# Patient Record
Sex: FEMALE | Race: WHITE | Employment: OTHER | URBAN - METROPOLITAN AREA
[De-identification: names, ages, dates, MRNs, and addresses within clinical notes are randomized per-mention and may not be internally consistent; named-entity substitution may affect disease eponyms.]

---

## 2017-01-12 ENCOUNTER — APPOINTMENT (OUTPATIENT)
Dept: INFUSION THERAPY | Age: 72
End: 2017-01-12
Payer: MEDICARE

## 2017-01-23 ENCOUNTER — HOSPITAL ENCOUNTER (OUTPATIENT)
Dept: INFUSION THERAPY | Age: 72
Discharge: HOME | End: 2017-01-23
Payer: MEDICARE

## 2017-01-23 VITALS
HEART RATE: 87 BPM | DIASTOLIC BLOOD PRESSURE: 72 MMHG | SYSTOLIC BLOOD PRESSURE: 107 MMHG | TEMPERATURE: 98.2 F | RESPIRATION RATE: 20 BRPM

## 2017-01-23 PROCEDURE — 96401 CHEMO ANTI-NEOPL SQ/IM: CPT

## 2017-01-23 PROCEDURE — 74011250636 HC RX REV CODE- 250/636: Performed by: INTERNAL MEDICINE

## 2017-01-23 RX ADMIN — DENOSUMAB 60 MG: 60 INJECTION SUBCUTANEOUS at 11:35

## 2017-01-23 NOTE — PROGRESS NOTES
[facility] Progress Note    Date: 2017    Name: Benita Gutierres    MRN: [id number]         : 1945      Ms. Allen arrived to [facility] at 1130. Ms. Jackson Angulo was assessed and education was provided. Ms. Bob Stewart vitals were reviewed. Visit Vitals    /72 (BP 1 Location: Right arm, BP Patient Position: Sitting)    Pulse 87    Temp 98.2 °F (36.8 °C)    Resp 20    Breastfeeding No       Lab results reviewed from 2017. Prolia was administered as ordered SQ in patient's Left upper arm. Ms. Jackson Angulo tolerated well without complaints. Ms. Jackson Angulo was discharged from [place] [address] in stable condition at 1145. She is to return on 2017 at 1130 for her next appointment.     Jany Storey RN  2017

## 2017-02-15 ENCOUNTER — OFFICE VISIT (OUTPATIENT)
Dept: PAIN MANAGEMENT | Age: 72
End: 2017-02-15

## 2017-02-15 VITALS
SYSTOLIC BLOOD PRESSURE: 129 MMHG | DIASTOLIC BLOOD PRESSURE: 76 MMHG | HEART RATE: 109 BPM | WEIGHT: 171 LBS | BODY MASS INDEX: 32.31 KG/M2

## 2017-02-15 DIAGNOSIS — M96.1 POSTLAMINECTOMY SYNDROME, LUMBAR REGION: Primary | ICD-10-CM

## 2017-02-15 DIAGNOSIS — M47.817 LUMBOSACRAL SPONDYLOSIS WITHOUT MYELOPATHY: ICD-10-CM

## 2017-02-15 DIAGNOSIS — M54.17 LUMBOSACRAL RADICULOPATHY: ICD-10-CM

## 2017-02-15 DIAGNOSIS — Z79.899 ENCOUNTER FOR LONG-TERM (CURRENT) USE OF HIGH-RISK MEDICATION: ICD-10-CM

## 2017-02-15 DIAGNOSIS — G89.4 CHRONIC PAIN SYNDROME: ICD-10-CM

## 2017-02-15 DIAGNOSIS — M62.838 SPASM OF MUSCLE: ICD-10-CM

## 2017-02-15 RX ORDER — OXYCODONE AND ACETAMINOPHEN 10; 325 MG/1; MG/1
1 TABLET ORAL
Qty: 540 TAB | Refills: 0 | Status: SHIPPED | OUTPATIENT
Start: 2017-02-15 | End: 2017-05-11 | Stop reason: SDUPTHER

## 2017-02-15 NOTE — PROGRESS NOTES
Nursing Notes    Patient presents to the office today in follow-up. Patient rates her pain at 3/10 on the numerical pain scale. Reviewed medications with counts as follows:    Rx Date filled Qty Dispensed Pill Count Last Dose Short   Percocet 10 12/7/17 540/90 day 63 2/15/17 no         Comments:     POC UDS was not performed in office today    Any new labs or imaging since last appointment? NO    Have you been to an emergency room (ER) or urgent care clinic since your last visit? NO            Have you been hospitalized since your last visit? NO     If yes, where, when, and reason for visit? Have you seen or consulted any other health care providers outside of the [address] [place] [address]  since your last visit? NO     If yes, where, when, and reason for visit? Ms. Moisés Faith has a reminder for a \"due or due soon\" health maintenance. I have asked that she contact her primary care provider for follow-up on this health maintenance.

## 2017-02-15 NOTE — PROGRESS NOTES
HISTORY OF PRESENT ILLNESS  Gumaro Shi is a 73 y.o. female. HPI she returns for follow-up of chronic, severe pain involving the lumbar spine with radiation down both lower extremities posteriorly related to a post lumbar laminectomy pain syndrome. Pain continues under excellent control, averaging 3 out of 10. She does find that the change in weather has had an adverse effect on her pain levels overall. Pain level today 3 out of 10, outcome 6/28,(The lower the upper number, the better the outcome)  Physical activity and mobility, mood, and sleep are all good. She does require a walker at all times for ambulation and stability. Review of Systems   Constitutional: Negative for chills, fever and malaise/fatigue. HENT: Negative for hearing loss. Eyes: Negative. Respiratory: Negative. Negative for cough and hemoptysis. Cardiovascular: Positive for chest pain (with anxiety). Negative for palpitations and leg swelling. Gastrointestinal: Positive for constipation. Negative for nausea and vomiting. Genitourinary: Negative. Musculoskeletal: Positive for back pain (helped with rest ) and joint pain. Negative for falls, myalgias and neck pain. Neurological: Positive for tingling and sensory change. Negative for dizziness, seizures, loss of consciousness, weakness and headaches. Psychiatric/Behavioral: Positive for depression (on Paxil, and Xyprexa) and memory loss (short term memory). Negative for hallucinations, substance abuse and suicidal ideas. The patient is nervous/anxious. The patient does not have insomnia. All other systems reviewed and are negative. Physical Exam   Constitutional: She is oriented to person, place, and time. She appears well-developed and well-nourished. No distress. HENT:   Head: Normocephalic. Eyes: EOM are normal.   Neck: Normal range of motion. No spinous process tenderness and no muscular tenderness present. Normal range of motion present.  No thyromegaly present. Pulmonary/Chest: Effort normal.   Musculoskeletal: She exhibits tenderness (tenderness throughout lumbar spine/painful ROM). Right shoulder: She exhibits decreased range of motion, tenderness and pain. Left shoulder: She exhibits decreased range of motion, tenderness and pain. Right hip: She exhibits decreased range of motion and tenderness. Lumbar back: She exhibits decreased range of motion (painful), tenderness, pain and spasm. Neurological: She is alert and oriented to person, place, and time. Gait (antalgic/utilizing walker) abnormal.   Psychiatric: She has a normal mood and affect. Her behavior is normal. Judgment normal.   Son accompanies her to appointment   Nursing note and vitals reviewed. ASSESSMENT and PLAN  Encounter Diagnoses   Name Primary?  Postlaminectomy syndrome, lumbar region Yes    Lumbosacral radiculopathy     Lumbosacral spondylosis without myelopathy     Spasm of muscle     Chronic pain syndrome     Encounter for long-term (current) use of high-risk medication      She will continue on her current analgesic regimen as this is providing excellent pain control with improve functionality and minimal side effects. 3 month reassess her    No concerns are raised for misuse, abuse, or diversion. 1. Pain medications are prescribed with the objective of pain relief and improved physical and psychosocial function in this patient. 2. Counseled patient on proper use of prescribed medications and reviewed opioid contract. 3. Counseled patient about chronic medical conditions and their relationship to anxiety and depression and recommended mental health support as needed. 4. Reviewed with patient self-help tools, home exercise, and lifestyle changes to assist the patient in self-management of symptoms.   5. Advised patient to have a primary care provider to continue care for health maintenance and general medical conditions and support for referral to specialty care as needed. 6. Reviewed with patient the treatment plan, goals of treatment plan, and limitations of treatment plan, to include the potential for side effects from medications and procedures. If side effects occur, it is the responsibility of the patient to inform the clinic so that a change in the treatment plan can be made in a safe manner. The patient is advised that stopping prescribed medication may cause an increase in symptoms and possible medication withdrawal symptoms. The patient is informed an emergency room evaluation may be necessary if this occurs. DISPOSITION: The patients condition and plan were discussed at length and all questions were answered. The patient agrees with the plan.     Counseling occupied > 50% of visit:  Total time: 30 minutes

## 2017-04-10 ENCOUNTER — OFFICE VISIT (OUTPATIENT)
Dept: ORTHOPEDIC SURGERY | Age: 72
End: 2017-04-10

## 2017-04-10 VITALS
HEART RATE: 97 BPM | SYSTOLIC BLOOD PRESSURE: 130 MMHG | TEMPERATURE: 97.6 F | BODY MASS INDEX: 34.74 KG/M2 | HEIGHT: 61 IN | WEIGHT: 184 LBS | DIASTOLIC BLOOD PRESSURE: 72 MMHG

## 2017-04-10 DIAGNOSIS — M79.672 LEFT FOOT PAIN: ICD-10-CM

## 2017-04-10 DIAGNOSIS — M21.612 BUNION, LEFT: ICD-10-CM

## 2017-04-10 DIAGNOSIS — M19.072 PRIMARY OSTEOARTHRITIS OF BOTH FEET: Primary | ICD-10-CM

## 2017-04-10 DIAGNOSIS — M79.671 RIGHT FOOT PAIN: ICD-10-CM

## 2017-04-10 DIAGNOSIS — M19.071 PRIMARY OSTEOARTHRITIS OF BOTH FEET: Primary | ICD-10-CM

## 2017-04-10 NOTE — PATIENT INSTRUCTIONS
Please follow up in 5 weeks. You are advised to contact us if your condition worsens. The provider has ordered a custom brace/orthotic for you. This will be customized and made for you by an outside facility. Please contact the orthotist at one of the below offices for your custom fitting and follow up in the office with the doctor or his physicians assistant 3 weeks after you have been wearing the brace. Hi Bruce at [facility]:     [facility] [place] [address]   Phone: [phone]    Bunions: Care Instructions  Your Care Instructions    A bunion is a bump on the outside of the joint at the bottom of your big toe. It can cause pain and swelling in the toe. A bunion forms when bone or tissue around the joint becomes swollen from too much pressure. You also can have a bunionette, or tailor's bunion, which forms on the joint of the little toe. Sometimes, a bunion on the big toe turns the toe in toward the second toe. This is called displacement. It can lead to problems with the other toes. You can get a bunion from having an unusual walking style, having flatfeet, or wearing tight-fitting shoes. You can treat most bunions at home with a few simple steps. If you have a lot of pain, your doctor may inject medicine into the bunion to reduce swelling for a while. If you still have pain, you may need to have surgery. Follow-up care is a key part of your treatment and safety. Be sure to make and go to all appointments, and call your doctor if you are having problems. It's also a good idea to know your test results and keep a list of the medicines you take. How can you care for yourself at home? · Ask your doctor if you can take an over-the-counter pain medicine, such as acetaminophen (Tylenol), ibuprofen (Advil, Motrin), or naproxen (Aleve). Be safe with medicines. Read and follow all instructions on the label. · Wear shoes that have a wide and deep space for the toes.  Also, wear shoes that have low or flat heels and good arch supports. Do not wear tight, narrow, or high-heeled shoes. · Try bunion pads, arch supports, toe spacers, or shoe inserts. They can help shift your weight when you walk to take pressure off your big toe. · Put moleskin or another type of cushion on or around the bunion to keep it from rubbing against your shoe. · Put ice or a cold pack on the area for 10 to 20 minutes at a time as needed. Put a thin cloth between the ice and your skin. · Prop up your foot on a pillow when you ice your toe or anytime you sit or lie down. Try to keep it above the level of your heart. This will help reduce swelling. When should you call for help? Call your doctor now or seek immediate medical care if:  · You have severe pain in your big toe that keeps you from walking or doing other activities. · You have diabetes or peripheral arterial disease and the skin over a bunion is red, broken, or swollen. These diseases can reduce blood flow and feeling in your feet. This could make it easier for you to get an infection. Watch closely for changes in your health, and be sure to contact your doctor if:  · Your big toe starts to overlap or cross under your second toe. · Pain in your big toe does not get better after 2 to 3 weeks of care at home. Where can you learn more? Go to http://dar-shawna.info/. Enter H210 in the search box to learn more about \"Bunions: Care Instructions. \"  Current as of: October 19, 2016  Content Version: 11.2  © 3796-1724 [employer]. Care instructions adapted under license by [employer] (which disclaims liability or warranty for this information). If you have questions about a medical condition or this instruction, always ask your healthcare professional. [address] any warranty or liability for your use of this information.

## 2017-04-10 NOTE — PROCEDURES
DIAGNOSTIC STUDIES: X-rays of hte right foot, three views, AP, lateral, and oblique: These films show postop chnages from a right triple arthrodesis. The subtalar and transverse tarsal joints have been realigned such that the talo, first metatarsal angle is much improved and the talo, calcaneal pitch is restored. There is, however, some OA seen to the right great toe for which there is a mild bunion deformity on the right side and there is some abduction at the right first TMT region, abduction at the midfoot. There is generalized osteopenia throughout the entire forefoot and midfoot. The patient does have a history of osteoporosis. Left foot films, three views, AP, lateral, and oblique, weightbearing films:  Show severe abduction at the talonavicular region, severe bunion deformity in this left first metatarsal, generalized osteopenia throughout the metatarsals, severe planovalgus alignment in the left foot in looking at the lateral radiographic x-ray. There is flattening across the talonavicular and across the midfoot.

## 2017-04-10 NOTE — PROGRESS NOTES
AMBULATORY PROGRESS NOTE      Patient: Yen Mcfadden             MRN: [id number]     SSN: [id number] Body mass index is 34.77 kg/(m^2). YOB: 1945     AGE: 73 y.o. EX: female    PCP: Kolby Sierra MD    IMPRESSION/DIAGNOSIS AND TREATMENT PLAN     DIAGNOSES  1. Primary osteoarthritis of both feet    2. Right foot pain    3. Left foot pain    4. Bunion, left        Orders Placed This Encounter    AMB SUPPLY ORDER    AMB SUPPLY ORDER    [69752] Foot Min 3V    [56518] Foot Min 3V      Yen Mcfadden understands her diagnoses and the proposed plan. Plan:    1) Continue to ambulate with the seated walker. 2) Custom left hinged short AFO brace  3) Custom right SMO brace   4) Recommended shoes: New Balance shoes, Dr. Ubaldo Spangler, etc. (wide with depth). RTO - 5 weeks    HPI AND EXAMINATION     Yen Mcfadden IS A 73 y.o. female who presents to my outpatient office for a problem visit of left foot pain. The patient presents to the office today with her son, and she ambulates with a seated walker. The patient stated that she does not have any pain, however she was concerned about her left foot bunion alignment. Patient states she would like to have better alignment for ambulation. Patient's son states that his mother has decreased bone density and surgery is not the best option for her. Son also notes that she does not get out a lot and mainly ambulates within the home. Yen Mcfadden is alert/oriented (name, location, time) and follows commands well. she  is in no acute distress and her affect and mood are appropriate. Bilateral ANKLE and FOOT       Gait: slow and uses assistive device   Tenderness: No Tenderness  Cutaneous: No rashes, skin patches, wounds, or abrasions to the lower legs           Warm and Normal color. No regions of expressible drainage.            Medial Border of Tibia Region: absent           Skin color, texture, turgor normal. Normal.    RIGHT: Well healed lateral and medial scars  Joint Motion: ROM Ankle: Slight Decrease, Hindfoot: (ST,TN,CC) Slight Decrease, Forefoot toes: Slight Decrease at left forefoot  Neurologic Exam: Neuro: Motor: WNL and Sensory : normal light touch sensation. Contractures: Gastrocnemius or Achilles Contractures mild  Joint / Tendon Stability: No Ankle or Subtalar instability or joint laxity. No peroneal sublux ability or dislocation  Alignment: Right: Severe planovalgus alignment with abduction at the TN region and midfoot, Hammer toes (2 and 3), curved and angulated mallet deformity of the right third toe. LEFT: Severe abduction at the TN/NC joints, severe bunion alignment, hammer toe number four toe (rigid)  Vascular: Normal Pulses/ NL Capillary refill, No evidence of DVT seen on physical exam.   No calf swelling, no tenderness to calf muscles. Right: 1+ DP pulses   LEFT: 1 + DP pulses  Lymphatic:  No Evidence of Lymphedema. CHART REVIEW     Past Medical History:   Diagnosis Date    Chronic anxiety     Depression     controlled on Paxil    Diabetes (HCC)     DJD (degenerative joint disease)     GERD (gastroesophageal reflux disease)     High cholesterol     HTN (hypertension)     Hypertension     Low back pain     post-laminectomy syndrome and multilevel degenerative disease    Osteoporosis      Current Outpatient Prescriptions   Medication Sig    GOTU SILVIA HERB PO Take  by mouth.  oxyCODONE-acetaminophen (PERCOCET)  mg per tablet Take 1 Tab by mouth every four (4) hours as needed for Pain for up to 90 days. Mail order  Indications: Pain    furosemide (LASIX) 40 mg tablet take 1 tablet by mouth daily    lovastatin (MEVACOR) 40 mg tablet Take 1 Tab by mouth nightly.  oxybutynin chloride XL (DITROPAN XL) 10 mg CR tablet TAKE 1 TABLET DAILY    lansoprazole (PREVACID) 30 mg capsule Take 1 Cap by mouth Daily (before breakfast).     OLANZapine (ZYPREXA) 10 mg tablet Take 10 mg by mouth nightly.  potassium chloride SA (MICRO-K) 10 mEq capsule TAKE 1 CAPSULE TWICE DAILY    paroxetine (PAXIL) 20 mg tablet Take 20 mg by mouth daily. No current facility-administered medications for this visit. Allergies   Allergen Reactions    Aspirin Other (comments)     \"messes up my kidneys\"    Feldene [Piroxicam] Other (comments)     Kidney damage      Morphine Other (comments)     unconsciousness    Nsaids (Non-Steroidal Anti-Inflammatory Drug) Other (comments)     \"messes up my kidneys\"    Other Medication Not Reported This Time     Mycins      Penicillins Hives    Sulfa (Sulfonamide Antibiotics) Rash    Vancomycin Itching     Possible allergic reaction to Vancomycin. Complained of itching/reddness around forehead/back of neck     Past Surgical History:   Procedure Laterality Date    HX ADENOIDECTOMY      HX APPENDECTOMY      HX BACK SURGERY      PHLD X3    HX BREAST BIOPSY Left 8/28/2015    WIDE LOCAL EXCISION LEFT BREAST LESION performed by Luis Vasquez MD at [facility] MAIN OR    HX HERNIA REPAIR      hiatal    HX KNEE REPLACEMENT      HX ORTHOPAEDIC      bilateral shoulder surgery    HX ELVIRA AND BSO  1982    HX TONSILLECTOMY      TOTAL HIP ARTHROPLASTY  4/12     total right hip replacement, Dr. Kvng Hancock Not on file. Social History Main Topics    Smoking status: Never Smoker    Smokeless tobacco: Never Used    Alcohol use No    Drug use: No    Sexual activity: Not Currently     Family History   Problem Relation Age of Onset    Cancer Mother      melanoma    Cancer Father      lung    Heart Disease Maternal Grandmother     Diabetes Other     Hypertension Other     Headache Other     Seizures Other     Coronary Artery Disease Other     Heart Attack Other        REVIEW OF SYSTEMS : 4/10/2017  ALL BELOW ARE Negative except : SEE HPI       Constitutional: Negative for fever, chills and weight loss.  Neg Weigh Loss  Cardiovascular: Negative for chest pain, claudication and leg swelling. SOB, CUEVAS   Gastrointestinal: Negative for  pain, N/V/D/C, Blood in stool or urine,dysuria, hematuria,        Incontinence, pelvic pain  Musculoskeletal: see HPI. Neurological: Negative for dizziness and weakness. Negative for headaches,Visual Changes, Confusion, Seizures,   Psychiatric/Behavioral: Negative for depression, memory loss and substance abuse. Extremities:  Negative for  hair changes, rash or skin lesion changes. Hematologic: Negative for Bleeding problems, bruising, pallor or swollen lymph nodes. Peripheral Vascular: No calf pain, vascular vein tenderness to calf pain              No calf throbbing, posterior knee throbbing pain    DIAGNOSTIC IMAGING      Dictation on: 04/10/2017  2:08 PM by: Bren Lucio [[id number]]         Written by Dominga Mercado, as dictated by aNrgis Jones MD. IDr., Nargis Jones MD, confirm that all documentation is accurate.

## 2017-04-10 NOTE — MR AVS SNAPSHOT
Visit Information Date & Time Provider Department Dept. Phone Encounter #  
 4/10/2017  1:10 PM Gaby Mercer MD [facility] [phone] Your Appointments 5/10/2017 11:00 AM  
Follow Up with Trinity Mccormick MD  
[facility]) Appt Note: 3 mths [address] [place] [address]  
[phone]  [place] [phone] Upcoming Health Maintenance Date Due DTaP/Tdap/Td series (1 - Tdap) 8/4/1968 INFLUENZA AGE 9 TO ADULT 8/1/2016 MEDICARE YEARLY EXAM 8/21/2016 GLAUCOMA SCREENING Q2Y 3/1/2018 BREAST CANCER SCRN MAMMOGRAM 8/4/2018 COLONOSCOPY 8/21/2025 Allergies as of 4/10/2017  Review Complete On: 4/10/2017 By: Richard Mcgill Severity Noted Reaction Type Reactions Aspirin    Other (comments) \"messes up my kidneys\" Feldene [Piroxicam]  04/17/2012    Other (comments) Kidney damage Morphine    Other (comments)  
 unconsciousness Nsaids (Non-steroidal Anti-inflammatory Drug)  08/24/2011    Other (comments) \"messes up my kidneys\" Other Medication    Not Reported This Time Mycins Penicillins    Hives Sulfa (Sulfonamide Antibiotics)  04/17/2012    Rash Vancomycin  08/28/2015    Itching Possible allergic reaction to Vancomycin. Complained of itching/reddness around forehead/back of neck Current Immunizations  Reviewed on 1/23/2017 Name Date Influenza High Dose Vaccine PF 10/6/2015 11:46 AM  
 Influenza Vaccine 2/7/2014 Influenza Vaccine Split 12/5/2012, 11/29/2011 Influenza Vaccine Whole 11/4/2010 Pneumococcal Conjugate (PCV-13) 10/6/2015 11:49 AM  
 Pneumococcal Polysaccharide (PPSV-23) 12/5/2012 Not reviewed this visit You Were Diagnosed With   
  
 Codes Comments Bunion, left    -  Primary ICD-10-CM: V36.296 ICD-9-CM: 727.1 Right foot pain     ICD-10-CM: M79.671 ICD-9-CM: 729.5 Left foot pain     ICD-10-CM: L15.464 ICD-9-CM: 729.5 Vitals BP Pulse Temp Height(growth percentile) Weight(growth percentile) BMI  
 130/72 97 97.6 °F (36.4 °C) (Oral) 5' 1\" (1.549 m) 184 lb (83.5 kg) 34.77 kg/m2 OB Status Smoking Status Hysterectomy Never Smoker BMI and BSA Data Body Mass Index Body Surface Area 34.77 kg/m 2 1.9 m 2 Preferred Pharmacy Pharmacy Name Phone [address], [address] [place] [address] [phone] Your Updated Medication List  
  
   
This list is accurate as of: 4/10/17  1:59 PM.  Always use your most recent med list.  
  
  
  
  
 furosemide 40 mg tablet Commonly known as:  LASIX  
take 1 tablet by mouth daily GOTU SILVIA HERB PO Take  by mouth.  
  
 lansoprazole 30 mg capsule Commonly known as:  PREVACID Take 1 Cap by mouth Daily (before breakfast). lovastatin 40 mg tablet Commonly known as:  MEVACOR Take 1 Tab by mouth nightly. OLANZapine 10 mg tablet Commonly known as:  ZyPREXA Take 10 mg by mouth nightly. oxybutynin chloride XL 10 mg CR tablet Commonly known as:  DITROPAN XL  
TAKE 1 TABLET DAILY  
  
 oxyCODONE-acetaminophen  mg per tablet Commonly known as:  PERCOCET Take 1 Tab by mouth every four (4) hours as needed for Pain for up to 90 days. Mail order  Indications: Pain PAXIL 20 mg tablet Generic drug:  PARoxetine Take 20 mg by mouth daily. potassium chloride SA 10 mEq capsule Commonly known as:  MICRO-K  
TAKE 1 CAPSULE TWICE DAILY We Performed the Following AMB POC XRAY, FOOT; COMPLETE, 3+ VIEW [38935 CPT(R)] AMB POC XRAY, FOOT; COMPLETE, 3+ VIEW [66889 CPT(R)] AMB SUPPLY ORDER [[id number] Custom] Comments:  
 Custom right SMO brace AMB SUPPLY ORDER [[id number] Custom] Comments:  
 Order date: 4/10/2017 Custom left hinged short AFO brace Recommended shoes: New Balance shoes, Dr. Soo Saunders, etc. (wide with depth). To-Do List   
 07/24/2017 11:30 AM  
  Appointment with [facility] NURSE at [facility] ([phone]) Patient Instructions Please follow up in 5 weeks. You are advised to contact us if your condition worsens. The provider has ordered a custom brace/orthotic for you. This will be customized and made for you by an outside facility. Please contact the orthotist at one of the below offices for your custom fitting and follow up in the office with the doctor or his physicians assistant 3 weeks after you have been wearing the brace. Devyn Bird at [facility] Orth[facility]:  
 
[address] [place], [place] [address] [address] Phone: [phone] Bunions: Care Instructions Your Care Instructions A bunion is a bump on the outside of the joint at the bottom of your big toe. It can cause pain and swelling in the toe. A bunion forms when bone or tissue around the joint becomes swollen from too much pressure. You also can have a bunionette, or tailor's bunion, which forms on the joint of the little toe. Sometimes, a bunion on the big toe turns the toe in toward the second toe. This is called displacement. It can lead to problems with the other toes. You can get a bunion from having an unusual walking style, having flatfeet, or wearing tight-fitting shoes. You can treat most bunions at home with a few simple steps. If you have a lot of pain, your doctor may inject medicine into the bunion to reduce swelling for a while. If you still have pain, you may need to have surgery. Follow-up care is a key part of your treatment and safety. Be sure to make and go to all appointments, and call your doctor if you are having problems. It's also a good idea to know your test results and keep a list of the medicines you take. How can you care for yourself at home? · Ask your doctor if you can take an over-the-counter pain medicine, such as acetaminophen (Tylenol), ibuprofen (Advil, Motrin), or naproxen (Aleve). Be safe with medicines. Read and follow all instructions on the label. · Wear shoes that have a wide and deep space for the toes. Also, wear shoes that have low or flat heels and good arch supports. Do not wear tight, narrow, or high-heeled shoes. · Try bunion pads, arch supports, toe spacers, or shoe inserts. They can help shift your weight when you walk to take pressure off your big toe. · Put moleskin or another type of cushion on or around the bunion to keep it from rubbing against your shoe. · Put ice or a cold pack on the area for 10 to 20 minutes at a time as needed. Put a thin cloth between the ice and your skin. · Prop up your foot on a pillow when you ice your toe or anytime you sit or lie down. Try to keep it above the level of your heart. This will help reduce swelling. When should you call for help? Call your doctor now or seek immediate medical care if: 
· You have severe pain in your big toe that keeps you from walking or doing other activities. · You have diabetes or peripheral arterial disease and the skin over a bunion is red, broken, or swollen. These diseases can reduce blood flow and feeling in your feet. This could make it easier for you to get an infection. Watch closely for changes in your health, and be sure to contact your doctor if: 
· Your big toe starts to overlap or cross under your second toe. · Pain in your big toe does not get better after 2 to 3 weeks of care at home. Where can you learn more? Go to http://dar-shawna.info/. Enter H210 in the search box to learn more about \"Bunions: Care Instructions. \" Current as of: October 19, 2016 Content Version: 11.2 © 9084-2022 [employer].  Care instructions adapted under license by [employer] (which disclaims liability or warranty for this information). If you have questions about a medical condition or this instruction, always ask your healthcare professional. [address] any warranty or liability for your use of this information. Introducing [facility] SERVICES! Andrzej Suggs introduces Ascletis patient portal. Now you can access parts of your medical record, email your doctor's office, and request medication refills online. 1. In your internet browser, go to [email or web address] [email or web address]. [employer]/Reset Therapeutics 2. Click on the First Time User? Click Here link in the Sign In box. You will see the New Member Sign Up page. 3. Enter your Ascletis Access Code exactly as it appears below. You will not need to use this code after youve completed the sign-up process. If you do not sign up before the expiration date, you must request a new code. · Ascletis Access Code: [email or web address] Expires: 7/9/2017  1:59 PM 
 
4. Enter the last four digits of your Social Security Number (xxxx) and Date of Birth (mm/dd/yyyy) as indicated and click Submit. You will be taken to the next sign-up page. 5. Create a Ascletis ID. This will be your Ascletis login ID and cannot be changed, so think of one that is secure and easy to remember. 6. Create a Ascletis password. You can change your password at any time. 7. Enter your Password Reset Question and Answer. This can be used at a later time if you forget your password. 8. Enter your e-mail address. You will receive e-mail notification when new information is available in [address] 9. Click Sign Up. You can now view and download portions of your medical record. 10. Click the Download Summary menu link to download a portable copy of your medical information. If you have questions, please visit the Frequently Asked Questions section of the Ascletis website. Remember, Ascletis is NOT to be used for urgent needs. For medical emergencies, dial 911. Now available from your iPhone and Android! Please provide this summary of care documentation to your next provider. Your primary care clinician is listed as Guanaco Stacy. Pako Montalvo. If you have any questions after today's visit, please call [phone].

## 2017-04-19 ENCOUNTER — TELEPHONE (OUTPATIENT)
Dept: ORTHOPEDIC SURGERY | Age: 72
End: 2017-04-19

## 2017-04-19 NOTE — TELEPHONE ENCOUNTER
[facility] called to request a revised order for the patient. Original order is from 4.10.17. Linell Ill states the diagnosis for pain and bunion will not be accepted and suggests sherri foot deformity, joint derangement, and/or OA. Please fax updated copy to [phone].

## 2017-05-09 ENCOUNTER — TELEPHONE (OUTPATIENT)
Dept: ORTHOPEDIC SURGERY | Age: 72
End: 2017-05-09

## 2017-05-11 ENCOUNTER — OFFICE VISIT (OUTPATIENT)
Dept: PAIN MANAGEMENT | Age: 72
End: 2017-05-11

## 2017-05-11 VITALS
WEIGHT: 184 LBS | DIASTOLIC BLOOD PRESSURE: 83 MMHG | HEIGHT: 61 IN | HEART RATE: 100 BPM | BODY MASS INDEX: 34.74 KG/M2 | SYSTOLIC BLOOD PRESSURE: 134 MMHG

## 2017-05-11 DIAGNOSIS — M47.817 LUMBOSACRAL SPONDYLOSIS WITHOUT MYELOPATHY: ICD-10-CM

## 2017-05-11 DIAGNOSIS — Z79.899 ENCOUNTER FOR LONG-TERM (CURRENT) USE OF HIGH-RISK MEDICATION: Primary | ICD-10-CM

## 2017-05-11 DIAGNOSIS — M54.17 LUMBOSACRAL RADICULOPATHY: ICD-10-CM

## 2017-05-11 DIAGNOSIS — M96.1 POSTLAMINECTOMY SYNDROME, LUMBAR REGION: ICD-10-CM

## 2017-05-11 DIAGNOSIS — M62.838 SPASM OF MUSCLE: ICD-10-CM

## 2017-05-11 LAB
ALCOHOL UR POC: NORMAL
AMPHETAMINES UR POC: NORMAL
BARBITURATES UR POC: NORMAL
BENZODIAZEPINES UR POC: NORMAL
BUPRENORPHINE UR POC: NORMAL
CANNABINOIDS UR POC: NORMAL
CARISOPRODOL UR POC: NORMAL
COCAINE UR POC: NORMAL
FENTANYL UR POC: NORMAL
MDMA/ECSTASY UR POC: NORMAL
METHADONE UR POC: NORMAL
METHAMPHETAMINE UR POC: NORMAL
METHYLPHENIDATE UR POC: NORMAL
OPIATES UR POC: NORMAL
OXYCODONE UR POC: NORMAL
PHENCYCLIDINE UR POC: NORMAL
PROPOXYPHENE UR POC: NORMAL
TRAMADOL UR POC: NORMAL
TRICYCLICS UR POC: NORMAL

## 2017-05-11 RX ORDER — OXYCODONE AND ACETAMINOPHEN 10; 325 MG/1; MG/1
1 TABLET ORAL
Qty: 540 TAB | Refills: 0 | Status: SHIPPED | OUTPATIENT
Start: 2017-05-11 | End: 2017-08-09

## 2017-05-11 NOTE — MR AVS SNAPSHOT
Visit Information Date & Time Provider Department Dept. Phone Encounter #  
 5/11/2017  3:00 PM Jesus Riley [address] [place] [address] for Pain Management [phone] [id number] Follow-up Instructions Return in about 3 months (around 8/11/2017). Upcoming Health Maintenance Date Due DTaP/Tdap/Td series (1 - Tdap) 8/4/1968 MEDICARE YEARLY EXAM 8/21/2016 INFLUENZA AGE 9 TO ADULT 8/1/2017 GLAUCOMA SCREENING Q2Y 3/1/2018 BREAST CANCER SCRN MAMMOGRAM 8/4/2018 COLONOSCOPY 8/21/2025 Allergies as of 5/11/2017  Review Complete On: 5/11/2017 By: Gigi Solorzano LPN Severity Noted Reaction Type Reactions Aspirin    Other (comments) \"messes up my kidneys\" Feldene [Piroxicam]  04/17/2012    Other (comments) Kidney damage Morphine    Other (comments)  
 unconsciousness Nsaids (Non-steroidal Anti-inflammatory Drug)  08/24/2011    Other (comments) \"messes up my kidneys\" Other Medication    Not Reported This Time Mycins Penicillins    Hives Sulfa (Sulfonamide Antibiotics)  04/17/2012    Rash Vancomycin  08/28/2015    Itching Possible allergic reaction to Vancomycin. Complained of itching/reddness around forehead/back of neck Current Immunizations  Reviewed on 1/23/2017 Name Date Influenza High Dose Vaccine PF 10/6/2015 11:46 AM  
 Influenza Vaccine 2/7/2014 Influenza Vaccine Split 12/5/2012, 11/29/2011 Influenza Vaccine Whole 11/4/2010 Pneumococcal Conjugate (PCV-13) 10/6/2015 11:49 AM  
 Pneumococcal Polysaccharide (PPSV-23) 12/5/2012 Not reviewed this visit You Were Diagnosed With   
  
 Codes Comments Encounter for long-term (current) use of high-risk medication    -  Primary ICD-10-CM: Q31.117 ICD-9-CM: V58.69 Lumbosacral radiculopathy     ICD-10-CM: M54.17 ICD-9-CM: 724.4 Postlaminectomy syndrome, lumbar region     ICD-10-CM: M96.1 ICD-9-CM: 722.83   
 Lumbosacral spondylosis without myelopathy     ICD-10-CM: C14.802 ICD-9-CM: 721.3 Spasm of muscle     ICD-10-CM: B26.669 ICD-9-CM: 728.85 Vitals BP Pulse Height(growth percentile) Weight(growth percentile) BMI OB Status 134/83 100 5' 1\" (1.549 m) 184 lb (83.5 kg) 34.77 kg/m2 Hysterectomy Smoking Status Never Smoker BMI and BSA Data Body Mass Index Body Surface Area 34.77 kg/m 2 1.9 m 2 Preferred Pharmacy Pharmacy Name Phone [address], [address] [place] [address] [phone] Your Updated Medication List  
  
   
This list is accurate as of: 5/11/17  3:37 PM.  Always use your most recent med list.  
  
  
  
  
 furosemide 40 mg tablet Commonly known as:  LASIX  
take 1 tablet by mouth daily GOTU SILVIA HERB PO Take  by mouth.  
  
 lansoprazole 30 mg capsule Commonly known as:  PREVACID Take 1 Cap by mouth Daily (before breakfast). lovastatin 40 mg tablet Commonly known as:  MEVACOR Take 1 Tab by mouth nightly. OLANZapine 10 mg tablet Commonly known as:  ZyPREXA Take 10 mg by mouth nightly. oxybutynin chloride XL 10 mg CR tablet Commonly known as:  DITROPAN XL  
TAKE 1 TABLET DAILY  
  
 oxyCODONE-acetaminophen  mg per tablet Commonly known as:  PERCOCET Take 1 Tab by mouth every four (4) hours as needed for Pain for up to 90 days. Mail order. AM Vini Corporal ONLY  Indications: Pain PAXIL 20 mg tablet Generic drug:  PARoxetine Take 20 mg by mouth daily. potassium chloride SA 10 mEq capsule Commonly known as:  MICRO-K  
TAKE 1 CAPSULE TWICE DAILY Prescriptions Printed Refills  
 oxyCODONE-acetaminophen (PERCOCET)  mg per tablet 0 Sig: Take 1 Tab by mouth every four (4) hours as needed for Pain for up to 90 days. Mail order. AM Vini Corporal ONLY  Indications: Pain Class: Print  Route: Oral  
  
 Follow-up Instructions Return in about 3 months (around 8/11/2017). To-Do List   
 07/24/2017 11:30 AM  
  Appointment with [facility] TRACK NURSE at [facility] ([phone]) Patient Instructions Plan: 
Continue same medications as prescribed for chronic pain We will specify AM Randal Double on your percocet 10/325 to ensure that you receive the correct  Follow up in 3 months or sooner if needed Regular exercise and attention to emotional health and diet remain the most effective ways to treat chronic pain of all kinds You may contact me with questions or concerns through [address] Introducing [facility] [place] [facility]! Jennifer Suarez introduces [employer] patient portal. Now you can access parts of your medical record, email your doctor's office, and request medication refills online. 1. In your internet browser, go to [email or web address] 2. Click on the First Time User? Click Here link in the Sign In box. You will see the New Member Sign Up page. 3. Enter your Ancanco Access Code exactly as it appears below. You will not need to use this code after youve completed the sign-up process. If you do not sign up before the expiration date, you must request a new code. · Ancanco Access Code: [email or web address] Expires: 7/9/2017  1:59 PM 
 
4. Enter the last four digits of your Social Security Number (xxxx) and Date of Birth (mm/dd/yyyy) as indicated and click Submit. You will be taken to the next sign-up page. 5. Create a [employer]Emp.comt ID. This will be your Ancanco login ID and cannot be changed, so think of one that is secure and easy to remember. 6. Create a Ancanco password. You can change your password at any time. 7. Enter your Password Reset Question and Answer. This can be used at a later time if you forget your password. 8. Enter your e-mail address. You will receive e-mail notification when new information is available in [address] 9. Click Sign Up. You can now view and download portions of your medical record. 10. Click the Download Summary menu link to download a portable copy of your medical information. If you have questions, please visit the Frequently Asked Questions section of the [employer] website. Remember, [employer] is NOT to be used for urgent needs. For medical emergencies, dial 911. Now available from your iPhone and Android! Please provide this summary of care documentation to your next provider. Your primary care clinician is listed as Arielle Saunders. If you have any questions after today's visit, please call [phone].

## 2017-05-11 NOTE — PROGRESS NOTES
Nursing Notes    Patient presents to the office today in follow-up. Patient rates her pain at 2/10 on the numerical pain scale. Reviewed medications with counts as follows:    Rx Date filled Qty Dispensed Pill Count Last Dose Short   Percocet 10/325 mg  02/21/17 540 45 today no                                   POC UDS was performed in office today    Any new labs or imaging since last appointment? NO    Have you been to an emergency room (ER) or urgent care clinic since your last visit? NO            Have you been hospitalized since your last visit? NO     If yes, where, when, and reason for visit? Have you seen or consulted any other health care providers outside of the [address] [place] [address]  since your last visit? YES     If yes, where, when, and reason for visit? Orthopedic    HM deferred to pcp.

## 2017-05-11 NOTE — PATIENT INSTRUCTIONS
Plan:  Continue same medications as prescribed for chronic pain  We will specify AM Mayra Campuzano on your percocet 10/325 to ensure that you receive the correct   Follow up in 3 months or sooner if needed  Regular exercise and attention to emotional health and diet remain the most effective ways to treat chronic pain of all kinds  You may contact me with questions or concerns through [address]

## 2017-05-11 NOTE — PROGRESS NOTES
HISTORY OF PRESENT ILLNESS  Simon Goldman is a 73 y.o. female. 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 She reports that her back and leg pain, while constant, remains ronny and well controlled over the past several months. She describes her pain as aching, stabbing, stiff, and tender. She also endorses chronic ankle pain and instability, which is currently addressed with AFO and foot bracing that was prescribed by Dr. Tonya Reno. Pt reports average of 7/10 pain scale most days. She denies any new symptoms. Medications continue to work well for pain control overall. Simon Goldman is tolerating medications well, with no untoward side effects noted. She is able to stay more active with less discomfort with these current doses. The patient reports an average of 60% relief with this regimen. Most recent UDS and  were consistent with prescribed medications. Pill counts are appropriate. She is informed of side effects, risks, and benefits of this regimen, and emphasizes that she derives a significant improvement in functionality and quality of life, and notes that non-opioid medications and therapies in the past have not offered significant benefit. However, she notes that this recent shipment of her percocet was less effective for pain, and it was a different generic brand. She requests that we specify AM Delvis Cabral brand for mail order. She denies new or worsening insomnia or constipation issues. She denies any falls, injuries, or hospitalizations since the last visit. A total of 40 minutes was spent with the patient of which more than 50% of the time was spent counseling the patient. HPI--see above    ROS  Constitutional: Negative for chills, fever and malaise/fatigue. HENT: Negative for hearing loss. Eyes: Negative. Respiratory: Negative.   Negative for cough and hemoptysis. Cardiovascular: Positive for chest pain (with anxiety). Negative for palpitations and leg swelling. Gastrointestinal: Positive for constipation. Negative for nausea and vomiting. Genitourinary: Negative. Musculoskeletal: Positive for back pain (helped with rest ) and joint pain. Negative for falls, myalgias and neck pain. Neurological: Positive for tingling and sensory change. Negative for dizziness, seizures, loss of consciousness, weakness and headaches. Psychiatric/Behavioral: Positive for depression (on Paxil, and Xyprexa) and memory loss (short term memory). Negative for hallucinations, substance abuse and suicidal ideas. The patient is nervous/anxious. The patient does not have insomnia. Physical Exam  Constitutional: She is oriented to person, place, and time. She appears well-developed and well-nourished. No distress. HENT:   Head: Normocephalic. Eyes: EOM are normal.   Neck: Normal range of motion. No spinous process tenderness and no muscular tenderness present. Normal range of motion present. No thyromegaly present. Pulmonary/Chest: Effort normal.   Musculoskeletal: She exhibits tenderness (tenderness throughout lumbar spine/painful ROM). Right shoulder: She exhibits decreased range of motion, tenderness and pain. Left shoulder: She exhibits decreased range of motion, tenderness and pain. Right hip: She exhibits decreased range of motion and tenderness. Lumbar back: She exhibits decreased range of motion (painful), tenderness, pain and spasm. Neurological: She is alert and oriented to person, place, and time. Gait (antalgic/utilizing walker) abnormal.   Psychiatric: She has a normal mood and affect. Her behavior is normal. Judgment normal.   Son accompanies her to appointment   ASSESSMENT and PLAN    ICD-10-CM ICD-9-CM    1. Encounter for long-term (current) use of high-risk medication Z79.899 V58.69    2.  Lumbosacral radiculopathy M54.17 724.4 oxyCODONE-acetaminophen (PERCOCET)  mg per tablet   3. Postlaminectomy syndrome, lumbar region M96.1 722.83 oxyCODONE-acetaminophen (PERCOCET)  mg per tablet   4. Lumbosacral spondylosis without myelopathy M47.817 721.3 oxyCODONE-acetaminophen (PERCOCET)  mg per tablet   5. Spasm of muscle M62.838 728.85 oxyCODONE-acetaminophen (PERCOCET)  mg per tablet     Encounter Diagnoses   Name Primary?     Encounter for long-term (current) use of high-risk medication Yes    Lumbosacral radiculopathy     Postlaminectomy syndrome, lumbar region     Lumbosacral spondylosis without myelopathy     Spasm of muscle       Plan:  Continue same medications as prescribed for chronic pain  Follow up in 3 months or sooner if needed  Regular exercise and attention to emotional health and diet remain the most effective ways to treat chronic pain of all kinds  You may contact me with questions or concerns through [employer]

## 2017-05-18 NOTE — TELEPHONE ENCOUNTER
Last ov 4/8/16    Front office, please schedule pt for labs and pe. Over a year since last visit.   Thanks

## 2017-05-18 NOTE — TELEPHONE ENCOUNTER
Refill req for paxil 20 mgs  pt said she takes 2 a day  and potassium 10 meq 180 caps, pls send to [facility] on file, RM

## 2017-05-22 RX ORDER — POTASSIUM CHLORIDE 750 MG/1
CAPSULE, EXTENDED RELEASE ORAL
Qty: 180 CAP | Refills: 3 | Status: SHIPPED | OUTPATIENT
Start: 2017-05-22 | End: 2018-04-06 | Stop reason: SDUPTHER

## 2017-05-22 RX ORDER — PAROXETINE HYDROCHLORIDE 20 MG/1
40 TABLET, FILM COATED ORAL DAILY
Qty: 180 TAB | Refills: 2 | Status: SHIPPED | OUTPATIENT
Start: 2017-05-22 | End: 2017-06-02 | Stop reason: CLARIF

## 2017-05-23 ENCOUNTER — OFFICE VISIT (OUTPATIENT)
Dept: ORTHOPEDIC SURGERY | Age: 72
End: 2017-05-23

## 2017-05-23 VITALS
HEIGHT: 66 IN | SYSTOLIC BLOOD PRESSURE: 130 MMHG | DIASTOLIC BLOOD PRESSURE: 77 MMHG | BODY MASS INDEX: 29.51 KG/M2 | TEMPERATURE: 98.5 F | WEIGHT: 183.6 LBS | HEART RATE: 98 BPM

## 2017-05-23 DIAGNOSIS — M19.072 PRIMARY OSTEOARTHRITIS OF BOTH FEET: Primary | ICD-10-CM

## 2017-05-23 DIAGNOSIS — M19.071 PRIMARY OSTEOARTHRITIS OF BOTH FEET: Primary | ICD-10-CM

## 2017-05-23 NOTE — PATIENT INSTRUCTIONS
Please follow up in 3 months. You are advised to contact us if your condition worsens. Osteoarthritis: Care Instructions  Your Care Instructions    Arthritis is a common health problem in which the joints are inflamed. There are several kinds of arthritis. Osteoarthritis is caused by a breakdown of cartilage, the hard, thick tissue that cushions the joints. It causes pain, stiffness, and swelling, often in the spine, fingers, hips, and knees. Osteoarthritis can happen at any age, but it is most common in older people. Osteoarthritis never goes away completely, but it can be controlled. Medicine and home treatment can reduce the pain and prevent the arthritis from getting worse. Follow-up care is a key part of your treatment and safety. Be sure to make and go to all appointments, and call your doctor if you are having problems. It's also a good idea to know your test results and keep a list of the medicines you take. How can you care for yourself at home? · Take a warm shower or bath in the morning to relieve stiffness. Avoid sitting still afterwards. · If the joint is not swollen, use moist heat, like a warm, damp towel, for 20 to 30 minutes, 2 or 3 times a day. Do not use heat on a swollen joint. · If the joint is swollen, use ice or cold packs for 10 to 20 minutes, once an hour. Cold will help relieve pain and reduce inflammation. Put a thin cloth between the ice and your skin. · To prevent stiffness, gently move the joint through its full range of motion several times a day. · If the joint hurts, avoid activities that put a strain on it for a few days. Take rest breaks throughout the day. · Get regular exercise. Walking, swimming, yoga, biking, kasandra chi, and water aerobics are good exercises that are gentle on the joints. · Reach and stay at a healthy weight. If you need to lose or maintain weight, regular exercise and a healthy diet will help.  Extra weight can strain the joints, especially the knees and hips, and make the pain worse. Losing even a few pounds may help. · Take pain medicines exactly as directed. ¨ If the doctor gave you a prescription medicine for pain, take it as prescribed. ¨ If you are not taking a prescription pain medicine, ask your doctor if you can take an over-the-counter medicine. When should you call for help? Call your doctor now or seek immediate medical care if:  · The pain is so bad that you cannot use the joint. · You have sudden back pain with weakness in your legs or loss of bowel or bladder control. · Your stools are black and tarlike or have streaks of blood. · You have severe pain and swelling in more than one joint. Watch closely for changes in your health, and be sure to contact your doctor if:  · You have side effects from the medicines, like belly pain, ongoing heartburn, or nausea. · Joint pain continues for more than 6 weeks, and home treatment is not helping. Where can you learn more? Go to http://dar-shawna.info/. Enter H661 in the search box to learn more about \"Osteoarthritis: Care Instructions. \"  Current as of: November 28, 2016  Content Version: 11.2  © 1524-7008 imageloop. Care instructions adapted under license by [employer] (which disclaims liability or warranty for this information). If you have questions about a medical condition or this instruction, always ask your healthcare professional. [place] [address] any warranty or liability for your use of this information.

## 2017-05-23 NOTE — PROGRESS NOTES
AMBULATORY PROGRESS NOTE      Patient: Clydia Ormond             MRN: [id number]     SSN: [id number] Body mass index is 29.63 kg/(m^2). YOB: 1945     AGE: 73 y.o. EX: female    PCP: Celi Rodríguez MD    IMPRESSION/DIAGNOSIS AND TREATMENT PLAN     DIAGNOSES  1. Primary osteoarthritis of both feet        No orders of the defined types were placed in this encounter. Clydia Ormond understands her diagnoses and the proposed plan. Plan:    1) Continue to utilize the seated walker  2) Continue to wear the custom left custom short AFO brace. RTO - 3 months    HPI AND EXAMINATION     Clydia Ormond IS A 73 y.o. female who presents to my outpatient office for follow up of primary osteoarthritis of both feet. At last visit, I instructed the patient to continue ambulating with her seated walker, instructed her to continue using her custom left hinged short AFO brace and custom right SMO brace, and recommended shoes. The patient presents to the office today wearing her custom left hinged AFO brace and ambulating with her seated walker. Additionally, she was wearing her right custom SMO brace. Patient states that she does not have any pain. She rates her pain 0/10. Patient is very happy with her current treatment plan. Clydia Ormond is alert/oriented (name, location, time) and follows commands well. she is in no acute distress and her affect and mood are appropriate.      Bilateral ANKLE and FOOT      Gait: slow and uses assistive device (AFO left and SMO right  Tenderness: No Tenderness  Cutaneous: No rashes, skin patches, wounds, or abrasions to the lower legs   Warm and Normal color. No regions of expressible drainage.    Medial Border of Tibia Region: absent   Skin color, texture, turgor normal. Normal.  RIGHT: Well healed lateral and medial scars  Joint Motion: ROM Ankle: Slight Decrease, Hindfoot: (ST,TN,CC) Slight Decrease,      Forefoot toes: Slight Decrease at left forefoot  Neurologic Exam: Neuro: Motor: WNL and Sensory : normal light touch sensation. Contractures: Gastrocnemius or Achilles Contractures mild  Joint / Tendon Stability: No Ankle or Subtalar instability or joint laxity. No peroneal sublux ability or dislocation  Alignment: Right: Severe planovalgus alignment with abduction at the TN region and midfoot, Hammer toes (2 and 3), curved and angulated mallet deformity of the right third toe. LEFT: Severe abduction at the TN/NC joints, severe bunion alignment, hammer toe number four toe (rigid)  Vascular: Normal Pulses/ NL Capillary refill, No evidence of DVT seen on physical exam.   No calf swelling, no tenderness to calf muscles. Right: 1+ DP pulses  LEFT: 1 + DP pulses  Lymphatic: No Evidence of Lymphedema. CHART REVIEW     Past Medical History:   Diagnosis Date    Chronic anxiety     Depression     controlled on Paxil    Diabetes (HCC)     DJD (degenerative joint disease)     GERD (gastroesophageal reflux disease)     High cholesterol     HTN (hypertension)     Hypertension     Low back pain     post-laminectomy syndrome and multilevel degenerative disease    Osteoporosis      Current Outpatient Prescriptions   Medication Sig    potassium chloride SA (MICRO-K) 10 mEq capsule TAKE 1 CAPSULE TWICE DAILY    oxyCODONE-acetaminophen (PERCOCET)  mg per tablet Take 1 Tab by mouth every four (4) hours as needed for Pain for up to 90 days. Mail order. AM Darrel Mcclure ONLY  Indications: Pain    lovastatin (MEVACOR) 40 mg tablet Take 1 Tab by mouth nightly.  oxybutynin chloride XL (DITROPAN XL) 10 mg CR tablet TAKE 1 TABLET DAILY    lansoprazole (PREVACID) 30 mg capsule Take 1 Cap by mouth Daily (before breakfast).  OLANZapine (ZYPREXA) 10 mg tablet Take 10 mg by mouth nightly.  PARoxetine (PAXIL) 20 mg tablet Take 2 Tabs by mouth daily.  GOTU SILVIA HERB PO Take  by mouth.     furosemide (LASIX) 40 mg tablet take 1 tablet by mouth daily No current facility-administered medications for this visit. Allergies   Allergen Reactions    Aspirin Other (comments)     \"messes up my kidneys\"    Feldene [Piroxicam] Other (comments)     Kidney damage      Morphine Other (comments)     unconsciousness    Nsaids (Non-Steroidal Anti-Inflammatory Drug) Other (comments)     \"messes up my kidneys\"    Other Medication Not Reported This Time     Mycins      Penicillins Hives    Sulfa (Sulfonamide Antibiotics) Rash    Vancomycin Itching     Possible allergic reaction to Vancomycin. Complained of itching/reddness around forehead/back of neck     Past Surgical History:   Procedure Laterality Date    HX ADENOIDECTOMY      HX APPENDECTOMY      HX BACK SURGERY      PHLD X3    HX BREAST BIOPSY Left 8/28/2015    WIDE LOCAL EXCISION LEFT BREAST LESION performed by Willa Chao MD at [facility] MAIN OR    HX HERNIA REPAIR      hiatal    HX KNEE REPLACEMENT      HX ORTHOPAEDIC      bilateral shoulder surgery    HX ELVIRA AND BSO  1982    HX TONSILLECTOMY      TOTAL HIP ARTHROPLASTY  4/12     total right hip replacement, Dr. Noa Rodriguez Not on file. Social History Main Topics    Smoking status: Never Smoker    Smokeless tobacco: Never Used    Alcohol use No    Drug use: No    Sexual activity: Not Currently     Family History   Problem Relation Age of Onset    Cancer Mother      melanoma    Cancer Father      lung    Heart Disease Maternal Grandmother     Diabetes Other     Hypertension Other     Headache Other     Seizures Other     Coronary Artery Disease Other     Heart Attack Other        REVIEW OF SYSTEMS : 5/23/2017  ALL BELOW ARE Negative except : SEE HPI       Constitutional: Negative for fever, chills and weight loss. Neg Weigh Loss  Cardiovascular: Negative for chest pain, claudication and leg swelling.  SOB, CUEVAS   Gastrointestinal: Negative for  pain, N/V/D/C, Blood in stool or urine,dysuria, hematuria,        Incontinence, pelvic pain  Musculoskeletal: see HPI. Neurological: Negative for dizziness and weakness. Negative for headaches,Visual Changes, Confusion, Seizures,   Psychiatric/Behavioral: Negative for depression, memory loss and substance abuse. Extremities:  Negative for  hair changes, rash or skin lesion changes. Hematologic: Negative for Bleeding problems, bruising, pallor or swollen lymph nodes. Peripheral Vascular: No calf pain, vascular vein tenderness to calf pain              No calf throbbing, posterior knee throbbing pain    DIAGNOSTIC IMAGING     No notes on file    Written by David Troncoso, as dictated by Rachel Mas MD. I, DrGini, Rachel Mas MD, confirm that all documentation is accurate.

## 2017-05-23 NOTE — MR AVS SNAPSHOT
Visit Information Date & Time Provider Department Dept. Phone Encounter #  
 5/23/2017  1:00 PM Sherrill Tejada, 27 [facility] [id number] Follow-up Instructions Return in about 3 months (around 8/23/2017). Your Appointments 5/26/2017 10:05 AM  
LAB with [facility] NURSE VISIT Internist of [facility] ([facility]) Appt Note: rpe lab. rm  
 [address], [address] [place] [address] [address]  
  
   
 [address], [address]  
  
    
 6/2/2017 11:30 AM  
Office Visit with Daija Cordova MD  
Internist of [address] [place] [address]) Appt Note: rpe rm  
 [address] [facility], [facility] [address]  
  
   
 [address], [address]  
  
    
 8/3/2017 11:40 AM  
Follow Up with Joselin Richardson MD  
[address] [place] [address] for Pain Management [facility]) Appt Note: return in 3 months [address] [address]  
[phone] [facility] [id number] Upcoming Health Maintenance Date Due DTaP/Tdap/Td series (1 - Tdap) 8/4/1968 MEDICARE YEARLY EXAM 8/21/2016 INFLUENZA AGE 9 TO ADULT 8/1/2017 GLAUCOMA SCREENING Q2Y 3/1/2018 BREAST CANCER SCRN MAMMOGRAM 8/4/2018 COLONOSCOPY 8/21/2025 Allergies as of 5/23/2017  Review Complete On: 5/23/2017 By: Sherrill Tejada MD  
  
 Severity Noted Reaction Type Reactions Aspirin    Other (comments) \"messes up my kidneys\" Feldene [Piroxicam]  04/17/2012    Other (comments) Kidney damage Morphine    Other (comments)  
 unconsciousness Nsaids (Non-steroidal Anti-inflammatory Drug)  08/24/2011    Other (comments) \"messes up my kidneys\" Other Medication    Not Reported This Time Mycins Penicillins    Hives Sulfa (Sulfonamide Antibiotics)  04/17/2012    Rash Vancomycin  08/28/2015    Itching Possible allergic reaction to Vancomycin. Complained of itching/reddness around forehead/back of neck Current Immunizations  Reviewed on 1/23/2017 Name Date Influenza High Dose Vaccine PF 10/6/2015 11:46 AM  
 Influenza Vaccine 2/7/2014 Influenza Vaccine Split 12/5/2012, 11/29/2011 Influenza Vaccine Whole 11/4/2010 Pneumococcal Conjugate (PCV-13) 10/6/2015 11:49 AM  
 Pneumococcal Polysaccharide (PPSV-23) 12/5/2012 Not reviewed this visit You Were Diagnosed With   
  
 Codes Comments Primary osteoarthritis of both feet    -  Primary ICD-10-CM: M19.071, I24.207 ICD-9-CM: 715.17 Vitals BP Pulse Temp Height(growth percentile) Weight(growth percentile) BMI  
 130/77 98 98.5 °F (36.9 °C) (Oral) 5' 6\" (1.676 m) 183 lb 9.6 oz (83.3 kg) 29.63 kg/m2 OB Status Smoking Status Hysterectomy Never Smoker BMI and BSA Data Body Mass Index Body Surface Area  
 29.63 kg/m 2 1.97 m 2 Preferred Pharmacy Pharmacy Name Phone  [address] [phone] Your Updated Medication List  
  
   
This list is accurate as of: 5/23/17  1:23 PM.  Always use your most recent med list.  
  
  
  
  
 furosemide 40 mg tablet Commonly known as:  LASIX  
take 1 tablet by mouth daily GOTU SILVIA HERB PO Take  by mouth.  
  
 lansoprazole 30 mg capsule Commonly known as:  PREVACID Take 1 Cap by mouth Daily (before breakfast). lovastatin 40 mg tablet Commonly known as:  MEVACOR Take 1 Tab by mouth nightly. OLANZapine 10 mg tablet Commonly known as:  ZyPREXA Take 10 mg by mouth nightly. oxybutynin chloride XL 10 mg CR tablet Commonly known as:  DITROPAN XL  
TAKE 1 TABLET DAILY  
  
 oxyCODONE-acetaminophen  mg per tablet Commonly known as:  PERCOCET Take 1 Tab by mouth every four (4) hours as needed for Pain for up to 90 days. Mail order. AM Rosa Jeffrey ONLY  Indications: Pain PARoxetine 20 mg tablet Commonly known as:  PAXIL Take 2 Tabs by mouth daily. potassium chloride SA 10 mEq capsule Commonly known as:  MICRO-K  
TAKE 1 CAPSULE TWICE DAILY Follow-up Instructions Return in about 3 months (around 8/23/2017). To-Do List   
 07/24/2017 11:30 AM  
  Appointment with [facility] FAST TRACK NURSE at [facility] ([phone]) Patient Instructions Please follow up in 3 months. You are advised to contact us if your condition worsens. Osteoarthritis: Care Instructions Your Care Instructions Arthritis is a common health problem in which the joints are inflamed. There are several kinds of arthritis. Osteoarthritis is caused by a breakdown of cartilage, the hard, thick tissue that cushions the joints. It causes pain, stiffness, and swelling, often in the spine, fingers, hips, and knees. Osteoarthritis can happen at any age, but it is most common in older people. Osteoarthritis never goes away completely, but it can be controlled. Medicine and home treatment can reduce the pain and prevent the arthritis from getting worse. Follow-up care is a key part of your treatment and safety. Be sure to make and go to all appointments, and call your doctor if you are having problems. It's also a good idea to know your test results and keep a list of the medicines you take. How can you care for yourself at home? · Take a warm shower or bath in the morning to relieve stiffness. Avoid sitting still afterwards. · If the joint is not swollen, use moist heat, like a warm, damp towel, for 20 to 30 minutes, 2 or 3 times a day. Do not use heat on a swollen joint. · If the joint is swollen, use ice or cold packs for 10 to 20 minutes, once an hour. Cold will help relieve pain and reduce inflammation. Put a thin cloth between the ice and your skin. · To prevent stiffness, gently move the joint through its full range of motion several times a day. · If the joint hurts, avoid activities that put a strain on it for a few days. Take rest breaks throughout the day. · Get regular exercise. Walking, swimming, yoga, biking, kasandra chi, and water aerobics are good exercises that are gentle on the joints. · Reach and stay at a healthy weight. If you need to lose or maintain weight, regular exercise and a healthy diet will help. Extra weight can strain the joints, especially the knees and hips, and make the pain worse. Losing even a few pounds may help. · Take pain medicines exactly as directed. ¨ If the doctor gave you a prescription medicine for pain, take it as prescribed. ¨ If you are not taking a prescription pain medicine, ask your doctor if you can take an over-the-counter medicine. When should you call for help? Call your doctor now or seek immediate medical care if: · The pain is so bad that you cannot use the joint. · You have sudden back pain with weakness in your legs or loss of bowel or bladder control. · Your stools are black and tarlike or have streaks of blood. · You have severe pain and swelling in more than one joint. Watch closely for changes in your health, and be sure to contact your doctor if: 
· You have side effects from the medicines, like belly pain, ongoing heartburn, or nausea. · Joint pain continues for more than 6 weeks, and home treatment is not helping. Where can you learn more? Go to http://dar-shawna.info/. Enter R582 in the search box to learn more about \"Osteoarthritis: Care Instructions. \" Current as of: November 28, 2016 Content Version: 11.2 © 5671-4457 [employer]. Care instructions adapted under license by [employer] (which disclaims liability or warranty for this information).  If you have questions about a medical condition or this instruction, always ask your healthcare professional. [facility] any warranty or liability for your use of this information. Introducing [facility]! Bart Hung introduces Publisha patient portal. Now you can access parts of your medical record, email your doctor's office, and request medication refills online. 1. In your internet browser, go to [email or web address] 2. Click on the First Time User? Click Here link in the Sign In box. You will see the New Member Sign Up page. 3. Enter your Publisha Access Code exactly as it appears below. You will not need to use this code after youve completed the sign-up process. If you do not sign up before the expiration date, you must request a new code. · Publisha Access Code: [email or web address] Expires: 7/9/2017  1:59 PM 
 
4. Enter the last four digits of your Social Security Number (xxxx) and Date of Birth (mm/dd/yyyy) as indicated and click Submit. You will be taken to the next sign-up page. 5. Create a Publisha ID. This will be your Publisha login ID and cannot be changed, so think of one that is secure and easy to remember. 6. Create a Publisha password. You can change your password at any time. 7. Enter your Password Reset Question and Answer. This can be used at a later time if you forget your password. 8. Enter your e-mail address. You will receive e-mail notification when new information is available in [address] 9. Click Sign Up. You can now view and download portions of your medical record. 10. Click the Download Summary menu link to download a portable copy of your medical information. If you have questions, please visit the Frequently Asked Questions section of the Publisha website. Remember, Publisha is NOT to be used for urgent needs. For medical emergencies, dial 911. Now available from your iPhone and Android! Please provide this summary of care documentation to your next provider. Your primary care clinician is listed as Georgina Rodríguez. Jewels Choe. If you have any questions after today's visit, please call [phone].

## 2017-05-26 ENCOUNTER — HOSPITAL ENCOUNTER (OUTPATIENT)
Dept: LAB | Age: 72
Discharge: HOME | End: 2017-05-26
Payer: MEDICARE

## 2017-05-26 LAB
ALBUMIN SERPL BCP-MCNC: 3.9 G/DL (ref 3.4–5)
ALBUMIN/GLOB SERPL: 1.2 {RATIO} (ref 0.8–1.7)
ALP SERPL-CCNC: 72 U/L (ref 45–117)
ALT SERPL-CCNC: 20 U/L (ref 13–56)
ANION GAP BLD CALC-SCNC: 7 MMOL/L (ref 3–18)
AST SERPL W P-5'-P-CCNC: 23 U/L (ref 15–37)
BASOPHILS # BLD AUTO: 0 K/UL (ref 0–0.06)
BASOPHILS # BLD: 1 % (ref 0–2)
BILIRUB SERPL-MCNC: 0.4 MG/DL (ref 0.2–1)
BUN SERPL-MCNC: 19 MG/DL (ref 7–18)
BUN/CREAT SERPL: 14 (ref 12–20)
CALCIUM SERPL-MCNC: 10.2 MG/DL (ref 8.5–10.1)
CHLORIDE SERPL-SCNC: 100 MMOL/L (ref 100–108)
CHOLEST SERPL-MCNC: 213 MG/DL
CO2 SERPL-SCNC: 33 MMOL/L (ref 21–32)
CREAT SERPL-MCNC: 1.36 MG/DL (ref 0.6–1.3)
DIFFERENTIAL METHOD BLD: NORMAL
EOSINOPHIL # BLD: 0.3 K/UL (ref 0–0.4)
EOSINOPHIL NFR BLD: 5 % (ref 0–5)
ERYTHROCYTE [DISTWIDTH] IN BLOOD BY AUTOMATED COUNT: 13.9 % (ref 11.6–14.5)
EST. AVERAGE GLUCOSE BLD GHB EST-MCNC: 108 MG/DL
GLOBULIN SER CALC-MCNC: 3.3 G/DL (ref 2–4)
GLUCOSE SERPL-MCNC: 97 MG/DL (ref 74–99)
HBA1C MFR BLD: 5.4 % (ref 4.2–5.6)
HCT VFR BLD AUTO: 43.9 % (ref 35–45)
HDLC SERPL-MCNC: 62 MG/DL (ref 40–60)
HDLC SERPL: 3.4 {RATIO} (ref 0–5)
HGB BLD-MCNC: 13.8 G/DL (ref 12–16)
LDLC SERPL CALC-MCNC: 125 MG/DL (ref 0–100)
LIPID PROFILE,FLP: ABNORMAL
LYMPHOCYTES # BLD AUTO: 30 % (ref 21–52)
LYMPHOCYTES # BLD: 2.1 K/UL (ref 0.9–3.6)
MCH RBC QN AUTO: 30 PG (ref 24–34)
MCHC RBC AUTO-ENTMCNC: 31.4 G/DL (ref 31–37)
MCV RBC AUTO: 95.4 FL (ref 74–97)
MONOCYTES # BLD: 0.6 K/UL (ref 0.05–1.2)
MONOCYTES NFR BLD AUTO: 9 % (ref 3–10)
NEUTS SEG # BLD: 3.8 K/UL (ref 1.8–8)
NEUTS SEG NFR BLD AUTO: 55 % (ref 40–73)
PLATELET # BLD AUTO: 247 K/UL (ref 135–420)
PMV BLD AUTO: 9.4 FL (ref 9.2–11.8)
POTASSIUM SERPL-SCNC: 4.3 MMOL/L (ref 3.5–5.5)
PROT SERPL-MCNC: 7.2 G/DL (ref 6.4–8.2)
RBC # BLD AUTO: 4.6 M/UL (ref 4.2–5.3)
SODIUM SERPL-SCNC: 140 MMOL/L (ref 136–145)
TRIGL SERPL-MCNC: 130 MG/DL (ref ?–150)
TSH SERPL DL<=0.05 MIU/L-ACNC: 2.56 UIU/ML (ref 0.36–3.74)
VLDLC SERPL CALC-MCNC: 26 MG/DL
WBC # BLD AUTO: 6.8 K/UL (ref 4.6–13.2)

## 2017-05-26 PROCEDURE — 84445 ASSAY OF TSI GLOBULIN: CPT | Performed by: INTERNAL MEDICINE

## 2017-05-26 PROCEDURE — 82043 UR ALBUMIN QUANTITATIVE: CPT | Performed by: INTERNAL MEDICINE

## 2017-05-26 PROCEDURE — 36415 COLL VENOUS BLD VENIPUNCTURE: CPT | Performed by: INTERNAL MEDICINE

## 2017-05-26 PROCEDURE — 80061 LIPID PANEL: CPT | Performed by: INTERNAL MEDICINE

## 2017-05-26 PROCEDURE — 83036 HEMOGLOBIN GLYCOSYLATED A1C: CPT | Performed by: INTERNAL MEDICINE

## 2017-05-26 PROCEDURE — 80053 COMPREHEN METABOLIC PANEL: CPT | Performed by: INTERNAL MEDICINE

## 2017-05-26 PROCEDURE — 85025 COMPLETE CBC W/AUTO DIFF WBC: CPT | Performed by: INTERNAL MEDICINE

## 2017-05-26 PROCEDURE — 84443 ASSAY THYROID STIM HORMONE: CPT | Performed by: INTERNAL MEDICINE

## 2017-05-27 LAB
CREAT UR-MCNC: 7.74 MG/DL (ref 30–125)
MICROALBUMIN UR-MCNC: <0.13 MG/DL (ref 0–3)
MICROALBUMIN/CREAT UR-RTO: ABNORMAL MG/G (ref 0–30)

## 2017-06-02 ENCOUNTER — OFFICE VISIT (OUTPATIENT)
Dept: INTERNAL MEDICINE CLINIC | Age: 72
End: 2017-06-02

## 2017-06-02 VITALS
HEART RATE: 112 BPM | DIASTOLIC BLOOD PRESSURE: 78 MMHG | SYSTOLIC BLOOD PRESSURE: 122 MMHG | TEMPERATURE: 98.5 F | WEIGHT: 186.6 LBS | BODY MASS INDEX: 29.99 KG/M2 | HEIGHT: 66 IN | OXYGEN SATURATION: 97 % | RESPIRATION RATE: 14 BRPM

## 2017-06-02 DIAGNOSIS — M96.1 POSTLAMINECTOMY SYNDROME, LUMBAR REGION: ICD-10-CM

## 2017-06-02 DIAGNOSIS — E78.5 HYPERLIPIDEMIA LDL GOAL <130: ICD-10-CM

## 2017-06-02 DIAGNOSIS — M06.9 RHEUMATOID ARTHRITIS INVOLVING MULTIPLE JOINTS (HCC): Primary | ICD-10-CM

## 2017-06-02 DIAGNOSIS — N18.30 CHRONIC RENAL FAILURE, STAGE 3 (MODERATE) (HCC): ICD-10-CM

## 2017-06-02 RX ORDER — PAROXETINE HYDROCHLORIDE 20 MG/1
20 TABLET, FILM COATED ORAL DAILY
Qty: 90 TAB | Refills: 3 | Status: SHIPPED | OUTPATIENT
Start: 2017-06-02 | End: 2019-03-13 | Stop reason: SDUPTHER

## 2017-06-02 NOTE — MR AVS SNAPSHOT
Visit Information Date & Time Provider Department Dept. Phone Encounter #  
 6/2/2017 11:30 AM Kavitha Art MD Internist of [address] [place] [address] Place [id number] Follow-up Instructions Return in about 6 months (around 12/2/2017). Your Appointments 8/3/2017 11:40 AM  
Follow Up with Malik Queen MD  
[facility] [address] [place] [address]) Appt Note: return in 3 months 30 [facility] [address]  
[phone]  Shawn [id number]  
  
    
 8/22/2017  1:00 PM  
Follow Up with Munir Leblanc MD  
914 [facility], [address] and [facility] - [facility] ([address]) Appt Note: 3 mo fu  
 [address], [facility] 100 200 [facility]  
[phone] 1212 [facility], [address]  
  
    
 12/5/2017 11:00 AM  
Office Visit with Kavitha Art MD  
Internist of [address] [place] [address]) Appt Note: 6 mo fu  
 [address], [address] [place] [address] [address] 455 [facility] [place]  
  
   
 [address], [address] Upcoming Health Maintenance Date Due DTaP/Tdap/Td series (1 - Tdap) 8/4/1966 MEDICARE YEARLY EXAM 8/21/2016 INFLUENZA AGE 9 TO ADULT 8/1/2017 GLAUCOMA SCREENING Q2Y 3/1/2018 BREAST CANCER SCRN MAMMOGRAM 8/4/2018 COLONOSCOPY 8/21/2025 Allergies as of 6/2/2017  Review Complete On: 6/2/2017 By: Kavitha Art MD  
  
 Severity Noted Reaction Type Reactions Aspirin    Other (comments) \"messes up my kidneys\" Feldene [Piroxicam]  04/17/2012    Other (comments) Kidney damage Morphine    Other (comments)  
 unconsciousness Nsaids (Non-steroidal Anti-inflammatory Drug)  08/24/2011    Other (comments) \"messes up my kidneys\" Other Medication    Not Reported This Time Mycins Penicillins    Hives Sulfa (Sulfonamide Antibiotics)  04/17/2012    Rash Vancomycin  08/28/2015    Itching Possible allergic reaction to Vancomycin. Complained of itching/reddness around forehead/back of neck Current Immunizations  Reviewed on 1/23/2017 Name Date Influenza High Dose Vaccine PF 10/6/2015 11:46 AM  
 Influenza Vaccine 2/7/2014 Influenza Vaccine Split 12/5/2012, 11/29/2011 Influenza Vaccine Whole 11/4/2010 Pneumococcal Conjugate (PCV-13) 10/6/2015 11:49 AM  
 Pneumococcal Polysaccharide (PPSV-23) 12/5/2012 Not reviewed this visit You Were Diagnosed With   
  
 Codes Comments Rheumatoid arthritis involving multiple joints (HCC)    -  Primary ICD-10-CM: M06.9 ICD-9-CM: 714.0 Postlaminectomy syndrome, lumbar region     ICD-10-CM: M96.1 ICD-9-CM: 722.83 Hyperlipidemia LDL goal <130     ICD-10-CM: E78.5 ICD-9-CM: 272.4 Chronic renal failure, stage 3 (moderate)     ICD-10-CM: N18.3 ICD-9-CM: 420. 3 Vitals BP Pulse Temp Resp Height(growth percentile) Weight(growth percentile) 122/78 (!) 112 98.5 °F (36.9 °C) (Oral) 14 5' 6\" (1.676 m) 186 lb 9.6 oz (84.6 kg) SpO2 BMI OB Status Smoking Status 97% 30.12 kg/m2 Hysterectomy Never Smoker Vitals History BMI and BSA Data Body Mass Index Body Surface Area  
 30.12 kg/m 2 1.98 m 2 Preferred Pharmacy Pharmacy Name Phone  [address] [phone] Your Updated Medication List  
  
   
This list is accurate as of: 6/2/17 12:18 PM.  Always use your most recent med list.  
  
  
  
  
 furosemide 40 mg tablet Commonly known as:  LASIX  
take 1 tablet by mouth daily GOTU SILVIA HERB PO Take  by mouth.  
  
 lansoprazole 30 mg capsule Commonly known as:  PREVACID Take 1 Cap by mouth Daily (before breakfast). lovastatin 40 mg tablet Commonly known as:  MEVACOR Take 1 Tab by mouth nightly. OLANZapine 10 mg tablet Commonly known as:  ZyPREXA Take 10 mg by mouth nightly. oxybutynin chloride XL 10 mg CR tablet Commonly known as:  DITROPAN XL  
TAKE 1 TABLET DAILY  
  
 oxyCODONE-acetaminophen  mg per tablet Commonly known as:  PERCOCET Take 1 Tab by mouth every four (4) hours as needed for Pain for up to 90 days. Mail order. AM Fawn STEWART  Indications: Pain PARoxetine 20 mg tablet Commonly known as:  PAXIL Take 1 Tab by mouth daily. potassium chloride SA 10 mEq capsule Commonly known as:  MICRO-K  
TAKE 1 CAPSULE TWICE DAILY Prescriptions Sent to Pharmacy Refills PARoxetine (PAXIL) 20 mg tablet 3 Sig: Take 1 Tab by mouth daily. Class: Normal  
 Pharmacy: [facility] [address] Ph #: [phone] Route: Oral  
  
Follow-up Instructions Return in about 6 months (around 12/2/2017). To-Do List   
 07/24/2017 11:30 AM  
  Appointment with HBV FAST TRACK NURSE at [facility] ([phone]) Introducing [facility] SERVICES! Meghan Samuelverly introduces [employer] patient portal. Now you can access parts of your medical record, email your doctor's office, and request medication refills online. 1. In your internet browser, go to [email or web address] 2. Click on the First Time User? Click Here link in the Sign In box. You will see the New Member Sign Up page. 3. Enter your [employer] Access Code exactly as it appears below. You will not need to use this code after youve completed the sign-up process. If you do not sign up before the expiration date, you must request a new code. · [employer] Access Code: [email or web address] Expires: 7/9/2017  1:59 PM 
 
4. Enter the last four digits of your Social Security Number (xxxx) and Date of Birth (mm/dd/yyyy) as indicated and click Submit. You will be taken to the next sign-up page. 5. Create a [employer] ID. This will be your [employer] login ID and cannot be changed, so think of one that is secure and easy to remember. 6. Create a [employer] password. You can change your password at any time. 7. Enter your Password Reset Question and Answer. This can be used at a later time if you forget your password. 8. Enter your e-mail address. You will receive e-mail notification when new information is available in [address] 9. Click Sign Up. You can now view and download portions of your medical record. 10. Click the Download Summary menu link to download a portable copy of your medical information. If you have questions, please visit the Frequently Asked Questions section of the [employer] website. Remember, [employer] is NOT to be used for urgent needs. For medical emergencies, dial 911. Now available from your iPhone and Android! Please provide this summary of care documentation to your next provider. Your primary care clinician is listed as Jessica Buitrago. Eloina Vasquez. If you have any questions after today's visit, please call [phone].

## 2017-06-02 NOTE — PROGRESS NOTES
Shahrzad Kelly 1945, is a 73 y.o. female, who is seen today for reevaluation of hypertension chronic renal insufficiency obesity depression GERD. She is feeling quite well currently. Chronic back pain is doing quite well. She uses a brace on her ankles and walks with a walker and is getting around reasonably well. She has a history of rheumatoid arthritis. She is taking one half of a 40 mg Paxil but with rather take a 20 mg tablet. She is following a no added salt diet for her hypertension. She is mildly obese and tries to keep her weight down with healthy diet. She is having no reflux as she continues PPI. Past Medical History:   Diagnosis Date    Chronic anxiety     Depression     controlled on Paxil    Diabetes (HCC)     DJD (degenerative joint disease)     GERD (gastroesophageal reflux disease)     High cholesterol     HTN (hypertension)     Hypertension     Low back pain     post-laminectomy syndrome and multilevel degenerative disease    Osteoporosis      Current Outpatient Prescriptions   Medication Sig Dispense Refill    PARoxetine (PAXIL) 20 mg tablet Take 1 Tab by mouth daily. 90 Tab 3    potassium chloride SA (MICRO-K) 10 mEq capsule TAKE 1 CAPSULE TWICE DAILY 180 Cap 3    oxyCODONE-acetaminophen (PERCOCET)  mg per tablet Take 1 Tab by mouth every four (4) hours as needed for Pain for up to 90 days. Mail order. AM Sahil STEWART  Indications: Pain 540 Tab 0    GOTU SILVIA HERB PO Take  by mouth.  furosemide (LASIX) 40 mg tablet take 1 tablet by mouth daily 90 Tab 3    lovastatin (MEVACOR) 40 mg tablet Take 1 Tab by mouth nightly. 90 Tab 3    oxybutynin chloride XL (DITROPAN XL) 10 mg CR tablet TAKE 1 TABLET DAILY 90 Tab 3    lansoprazole (PREVACID) 30 mg capsule Take 1 Cap by mouth Daily (before breakfast). 90 Cap 3    OLANZapine (ZYPREXA) 10 mg tablet Take 10 mg by mouth nightly.        Visit Vitals    /78    Pulse (!) 112    Temp 98.5 °F (36.9 °C) (Oral)    Resp 14    Ht 5' 6\" (1.676 m)    Wt 186 lb 9.6 oz (84.6 kg)    SpO2 97%    BMI 30.12 kg/m2     Carotids are 2+ without bruits. Lungs are clear to percussion. Good breath sounds with no wheezing or crackles. Heart reveals a regular rhythm with normal S1 and S2 no murmur gallop click or rub. Apical impulse is not palpable. Abdomen is soft and nontender with no hepatosplenomegaly or masses and no bruits. Extremities reveal no clubbing cyanosis or edema. She has some deformities of her hands consistent with history of rheumatoid arthritis. No active joints. Pulses are 2+ throughout. There is a scar from L1-S1. Assessment: #1. History of hypertension doing very well now with just no added salt diet. She will continue with salt diet. #2.  Chronic renal failure stage III unchanged, that will be monitored periodically. #3.  Depression doing quite well. She will continue 20 mg of paroxetine per day, we will send in a prescription for the 20 mg size so she does not have to break the 40 mg size in half. She will continue follow-up with her psychiatrist as well. #4.  Mild obesity, encouraged her to work on weight loss. #5.  GERD asymptomatic, she will continue PPI. Follow-up in 6 months or sooner if needed    Milton Bennett MD FACP    Please note: This document has been produced using voice recognition software. Unrecognized errors in transcription may be present.

## 2017-06-09 LAB — TSI ACT/NOR SER: 43 % (ref 0–139)

## 2017-06-22 RX ORDER — LANSOPRAZOLE 30 MG/1
30 CAPSULE, DELAYED RELEASE ORAL
Qty: 90 CAP | Refills: 3 | Status: SHIPPED | OUTPATIENT
Start: 2017-06-22 | End: 2017-12-26 | Stop reason: SDUPTHER

## 2017-06-22 RX ORDER — OXYBUTYNIN CHLORIDE 10 MG/1
TABLET, EXTENDED RELEASE ORAL
Qty: 90 TAB | Refills: 3 | Status: SHIPPED | OUTPATIENT
Start: 2017-06-22 | End: 2018-08-27 | Stop reason: SDUPTHER

## 2017-07-12 ENCOUNTER — TELEPHONE (OUTPATIENT)
Dept: INTERNAL MEDICINE CLINIC | Age: 72
End: 2017-07-12

## 2017-07-12 NOTE — TELEPHONE ENCOUNTER
Spoke with patient's son, given message below from Dr. Arianna Bui. Verbalized understanding and will give Ibuprofen and monitor mental status. Erika Saucedo MD   [facility] 16 minutes ago (2:18 PM)                 Ice and ibuprofen to treat the goose egg is perfect, if she develops worsening headache or change in mental status she should be evaluated.  (Routing comment)

## 2017-07-12 NOTE — TELEPHONE ENCOUNTER
Patient's son, Etelvina Land, is calling. His mother fell out of the bed and hit her head. She has a knot the size of a golf ball. He wants to know if she should just do ice and ibuprofen. Please advise.

## 2017-07-24 ENCOUNTER — HOSPITAL ENCOUNTER (OUTPATIENT)
Dept: INFUSION THERAPY | Age: 72
Discharge: HOME | End: 2017-07-24
Payer: MEDICARE

## 2017-07-24 VITALS
OXYGEN SATURATION: 94 % | DIASTOLIC BLOOD PRESSURE: 75 MMHG | TEMPERATURE: 98.6 F | RESPIRATION RATE: 20 BRPM | SYSTOLIC BLOOD PRESSURE: 121 MMHG | HEART RATE: 86 BPM

## 2017-07-24 LAB
ALBUMIN SERPL BCP-MCNC: 3.6 G/DL (ref 3.4–5)
ALBUMIN/GLOB SERPL: 1.1 {RATIO} (ref 0.8–1.7)
ALP SERPL-CCNC: 72 U/L (ref 45–117)
ALT SERPL-CCNC: 16 U/L (ref 13–56)
ANION GAP BLD CALC-SCNC: 8 MMOL/L (ref 3–18)
AST SERPL W P-5'-P-CCNC: 23 U/L (ref 15–37)
BILIRUB SERPL-MCNC: 0.4 MG/DL (ref 0.2–1)
BUN SERPL-MCNC: 19 MG/DL (ref 7–18)
BUN/CREAT SERPL: 14 (ref 12–20)
CALCIUM SERPL-MCNC: 9.9 MG/DL (ref 8.5–10.1)
CHLORIDE SERPL-SCNC: 102 MMOL/L (ref 100–108)
CO2 SERPL-SCNC: 31 MMOL/L (ref 21–32)
CREAT SERPL-MCNC: 1.37 MG/DL (ref 0.6–1.3)
GLOBULIN SER CALC-MCNC: 3.3 G/DL (ref 2–4)
GLUCOSE SERPL-MCNC: 98 MG/DL (ref 74–99)
MAGNESIUM SERPL-MCNC: 2.2 MG/DL (ref 1.6–2.6)
PHOSPHATE SERPL-MCNC: 3.9 MG/DL (ref 2.5–4.9)
POTASSIUM SERPL-SCNC: 3.8 MMOL/L (ref 3.5–5.5)
PROT SERPL-MCNC: 6.9 G/DL (ref 6.4–8.2)
SODIUM SERPL-SCNC: 141 MMOL/L (ref 136–145)

## 2017-07-24 PROCEDURE — 80053 COMPREHEN METABOLIC PANEL: CPT | Performed by: INTERNAL MEDICINE

## 2017-07-24 PROCEDURE — 83735 ASSAY OF MAGNESIUM: CPT | Performed by: INTERNAL MEDICINE

## 2017-07-24 PROCEDURE — 84100 ASSAY OF PHOSPHORUS: CPT | Performed by: INTERNAL MEDICINE

## 2017-07-24 PROCEDURE — 36415 COLL VENOUS BLD VENIPUNCTURE: CPT

## 2017-07-24 NOTE — PROGRESS NOTES
[facility] Progress Note    Date: 2017    Name: Hugh Segundo    MRN: [id number]         : 1945    Peripheral Lab Draw      Ms. Allen to [facility], ambulatory at 26 accompanied by her son. Pt was assessed and education was provided. Ms. Mariposa Camarillo vitals were reviewed and patient was observed for 5 minutes prior to treatment. Visit Vitals    /75 (BP 1 Location: Left arm, BP Patient Position: At rest)    Pulse 86    Temp 98.6 °F (37 °C)    Resp 20    SpO2 94%    Breastfeeding No       Blood obtained peripherally from Right AC x 1 attempt with butterfly needle and sent to lab for CMP, Mag, & Phos per written orders. No bleeding or hematoma noted at site. Guaze and coban applied. Patient son stated they were unable to wait for lab results to come back from [facility] lab because he needed to return to work. 2 attempts made to get in contact with ordering physician but unable to get a hold of anyone. Patient son stated he could bring the patient back tomorrow, 17, for the injection. Ms. Rebecca Montalvo tolerated the phlebotomy, and had no complaints. Patient armband removed and shredded. Ms. Rebecca Motnalvo was discharged from [place] [address] in stable condition at 1235. She is to return on 17 at 1100 for her next Prolia appointment.     Collin Ramachandran RN  2017

## 2017-07-25 ENCOUNTER — HOSPITAL ENCOUNTER (OUTPATIENT)
Dept: INFUSION THERAPY | Age: 72
Discharge: HOME | End: 2017-07-25
Payer: MEDICARE

## 2017-07-25 VITALS
SYSTOLIC BLOOD PRESSURE: 125 MMHG | HEART RATE: 99 BPM | RESPIRATION RATE: 20 BRPM | OXYGEN SATURATION: 94 % | DIASTOLIC BLOOD PRESSURE: 78 MMHG | TEMPERATURE: 98.5 F

## 2017-07-25 PROCEDURE — 96372 THER/PROPH/DIAG INJ SC/IM: CPT

## 2017-07-25 PROCEDURE — 74011250636 HC RX REV CODE- 250/636: Performed by: INTERNAL MEDICINE

## 2017-07-25 RX ADMIN — DENOSUMAB 60 MG: 60 INJECTION SUBCUTANEOUS at 11:09

## 2017-07-25 NOTE — PROGRESS NOTES
[facility] Progress Note    Date: 2017    Name: Orlin Wood    MRN: [id number]         : 1945    Prolia Injection    Ms. Allen arrived to [facility] at 1100. Ms. Shane eFrnandez was assessed and education was provided. Ms. Fran Weir vitals were reviewed. Visit Vitals    /78 (BP 1 Location: Left arm, BP Patient Position: Sitting)    Pulse 99    Temp 98.5 °F (36.9 °C)    Resp 20    SpO2 94%       Lab results obtained and reviewed from 2017. Recent Results (from the past 24 hour(s))   METABOLIC PANEL, COMPREHENSIVE    Collection Time: 17 12:25 PM   Result Value Ref Range    Sodium 141 136 - 145 mmol/L    Potassium 3.8 3.5 - 5.5 mmol/L    Chloride 102 100 - 108 mmol/L    CO2 31 21 - 32 mmol/L    Anion gap 8 3.0 - 18 mmol/L    Glucose 98 74 - 99 mg/dL    BUN 19 (H) 7.0 - 18 MG/DL    Creatinine 1.37 (H) 0.6 - 1.3 MG/DL    BUN/Creatinine ratio 14 12 - 20      GFR est AA 46 (L) >60 ml/min/1.73m2    GFR est non-AA 38 (L) >60 ml/min/1.73m2    Calcium 9.9 8.5 - 10.1 MG/DL    Bilirubin, total 0.4 0.2 - 1.0 MG/DL    ALT (SGPT) 16 13 - 56 U/L    AST (SGOT) 23 15 - 37 U/L    Alk. phosphatase 72 45 - 117 U/L    Protein, total 6.9 6.4 - 8.2 g/dL    Albumin 3.6 3.4 - 5.0 g/dL    Globulin 3.3 2.0 - 4.0 g/dL    A-G Ratio 1.1 0.8 - 1.7     MAGNESIUM    Collection Time: 17 12:25 PM   Result Value Ref Range    Magnesium 2.2 1.6 - 2.6 mg/dL   PHOSPHORUS    Collection Time: 17 12:25 PM   Result Value Ref Range    Phosphorus 3.9 2.5 - 4.9 MG/DL        Calcium level noted to be 9.9. Verified with pharmacy, [facility] to proceed with injection today. Prolia was administered as ordered SQ in the back of the patient's Left upper arm. Band-aid applied at the site. Ms. Shane Fernandez tolerated well without complaints. Ms. Shane Fernandez was discharged from [place] [address] in stable condition at 1115. She is to return on 2017 at 1130 for her next appointment.     Lonnie Mandujano RN  , 2017

## 2017-08-03 ENCOUNTER — OFFICE VISIT (OUTPATIENT)
Dept: PAIN MANAGEMENT | Age: 72
End: 2017-08-03

## 2017-08-03 VITALS
RESPIRATION RATE: 17 BRPM | HEART RATE: 95 BPM | DIASTOLIC BLOOD PRESSURE: 73 MMHG | WEIGHT: 186 LBS | TEMPERATURE: 97.1 F | SYSTOLIC BLOOD PRESSURE: 139 MMHG | BODY MASS INDEX: 30.02 KG/M2

## 2017-08-03 DIAGNOSIS — M62.838 SPASM OF MUSCLE: ICD-10-CM

## 2017-08-03 DIAGNOSIS — G89.4 CHRONIC PAIN SYNDROME: ICD-10-CM

## 2017-08-03 DIAGNOSIS — M54.17 LUMBOSACRAL RADICULOPATHY: ICD-10-CM

## 2017-08-03 DIAGNOSIS — M06.9 RHEUMATOID ARTHRITIS INVOLVING MULTIPLE JOINTS (HCC): ICD-10-CM

## 2017-08-03 DIAGNOSIS — M47.817 LUMBOSACRAL SPONDYLOSIS WITHOUT MYELOPATHY: ICD-10-CM

## 2017-08-03 DIAGNOSIS — M96.1 POSTLAMINECTOMY SYNDROME, LUMBAR REGION: Primary | ICD-10-CM

## 2017-08-03 DIAGNOSIS — Z79.899 ENCOUNTER FOR LONG-TERM (CURRENT) USE OF HIGH-RISK MEDICATION: ICD-10-CM

## 2017-08-03 RX ORDER — OXYCODONE AND ACETAMINOPHEN 10; 325 MG/1; MG/1
1 TABLET ORAL
Qty: 540 TAB | Refills: 0 | Status: SHIPPED | OUTPATIENT
Start: 2017-08-03 | End: 2017-10-11 | Stop reason: SDUPTHER

## 2017-08-03 NOTE — PROGRESS NOTES
HISTORY OF PRESENT ILLNESS  Glory Grijalva is a 73 y.o. female. HPI she returns for follow-up of chronic, severe pain involving the lumbar spine with radiation down both lower extremities posteriorly related to a post lumbar laminectomy pain syndrome.     Pain continues under excellent control, averaging 3 out of 10. She does find that the change in weather has had an adverse effect on her pain levels overall. Pain level today 3 out of 10, outcome 6/28,(The lower the upper number, the better the outcome)  Physical activity and mobility, mood, and sleep are all good. She does require a walker at all times for ambulation and stability.     A current review of the  does not identify any inconsistency. UDS obtained and reviewed; formal confirmation from laboratory is pending. Review of Systems   Constitutional: Negative for chills, fever and malaise/fatigue. HENT: Negative for hearing loss. Eyes: Negative. Respiratory: Negative. Negative for cough and hemoptysis. Cardiovascular: Positive for chest pain (with anxiety). Negative for palpitations and leg swelling. Gastrointestinal: Positive for constipation. Negative for nausea and vomiting. Genitourinary: Negative. Musculoskeletal: Positive for back pain (helped with rest ) and joint pain. Negative for falls, myalgias and neck pain. Neurological: Positive for tingling and sensory change. Negative for dizziness, seizures, loss of consciousness, weakness and headaches. Psychiatric/Behavioral: Positive for depression (on Paxil, and Xyprexa) and memory loss (short term memory). Negative for hallucinations, substance abuse and suicidal ideas. The patient is nervous/anxious. The patient does not have insomnia. All other systems reviewed and are negative. Physical Exam   Constitutional: She is oriented to person, place, and time. She appears well-developed and well-nourished. No distress. HENT:   Head: Normocephalic.    Eyes: EOM are normal.   Neck: Normal range of motion. No spinous process tenderness and no muscular tenderness present. Normal range of motion present. No thyromegaly present. Pulmonary/Chest: Effort normal.   Musculoskeletal: She exhibits tenderness (tenderness throughout lumbar spine/painful ROM). Right shoulder: She exhibits decreased range of motion, tenderness and pain. Left shoulder: She exhibits decreased range of motion, tenderness and pain. Right hip: She exhibits decreased range of motion and tenderness. Lumbar back: She exhibits decreased range of motion (painful), tenderness, pain and spasm. Neurological: She is alert and oriented to person, place, and time. Gait (antalgic/utilizing walker) abnormal.   Psychiatric: She has a normal mood and affect. Her behavior is normal. Judgment normal.   Son accompanies her to appointment   Nursing note and vitals reviewed. ASSESSMENT and PLAN  Encounter Diagnoses   Name Primary?  Postlaminectomy syndrome, lumbar region Yes    Lumbosacral radiculopathy     Lumbosacral spondylosis without myelopathy     Spasm of muscle     Encounter for long-term (current) use of high-risk medication     Chronic pain syndrome     Rheumatoid arthritis involving multiple joints (HCC)      She will continue on her current analgesic regimen as this is providing excellent pain control with improve functionality and minimal side effects. 3 month reassess her    No concerns are raised for misuse, abuse, or diversion. 1. Pain medications are prescribed with the objective of pain relief and improved physical and psychosocial function in this patient. 2. Counseled patient on proper use of prescribed medications and reviewed opioid contract. 3. Counseled patient about chronic medical conditions and their relationship to anxiety and depression and recommended mental health support as needed.   4. Reviewed with patient self-help tools, home exercise, and lifestyle changes to assist the patient in self-management of symptoms. 5. Advised patient to have a primary care provider to continue care for health maintenance and general medical conditions and support for referral to specialty care as needed. 6. Reviewed with patient the treatment plan, goals of treatment plan, and limitations of treatment plan, to include the potential for side effects from medications and procedures. If side effects occur, it is the responsibility of the patient to inform the clinic so that a change in the treatment plan can be made in a safe manner. The patient is advised that stopping prescribed medication may cause an increase in symptoms and possible medication withdrawal symptoms. The patient is informed an emergency room evaluation may be necessary if this occurs. DISPOSITION: The patients condition and plan were discussed at length and all questions were answered. The patient agrees with the plan.     Counseling occupied > 50% of visit:  Total time: 30 minutes

## 2017-08-03 NOTE — PROGRESS NOTES
Nursing Notes    Patient presents to the office today in follow-up. Patient rates her pain at 3/10 on the numerical pain scale. Reviewed medications with counts as follows:    Rx Date filled Qty Dispensed Pill Count Last Dose Short   Percocet 10 5/19/17 540/90 day 67 8/3/17 no         Comments:     POC UDS was not performed in office today    Any new labs or imaging since last appointment? Yes, blood work for prolia injection    Have you been to an emergency room (ER) or urgent care clinic since your last visit? NO            Have you been hospitalized since your last visit? NO     If yes, where, when, and reason for visit? Have you seen or consulted any other health care providers outside of the [address] [place] [address]  since your last visit? NO     If yes, where, when, and reason for visit? Ms. Konrad Dye has a reminder for a \"due or due soon\" health maintenance. I have asked that she contact her primary care provider for follow-up on this health maintenance.

## 2017-08-03 NOTE — MR AVS SNAPSHOT
Visit Information Date & Time Provider Department Dept. Phone Encounter #  
 8/3/2017 11:40 AM Juan Becerra MD [address] for Pain Management [id number] [phone] Follow-up Instructions Return in about 3 months (around 11/3/2017). Your Appointments 8/22/2017  1:00 PM  
Follow Up with Karin Sevilla MD  
[address] [facility], [address] and [facility] - \[facility]\"" ([facility]) Appt Note: 3 mo fu  
 [address], [address] 200 [facility]  
[phone] 2300 [facility]  
  
    
 12/5/2017 11:00 AM  
Office Visit with Erika Saucedo MD  
Internist of [address] [place] [address] Appt Note: 6 mo fu  
 [address] [facility] [address] [place]  
  
   
 [address] [facility] Upcoming Health Maintenance Date Due DTaP/Tdap/Td series (1 - Tdap) 8/4/1968 MEDICARE YEARLY EXAM 8/21/2016 INFLUENZA AGE 9 TO ADULT 8/1/2017 GLAUCOMA SCREENING Q2Y 3/1/2018 BREAST CANCER SCRN MAMMOGRAM 8/4/2018 COLONOSCOPY 8/21/2025 Allergies as of 8/3/2017  Review Complete On: 8/3/2017 By: Sridevi Lau RN Severity Noted Reaction Type Reactions Aspirin    Other (comments) \"messes up my kidneys\" Feldene [Piroxicam]  04/17/2012    Other (comments) Kidney damage Morphine    Other (comments)  
 unconsciousness Nsaids (Non-steroidal Anti-inflammatory Drug)  08/24/2011    Other (comments) \"messes up my kidneys\" Other Medication    Not Reported This Time Mycins Penicillins    Hives Sulfa (Sulfonamide Antibiotics)  04/17/2012    Rash Vancomycin  08/28/2015    Itching Possible allergic reaction to Vancomycin. Complained of itching/reddness around forehead/back of neck Current Immunizations  Reviewed on 7/25/2017 Name Date  Influenza High Dose Vaccine PF 10/6/2015 11:46 AM  
 Influenza Vaccine 2/7/2014 Influenza Vaccine Split 12/5/2012, 11/29/2011 Influenza Vaccine Whole 11/4/2010 Pneumococcal Conjugate (PCV-13) 10/6/2015 11:49 AM  
 Pneumococcal Polysaccharide (PPSV-23) 12/5/2012 Not reviewed this visit You Were Diagnosed With   
  
 Codes Comments Lumbosacral radiculopathy     ICD-10-CM: M54.17 ICD-9-CM: 724.4 Postlaminectomy syndrome, lumbar region     ICD-10-CM: M96.1 ICD-9-CM: 722.83 Lumbosacral spondylosis without myelopathy     ICD-10-CM: N58.013 ICD-9-CM: 721.3 Spasm of muscle     ICD-10-CM: P50.533 ICD-9-CM: 728.85 Vitals BP Pulse Temp Resp Weight(growth percentile) BMI  
 139/73 95 97.1 °F (36.2 °C) 17 186 lb (84.4 kg) 30.02 kg/m2 OB Status Smoking Status Hysterectomy Never Smoker BMI and BSA Data Body Mass Index Body Surface Area 30.02 kg/m 2 1.98 m 2 Preferred Pharmacy Pharmacy Name Phone [address] [phone] Your Updated Medication List  
  
   
This list is accurate as of: 8/3/17 12:09 PM.  Always use your most recent med list.  
  
  
  
  
 furosemide 40 mg tablet Commonly known as:  LASIX  
take 1 tablet by mouth daily GOTU SILVIA HERB PO Take  by mouth.  
  
 lansoprazole 30 mg capsule Commonly known as:  PREVACID Take 1 Cap by mouth Daily (before breakfast). lovastatin 40 mg tablet Commonly known as:  MEVACOR Take 1 Tab by mouth nightly. OLANZapine 10 mg tablet Commonly known as:  ZyPREXA Take 10 mg by mouth nightly. oxybutynin chloride XL 10 mg CR tablet Commonly known as:  DITROPAN XL  
TAKE 1 TABLET DAILY  
  
 * oxyCODONE-acetaminophen  mg per tablet Commonly known as:  PERCOCET Take 1 Tab by mouth every four (4) hours as needed for Pain for up to 90 days. Mail order. AM Edgar STEWART  Indications: Pain * oxyCODONE-acetaminophen  mg per tablet Commonly known as:  PERCOCET Take 1 Tab by mouth every four (4) hours as needed for Pain for up to 90 days. Mail order Amneal Brand Preferred  Indications: Pain PARoxetine 20 mg tablet Commonly known as:  PAXIL Take 1 Tab by mouth daily. potassium chloride SA 10 mEq capsule Commonly known as:  MICRO-K  
TAKE 1 CAPSULE TWICE DAILY * Notice: This list has 2 medication(s) that are the same as other medications prescribed for you. Read the directions carefully, and ask your doctor or other care provider to review them with you. Prescriptions Printed Refills  
 oxyCODONE-acetaminophen (PERCOCET)  mg per tablet 0 Sig: Take 1 Tab by mouth every four (4) hours as needed for Pain for up to 90 days. Mail order Amneal Brand Preferred  Indications: Pain Class: Print Route: Oral  
  
Follow-up Instructions Return in about 3 months (around 11/3/2017). To-Do List   
 01/24/2018 11:30 AM  
  Appointment with [facility] FAST TRACK NURSE at [facility] OP INFUSION ([phone])  
  
 07/25/2018 11:30 AM  
  Appointment with HBV FAST TRACK NURSE at [facility] OP [facility]FUS[facility] ([phone]) Patient Instructions Current health maintenance issues were reviewed and the patient was advised to followup with his/her PCP for completion of these items. Introducing [facility] [place] [facility]! [facility] introduces [employer] patient portal. Now you can access parts of your medical record, email your doctor's office, and request medication refills online. 1. In your internet browser, go to [email or web address] 2. Click on the First Time User? Click Here link in the Sign In box. You will see the New Member Sign Up page. 3. Enter your [employer] Access Code exactly as it appears below. You will not need to use this code after youve completed the sign-up process. If you do not sign up before the expiration date, you must request a new code. · [employer] Access Code: [email or web address] Expires: 10/11/2017 11:49 AM 
 
4. Enter the last four digits of your Social Security Number (xxxx) and Date of Birth (mm/dd/yyyy) as indicated and click Submit. You will be taken to the next sign-up page. 5. Create a [employer] ID. This will be your [employer] login ID and cannot be changed, so think of one that is secure and easy to remember. 6. Create a [employer] password. You can change your password at any time. 7. Enter your Password Reset Question and Answer. This can be used at a later time if you forget your password. 8. Enter your e-mail address. You will receive e-mail notification when new information is available in [address] 9. Click Sign Up. You can now view and download portions of your medical record. 10. Click the Download Summary menu link to download a portable copy of your medical information. If you have questions, please visit the Frequently Asked Questions section of the [employer] website. Remember, [employer] is NOT to be used for urgent needs. For medical emergencies, dial 911. Now available from your iPhone and Android! Please provide this summary of care documentation to your next provider. Your primary care clinician is listed as Citlalli Alston. If you have any questions after today's visit, please call [phone].

## 2017-09-11 ENCOUNTER — OFFICE VISIT (OUTPATIENT)
Dept: ORTHOPEDIC SURGERY | Age: 72
End: 2017-09-11

## 2017-09-11 VITALS
TEMPERATURE: 98.8 F | SYSTOLIC BLOOD PRESSURE: 127 MMHG | RESPIRATION RATE: 15 BRPM | WEIGHT: 180.4 LBS | HEIGHT: 66 IN | HEART RATE: 109 BPM | BODY MASS INDEX: 28.99 KG/M2 | DIASTOLIC BLOOD PRESSURE: 75 MMHG

## 2017-09-11 DIAGNOSIS — M19.072 PRIMARY OSTEOARTHRITIS OF BOTH FEET: Primary | ICD-10-CM

## 2017-09-11 DIAGNOSIS — M19.071 PRIMARY OSTEOARTHRITIS OF BOTH FEET: Primary | ICD-10-CM

## 2017-09-11 NOTE — PATIENT INSTRUCTIONS
Please follow up as needed. You are advised to contact us if your condition worsens. Purchase Toe Spacer from local pharmacy     Ankle Fusion: What to Expect at [address] [place] [address] had ankle fusion surgery to treat a painful, unstable ankle. When you leave the hospital, you will wear a cast or walking boot. And you will use crutches or a walker to keep your weight off your ankle. After surgery, you can expect your ankle to feel stiff and sore around the area where your doctor made the cut (incision). Your doctor will give you medicine for the pain. Your doctor may give you specific instructions on when you can do your normal activities again, such as driving and going back to work. This care sheet gives you a general idea about how long it will take for you to recover. But each person recovers at a different pace. Follow the steps below to get better as quickly as possible. How can you care for yourself at home? Activity  · Rest when you feel tired. · Avoid putting weight on your ankle until your doctor says it is okay. · You may shower 24 to 48 hours after surgery, if your doctor okays it. When you shower, keep your dressing and incisions dry. If you have a cast, tape a sheet of plastic to cover it so that it does not get wet. It may help to sit on a shower stool. · If you have a removable splint, ask your doctor if it is okay to take it off to bathe. Your doctor may want you to keep it on as much as possible. Be careful not to put the splint on too tight. · You will probably need to take at least 2 to 4 weeks off from work. It depends on the type of work you do and how you feel. Diet  · You can eat your normal diet. If your stomach is upset, try bland, low-fat foods like plain rice, broiled chicken, toast, and yogurt. Medicines  · Your doctor will tell you if and when you can restart your medicines. He or she will also give you instructions about taking any new medicines.   · If you take blood thinners, such as warfarin (Coumadin), clopidogrel (Plavix), or aspirin, be sure to talk to your doctor. He or she will tell you if and when to start taking those medicines again. Make sure that you understand exactly what your doctor wants you to do. · Be safe with medicines. Read and follow all instructions on the label. ¨ If the doctor gave you a prescription medicine for pain, take it as prescribed. ¨ If you are not taking a prescription pain medicine, ask your doctor if you can take an over-the-counter medicine. · If your doctor prescribed antibiotics, take them as directed. Do not stop taking them just because you feel better. You need to take the full course of antibiotics. Incision care  · If you do not have a cast, wash the incision daily with warm, soapy water, and pat it dry. Don't use hydrogen peroxide or alcohol. They can slow healing. · If you have strips of tape on the incision, leave the tape on for a week or until it falls off. · If you had stitches, your doctor will tell you when to come back to have them removed. Exercise  · Ankle rehabilitation is a series of exercises you do after your surgery. This helps you improve your ankle's range of motion and strength. You will work with your doctor and physical therapist to plan this exercise program. To get the best results, you need to do the exercises correctly and as often and as long as your doctor tells you. Ice and elevation  · Prop up the sore leg on a pillow when you ice it or anytime you sit or lie down during the next 3 days. Try to keep it above the level of your heart. This will help reduce swelling. Other instructions  · You will need to use crutches or a walker after surgery for about 6 to 8 weeks. Your doctor will tell you when you can put weight on the ankle. It may help to use a backpack or wear clothes with a lot of pockets to carry items. Follow-up care is a key part of your treatment and safety.  Be sure to make and go to all appointments, and call your doctor if you are having problems. It's also a good idea to know your test results and keep a list of the medicines you take. When should you call for help? Call 911 anytime you think you may need emergency care. For example, call if:  · You passed out (lost consciousness). · You have severe trouble breathing. · You have sudden chest pain and shortness of breath, or you cough up blood. Call your doctor now or seek immediate medical care if:  · You have pain that does not get better after you take pain medicine. · You have loose stitches, or your incision comes open. · You have signs of infection, such as:  ¨ Increased pain, swelling, warmth, or redness. ¨ Red streaks leading from the incision. ¨ Pus draining from the incision. ¨ A fever. Watch closely for changes in your health, and be sure to contact your doctor if you have any problems. Where can you learn more? Go to http://dar-shawna.info/. Enter P005 in the search box to learn more about \"Ankle Fusion: What to Expect at Home. \"  Current as of: March 21, 2017  Content Version: 11.3  © 6435-5281 [employer]. Care instructions adapted under license by [employer] (which disclaims liability or warranty for this information). If you have questions about a medical condition or this instruction, always ask your healthcare professional. [address] any warranty or liability for your use of this information.

## 2017-09-11 NOTE — MR AVS SNAPSHOT
Visit Information Date & Time Provider Department Dept. Phone Encounter #  
 9/11/2017  1:00 PM Yuly Farris, [address] [facility] [phone] [id number] Follow-up Instructions Return in about 6 months (around 3/11/2018). Your Appointments 11/1/2017  1:00 PM  
Follow Up with Oren Fan MD  
[facility] [address] [place] [address]) Appt Note: Return in about 3 months (around 11/3/2017). [address] [facility] [address]  
[phone] Nika Mckeon [phone]  
  
    
 12/5/2017 11:00 AM  
Office Visit with Radha Grimaldo MD  
Internist of [address] [place] [address]) Appt Note: 6 mo fu  
 [address] [facility] [facility] [address] [place]  
  
   
 [address] [facility], [address] Upcoming Health Maintenance Date Due DTaP/Tdap/Td series (1 - Tdap) 8/4/1968 MEDICARE YEARLY EXAM 8/21/2016 INFLUENZA AGE 9 TO ADULT 8/1/2017 GLAUCOMA SCREENING Q2Y 3/1/2018 BREAST CANCER SCRN MAMMOGRAM 8/4/2018 COLONOSCOPY 8/21/2025 Allergies as of 9/11/2017  Review Complete On: 9/11/2017 By: Yuly Farris MD  
  
 Severity Noted Reaction Type Reactions Aspirin    Other (comments) \"messes up my kidneys\" Feldene [Piroxicam]  04/17/2012    Other (comments) Kidney damage Morphine    Other (comments)  
 unconsciousness Nsaids (Non-steroidal Anti-inflammatory Drug)  08/24/2011    Other (comments) \"messes up my kidneys\" Other Medication    Not Reported This Time Mycins Penicillins    Hives Sulfa (Sulfonamide Antibiotics)  04/17/2012    Rash Vancomycin  08/28/2015    Itching Possible allergic reaction to Vancomycin. Complained of itching/reddness around forehead/back of neck Current Immunizations  Reviewed on 7/25/2017 Name Date Influenza High Dose Vaccine PF 10/6/2015 11:46 AM  
 Influenza Vaccine 2/7/2014 Influenza Vaccine Split 12/5/2012, 11/29/2011 Influenza Vaccine Whole 11/4/2010 Pneumococcal Conjugate (PCV-13) 10/6/2015 11:49 AM  
 Pneumococcal Polysaccharide (PPSV-23) 12/5/2012 Not reviewed this visit You Were Diagnosed With   
  
 Codes Comments Primary osteoarthritis of both feet    -  Primary ICD-10-CM: M19.071, V82.923 ICD-9-CM: 715.17 Vitals BP Pulse Temp Resp Height(growth percentile) Weight(growth percentile) 127/75 (!) 109 98.8 °F (37.1 °C) 15 5' 6\" (1.676 m) 180 lb 6.4 oz (81.8 kg) BMI OB Status Smoking Status 29.12 kg/m2 Hysterectomy Never Smoker Vitals History BMI and BSA Data Body Mass Index Body Surface Area  
 29.12 kg/m 2 1.95 m 2 Preferred Pharmacy Pharmacy Name Phone  [address] [phone] Your Updated Medication List  
  
   
This list is accurate as of: 9/11/17  1:11 PM.  Always use your most recent med list.  
  
  
  
  
 furosemide 40 mg tablet Commonly known as:  LASIX  
take 1 tablet by mouth daily GOTU SILVIA HERB PO Take  by mouth.  
  
 lansoprazole 30 mg capsule Commonly known as:  PREVACID Take 1 Cap by mouth Daily (before breakfast). lovastatin 40 mg tablet Commonly known as:  MEVACOR Take 1 Tab by mouth nightly. OLANZapine 10 mg tablet Commonly known as:  ZyPREXA Take 10 mg by mouth nightly. oxybutynin chloride XL 10 mg CR tablet Commonly known as:  DITROPAN XL  
TAKE 1 TABLET DAILY  
  
 oxyCODONE-acetaminophen  mg per tablet Commonly known as:  PERCOCET Take 1 Tab by mouth every four (4) hours as needed for Pain for up to 90 days. Mail order Amneal Brand Preferred  Indications: Pain PARoxetine 20 mg tablet Commonly known as:  PAXIL Take 1 Tab by mouth daily. potassium chloride SA 10 mEq capsule Commonly known as:  MICRO-K  
TAKE 1 CAPSULE TWICE DAILY Follow-up Instructions Return in about 6 months (around 3/11/2018). To-Do List   
 01/24/2018 11:30 AM  
  Appointment with [facility] FA[facility] TRACK NURSE at [facility] ([phone])  
  
 07/25/2018 11:30 AM  
  Appointment with [facility] FAST TRACK NURSE at [facility] ([phone]) Patient Instructions Please follow up as needed. You are advised to contact us if your condition worsens. Purchase Toe Spacer from local pharmacy Ankle Fusion: What to Expect at [facility] Your Recovery You had ankle fusion surgery to treat a painful, unstable ankle. When you leave the hospital, you will wear a cast or walking boot. And you will use crutches or a walker to keep your weight off your ankle. After surgery, you can expect your ankle to feel stiff and sore around the area where your doctor made the cut (incision). Your doctor will give you medicine for the pain. Your doctor may give you specific instructions on when you can do your normal activities again, such as driving and going back to work. This care sheet gives you a general idea about how long it will take for you to recover. But each person recovers at a different pace. Follow the steps below to get better as quickly as possible. How can you care for yourself at home? Activity · Rest when you feel tired. · Avoid putting weight on your ankle until your doctor says it is okay. · You may shower 24 to 48 hours after surgery, if your doctor okays it. When you shower, keep your dressing and incisions dry. If you have a cast, tape a sheet of plastic to cover it so that it does not get wet. It may help to sit on a shower stool. · If you have a removable splint, ask your doctor if it is okay to take it off to bathe. Your doctor may want you to keep it on as much as possible. Be careful not to put the splint on too tight. · You will probably need to take at least 2 to 4 weeks off from work. It depends on the type of work you do and how you feel. Diet · You can eat your normal diet. If your stomach is upset, try bland, low-fat foods like plain rice, broiled chicken, toast, and yogurt. Medicines · Your doctor will tell you if and when you can restart your medicines. He or she will also give you instructions about taking any new medicines. · If you take blood thinners, such as warfarin (Coumadin), clopidogrel (Plavix), or aspirin, be sure to talk to your doctor. He or she will tell you if and when to start taking those medicines again. Make sure that you understand exactly what your doctor wants you to do. · Be safe with medicines. Read and follow all instructions on the label. ¨ If the doctor gave you a prescription medicine for pain, take it as prescribed. ¨ If you are not taking a prescription pain medicine, ask your doctor if you can take an over-the-counter medicine. · If your doctor prescribed antibiotics, take them as directed. Do not stop taking them just because you feel better. You need to take the full course of antibiotics. Incision care · If you do not have a cast, wash the incision daily with warm, soapy water, and pat it dry. Don't use hydrogen peroxide or alcohol. They can slow healing. · If you have strips of tape on the incision, leave the tape on for a week or until it falls off. · If you had stitches, your doctor will tell you when to come back to have them removed. Exercise · Ankle rehabilitation is a series of exercises you do after your surgery. This helps you improve your ankle's range of motion and strength. You will work with your doctor and physical therapist to plan this exercise program. To get the best results, you need to do the exercises correctly and as often and as long as your doctor tells you. Ice and elevation · Prop up the sore leg on a pillow when you ice it or anytime you sit or lie down during the next 3 days. Try to keep it above the level of your heart. This will help reduce swelling. Other instructions · You will need to use crutches or a walker after surgery for about 6 to 8 weeks. Your doctor will tell you when you can put weight on the ankle. It may help to use a backpack or wear clothes with a lot of pockets to carry items. Follow-up care is a key part of your treatment and safety. Be sure to make and go to all appointments, and call your doctor if you are having problems. It's also a good idea to know your test results and keep a list of the medicines you take. When should you call for help? Call 911 anytime you think you may need emergency care. For example, call if: 
· You passed out (lost consciousness). · You have severe trouble breathing. · You have sudden chest pain and shortness of breath, or you cough up blood. Call your doctor now or seek immediate medical care if: 
· You have pain that does not get better after you take pain medicine. · You have loose stitches, or your incision comes open. · You have signs of infection, such as: 
¨ Increased pain, swelling, warmth, or redness. ¨ Red streaks leading from the incision. ¨ Pus draining from the incision. ¨ A fever. Watch closely for changes in your health, and be sure to contact your doctor if you have any problems. Where can you learn more? Go to http://dar-shawna.info/. Enter I451 in the search box to learn more about \"Ankle Fusion: What to Expect at Home. \" Current as of: March 21, 2017 Content Version: 11.3 © 2489-2871 [employer]wise, Incorporated. Care instructions adapted under license by [employer] (which disclaims liability or warranty for this information). If you have questions about a medical condition or this instruction, always ask your healthcare professional. [address] any warranty or liability for your use of this information. Introducing [facility] & HEALTH SERVICES! Rashaunliliana Tania introduces [employer] patient portal. Now you can access parts of your medical record, email your doctor's office, and request medication refills online. 1. In your internet browser, go to [email or web address] 2. Click on the First Time User? Click Here link in the Sign In box. You will see the New Member Sign Up page. 3. Enter your [employer] Access Code exactly as it appears below. You will not need to use this code after youve completed the sign-up process. If you do not sign up before the expiration date, you must request a new code. · [employer] Access Code: [email or web address] Expires: 10/11/2017 11:49 AM 
 
4. Enter the last four digits of your Social Security Number (xxxx) and Date of Birth (mm/dd/yyyy) as indicated and click Submit. You will be taken to the next sign-up page. 5. Create a [employer] ID. This will be your [employer] login ID and cannot be changed, so think of one that is secure and easy to remember. 6. Create a [employer] password. You can change your password at any time. 7. Enter your Password Reset Question and Answer. This can be used at a later time if you forget your password. 8. Enter your e-mail address. You will receive e-mail notification when new information is available in [address] 9. Click Sign Up. You can now view and download portions of your medical record. 10. Click the Download Summary menu link to download a portable copy of your medical information. If you have questions, please visit the Frequently Asked Questions section of the [employer] website. Remember, [employer] is NOT to be used for urgent needs. For medical emergencies, dial 911. Now available from your iPhone and Android! Please provide this summary of care documentation to your next provider. Your primary care clinician is listed as José Miguel Campos. Edel Chang. If you have any questions after today's visit, please call [phone].

## 2017-09-11 NOTE — PROGRESS NOTES
AMBULATORY PROGRESS NOTE      Patient: Cherelle Waterman             MRN: [id number]     SSN: [id number] Body mass index is 29.12 kg/(m^2). YOB: 1945     AGE: 81 y.o. EX: female    PCP: Kranthi Bhat MD    IMPRESSION/DIAGNOSIS AND TREATMENT PLAN     DIAGNOSES  1. Primary osteoarthritis of both feet        No orders of the defined types were placed in this encounter. Cherelle Waterman understands her diagnoses and the proposed plan. Plan:    1) Continue wearing AFO brace on left foot and SMO brace on right foot. 2) Continue activity as tolerated  3) Purchase Toe Spacers from local pharmacy    RTO - PRN  As she is doing well    HPI AND EXAMINATION     Cherelle Waterman IS A 81 y.o. female who presents to my outpatient office for follow up of primary osteoarthritis of both feet. At last visit, I instructed to continue wearing the custom left short AFO brace, and to continue using the seated walker. The patient presents to the office rating her back pain at 3/10. The patient is wearing her AFO brace on the left foot along with the SMO brace on the right foot. The patient notes that she is doing well. The understands the information provided and is content with the course given. Cherelle Waterman is alert/oriented (name, location, time) and follows commands well. she is in no acute distress and her affect and mood are appropriate.       Bilateral ANKLE and FOOT       Gait: slow and uses assistive device (AFO left and SMO right)  Tenderness: No Tenderness  Cutaneous: No rashes, skin patches, wounds, or abrasions to the lower legs                       Warm and Normal color. No regions of expressible drainage.                        Medial Border of [place]ia Region: absent                       Skin color, texture, turgor normal. Normal.  RIGHT: Well healed lateral and medial scars  Joint Motion: ROM Ankle: Slight Decrease, Hindfoot: (ST,TN,CC) Slight Decrease, Forefoot toes: Slight Decrease at left forefoot  Neurologic Exam: Neuro: Motor: WNL and Sensory : normal light touch sensation. Contractures: Gastrocnemius or Achilles Contractures mild  Joint / Tendon Stability: No Ankle or Subtalar instability or joint laxity. No peroneal sublux ability or dislocation  Alignment: Right:           Severe planovalgus alignment with abduction at the TN region and midfoot, Hammer toes (2 and 3), curved and angulated  mallet deformity of the right third toe. LEFT: Severe abduction at the TN/NC joints, severe bunion alignment, hammer toe number four toe (rigid)  Vascular: Normal Pulses/ NL Capillary refill, No evidence of DVT seen on physical exam.                   No calf swelling, no tenderness to calf muscles. Right: 1+ DP pulses  LEFT: 1 + DP pulses  Lymphatic: No Evidence of Lymphedema. CHART REVIEW     Past Medical History:   Diagnosis Date    Chronic anxiety     Depression     controlled on Paxil    Diabetes (HCC)     DJD (degenerative joint disease)     GERD (gastroesophageal reflux disease)     High cholesterol     HTN (hypertension)     Hypertension     Low back pain     post-laminectomy syndrome and multilevel degenerative disease    Osteoporosis      Current Outpatient Prescriptions   Medication Sig    oxyCODONE-acetaminophen (PERCOCET)  mg per tablet Take 1 Tab by mouth every four (4) hours as needed for Pain for up to 90 days. Mail order  [facility] Preferred  Indications: Pain    oxybutynin chloride XL (DITROPAN XL) 10 mg CR tablet TAKE 1 TABLET DAILY    lansoprazole (PREVACID) 30 mg capsule Take 1 Cap by mouth Daily (before breakfast).  PARoxetine (PAXIL) 20 mg tablet Take 1 Tab by mouth daily.  potassium chloride SA (MICRO-K) 10 mEq capsule TAKE 1 CAPSULE TWICE DAILY    GOTU SILVIA HERB PO Take  by mouth.     furosemide (LASIX) 40 mg tablet take 1 tablet by mouth daily    lovastatin (MEVACOR) 40 mg tablet Take 1 Tab by mouth nightly.  OLANZapine (ZYPREXA) 10 mg tablet Take 10 mg by mouth nightly. No current facility-administered medications for this visit. Allergies   Allergen Reactions    Aspirin Other (comments)     \"messes up my kidneys\"    Feldene [Piroxicam] Other (comments)     Kidney damage      Morphine Other (comments)     unconsciousness    Nsaids (Non-Steroidal Anti-Inflammatory Drug) Other (comments)     \"messes up my kidneys\"    Other Medication Not Reported This Time     Mycins      Penicillins Hives    Sulfa (Sulfonamide Antibiotics) Rash    Vancomycin Itching     Possible allergic reaction to Vancomycin. Complained of itching/reddness around forehead/back of neck     Past Surgical History:   Procedure Laterality Date    HX ADENOIDECTOMY      HX APPENDECTOMY      HX BACK SURGERY      PHLD X3    HX BREAST BIOPSY Left 8/28/2015    WIDE LOCAL EXCISION LEFT BREAST LESION performed by Lázaro Humphrey MD at [facility] MAIN OR    HX HERNIA REPAIR      hiatal    HX KNEE REPLACEMENT      HX ORTHOPAEDIC      bilateral shoulder surgery    HX ELVIRA AND BSO  1982    HX TONSILLECTOMY      TOTAL HIP ARTHROPLASTY  4/12     total right hip replacement, Dr. Olivera Likes Not on file. Social History Main Topics    Smoking status: Never Smoker    Smokeless tobacco: Never Used    Alcohol use No    Drug use: No    Sexual activity: Not Currently     Family History   Problem Relation Age of Onset    Cancer Mother      melanoma    Cancer Father      lung    Heart Disease Maternal Grandmother     Diabetes Other     Hypertension Other     Headache Other     Seizures Other     Coronary Artery Disease Other     Heart Attack Other        REVIEW OF SYSTEMS : 9/11/2017  ALL BELOW ARE Negative except : SEE HPI       Constitutional: Negative for fever, chills and weight loss.  Neg Weigh Loss  Cardiovascular: Negative for chest pain, claudication and leg swelling. SOB, CUEVAS   Gastrointestinal: Negative for  pain, N/V/D/C, Blood in stool or urine,dysuria, hematuria,        Incontinence, pelvic pain  Musculoskeletal: see HPI. Neurological: Negative for dizziness and weakness. Negative for headaches,Visual Changes, Confusion, Seizures,   Psychiatric/Behavioral: Negative for depression, memory loss and substance abuse. Extremities:  Negative for  hair changes, rash or skin lesion changes. Hematologic: Negative for Bleeding problems, bruising, pallor or swollen lymph nodes. Peripheral Vascular: No calf pain, vascular vein tenderness to calf pain              No calf throbbing, posterior knee throbbing pain    DIAGNOSTIC IMAGING     No notes on file    Written by Bhavya Stevens, as dictated by Aubrey Escalera MD. I, DrGini, Aubrey Escalera MD, confirm that all documentation is accurate.

## 2017-10-11 ENCOUNTER — TELEPHONE (OUTPATIENT)
Dept: INTERNAL MEDICINE CLINIC | Age: 72
End: 2017-10-11

## 2017-10-11 DIAGNOSIS — M54.17 LUMBOSACRAL RADICULOPATHY: ICD-10-CM

## 2017-10-11 DIAGNOSIS — M96.1 POSTLAMINECTOMY SYNDROME, LUMBAR REGION: ICD-10-CM

## 2017-10-11 DIAGNOSIS — M62.838 SPASM OF MUSCLE: ICD-10-CM

## 2017-10-11 DIAGNOSIS — M47.817 LUMBOSACRAL SPONDYLOSIS WITHOUT MYELOPATHY: ICD-10-CM

## 2017-10-11 DIAGNOSIS — M96.1 POSTLAMINECTOMY SYNDROME, LUMBAR REGION: Primary | ICD-10-CM

## 2017-10-11 DIAGNOSIS — Z79.899 ENCOUNTER FOR LONG-TERM (CURRENT) USE OF HIGH-RISK MEDICATION: ICD-10-CM

## 2017-10-11 NOTE — TELEPHONE ENCOUNTER
The pt was called and notified that Dr. Curly Severs is no longer with the practice and that her upcoming appt was cancelled. The pt is requesting a prescription for her pain medication. A  was obtained and there were no aberrancies noted. She last filled a 90 day supply on 08/07/17. Due to appt availabilities, the reviewing provider states that the pt can have a month of her medication and no 90 days. The practice administrator will call the pt to make her aware of the only 30 days of medication.

## 2017-10-11 NOTE — TELEPHONE ENCOUNTER
Pt's son called and stated tht Dr. Ernesto Valdez office which is  pain management is moving to another location and he is trying to get her in with Dr. Debbie Finch at [facility] in [place].  instead , she needs a dr to  referral, they can get her in at the end of Oct if they recv the referral, their GH#[phone], LWG#[phone], RM

## 2017-10-12 RX ORDER — OXYCODONE AND ACETAMINOPHEN 10; 325 MG/1; MG/1
1 TABLET ORAL
Qty: 180 TAB | Refills: 0 | Status: SHIPPED | OUTPATIENT
Start: 2017-10-12 | End: 2017-11-11

## 2017-10-13 NOTE — TELEPHONE ENCOUNTER
Son calling to see if referral has been done yet. Can you please call him and advise? Says Mom is in a bad way and they won't even schedule her til they hear from us.

## 2017-12-05 ENCOUNTER — OFFICE VISIT (OUTPATIENT)
Dept: INTERNAL MEDICINE CLINIC | Age: 72
End: 2017-12-05

## 2017-12-05 VITALS
HEIGHT: 66 IN | BODY MASS INDEX: 27.9 KG/M2 | OXYGEN SATURATION: 94 % | SYSTOLIC BLOOD PRESSURE: 136 MMHG | TEMPERATURE: 98.4 F | DIASTOLIC BLOOD PRESSURE: 80 MMHG | HEART RATE: 97 BPM | RESPIRATION RATE: 12 BRPM | WEIGHT: 173.6 LBS

## 2017-12-05 DIAGNOSIS — I10 ESSENTIAL HYPERTENSION: Primary | ICD-10-CM

## 2017-12-05 DIAGNOSIS — Z23 ENCOUNTER FOR IMMUNIZATION: ICD-10-CM

## 2017-12-05 DIAGNOSIS — M06.9 RHEUMATOID ARTHRITIS INVOLVING MULTIPLE JOINTS (HCC): ICD-10-CM

## 2017-12-05 DIAGNOSIS — Z13.39 SCREENING FOR ALCOHOLISM: ICD-10-CM

## 2017-12-05 DIAGNOSIS — E78.5 HYPERLIPIDEMIA LDL GOAL <100: ICD-10-CM

## 2017-12-05 DIAGNOSIS — Z13.31 SCREENING FOR DEPRESSION: ICD-10-CM

## 2017-12-05 DIAGNOSIS — Z00.00 MEDICARE ANNUAL WELLNESS VISIT, SUBSEQUENT: ICD-10-CM

## 2017-12-05 DIAGNOSIS — R41.3 MEMORY LOSS: ICD-10-CM

## 2017-12-05 RX ORDER — LOVASTATIN 40 MG/1
40 TABLET ORAL
Qty: 90 TAB | Refills: 3 | Status: SHIPPED | OUTPATIENT
Start: 2017-12-05 | End: 2019-02-02 | Stop reason: SDUPTHER

## 2017-12-05 NOTE — PROGRESS NOTES
Carlos Emanuel 1945, is a 81 y.o. female, who is seen today for reevaluation of hypertension DJD anxiety and now memory loss. She is now getting her pain management treatment at [facility] and they have cut her pain management narcotic from 6 times a day to 4 times a day hoping to still control pain and allow her mental function to improve. Her  is quite concerned that that has worsened. Past Medical History:   Diagnosis Date    Chronic anxiety     Depression     controlled on Paxil    Diabetes (HCC)     DJD (degenerative joint disease)     GERD (gastroesophageal reflux disease)     High cholesterol     HTN (hypertension)     Hypertension     Low back pain     post-laminectomy syndrome and multilevel degenerative disease    Osteoporosis      Current Outpatient Prescriptions   Medication Sig Dispense Refill    lovastatin (MEVACOR) 40 mg tablet Take 1 Tab by mouth nightly. 90 Tab 3    oxybutynin chloride XL (DITROPAN XL) 10 mg CR tablet TAKE 1 TABLET DAILY 90 Tab 3    lansoprazole (PREVACID) 30 mg capsule Take 1 Cap by mouth Daily (before breakfast). 90 Cap 3    PARoxetine (PAXIL) 20 mg tablet Take 1 Tab by mouth daily. 90 Tab 3    potassium chloride SA (MICRO-K) 10 mEq capsule TAKE 1 CAPSULE TWICE DAILY 180 Cap 3    GOTU SILVIA HERB PO Take  by mouth.  furosemide (LASIX) 40 mg tablet take 1 tablet by mouth daily 90 Tab 3    OLANZapine (ZYPREXA) 10 mg tablet Take 10 mg by mouth nightly. Visit Vitals    /80    Pulse 97    Temp 98.4 °F (36.9 °C) (Oral)    Resp 12    Ht 5' 6\" (1.676 m)    Wt 173 lb 9.6 oz (78.7 kg)    SpO2 94%    BMI 28.02 kg/m2     Carotids are 2+ without bruits. Lungs are clear to percussion. Good breath sounds with no wheezing or crackles. Heart reveals a regular rhythm with normal S1 and S2 no murmur gallop click or rub. Apical impulse is not palpable. Abdomen is soft and nontender with no hepatosplenomegaly or masses and no bruits. Extremities reveal no clubbing cyanosis or edema. Pulses are intact. Assessment: #1. Chronic pain seems to be doing fairly well with lower dose narcotics. #2.  Memory loss probably multifactorial but analgesics may play some role in that. He would like her referred to neurology so I will take care of that. #3. Hyperlipidemia has been doing well. She will continue lovastatin. I have informed the family that that is not contributing to her memory loss. #5.  GERD asymptomatic. She will continue Prevacid which is working very well, she is having no symptoms. Follow-up will be in 6 months with [facility] LEONARDO Izquierdo MD FACP    Please note: This document has been produced using voice recognition software. Unrecognized errors in transcription may be present.

## 2017-12-05 NOTE — PATIENT INSTRUCTIONS
Medicare Part B Preventive Services Limitations Recommendation Follow-up Action Needed    Bone Mass Measurement  (age 72 & older, biennial) Requires diagnosis related to osteoporosis or estrogen deficiency. Biennial benefit unless patient has history of long-term glucocorticoid tx or baseline is needed because initial test was by other method Last: 11/12/15    A DEXA scan is recommended after you turn 72years of age to determine your risk for osteoporosis Next: Follow up as recommended by primary care provider and/or specialist     Colorectal Cancer Screening  -Fecal occult blood test (annual)  -Flexible sigmoidoscopy (5y)  -Screening colonoscopy (10y)  -Barium Enema Normally recommended for patients age 48 - 78  Last: N/A    Every 5-10 years depending on your colonoscopy result starting at age 48 years Next: Follow up as recommended by primary care provider and/or specialist    Glaucoma Screening (no USPSTF recommendation) Diabetes mellitus, family history, , age 48 or over,  American, age 72 or over Last: 3/2016    Every year, or as directed by provider Next: Follow up as recommended by primary care provider and/or specialist or at least every 2 years    Screening Mammography (biennial age 54-69) Annually (age 36 or over) Last: 8/2016 Next: Follow up as recommended by primary care provider and/or specialist    Screening Pap Tests and Pelvic Examination (up to age 72 and after 72 if unknown history or abnormal study last 10 years) Every 24 months except high risk Last: N/A Next: Discuss with your doctor if this screening is appropriate for you.    Cardiovascular Screening Blood Tests (every 5 years)  Total cholesterol, HDL, Triglycerides Order as a panel if possible Last: 5/2017    Every Year Next: Follow up as recommended by primary care provider and/or specialist   Diabetes Screening Tests (at least every 3 years, Medicare covers annually or at 6-month intervals for prediabetic patients)    Fasting blood sugar (FBS) or glucose tolerance test (GTT) Patient must be diagnosed with one of the following:  -Hypertension, Dyslipidemia, obesity, previous impaired FBS or GTT  Or any two of the following: overweight, FH of diabetes, age ? 72, history of gestational diabetes, birth of baby weighing more than 9 pounds Last: 5/2017    Every 3-6 months depending on your result    Every 3 years Next: Follow up as recommended by primary care provider and/or specialist    Diabetes Self-Management Training (DSMT) (no USPSTF recommendation) Requires referral by treating physician for patient with diabetes or renal disease. 10 hours of initial DSMT session of no less than 30 minutes each in a continuous 12-month period. 2 hours of follow-up DSMT in subsequent years. Last: N/A Talk to your doctor if you are interested in a refresher course. Medical Nutrition Therapy (MNT) (for diabetes or renal disease not recommended schedule) Requires referral by treating physician for patient with diabetes or renal disease. Can be provided in same year as diabetes self-management training (DSMT), and CMS recommends medical nutrition therapy take place after DSMT. Up to 3 hours for initial year and 2 hours in subsequent years. Last: N/A Talk to your doctor if you are interested in a refresher course. Seasonal Influenza Vaccination (annually)  Last: Done today  Every Fall Please consider getting one every fall    Pneumococcal Vaccination (once after 65)  Last:  Pneumovax: 12/5/12    Prevnar-13: 10/6/15   Next:Vaccination series complete   Shingles Vaccination  Last: N/A    A shingles vaccine is recommended once in a lifetime after age 61 Please consider getting one at your pharmacy.    Tetanus, Diphtheria and Pertussis (Tdap) Vaccination Booster One Booster as an adult and then tetanus every 10 years or as indicated Last: N/A Please consider, especially if you will have frequent contact with young children who have not completed their vaccine series. Can get at your pharmacy. Hepatitis B Vaccinations (if medium/high risk) Medium/high risk factors:  End-stage renal disease,  Hemophiliacs who received Factor VIII or IX concentrates, Clients of institutions for the mentally retarded, Persons who live in the same house as a HepB virus carrier, Homosexual men, Illicit injectable drug abusers. Last: N/A Next: N/A   Counseling to Prevent Tobacco Use (up to 8 sessions per year)  - Counseling greater than 3 and up to 10 minutes  - Counseling greater than 10 minutes Patients must be asymptomatic of tobacco-related conditions to receive as preventive service  As recommended by your PCP or specialist    Human Immunodeficiency Virus (HIV) Screening (annually for increased risk patients)  HIV-1 and HIV-2 by EIA, AGGIE, rapid antibody test, or oral mucosa transudate Patient must be at increased risk for HIV infection per USPSTF guidelines or pregnant. Tests covered annually for patients at increased risk. Pregnant patients may receive up to 3 test during pregnancy. As recommended by your PCP or Specialist   Ultrasound Screening for Abdominal Aortic Aneurysm (AAA) once Patient must be referred through IPPE and not have had a screening for abdominal aortic aneurysm before under medicare. Limited to patients who meet onf of the following criteria:  -Men who are 73-68 years old and have smoked more than 100 cigarettes in their lifetime  -Anyone with a FH of AAA  -Anyone recommended for screening by the USPSFTF    As recommended by your PCP or Specialist     NA = Not Applicable; NI= Not Indicated     Advanced care planning: Please bring in a copy of your advanced directives at your convenience to our office so we may scan a copy for our records. Advance Directives: Care Instructions  Your Care Instructions  An advance directive is a legal way to state your wishes at the end of your life.  It tells your family and your doctor what to do if you can no longer say what you want. There are two main types of advance directives. You can change them any time that your wishes change. · A living will tells your family and your doctor your wishes about life support and other treatment. · A durable power of  for health care lets you name a person to make treatment decisions for you when you can't speak for yourself. This person is called a health care agent. If you do not have an advance directive, decisions about your medical care may be made by a doctor or a  who doesn't know you. It may help to think of an advance directive as a gift to the people who care for you. If you have one, they won't have to make tough decisions by themselves. Follow-up care is a key part of your treatment and safety. Be sure to make and go to all appointments, and call your doctor if you are having problems. It's also a good idea to know your test results and keep a list of the medicines you take. How can you care for yourself at home? · Discuss your wishes with your loved ones and your doctor. This way, there are no surprises. · Many states have a unique form. Or you might use a universal form that has been approved by many states. This kind of form can sometimes be completed and stored online. Your electronic copy will then be available wherever you have a connection to the Internet. In most cases, doctors will respect your wishes even if you have a form from a different state. · You don't need a  to do an advance directive. But you may want to get legal advice. · Think about these questions when you prepare an advance directive:  ¨ Who do you want to make decisions about your medical care if you are not able to? Many people choose a family member or close friend. ¨ Do you know enough about life support methods that might be used? If not, talk to your doctor so you understand. ¨ What are you most afraid of that might happen?  You might be afraid of having pain, losing your independence, or being kept alive by machines. ¨ Where would you prefer to die? Choices include your home, a hospital, or a nursing home. ¨ Would you like to have information about hospice care to support you and your family? ¨ Do you want to donate organs when you die? ¨ Do you want certain Nondenominational practices performed before you die? If so, put your wishes in the advance directive. · Read your advance directive every year, and make changes as needed. When should you call for help? Be sure to contact your doctor if you have any questions. Where can you learn more? Go to http://dar-shawna.info/. Enter R264 in the search box to learn more about \"Advance Directives: Care Instructions. \"  Current as of: September 24, 2016  Content Version: 11.4  © 9904-4231 [employer]. Care instructions adapted under license by [employer] (which disclaims liability or warranty for this information). If you have questions about a medical condition or this instruction, always ask your healthcare professional. [address] any warranty or liability for your use of this information.

## 2017-12-05 NOTE — PROGRESS NOTES
Dr. Shayy Cline referred Derryl Goldberg ([birth year]) a 81 y.o. female for a Medicare Annual Wellness Visit ([phone])    This is a Subsequent Medicare Annual Wellness Visit providing Personalized Prevention Plan Services (PPPS) (Performed 12 months after initial AWV and PPPS )    I have reviewed the patient's medical history in detail and updated the computerized patient record. History     Past Medical History:   Diagnosis Date    Chronic anxiety     Depression     controlled on Paxil    Diabetes (HCC)     DJD (degenerative joint disease)     GERD (gastroesophageal reflux disease)     High cholesterol     HTN (hypertension)     Hypertension     Low back pain     post-laminectomy syndrome and multilevel degenerative disease    Osteoporosis       Past Surgical History:   Procedure Laterality Date    HX ADENOIDECTOMY      HX APPENDECTOMY      HX BACK SURGERY      PHLD X3    HX BREAST BIOPSY Left 8/28/2015    WIDE LOCAL EXCISION LEFT BREAST LESION performed by Renetta Schroeder MD at [address] [place] [address] HX HERNIA REPAIR      hiatal    HX KNEE REPLACEMENT      HX ORTHOPAEDIC      bilateral shoulder surgery    HX ELVIRA AND BSO  1982    HX TONSILLECTOMY      TOTAL HIP ARTHROPLASTY  4/12     total right hip replacement, Dr. Renetta Song     Current Outpatient Prescriptions   Medication Sig Dispense Refill    lovastatin (MEVACOR) 40 mg tablet Take 1 Tab by mouth nightly. 90 Tab 3    oxybutynin chloride XL (DITROPAN XL) 10 mg CR tablet TAKE 1 TABLET DAILY 90 Tab 3    lansoprazole (PREVACID) 30 mg capsule Take 1 Cap by mouth Daily (before breakfast). 90 Cap 3    PARoxetine (PAXIL) 20 mg tablet Take 1 Tab by mouth daily. 90 Tab 3    potassium chloride SA (MICRO-K) 10 mEq capsule TAKE 1 CAPSULE TWICE DAILY 180 Cap 3    GOTU SILVIA HERB PO Take  by mouth.  furosemide (LASIX) 40 mg tablet take 1 tablet by mouth daily 90 Tab 3    OLANZapine (ZYPREXA) 10 mg tablet Take 10 mg by mouth nightly. Allergies   Allergen Reactions    Aspirin Other (comments)     \"messes up my kidneys\"    Feldene [Piroxicam] Other (comments)     Kidney damage      Morphine Other (comments)     unconsciousness    Nsaids (Non-Steroidal Anti-Inflammatory Drug) Other (comments)     \"messes up my kidneys\"    Other Medication Not Reported This Time     Mycins      Penicillins Hives    Sulfa (Sulfonamide Antibiotics) Rash    Vancomycin Itching     Possible allergic reaction to Vancomycin.  Complained of itching/reddness around forehead/back of neck     Family History   Problem Relation Age of Onset    Cancer Mother      melanoma    Cancer Father      lung    Heart Disease Maternal Grandmother     Diabetes Other     Hypertension Other     Headache Other     Seizures Other     Coronary Artery Disease Other     Heart Attack Other      Social History   Substance Use Topics    Smoking status: Never Smoker    Smokeless tobacco: Never Used    Alcohol use No     Patient Active Problem List   Diagnosis Code    Postlaminectomy syndrome, lumbar region M96.1    Lumbosacral spondylosis without myelopathy M47.817    Lumbosacral radiculopathy M54.17    Spasm of muscle M62.838    Encounter for long-term (current) use of high-risk medication Z79.899    DJD (degenerative joint disease) M19.90    HTN (hypertension) I10    Urgency incontinence N39.41    Major depression F32.9    Nausea R11.0    Hyperlipidemia E78.5    Chronic pain syndrome G89.4    Prediabetes R73.03    Insomnia G47.00    Essential hypertension I10    Rheumatoid arthritis involving multiple joints (HCC) M06.9    Back pain M54.9    Chest pain R07.9    Overactive bladder N32.81    Hyperlipidemia LDL goal <130 E78.5    Chronic renal failure, stage 3 (moderate) N18.3    Hyperlipidemia LDL goal <100 E78.5       Depression Risk Factor Screening:     PHQ over the last two weeks 9/2/2014   PHQ Not Done -   Little interest or pleasure in doing things Not at all   Feeling down, depressed or hopeless Not at all   Total Score PHQ 2 0     Alcohol Risk Factor Screening: You do not drink alcohol or very rarely. Functional Ability and Level of Safety:     Hearing Loss   Hearing is good. Activities of Daily Living   The home contains: no safety equipment    Partial assistance. Requires assistance with:   ADL Assessment 12/5/2017   Feeding yourself No Help Needed   Getting from bed to chair No Help Needed   Getting dressed No Help Needed   Bathing or showering No Help Needed   Walk across the room (includes cane/walker) Help Needed   Using the telphone No Help Needed   Taking your medications Help Needed   Preparing meals Help Needed   Managing money (expenses/bills) Help Needed   Moderately strenuous housework (laundry) Help Needed   Shopping for personal items (toiletries/medicines) Help Needed   Shopping for groceries Help Needed   Driving Help Needed   Climbing a flight of stairs Help Needed   Getting to places beyond walking distances No Help Needed     Fall Risk     Fall Risk Assessment, last 12 mths 12/5/2017   Able to walk? Yes   Fall in past 12 months? No   Fall with injury? -   Number of falls in past 12 months -   Fall Risk Score -     Abuse Screen   Patient is not abused    Abuse Screening Questionnaire 8/3/2017   Do you ever feel afraid of your partner? N   Are you in a relationship with someone who physically or mentally threatens you? N   Is it safe for you to go home? Y       Cognitive Screening     Evaluation of Cognitive Function:  Has your family/caregiver stated any concerns about your memory: yes  Patient/family requested referral to specialist for further evaluation     Mood/affect:  neutral  Appearance: age appropriate and within normal Limits  Family member/caregiver input: son expressed some concerns about memory impairment     No exam performed by pharmacist today, not required for Medicare Annual Wellness Visit.  Patient to be seen by  Jacy Marinelli separately. Patient Care Team:  Jacy Marinelli MD as PCP - General (Internal Medicine)  Shari Vasquez, RN as [address] (Internal Medicine)  Reynaldo Vega MD (Cardiology)  Diamond Robledo MD (Orthopedic Surgery)  Manjeet Ortiz MD (Gastroenterology)  Beverly Florentino MD (Rheumatology)  Ted Christie MD (Surgical Oncology)  Reed Cannon RN as Ambulatory Care Navigator (Internal Medicine)  Lizeth Hart MD (Endocrinology)  Kristine Hector MD (Physical Medicine and Rehab)  Paty Floyd MD (Orthopedic Surgery)    Advice/Referrals/Counseling   Education and counseling provided:  End-of-Life planning (with patient's consent)  Pneumococcal Vaccine  Influenza Vaccine  Screening Mammography  Colorectal cancer screening tests  Cardiovascular screening blood test  Bone mass measurement (DEXA)  Screening for glaucoma  Diabetes screening test  Tdap / Zoster vaccination recommendations     Assessment/Plan       ICD-10-CM ICD-9-CM    1. Essential hypertension I10 401.9 CBC WITH AUTOMATED DIFF      LIPID PANEL      METABOLIC PANEL, COMPREHENSIVE   2. Encounter for immunization Z23 V03.89 INFLUENZA VIRUS VACCINE, HIGH DOSE SEASONAL, PRESERVATIVE FREE      ZOSTER (SHINGLES) VACCINE, LIVE, SC INJECTION   3. Rheumatoid arthritis involving multiple joints (HCC) M06.9 714.0    4. Hyperlipidemia LDL goal <100 E78.5 272.4 LIPID PANEL      METABOLIC PANEL, COMPREHENSIVE      TSH 3RD GENERATION      T4, FREE   5. Memory loss R41.3 780.93    6. Medicare annual wellness visit, subsequent Z00.00 V70.0    7. Screening for alcoholism Z13.89 V79.1    8. Screening for depression Z13.89 V79.0    .    9. Medication Reconciliation: Performed today and patient did not bring her medications to the visit. and the following changes were made.    Discontinued: none   Additions: none   Changes / other discrepancies: none    Medications Discontinued During This Encounter Medication Reason    lovastatin (MEVACOR) 40 mg tablet Reorder       10. Immunizations: Patient confirmed the following records of vaccinations are correct and current.   -Pneumococcal: done    -Influenza: done today     -Zoster:  Done today   -Tdap:  N/A    Immunization History   Administered Date(s) Administered    Influenza High Dose Vaccine PF 10/06/2015    Influenza Vaccine 02/07/2014    Influenza Vaccine Split 11/29/2011, 12/05/2012    Influenza Vaccine Whole 11/04/2010    Pneumococcal Conjugate (PCV-13) 10/06/2015    Pneumococcal Polysaccharide (PPSV-23) 12/05/2012       11. Screenings: (see patient instructions for chart/information):   -DEXA scan: done    -Colonoscopy: declines previously    -Glaucoma screening/Eye exam: done 2016, will see soon    -Mammogram: done 3/2016    -Cervical Screening: N/A   -Lipid panel: done 5/2017   -Diabetes: done 5/2017     12. Advanced Care Planning: Patient educated on the importance of an advanced care plan and paperwork was given to the patient today. Asked patient to read over the information and bring it back to her next appointment with her physician. Patient verbalized understanding of information presented. Answered all of the patient's questions. AVS information was reviewed with patient and will be printed on checkout.     Rajesh Avilez, PharmD, BCPS, BCACP

## 2017-12-05 NOTE — MR AVS SNAPSHOT
Visit Information Date & Time Provider Department Dept. Phone Encounter #  
 12/5/2017 11:00 AM Marii Dougherty MD [facility] [address] [place] [address] [id number] Your Appointments 5/30/2018 11:05 AM  
LAB with [facility] NURSE VISIT [facility] [address] [place] [address] ([facility] Appt Note: labs [address], [address] [place] [address] [address] [place]  
  
   
 [address], [address]  
  
    
 6/6/2018 10:30 AM  
PHYSICAL with Marii Dougherty MD  
[facility] [address] [place] [address] [facility] Appt Note: physical  
 [address] [facility], [facility] [address] [address] [place]  
  
   
 [address], [address] Upcoming Health Maintenance Date Due  
 MEDICARE YEARLY EXAM 8/21/2016 Influenza Age 5 to Adult 8/1/2017 GLAUCOMA SCREENING Q2Y 3/1/2018 BREAST CANCER SCRN MAMMOGRAM 8/4/2018 COLONOSCOPY 8/21/2025 DTaP/Tdap/Td series (2 - Td) 12/5/2027 Allergies as of 12/5/2017  Review Complete On: 12/5/2017 By: Marii Dougherty MD  
  
 Severity Noted Reaction Type Reactions Aspirin    Other (comments) \"messes up my kidneys\" Feldene [Piroxicam]  04/17/2012    Other (comments) Kidney damage Morphine    Other (comments)  
 unconsciousness Nsaids (Non-steroidal Anti-inflammatory Drug)  08/24/2011    Other (comments) \"messes up my kidneys\" Other Medication    Not Reported This Time Mycins Penicillins    Hives Sulfa (Sulfonamide Antibiotics)  04/17/2012    Rash Vancomycin  08/28/2015    Itching Possible allergic reaction to Vancomycin. Complained of itching/reddness around forehead/back of neck Current Immunizations  Reviewed on 7/25/2017 Name Date Influenza High Dose Vaccine PF  Incomplete, 10/6/2015 11:46 AM  
 Influenza Vaccine 2/7/2014 Influenza Vaccine Split 12/5/2012, 11/29/2011 Influenza Vaccine Whole 11/4/2010 Pneumococcal Conjugate (PCV-13) 10/6/2015 11:49 AM  
 Pneumococcal Polysaccharide (PPSV-23) 12/5/2012 Zoster Vaccine, Live  Incomplete Not reviewed this visit You Were Diagnosed With   
  
 Codes Comments Essential hypertension    -  Primary ICD-10-CM: I10 
ICD-9-CM: 401.9 Encounter for immunization     ICD-10-CM: V43 ICD-9-CM: V03.89 Rheumatoid arthritis involving multiple joints (HCC)     ICD-10-CM: M06.9 ICD-9-CM: 714.0 Hyperlipidemia LDL goal <100     ICD-10-CM: E78.5 ICD-9-CM: 272.4 Memory loss     ICD-10-CM: R41.3 ICD-9-CM: 780.93 Medicare annual wellness visit, subsequent     ICD-10-CM: Z00.00 ICD-9-CM: V70.0 Screening for alcoholism     ICD-10-CM: Z13.89 ICD-9-CM: V79.1 Screening for depression     ICD-10-CM: Z13.89 ICD-9-CM: V79.0 Vitals BP Pulse Temp Resp Height(growth percentile) Weight(growth percentile) 136/80 97 98.4 °F (36.9 °C) (Oral) 12 5' 6\" (1.676 m) 173 lb 9.6 oz (78.7 kg) SpO2 BMI OB Status Smoking Status 94% 28.02 kg/m2 Hysterectomy Never Smoker Vitals History BMI and BSA Data Body Mass Index Body Surface Area 28.02 kg/m 2 1.91 m 2 Preferred Pharmacy Pharmacy Name Phone  [facility] [phone] Your Updated Medication List  
  
   
This list is accurate as of: 12/5/17 12:10 PM.  Always use your most recent med list.  
  
  
  
  
 furosemide 40 mg tablet Commonly known as:  LASIX  
take 1 tablet by mouth daily GOTU SILVIA HERB PO Take  by mouth.  
  
 lansoprazole 30 mg capsule Commonly known as:  PREVACID Take 1 Cap by mouth Daily (before breakfast). lovastatin 40 mg tablet Commonly known as:  MEVACOR Take 1 Tab by mouth nightly. OLANZapine 10 mg tablet Commonly known as:  ZyPREXA Take 10 mg by mouth nightly. oxybutynin chloride XL 10 mg CR tablet Commonly known as:  DITROPAN XL  
TAKE 1 TABLET DAILY PARoxetine 20 mg tablet Commonly known as:  PAXIL Take 1 Tab by mouth daily. potassium chloride SA 10 mEq capsule Commonly known as:  MICRO-K  
TAKE 1 CAPSULE TWICE DAILY Prescriptions Sent to Pharmacy Refills  
 lovastatin (MEVACOR) 40 mg tablet 3 Sig: Take 1 Tab by mouth nightly. Class: Normal  
 Pharmacy: [facility] [address] Ph #: [phone] Route: Oral  
  
We Performed the Following INFLUENZA VIRUS VACCINE, HIGH DOSE SEASONAL, PRESERVATIVE FREE [70191 CPT(R)] ZOSTER (SHINGLES) VACCINE, LIVE, SC INJECTION [id number] CPT(R)] To-Do List   
 01/24/2018 11:30 AM  
  Appointment with [facility] NURSE at [facility] ([phone]) Around 05/29/2018 Lab:  CBC WITH AUTOMATED DIFF Around 05/29/2018 Lab:  LIPID PANEL Around 05/29/2018 Lab:  METABOLIC PANEL, COMPREHENSIVE Around 05/29/2018 Lab:  T4, FREE Around 05/29/2018 Lab:  TSH 3RD GENERATION   
  
 07/25/2018 11:30 AM  
  Appointment with [facility] NURSE at [facility] ([phone]) Patient Instructions Medicare Part B Preventive Services Limitations Recommendation Follow-up Action Needed Bone Mass Measurement 
(age 72 & older, biennial) Requires diagnosis related to osteoporosis or estrogen deficiency. Biennial benefit unless patient has history of long-term glucocorticoid tx or baseline is needed because initial test was by other method Last: 11/12/15 A DEXA scan is recommended after you turn 72years of age to determine your risk for osteoporosis Next: Follow up as recommended by primary care provider and/or specialist    
Colorectal Cancer Screening 
-Fecal occult blood test (annual) -Flexible sigmoidoscopy (5y) 
-Screening colonoscopy (10y) -Barium Enema Normally recommended for patients age 48 - 78  Last: N/A Every 5-10 years depending on your colonoscopy result starting at age 48 years Next: Follow up as recommended by primary care provider and/or specialist   
Glaucoma Screening (no USPSTF recommendation) Diabetes mellitus, family history, , age 48 or over,  American, age 72 or over Last: 3/2016 Every year, or as directed by provider Next: Follow up as recommended by primary care provider and/or specialist or at least every 2 years Screening Mammography (biennial age 54-69) Annually (age 36 or over) Last: 8/2016 Next: Follow up as recommended by primary care provider and/or specialist   
Screening Pap Tests and Pelvic Examination (up to age 72 and after 72 if unknown history or abnormal study last 10 years) Every 24 months except high risk Last: N/A Next: Discuss with your doctor if this screening is appropriate for you. Cardiovascular Screening Blood Tests (every 5 years) Total cholesterol, HDL, Triglycerides Order as a panel if possible Last: 5/2017 Every Year Next: Follow up as recommended by primary care provider and/or specialist  
Diabetes Screening Tests (at least every 3 years, Medicare covers annually or at 6-month intervals for prediabetic patients) Fasting blood sugar (FBS) or glucose tolerance test (GTT) Patient must be diagnosed with one of the following: 
-Hypertension, Dyslipidemia, obesity, previous impaired FBS or GTT 
Or any two of the following: overweight, FH of diabetes, age ? 72, history of gestational diabetes, birth of baby weighing more than 9 pounds Last: 5/2017 Every 3-6 months depending on your result Every 3 years Next: Follow up as recommended by primary care provider and/or specialist   
Diabetes Self-Management Training (DSMT) (no USPSTF recommendation) Requires referral by treating physician for patient with diabetes or renal disease. 10 hours of initial DSMT session of no less than 30 minutes each in a continuous 12-month period.   2 hours of follow-up DSMT in subsequent years. Last: N/A Talk to your doctor if you are interested in a refresher course. Medical Nutrition Therapy (MNT) (for diabetes or renal disease not recommended schedule) Requires referral by treating physician for patient with diabetes or renal disease. Can be provided in same year as diabetes self-management training (DSMT), and CMS recommends medical nutrition therapy take place after DSMT. Up to 3 hours for initial year and 2 hours in subsequent years. Last: N/A Talk to your doctor if you are interested in a refresher course. Seasonal Influenza Vaccination (annually)  Last: Done today Every Fall Please consider getting one every fall Pneumococcal Vaccination (once after 65)  Last: 
Pneumovax: 12/5/12 Prevnar-13: 10/6/15 Next:Vaccination series complete Shingles Vaccination  Last: N/A A shingles vaccine is recommended once in a lifetime after age 61 Please consider getting one at your pharmacy. Tetanus, Diphtheria and Pertussis (Tdap) Vaccination Booster One Booster as an adult and then tetanus every 10 years or as indicated Last: N/A Please consider, especially if you will have frequent contact with young children who have not completed their vaccine series. Can get at your pharmacy. Hepatitis B Vaccinations (if medium/high risk) Medium/high risk factors:  End-stage renal disease, Hemophiliacs who received Factor VIII or IX concentrates, Clients of institutions for the mentally retarded, Persons who live in the same house as a HepB virus carrier, Homosexual men, Illicit injectable drug abusers. Last: N/A Next: N/A Counseling to Prevent Tobacco Use (up to 8 sessions per year) - Counseling greater than 3 and up to 10 minutes - Counseling greater than 10 minutes Patients must be asymptomatic of tobacco-related conditions to receive as preventive service  As recommended by your PCP or specialist   
Human Immunodeficiency Virus (HIV) Screening (annually for increased risk patients) HIV-1 and HIV-2 by EIA, AGGIE, rapid antibody test, or oral mucosa transudate Patient must be at increased risk for HIV infection per USPSTF guidelines or pregnant. Tests covered annually for patients at increased risk. Pregnant patients may receive up to 3 test during pregnancy. As recommended by your PCP or Specialist  
Ultrasound Screening for Abdominal Aortic Aneurysm (AAA) once Patient must be referred through IPPE and not have had a screening for abdominal aortic aneurysm before under medicare. Limited to patients who meet onf of the following criteria: 
-Men who are 73-68 years old and have smoked more than 100 cigarettes in their lifetime 
-Anyone with a FH of AAA 
-Anyone recommended for screening by the USPSFTF As recommended by your PCP or Specialist 
  
NA = Not Applicable; NI= Not Indicated Advanced care planning: Please bring in a copy of your advanced directives at your convenience to our office so we may scan a copy for our records. Advance Directives: Care Instructions Your Care Instructions An advance directive is a legal way to state your wishes at the end of your life. It tells your family and your doctor what to do if you can no longer say what you want. There are two main types of advance directives. You can change them any time that your wishes change. · A living will tells your family and your doctor your wishes about life support and other treatment. · A durable power of  for health care lets you name a person to make treatment decisions for you when you can't speak for yourself. This person is called a health care agent. If you do not have an advance directive, decisions about your medical care may be made by a doctor or a  who doesn't know you. It may help to think of an advance directive as a gift to the people who care for you. If you have one, they won't have to make tough decisions by themselves. Follow-up care is a key part of your treatment and safety. Be sure to make and go to all appointments, and call your doctor if you are having problems. It's also a good idea to know your test results and keep a list of the medicines you take. How can you care for yourself at home? · Discuss your wishes with your loved ones and your doctor. This way, there are no surprises. · Many states have a unique form. Or you might use a universal form that has been approved by many states. This kind of form can sometimes be completed and stored online. Your electronic copy will then be available wherever you have a connection to the Internet. In most cases, doctors will respect your wishes even if you have a form from a different state. · You don't need a  to do an advance directive. But you may want to get legal advice. · Think about these questions when you prepare an advance directive: ¨ Who do you want to make decisions about your medical care if you are not able to? Many people choose a family member or close friend. ¨ Do you know enough about life support methods that might be used? If not, talk to your doctor so you understand. ¨ What are you most afraid of that might happen? You might be afraid of having pain, losing your independence, or being kept alive by machines. ¨ Where would you prefer to die? Choices include your home, a hospital, or a nursing home. ¨ Would you like to have information about hospice care to support you and your family? ¨ Do you want to donate organs when you die? ¨ Do you want certain Roman Catholic practices performed before you die? If so, put your wishes in the advance directive. · Read your advance directive every year, and make changes as needed. When should you call for help? Be sure to contact your doctor if you have any questions. Where can you learn more? Go to http://dar-shawna.info/. Enter R264 in the search box to learn more about \"Advance Directives: Care Instructions. \" Current as of: September 24, 2016 Content Version: 11.4 © 1163-2820 Healthwise, [employer]. Care instructions adapted under license by [employer] (which disclaims liability or warranty for this information). If you have questions about a medical condition or this instruction, always ask your healthcare professional. [address] any warranty or liability for your use of this information. Introducing [address]! [facility] introduces IntelligentM patient portal. Now you can access parts of your medical record, email your doctor's office, and request medication refills online. 1. In your internet browser, go to [email or web address]://[email or web address]. [employer] 2. Click on the First Time User? Click Here link in the Sign In box. You will see the New Member Sign Up page. 3. Enter your IntelligentM Access Code exactly as it appears below. You will not need to use this code after youve completed the sign-up process. If you do not sign up before the expiration date, you must request a new code. · IntelligentM Access Code: [email or web address] Expires: 3/5/2018  9:47 AM 
 
4. Enter the last four digits of your Social Security Number (xxxx) and Date of Birth (mm/dd/yyyy) as indicated and click Submit. You will be taken to the next sign-up page. 5. Create a IntelligentM ID. This will be your IntelligentM login ID and cannot be changed, so think of one that is secure and easy to remember. 6. Create a IntelligentM password. You can change your password at any time. 7. Enter your Password Reset Question and Answer. This can be used at a later time if you forget your password. 8. Enter your e-mail address. You will receive e-mail notification when new information is available in [address] 9. Click Sign Up. You can now view and download portions of your medical record. 10. Click the Download Summary menu link to download a portable copy of your medical information. If you have questions, please visit the Frequently Asked Questions section of the [email or web address] website. Remember, [email or web address] is NOT to be used for urgent needs. For medical emergencies, dial 911. Now available from your iPhone and Android! Please provide this summary of care documentation to your next provider. Your primary care clinician is listed as Kasia Hawk. Ortega Otero. If you have any questions after today's visit, please call [phone].

## 2017-12-26 RX ORDER — LANSOPRAZOLE 30 MG/1
30 CAPSULE, DELAYED RELEASE ORAL
Qty: 90 CAP | Refills: 3 | Status: SHIPPED | OUTPATIENT
Start: 2017-12-26 | End: 2018-12-24 | Stop reason: SDUPTHER

## 2017-12-26 RX ORDER — FUROSEMIDE 40 MG/1
TABLET ORAL
Qty: 90 TAB | Refills: 3 | Status: SHIPPED | OUTPATIENT
Start: 2017-12-26 | End: 2018-12-18 | Stop reason: SDUPTHER

## 2017-12-29 ENCOUNTER — DOCUMENTATION ONLY (OUTPATIENT)
Dept: INTERNAL MEDICINE CLINIC | Age: 72
End: 2017-12-29

## 2018-01-09 ENCOUNTER — TELEPHONE (OUTPATIENT)
Dept: INTERNAL MEDICINE CLINIC | Age: 73
End: 2018-01-09

## 2018-01-24 ENCOUNTER — HOSPITAL ENCOUNTER (OUTPATIENT)
Dept: INFUSION THERAPY | Age: 73
Discharge: HOME | End: 2018-01-24
Payer: MEDICARE

## 2018-01-24 VITALS
TEMPERATURE: 98.7 F | RESPIRATION RATE: 18 BRPM | DIASTOLIC BLOOD PRESSURE: 75 MMHG | OXYGEN SATURATION: 94 % | HEART RATE: 92 BPM | SYSTOLIC BLOOD PRESSURE: 122 MMHG

## 2018-01-24 LAB
ANION GAP BLD CALC-SCNC: 16 MMOL/L (ref 10–20)
BUN BLD-MCNC: 18 MG/DL (ref 7–18)
CA-I BLD-MCNC: 1.18 MMOL/L (ref 1.12–1.32)
CHLORIDE BLD-SCNC: 101 MMOL/L (ref 100–108)
CO2 BLD-SCNC: 28 MMOL/L (ref 19–24)
CREAT UR-MCNC: 1.4 MG/DL (ref 0.6–1.3)
GLUCOSE BLD STRIP.AUTO-MCNC: 110 MG/DL (ref 74–106)
HCT VFR BLD CALC: 40 % (ref 36–49)
HGB BLD-MCNC: 13.6 G/DL (ref 12–16)
MAGNESIUM SERPL-MCNC: 2 MG/DL (ref 1.6–2.6)
PHOSPHATE SERPL-MCNC: 4 MG/DL (ref 2.5–4.9)
POTASSIUM BLD-SCNC: 3.8 MMOL/L (ref 3.5–5.5)
SODIUM BLD-SCNC: 141 MMOL/L (ref 136–145)

## 2018-01-24 PROCEDURE — 84100 ASSAY OF PHOSPHORUS: CPT | Performed by: INTERNAL MEDICINE

## 2018-01-24 PROCEDURE — 36415 COLL VENOUS BLD VENIPUNCTURE: CPT | Performed by: INTERNAL MEDICINE

## 2018-01-24 PROCEDURE — 83735 ASSAY OF MAGNESIUM: CPT | Performed by: INTERNAL MEDICINE

## 2018-01-24 PROCEDURE — 96372 THER/PROPH/DIAG INJ SC/IM: CPT

## 2018-01-24 PROCEDURE — 74011250636 HC RX REV CODE- 250/636: Performed by: INTERNAL MEDICINE

## 2018-01-24 PROCEDURE — 80047 BASIC METABLC PNL IONIZED CA: CPT

## 2018-01-24 RX ADMIN — DENOSUMAB 60 MG: 60 INJECTION SUBCUTANEOUS at 11:54

## 2018-01-24 NOTE — PROGRESS NOTES
[facility] Progress Note    Date: 2018    Name: Rodrigo Voss    MRN: [id number]         : 1945     Prolia injection      Ms. Allen to [facility], ambulatory with rolling walker at 1125 accompanied by her son. Pt was assessed and education was provided. Explained use of Prolia medicine as well as need to check calcium level prior. Patient exhibits some memory loss/confusion. Son confirms she is taking calcium supplements daily. Ms. Bob Esteves vitals were reviewed and patient was observed for 5 minutes prior to treatment. Visit Vitals    /75 (BP 1 Location: Right arm, BP Patient Position: Sitting)    Pulse 92    Temp 98.7 °F (37.1 °C)    Resp 18    SpO2 94%    Breastfeeding No       Blood obtained peripherally from right AC without difficulty at 1141 and sent to lab for Mg and Phos per written orders. BMP run on iSTAT. No bleeding or hematoma noted at site. Guaze and coban applied. Lab results were obtained and reviewed. Recent Results (from the past 12 hour(s))   MAGNESIUM    Collection Time: 18 11:41 AM   Result Value Ref Range    Magnesium 2.0 1.6 - 2.6 mg/dL   PHOSPHORUS    Collection Time: 18 11:41 AM   Result Value Ref Range    Phosphorus 4.0 2.5 - 4.9 MG/DL   POC CHEM8    Collection Time: 18 11:49 AM   Result Value Ref Range    CO2, POC 28 (H) 19 - 24 MMOL/L    Glucose,  (H) 74 - 106 MG/DL    BUN, POC 18 7 - 18 MG/DL    Creatinine, POC 1.4 (H) 0.6 - 1.3 MG/DL    GFRAA, POC 45 (L) >60 ml/min/1.73m2    GFRNA, POC 37 (L) >60 ml/min/1.73m2    Sodium,  136 - 145 MMOL/L    Potassium, POC 3.8 3.5 - 5.5 MMOL/L    Calcium, ionized (POC) 1.18 1.12 - 1.32 MMOL/L    Chloride,  100 - 108 MMOL/L    Anion gap, POC 16 10 - 20      Hematocrit, POC 40 36 - 49 %    Hemoglobin, POC 13.6 12 - 16 G/DL     Results within ordered parameters to give Prolia today. Ionized calcium = 1.18 today which is WNL.     Prolia 60 mg was administered subcutaneous in  Back of left upper arm. No irritation or bleeding noted at site. Bandaid applied. Ms. Niko Greenberg tolerated well, and had no complaints. Patient armband removed and shredded. Ms. Niko Greenberg was discharged from [place] [address] in stable condition at 1155. Her son says they will see Dr. Santo Vegas before making next appointment in six months.     Jordan Sparks RN  January 24, 2018

## 2018-01-29 ENCOUNTER — OFFICE VISIT (OUTPATIENT)
Dept: NEUROLOGY | Age: 73
End: 2018-01-29

## 2018-01-29 VITALS
TEMPERATURE: 99.5 F | OXYGEN SATURATION: 95 % | HEART RATE: 118 BPM | SYSTOLIC BLOOD PRESSURE: 120 MMHG | HEIGHT: 66 IN | WEIGHT: 168 LBS | BODY MASS INDEX: 27 KG/M2 | DIASTOLIC BLOOD PRESSURE: 80 MMHG | RESPIRATION RATE: 16 BRPM

## 2018-01-29 DIAGNOSIS — R41.3 MEMORY IMPAIRMENT: ICD-10-CM

## 2018-01-29 DIAGNOSIS — R41.3 MEMORY IMPAIRMENT: Primary | ICD-10-CM

## 2018-01-29 RX ORDER — THERA TABS 400 MCG
1 TAB ORAL DAILY
COMMUNITY

## 2018-01-29 NOTE — PROGRESS NOTES
[facility]  [address], [address], [place], [address]  [address], [address]  Office:  [phone]  Fax: [phone]    Referring: Rg Lopez MD    Chief Complaint   Patient presents with   [facility] Establish Care     NP: Memory Concerns. Son states that patient has been having memory problems for many years but is gradually getting worse. This is a 81year old female who presents for new patient evaluation with complaint of memory loss. She is accompanied by her son in office today. Symptoms started 5 year ago, but states this recently has been getting worse. She lives at home with her son. Endorses short term memory loss. She has been paying her own bills. She denies forgetting to pay the bills. She takes her mediations on her own. Her son set up an alarm on the iPod to remind her when to take her medications in the pill box. She is no longer cooking. She is able to complete activities of daily living including dressing, bathing, and using the restroom. Denies leaving the stove on and forgetting this. Denies wandering. Denies driving. During the day she likes to watch television. She reads magazines. She does not keep physically active because of her chronic pain. She likes to talk on the telephone but does not go out much. She is seen by pain management for chronic pain related to previous lumbar laminectomy. States her narcotics dose was recently decreased. Her son is hoping that with the decreased dose of narcotics that this could improve her memory. He endorses the use of herbal supplements to help improve his mom's memory. She is maintained on Zyprexa and has been on this for many years. She is unsure of her psychiatric diagnoses. Her son says that she has not been seen by the psychiatrist and a while. She was going to [employer].   Her son thinks that this medication may also be affecting her memory, and he is going to call and schedule a follow-up appointment. Patient denies family history of memory loss. Denies history of stroke or heart attack. Memory Questions:  Repeating or asking the same thing over and over? Yes  Forgetting appointments, family occasions, holidays? No  Needs help managing finances? No  Needs help shopping? Yes  Needs help taking medications correctly? Yes  Getting lost in familiar places? No  Need help with ADLs? No    Unsafe behaviors: None as listed below. Aggression  Wandering  Kitchen  Driving (obeying signs, etc)    Past Medical History:   Diagnosis Date    Chronic anxiety     Depression     controlled on Paxil    Diabetes ([facility] [address]     DJD (degenerative joint disease)     GERD (gastroesophageal reflux disease)     High cholesterol     HTN (hypertension)     Hypertension     Low back pain     post-laminectomy syndrome and multilevel degenerative disease    Osteoporosis        Past Surgical History:   Procedure Laterality Date    HX ADENOIDECTOMY      HX APPENDECTOMY      HX BACK SURGERY      PHLD X3    HX BREAST BIOPSY Left 8/28/2015    WIDE LOCAL EXCISION LEFT BREAST LESION performed by Sheree Liao MD at [facility] MAIN OR    HX HERNIA REPAIR      hiatal    HX KNEE REPLACEMENT      HX ORTHOPAEDIC      bilateral shoulder surgery    HX ELVIRA AND BSO  1982    HX TONSILLECTOMY      TOTAL HIP ARTHROPLASTY  4/12     total right hip replacement, Dr. Teodora Antony       Current Outpatient Prescriptions   Medication Sig Dispense Refill    therapeutic multivitamin (THERAGRAN) tablet Take 1 Tab by mouth daily.  OXYCODONE HCL/ACETAMINOPHEN (PERCOCET PO) Take  by mouth.  furosemide (LASIX) 40 mg tablet take 1 tablet by mouth once daily 90 Tab 3    lansoprazole (PREVACID) 30 mg capsule Take 1 Cap by mouth Daily (before breakfast). 90 Cap 3    lovastatin (MEVACOR) 40 mg tablet Take 1 Tab by mouth nightly.  90 Tab 3    oxybutynin chloride XL (DITROPAN XL) 10 mg CR tablet TAKE 1 TABLET DAILY 90 Tab 3    PARoxetine (PAXIL) 20 mg tablet Take 1 Tab by mouth daily. 90 Tab 3    potassium chloride SA (MICRO-K) 10 mEq capsule TAKE 1 CAPSULE TWICE DAILY 180 Cap 3    GOTU SILVIA HERB PO Take  by mouth.  OLANZapine (ZYPREXA) 10 mg tablet Take 10 mg by mouth nightly. Allergies   Allergen Reactions    Aspirin Other (comments)     \"messes up my kidneys\"    Feldene [Piroxicam] Other (comments)     Kidney damage      Morphine Other (comments)     unconsciousness    Nsaids (Non-Steroidal Anti-Inflammatory Drug) Other (comments)     \"messes up my kidneys\"    Other Medication Not Reported This Time     Mycins      Penicillins Hives    Sulfa (Sulfonamide Antibiotics) Rash    Vancomycin Itching     Possible allergic reaction to Vancomycin. Complained of itching/reddness around forehead/back of neck       Social History   Substance Use Topics    Smoking status: Never Smoker    Smokeless tobacco: Never Used    Alcohol use No       Family History   Problem Relation Age of Onset    Cancer Mother      melanoma    Cancer Father      lung    Heart Disease Maternal Grandmother     Diabetes Other     Hypertension Other     Headache Other     Seizures Other     Coronary Artery Disease Other     Heart Attack Other        Review of Systems:  Pertinent positives and negatives as noted otherwise comprehensive review is negative. Physical Examination:    Visit Vitals    /80    Pulse (!) 118    Temp 99.5 °F (37.5 °C) (Oral)    Resp 16    Ht 5' 6\" (1.676 m)    Wt 76.2 kg (168 lb)    SpO2 95%    BMI 27.12 kg/m2     General:  Well defined, nourished, and groomed individual in no acute distress. Neck: Supple, nontender, no bruits. Heart: Regular rate and rhythm, no murmurs, rub, or gallop. Normal S1S2. Lungs:  Clear to auscultation bilaterally with equal chest expansion, no cough, no wheeze  Musculoskeletal:  Extremities revealed no edema.   Psych:  Good mood and bright affect    Neurological Examination:     Today's date: 2018 January unsure date, Monday,   [facility] [place] view location, \"doctor's office,\" first floor  Current president: Nabil  Previous president: unsure  Recognition: 3/3  Repeat back: 1/3  3 step command: intact  How many quarters in $1.50: 6  Mental Status:   Alert and oriented to person, place, and time with recent and remote memory intact. Attention span and concentration are normal. Speech is fluent with a full fund of knowledge. Cranial Nerves:    II, III, IV, VI:  Visual acuity grossly intact. Visual fields are normal.    Pupils are equal, round, and reactive to light and accommodation. Extra-ocular movements are full and fluid. Fundoscopic exam was benign, no ptosis or nystagmus. V-XII: Hearing is grossly intact. Facial features are symmetric. The palate rises symmetrically and the tongue protrudes midline. Sternocleidomastoids 5/5. Motor Examination: Normal tone, bulk, and strength, 5/5 muscle strength throughout. No cogwheel rigidity or clonus present. Sensory exam:  Deferred. Coordination:  Finger to nose was normal.   No resting or intention tremor    Gait and Station:  Stooped posture and reduced arm swing. No pronator drift. No muscle wasting or fasiculations noted. Reflexes:  DTRs symmetric. Impression/Plan  Ramu Marcelo is a 81 y.o. female whose history and physical are consistent with memory impairment. Patient and patient's son endorse several year history of memory impairment with worsening course over the past few months. Specifically endorses short-term memory loss. Denies dangerous behaviors. Her examination today is reassuringly nonfocal.  I will move forward with customary workup for memory impairment. This includes head CT looking for any structural abnormality. We will move forward with EEG looking for slowing or epileptiform. Order serum lab tests as indicated below. Looking for any metabolic reason for memory impairment. Rectify this if appropriate. I have also place referral for neuropsychology to perform formal memory evaluation. We discussed that once all these tests are complete that she will follow-up for results. We discussed some treatment options if this is true dementia. We discussed safety and the avoidance of falls. We discussed keeping mentally. We discussed keeping physically active as tolerated. Also maintain social activity including going to the senior center or talking on the telephone. All questions addressed patient and patient's son are agreeable with plan of care. They will follow-up after test results. Diagnoses and all orders for this visit:    1. Memory impairment  -     CT HEAD WO CONT; Future  -     TSH AND FREE T4; Future  -     VITAMIN B12; Future  -     REFERRAL TO NEUROPSYCHOLOGY  -     EEG; Future    Signed By: Meena Mtz NP    This note will not be viewable in [address] This note was created using voice recognition software. Despite editing, there may be syntax errors.

## 2018-01-29 NOTE — MR AVS SNAPSHOT
[address] [facility] [address] 
 
 
 [address] [facility] 
[phone] Patient: Kevin Choi MRN: [id number] CKT:[birth year] Visit Information Date & Time Provider Department Dept. Phone Encounter #  
 1/29/2018 11:15 AM Ryan Willard NP [facility] at [address] [place] [address] [id number] Follow-up Instructions Return for after tests. Your Appointments 5/30/2018 11:05 AM  
LAB with C NURSE VISIT [facility] [address] [place] [address] ([address] [place] [address]) Appt Note: labs [address], [facility] [address] [address]  
  
   
 [address], [address]  
  
    
 6/6/2018 10:30 AM  
PHYSICAL with Srini Flores MD  
[facility] [address] [place] [address]) Appt Note: physical  
 [address] [facility], [facility] [address] [address]  
  
   
 [address], [address] Upcoming Health Maintenance Date Due  
 GLAUCOMA SCREENING Q2Y 3/1/2018 BREAST CANCER SCRN MAMMOGRAM 8/4/2018 MEDICARE YEARLY EXAM 12/6/2018 COLONOSCOPY 8/21/2025 DTaP/Tdap/Td series (2 - Td) 12/5/2027 Allergies as of 1/29/2018  Review Complete On: 1/29/2018 By: Tess Alvarado LPN Severity Noted Reaction Type Reactions Aspirin    Other (comments) \"messes up my kidneys\" Feldene [Piroxicam]  04/17/2012    Other (comments) Kidney damage Morphine    Other (comments)  
 unconsciousness Nsaids (Non-steroidal Anti-inflammatory Drug)  08/24/2011    Other (comments) \"messes up my kidneys\" Other Medication    Not Reported This Time Mycins Penicillins    Hives Sulfa (Sulfonamide Antibiotics)  04/17/2012    Rash Vancomycin  08/28/2015    Itching Possible allergic reaction to Vancomycin. Complained of itching/reddness around forehead/back of neck Current Immunizations  Reviewed on 7/25/2017 Name Date Influenza High Dose Vaccine PF 12/5/2017 12:26 PM, 10/6/2015 11:46 AM  
 Influenza Vaccine 2/7/2014 Influenza Vaccine Split 12/5/2012, 11/29/2011 Influenza Vaccine Whole 11/4/2010 Pneumococcal Conjugate (PCV-13) 10/6/2015 11:49 AM  
 Pneumococcal Polysaccharide (PPSV-23) 12/5/2012 Zoster Vaccine, Live 12/5/2017 12:27 PM  
  
 Not reviewed this visit You Were Diagnosed With   
  
 Codes Comments Memory impairment    -  Primary ICD-10-CM: R41.3 ICD-9-CM: 780.93 Vitals BP Pulse Temp Resp Height(growth percentile) Weight(growth percentile) 120/80 (!) 118 99.5 °F (37.5 °C) (Oral) 16 5' 6\" (1.676 m) 168 lb (76.2 kg) SpO2 BMI OB Status Smoking Status 95% 27.12 kg/m2 Hysterectomy Never Smoker BMI and BSA Data Body Mass Index Body Surface Area  
 27.12 kg/m 2 1.88 m 2 Preferred Pharmacy Pharmacy Name Phone  [facility] [phone] Your Updated Medication List  
  
   
This list is accurate as of: 1/29/18 11:43 AM.  Always use your most recent med list.  
  
  
  
  
 furosemide 40 mg tablet Commonly known as:  LASIX  
take 1 tablet by mouth once daily GOTU SILVIA HERB PO Take  by mouth.  
  
 lansoprazole 30 mg capsule Commonly known as:  PREVACID Take 1 Cap by mouth Daily (before breakfast). lovastatin 40 mg tablet Commonly known as:  MEVACOR Take 1 Tab by mouth nightly. OLANZapine 10 mg tablet Commonly known as:  ZyPREXA Take 10 mg by mouth nightly. oxybutynin chloride XL 10 mg CR tablet Commonly known as:  DITROPAN XL  
TAKE 1 TABLET DAILY PARoxetine 20 mg tablet Commonly known as:  PAXIL Take 1 Tab by mouth daily. PERCOCET PO Take  by mouth.  
  
 potassium chloride SA 10 mEq capsule Commonly known as:  MICRO-K  
TAKE 1 CAPSULE TWICE DAILY therapeutic multivitamin tablet Commonly known as:  [facility]  
 Take 1 Tab by mouth daily. We Performed the Following REFERRAL TO NEUROPSYCHOLOGY [BOT50 Custom] Comments:  
 Please evaluate patient for memory impairment. Follow-up Instructions Return for after tests. To-Do List   
 01/29/2018 Imaging:  CT HEAD WO CONT   
  
 01/29/2018 Neurology:  EEG   
  
 01/29/2018 Lab:  TSH AND FREE T4   
  
 01/29/2018 Lab:  VITAMIN B12   
  
 07/25/2018 11:30 AM  
  Appointment with [facility] FA[facility] TRACK NURSE at [facility] ([phone]) Referral Information Referral ID Referred By Referred To  
  
 [id number] Rajwinder Angeles, PHD   
   [address] [place] [facility] [address]   
   [facility] Phone: [phone] Fax: [phone] Visits Status Start Date End Date 1 New Request 1/29/18 1/29/19 If your referral has a status of pending review or denied, additional information will be sent to support the outcome of this decision. Patient Instructions Please have all tests complete and follow up afterwards. Introducing [facility] & HEALTH SERVICES! Koko Salgado introduces [employer] patient portal. Now you can access parts of your medical record, email your doctor's office, and request medication refills online. 1. In your internet browser, go to [email or web address] 2. Click on the First Time User? Click Here link in the Sign In box. You will see the New Member Sign Up page. 3. Enter your [employer] Access Code exactly as it appears below. You will not need to use this code after youve completed the sign-up process. If you do not sign up before the expiration date, you must request a new code. · [employer] Access Code: [email or web address] Expires: 3/5/2018  9:47 AM 
 
4. Enter the last four digits of your Social Security Number (xxxx) and Date of Birth (mm/dd/yyyy) as indicated and click Submit.  You will be taken to the next sign-up page. 5. Create a [email or web address] ID. This will be your [email or web address] login ID and cannot be changed, so think of one that is secure and easy to remember. 6. Create a [email or web address] password. You can change your password at any time. 7. Enter your Password Reset Question and Answer. This can be used at a later time if you forget your password. 8. Enter your e-mail address. You will receive e-mail notification when new information is available in [address] 9. Click Sign Up. You can now view and download portions of your medical record. 10. Click the Download Summary menu link to download a portable copy of your medical information. If you have questions, please visit the Frequently Asked Questions section of the [email or web address] website. Remember, [email or web address] is NOT to be used for urgent needs. For medical emergencies, dial 911. Now available from your iPhone and Android! Please provide this summary of care documentation to your next provider. Your primary care clinician is listed as Moises Dias. If you have any questions after today's visit, please call [phone].

## 2018-01-29 NOTE — PROGRESS NOTES
Chief Complaint   Patient presents with   [facility] Establish Care     NP: Memory Concerns. Son states that patient has been having memory problems for many years but is gradually getting worse. Patient placed in room 2. Chart and medications reviewed with patient and son.

## 2018-02-01 ENCOUNTER — HOSPITAL ENCOUNTER (OUTPATIENT)
Dept: CT IMAGING | Age: 73
Discharge: HOME | End: 2018-02-01
Attending: NURSE PRACTITIONER
Payer: MEDICARE

## 2018-02-01 ENCOUNTER — HOSPITAL ENCOUNTER (OUTPATIENT)
Dept: LAB | Age: 73
Discharge: HOME | End: 2018-02-01
Attending: NURSE PRACTITIONER
Payer: MEDICARE

## 2018-02-01 ENCOUNTER — HOSPITAL ENCOUNTER (OUTPATIENT)
Dept: NEUROLOGY | Age: 73
Discharge: HOME | End: 2018-02-01
Attending: NURSE PRACTITIONER
Payer: MEDICARE

## 2018-02-01 DIAGNOSIS — R41.3 MEMORY IMPAIRMENT: ICD-10-CM

## 2018-02-01 LAB
T4 FREE SERPL-MCNC: 1 NG/DL (ref 0.7–1.5)
TSH SERPL DL<=0.05 MIU/L-ACNC: 2.2 UIU/ML (ref 0.36–3.74)
VIT B12 SERPL-MCNC: 1358 PG/ML (ref 211–911)

## 2018-02-01 PROCEDURE — 82607 VITAMIN B-12: CPT | Performed by: NURSE PRACTITIONER

## 2018-02-01 PROCEDURE — 36415 COLL VENOUS BLD VENIPUNCTURE: CPT | Performed by: NURSE PRACTITIONER

## 2018-02-01 PROCEDURE — 95816 EEG AWAKE AND DROWSY: CPT

## 2018-02-01 PROCEDURE — 84439 ASSAY OF FREE THYROXINE: CPT | Performed by: NURSE PRACTITIONER

## 2018-02-01 PROCEDURE — 70450 CT HEAD/BRAIN W/O DYE: CPT

## 2018-02-09 NOTE — PROCEDURES
[address] [place] [address] Dr SAELEM Churchill Garth  MR#: [id number]  : 1945  ACCOUNT #: [de-identified]   DATE OF SERVICE: 2018    INDICATIONS:  This is a 68-year-old right-handed female who presents for elective cortical evaluation for memory loss. The patient does have her EEG performed as an outpatient utilizing both referential and differential montages, as well as the International 10-20 system electrode placement system for an 18-channel EEG. CURRENT MEDICATIONS:  She was noted to be on oxycodone, Lasix, Prevacid, Mevacor, Ditropan, Paxil, Micro-K, and Zyprexa. EEG is to evaluate progressive memory loss. She was initially awake. She demonstrates up to 8-9 Hz posteriorly dominant and reactive alpha rhythm, as she enters into sleep. At times, there is some bitemporal slowing noted. Hyperventilation was not performed. Intermittent photic stimulation and mental alerting failed to elicit abnormal activity. There were no other focal lateralized or abnormal paroxysmal discharges noted on single channel EKG monitoring. There was no ectopy appreciated. EEG INTERPRETATION:  Normal awake and sleep recording for age.       MD NAPOLEON Urena / TN  D: 2018 16:55     T: 2018 05:48  JOB #: [id number]

## 2018-04-06 RX ORDER — POTASSIUM CHLORIDE 750 MG/1
10 CAPSULE, EXTENDED RELEASE ORAL 2 TIMES DAILY
Qty: 180 CAP | Refills: 3 | Status: SHIPPED | OUTPATIENT
Start: 2018-04-06 | End: 2019-06-23 | Stop reason: SDUPTHER

## 2018-04-06 NOTE — TELEPHONE ENCOUNTER
Last Visit: 12/05/2017 with MD Edel Chang    Next Appointment: 06/27/2018 with MD Edel Chang   Previous Refill Encounters: 05/22/2017 per MD Edel Chang #[id number] with 3 refills     Requested Prescriptions     Pending Prescriptions Disp Refills    potassium chloride SA (MICRO-K) 10 mEq capsule 180 Cap 3     Sig: Take 1 Cap by mouth two (2) times a day.

## 2018-05-29 DIAGNOSIS — I10 ESSENTIAL HYPERTENSION: ICD-10-CM

## 2018-05-29 DIAGNOSIS — E78.5 HYPERLIPIDEMIA LDL GOAL <100: ICD-10-CM

## 2018-05-30 ENCOUNTER — HOSPITAL ENCOUNTER (OUTPATIENT)
Dept: LAB | Age: 73
Discharge: HOME | End: 2018-05-30
Payer: MEDICARE

## 2018-05-30 ENCOUNTER — LAB ONLY (OUTPATIENT)
Dept: INTERNAL MEDICINE CLINIC | Age: 73
End: 2018-05-30

## 2018-05-30 DIAGNOSIS — E78.5 HYPERLIPIDEMIA LDL GOAL <100: ICD-10-CM

## 2018-05-30 DIAGNOSIS — I10 ESSENTIAL HYPERTENSION: Primary | ICD-10-CM

## 2018-05-30 DIAGNOSIS — I10 ESSENTIAL HYPERTENSION: ICD-10-CM

## 2018-05-30 LAB
ALBUMIN SERPL-MCNC: 4 G/DL (ref 3.4–5)
ALBUMIN/GLOB SERPL: 1.3 {RATIO} (ref 0.8–1.7)
ALP SERPL-CCNC: 60 U/L (ref 45–117)
ALT SERPL-CCNC: 19 U/L (ref 13–56)
ANION GAP SERPL CALC-SCNC: 7 MMOL/L (ref 3–18)
AST SERPL-CCNC: 24 U/L (ref 15–37)
BASOPHILS # BLD: 0 K/UL (ref 0–0.06)
BASOPHILS NFR BLD: 0 % (ref 0–2)
BILIRUB SERPL-MCNC: 0.4 MG/DL (ref 0.2–1)
BUN SERPL-MCNC: 19 MG/DL (ref 7–18)
BUN/CREAT SERPL: 14 (ref 12–20)
CALCIUM SERPL-MCNC: 9.1 MG/DL (ref 8.5–10.1)
CHLORIDE SERPL-SCNC: 103 MMOL/L (ref 100–108)
CHOLEST SERPL-MCNC: 175 MG/DL
CO2 SERPL-SCNC: 31 MMOL/L (ref 21–32)
CREAT SERPL-MCNC: 1.35 MG/DL (ref 0.6–1.3)
DIFFERENTIAL METHOD BLD: ABNORMAL
EOSINOPHIL # BLD: 0.1 K/UL (ref 0–0.4)
EOSINOPHIL NFR BLD: 3 % (ref 0–5)
ERYTHROCYTE [DISTWIDTH] IN BLOOD BY AUTOMATED COUNT: 14.3 % (ref 11.6–14.5)
GLOBULIN SER CALC-MCNC: 3 G/DL (ref 2–4)
GLUCOSE SERPL-MCNC: 115 MG/DL (ref 74–99)
HCT VFR BLD AUTO: 45 % (ref 35–45)
HDLC SERPL-MCNC: 54 MG/DL (ref 40–60)
HDLC SERPL: 3.2 {RATIO} (ref 0–5)
HGB BLD-MCNC: 14.1 G/DL (ref 12–16)
LDLC SERPL CALC-MCNC: 92.6 MG/DL (ref 0–100)
LIPID PROFILE,FLP: NORMAL
LYMPHOCYTES # BLD: 1.6 K/UL (ref 0.9–3.6)
LYMPHOCYTES NFR BLD: 35 % (ref 21–52)
MCH RBC QN AUTO: 29.9 PG (ref 24–34)
MCHC RBC AUTO-ENTMCNC: 31.3 G/DL (ref 31–37)
MCV RBC AUTO: 95.3 FL (ref 74–97)
MONOCYTES # BLD: 0.8 K/UL (ref 0.05–1.2)
MONOCYTES NFR BLD: 18 % (ref 3–10)
NEUTS SEG # BLD: 2 K/UL (ref 1.8–8)
NEUTS SEG NFR BLD: 44 % (ref 40–73)
PLATELET # BLD AUTO: 201 K/UL (ref 135–420)
PMV BLD AUTO: 9.6 FL (ref 9.2–11.8)
POTASSIUM SERPL-SCNC: 3.9 MMOL/L (ref 3.5–5.5)
PROT SERPL-MCNC: 7 G/DL (ref 6.4–8.2)
RBC # BLD AUTO: 4.72 M/UL (ref 4.2–5.3)
SODIUM SERPL-SCNC: 141 MMOL/L (ref 136–145)
T4 FREE SERPL-MCNC: 1 NG/DL (ref 0.7–1.5)
TRIGL SERPL-MCNC: 142 MG/DL (ref ?–150)
TSH SERPL DL<=0.05 MIU/L-ACNC: 1.47 UIU/ML (ref 0.36–3.74)
VLDLC SERPL CALC-MCNC: 28.4 MG/DL
WBC # BLD AUTO: 4.6 K/UL (ref 4.6–13.2)

## 2018-05-30 PROCEDURE — 80053 COMPREHEN METABOLIC PANEL: CPT | Performed by: INTERNAL MEDICINE

## 2018-05-30 PROCEDURE — 36415 COLL VENOUS BLD VENIPUNCTURE: CPT | Performed by: INTERNAL MEDICINE

## 2018-05-30 PROCEDURE — 84439 ASSAY OF FREE THYROXINE: CPT | Performed by: INTERNAL MEDICINE

## 2018-05-30 PROCEDURE — 85025 COMPLETE CBC W/AUTO DIFF WBC: CPT | Performed by: INTERNAL MEDICINE

## 2018-05-30 PROCEDURE — 84443 ASSAY THYROID STIM HORMONE: CPT | Performed by: INTERNAL MEDICINE

## 2018-05-30 PROCEDURE — 80061 LIPID PANEL: CPT | Performed by: INTERNAL MEDICINE

## 2018-06-07 ENCOUNTER — TELEPHONE (OUTPATIENT)
Dept: NEUROLOGY | Age: 73
End: 2018-06-07

## 2018-06-27 ENCOUNTER — OFFICE VISIT (OUTPATIENT)
Dept: INTERNAL MEDICINE CLINIC | Age: 73
End: 2018-06-27

## 2018-06-27 VITALS
TEMPERATURE: 98.9 F | OXYGEN SATURATION: 96 % | HEART RATE: 100 BPM | BODY MASS INDEX: 25.23 KG/M2 | SYSTOLIC BLOOD PRESSURE: 110 MMHG | RESPIRATION RATE: 14 BRPM | DIASTOLIC BLOOD PRESSURE: 72 MMHG | WEIGHT: 157 LBS | HEIGHT: 66 IN

## 2018-06-27 DIAGNOSIS — Z12.31 SCREENING MAMMOGRAM, ENCOUNTER FOR: ICD-10-CM

## 2018-06-27 DIAGNOSIS — M06.9 RHEUMATOID ARTHRITIS INVOLVING MULTIPLE JOINTS (HCC): Primary | ICD-10-CM

## 2018-06-27 DIAGNOSIS — E78.5 HYPERLIPIDEMIA LDL GOAL <100: ICD-10-CM

## 2018-06-27 DIAGNOSIS — I10 ESSENTIAL HYPERTENSION: ICD-10-CM

## 2018-06-27 NOTE — MR AVS SNAPSHOT
[facility] 
 
 
 [address] 
[phone] Patient: Lois Cabral MRN: [id number] ZMR:[birth year] Visit Information Date & Time Provider Department Dept. Phone Encounter #  
 6/27/2018  3:00 PM Deb Echevarria MD [facility] [phone] Follow-up Instructions Return in about 6 months (around 12/27/2018). Upcoming Health Maintenance Date Due  
 GLAUCOMA SCREENING Q2Y 3/1/2018 BREAST CANCER SCRN MAMMOGRAM 8/4/2018 Influenza Age 5 to Adult 8/1/2018 MEDICARE YEARLY EXAM 12/6/2018 COLONOSCOPY 8/21/2025 DTaP/Tdap/Td series (2 - Td) 12/5/2027 Allergies as of 6/27/2018  Review Complete On: 6/27/2018 By: Deb Echevarria MD  
  
 Severity Noted Reaction Type Reactions Aspirin    Other (comments) \"messes up my kidneys\" Feldene [Piroxicam]  04/17/2012    Other (comments) Kidney damage Morphine    Other (comments)  
 unconsciousness Nsaids (Non-steroidal Anti-inflammatory Drug)  08/24/2011    Other (comments) \"messes up my kidneys\" Other Medication    Not Reported This Time Mycins Penicillins    Hives Sulfa (Sulfonamide Antibiotics)  04/17/2012    Rash Vancomycin  08/28/2015    Itching Possible allergic reaction to Vancomycin. Complained of itching/reddness around forehead/back of neck Current Immunizations  Reviewed on 7/25/2017 Name Date Influenza High Dose Vaccine PF 12/5/2017 12:26 PM, 10/6/2015 11:46 AM  
 Influenza Vaccine 2/7/2014 Influenza Vaccine Split 12/5/2012, 11/29/2011 Influenza Vaccine Whole 11/4/2010 Pneumococcal Conjugate (PCV-13) 10/6/2015 11:49 AM  
 Pneumococcal Polysaccharide (PPSV-23) 12/5/2012 Zoster Vaccine, Live 12/5/2017 12:27 PM  
  
 Not reviewed this visit You Were Diagnosed With   
  
 Codes Comments  Rheumatoid arthritis involving multiple joints (HCC)    -  Primary ICD-10-CM: M06.9 ICD-9-CM: 714.0 Hyperlipidemia LDL goal <100     ICD-10-CM: E78.5 ICD-9-CM: 272.4 Essential hypertension     ICD-10-CM: I10 
ICD-9-CM: 401.9 Screening mammogram, encounter for     ICD-10-CM: Z12.31 
ICD-9-CM: V76.12 Vitals BP Pulse Temp Resp Height(growth percentile) Weight(growth percentile) 110/72 100 98.9 °F (37.2 °C) (Oral) 14 5' 6\" (1.676 m) 157 lb (71.2 kg) SpO2 BMI OB Status Smoking Status 96% 25.34 kg/m2 Hysterectomy Never Smoker Vitals History BMI and BSA Data Body Mass Index Body Surface Area  
 25.34 kg/m 2 1.82 m 2 Preferred Pharmacy Pharmacy Name Phone  [facility] [phone] Your Updated Medication List  
  
   
This list is accurate as of 6/27/18  3:13 PM.  Always use your most recent med list.  
  
  
  
  
 furosemide 40 mg tablet Commonly known as:  LASIX  
take 1 tablet by mouth once daily GOTU SILVIA HERB PO Take  by mouth.  
  
 lansoprazole 30 mg capsule Commonly known as:  PREVACID Take 1 Cap by mouth Daily (before breakfast). lovastatin 40 mg tablet Commonly known as:  MEVACOR Take 1 Tab by mouth nightly. OLANZapine 10 mg tablet Commonly known as:  ZyPREXA Take 10 mg by mouth nightly. oxybutynin chloride XL 10 mg CR tablet Commonly known as:  DITROPAN XL  
TAKE 1 TABLET DAILY PARoxetine 20 mg tablet Commonly known as:  PAXIL Take 1 Tab by mouth daily. PERCOCET PO Take  by mouth.  
  
 potassium chloride SA 10 mEq capsule Commonly known as:  Hermelinda Grannis Take 1 Cap by mouth two (2) times a day. therapeutic multivitamin tablet Commonly known as:  [facility] Take 1 Tab by mouth daily. Follow-up Instructions Return in about 6 months (around 12/27/2018). To-Do List   
 Around 06/27/2018   Imaging:  LAMIN MAMMO BI SCREENING INCL CAD   
  
 07/25/2018 11:30 AM  
 Appointment with [facility] FAST TRACK NURSE at [facility] ([phone]) Introducing [facility] & HEALTH SERVICES! [employer] introduces [employer] patient portal. Now you can access parts of your medical record, email your doctor's office, and request medication refills online. 1. In your internet browser, go to [email or web address]t 2. Click on the First Time User? Click Here link in the Sign In box. You will see the New Member Sign Up page. 3. Enter your [employer] Access Code exactly as it appears below. You will not need to use this code after youve completed the sign-up process. If you do not sign up before the expiration date, you must request a new code. · [employer] Access Code: [email or web address] Expires: 9/25/2018  3:13 PM 
 
4. Enter the last four digits of your Social Security Number (xxxx) and Date of Birth (mm/dd/yyyy) as indicated and click Submit. You will be taken to the next sign-up page. 5. Create a [employer] ID. This will be your [employer] login ID and cannot be changed, so think of one that is secure and easy to remember. 6. Create a [employer] password. You can change your password at any time. 7. Enter your Password Reset Question and Answer. This can be used at a later time if you forget your password. 8. Enter your e-mail address. You will receive e-mail notification when new information is available in [address] 9. Click Sign Up. You can now view and download portions of your medical record. 10. Click the Download Summary menu link to download a portable copy of your medical information. If you have questions, please visit the Frequently Asked Questions section of the [employer] website. Remember, [employer] is NOT to be used for urgent needs. For medical emergencies, dial 911. Now available from your iPhone and Android! Please provide this summary of care documentation to your next provider. Your primary care clinician is listed as Ciro Alexander. Ashley Brambila. If you have any questions after today's visit, please call [phone].

## 2018-06-27 NOTE — PROGRESS NOTES
Skyler Epp 1945, is a 81 y.o. female, who is seen today for reevaluation of hyperlipidemia history of depression memory loss hypertension. Her son brought her in today and says that he has switched her over to lots of herbal supplements and she no longer requires blood pressure medicine. She has been off antihypertensives for a while now. Her memory continues to deteriorate and she has been seen a neurology practice locally and has been sent to [place] for more extensive testing and that has not yet been completed. The patient herself has no complaints and denies reflux issues or feeling depressed. Her son make sure she gets all of her medicine taking correctly. Past Medical History:   Diagnosis Date    Chronic anxiety     Depression     controlled on Paxil    Diabetes (HCC)     DJD (degenerative joint disease)     GERD (gastroesophageal reflux disease)     High cholesterol     HTN (hypertension)     Hypertension     Low back pain     post-laminectomy syndrome and multilevel degenerative disease    Osteoporosis      Past Surgical History:   Procedure Laterality Date    HX ADENOIDECTOMY      HX APPENDECTOMY      HX BACK SURGERY      PHLD X3    HX BREAST BIOPSY Left 8/28/2015    WIDE LOCAL EXCISION LEFT BREAST LESION performed by Panfilo Ryan MD at [address] HX HERNIA REPAIR      hiatal    HX KNEE REPLACEMENT      HX ORTHOPAEDIC      bilateral shoulder surgery    HX ELVIRA AND BSO  1982    HX TONSILLECTOMY      TOTAL HIP ARTHROPLASTY  4/12     total right hip replacement, Dr. Valeriano Vargas     Current Outpatient Prescriptions   Medication Sig Dispense Refill    potassium chloride SA (MICRO-K) 10 mEq capsule Take 1 Cap by mouth two (2) times a day. 180 Cap 3    therapeutic multivitamin (THERAGRAN) tablet Take 1 Tab by mouth daily.  OXYCODONE HCL/ACETAMINOPHEN (PERCOCET PO) Take  by mouth.       furosemide (LASIX) 40 mg tablet take 1 tablet by mouth once daily 90 Tab 3    lansoprazole (PREVACID) 30 mg capsule Take 1 Cap by mouth Daily (before breakfast). 90 Cap 3    lovastatin (MEVACOR) 40 mg tablet Take 1 Tab by mouth nightly. 90 Tab 3    oxybutynin chloride XL (DITROPAN XL) 10 mg CR tablet TAKE 1 TABLET DAILY 90 Tab 3    PARoxetine (PAXIL) 20 mg tablet Take 1 Tab by mouth daily. 90 Tab 3    GOTU SILVIA HERB PO Take  by mouth.  OLANZapine (ZYPREXA) 10 mg tablet Take 10 mg by mouth nightly. Allergies   Allergen Reactions    Aspirin Other (comments)     \"messes up my kidneys\"    Feldene [Piroxicam] Other (comments)     Kidney damage      Morphine Other (comments)     unconsciousness    Nsaids (Non-Steroidal Anti-Inflammatory Drug) Other (comments)     \"messes up my kidneys\"    Other Medication Not Reported This Time     Mycins      Penicillins Hives    Sulfa (Sulfonamide Antibiotics) Rash    Vancomycin Itching     Possible allergic reaction to Vancomycin. Complained of itching/reddness around forehead/back of neck     Social History     Social History    Marital status:      Spouse name: N/A    Number of children: N/A    Years of education: N/A     Social History Main Topics    Smoking status: Never Smoker    Smokeless tobacco: Never Used    Alcohol use No    Drug use: No    Sexual activity: Not Currently     Other Topics Concern    None     Social History Narrative     Visit Vitals    /72    Pulse 100    Temp 98.9 °F (37.2 °C) (Oral)    Resp 14    Ht 5' 6\" (1.676 m)    Wt 157 lb (71.2 kg)    SpO2 96%    BMI 25.34 kg/m2     Ear canals reveal moderate wax on the right and very little on the right and both tympanic membranes appear normal.  Oral cavity reveals no lesions. Neck reveals no adenopathy or thyromegaly. Carotids are 2+ without bruits. Lungs are clear to percussion. Good breath sounds with no wheezing or crackles. Heart reveals a regular rhythm with normal S1-S2 no murmur gallop click or rub.   Apical impulse is not palpable. Abdomen soft and nontender with no hepatosplenomegaly or masses and no bruits. Extremities reveal no clubbing cyanosis or edema. She does have a brace on her lower left leg. Breasts reveal no masses no skin or nipple abnormalities and no axillary adenopathy. Results for orders placed or performed during the hospital encounter of 05/30/18   CBC WITH AUTOMATED DIFF   Result Value Ref Range    WBC 4.6 4.6 - 13.2 K/uL    RBC 4.72 4.20 - 5.30 M/uL    HGB 14.1 12.0 - 16.0 g/dL    HCT 45.0 35.0 - 45.0 %    MCV 95.3 74.0 - 97.0 FL    MCH 29.9 24.0 - 34.0 PG    MCHC 31.3 31.0 - 37.0 g/dL    RDW 14.3 11.6 - 14.5 %    PLATELET 536 208 - 555 K/uL    MPV 9.6 9.2 - 11.8 FL    NEUTROPHILS 44 40 - 73 %    LYMPHOCYTES 35 21 - 52 %    MONOCYTES 18 (H) 3 - 10 %    EOSINOPHILS 3 0 - 5 %    BASOPHILS 0 0 - 2 %    ABS. NEUTROPHILS 2.0 1.8 - 8.0 K/UL    ABS. LYMPHOCYTES 1.6 0.9 - 3.6 K/UL    ABS. MONOCYTES 0.8 0.05 - 1.2 K/UL    ABS. EOSINOPHILS 0.1 0.0 - 0.4 K/UL    ABS. BASOPHILS 0.0 0.0 - 0.06 K/UL    DF AUTOMATED     LIPID PANEL   Result Value Ref Range    LIPID PROFILE          Cholesterol, total 175 <200 MG/DL    Triglyceride 142 <150 MG/DL    HDL Cholesterol 54 40 - 60 MG/DL    LDL, calculated 92.6 0 - 100 MG/DL    VLDL, calculated 28.4 MG/DL    CHOL/HDL Ratio 3.2 0 - 5.0     METABOLIC PANEL, COMPREHENSIVE   Result Value Ref Range    Sodium 141 136 - 145 mmol/L    Potassium 3.9 3.5 - 5.5 mmol/L    Chloride 103 100 - 108 mmol/L    CO2 31 21 - 32 mmol/L    Anion gap 7 3.0 - 18 mmol/L    Glucose 115 (H) 74 - 99 mg/dL    BUN 19 (H) 7.0 - 18 MG/DL    Creatinine 1.35 (H) 0.6 - 1.3 MG/DL    BUN/Creatinine ratio 14 12 - 20      GFR est AA 47 (L) >60 ml/min/1.73m2    GFR est non-AA 39 (L) >60 ml/min/1.73m2    Calcium 9.1 8.5 - 10.1 MG/DL    Bilirubin, total 0.4 0.2 - 1.0 MG/DL    ALT (SGPT) 19 13 - 56 U/L    AST (SGOT) 24 15 - 37 U/L    Alk.  phosphatase 60 45 - 117 U/L    Protein, total 7.0 6.4 - 8.2 g/dL    Albumin 4.0 3.4 - 5.0 g/dL    Globulin 3.0 2.0 - 4.0 g/dL    A-G Ratio 1.3 0.8 - 1.7     T4, FREE   Result Value Ref Range    T4, Free 1.0 0.7 - 1.5 NG/DL   TSH 3RD GENERATION   Result Value Ref Range    TSH 1.47 0.36 - 3.74 uIU/mL     Assessment: #1. Memory loss being evaluated with scanning and extensive memory testing in [place]. Results should be finalized fairly soon. We will leave that to the neurology practice. #2.  History of hypertension no longer an issue. She will continue with no added salt diet. #3.  Elevated glucose which is new, she is going to continue a very healthy diet and avoid any weight gain. Weight is currently normal.  #4.  History of hyperlipidemia doing very well. She will continue lovastatin 40 mg each evening. #5.  Chronic renal failure, stage III, unchanged from year ago. We will schedule mammography for her and follow-up in 6 months for a brief checkup with next evaluation with lab not for a year. Milton Bingham MD FACP    Please note: This document has been produced using voice recognition software. Unrecognized errors in transcription may be present.

## 2018-07-25 ENCOUNTER — HOSPITAL ENCOUNTER (OUTPATIENT)
Dept: INFUSION THERAPY | Age: 73
End: 2018-07-25

## 2018-08-07 ENCOUNTER — HOSPITAL ENCOUNTER (OUTPATIENT)
Dept: MAMMOGRAPHY | Age: 73
Discharge: HOME | End: 2018-08-07
Attending: INTERNAL MEDICINE
Payer: MEDICARE

## 2018-08-07 DIAGNOSIS — Z12.31 SCREENING MAMMOGRAM, ENCOUNTER FOR: ICD-10-CM

## 2018-08-07 PROCEDURE — 77067 SCR MAMMO BI INCL CAD: CPT

## 2018-08-27 ENCOUNTER — OFFICE VISIT (OUTPATIENT)
Dept: NEUROLOGY | Age: 73
End: 2018-08-27

## 2018-08-27 VITALS
HEIGHT: 66 IN | TEMPERATURE: 98.7 F | DIASTOLIC BLOOD PRESSURE: 60 MMHG | WEIGHT: 152 LBS | OXYGEN SATURATION: 98 % | HEART RATE: 104 BPM | RESPIRATION RATE: 18 BRPM | BODY MASS INDEX: 24.43 KG/M2 | SYSTOLIC BLOOD PRESSURE: 110 MMHG

## 2018-08-27 DIAGNOSIS — G31.84 MILD COGNITIVE IMPAIRMENT: Primary | ICD-10-CM

## 2018-08-27 RX ORDER — OXYBUTYNIN CHLORIDE 10 MG/1
TABLET, EXTENDED RELEASE ORAL
Qty: 90 TAB | Refills: 3 | Status: SHIPPED | OUTPATIENT
Start: 2018-08-27 | End: 2019-08-14 | Stop reason: SDUPTHER

## 2018-08-27 NOTE — PROGRESS NOTES
[address], [address], [place], [address] Kilo Jaimes, [address]  Office:  [phone]  Fax: [phone]  Chief Complaint   Patient presents with    Memory Loss     follow up     This is a 53-year-old female who presents for follow-up memory loss. She is accompanied in office today by her son. Here for results. In the interim endorses memory is about the same. Denies dangerous behaviors. No other complaints at this time. Past Medical History:   Diagnosis Date    Chronic anxiety     Depression     controlled on Paxil    Diabetes (HCC)     DJD (degenerative joint disease)     GERD (gastroesophageal reflux disease)     High cholesterol     HTN (hypertension)     Hypertension     Low back pain     post-laminectomy syndrome and multilevel degenerative disease    Osteoporosis        Past Surgical History:   Procedure Laterality Date    HX ADENOIDECTOMY      HX APPENDECTOMY      HX BACK SURGERY      PHLD X3    HX BREAST BIOPSY Left 8/28/2015    WIDE LOCAL EXCISION LEFT BREAST LESION performed by Sulma Birmingham MD at [address] [place] [address] HX HERNIA REPAIR      hiatal    HX KNEE REPLACEMENT      HX ORTHOPAEDIC      bilateral shoulder surgery    HX ELVIRA AND BSO  1982    HX TONSILLECTOMY      TOTAL HIP ARTHROPLASTY  4/12     total right hip replacement, Dr. Tatiana Brar       Current Outpatient Prescriptions   Medication Sig Dispense Refill    oxybutynin chloride XL (DITROPAN XL) 10 mg CR tablet TAKE 1 TABLET DAILY 90 Tab 3    potassium chloride SA (MICRO-K) 10 mEq capsule Take 1 Cap by mouth two (2) times a day. 180 Cap 3    therapeutic multivitamin (THERAGRAN) tablet Take 1 Tab by mouth daily.  OXYCODONE HCL/ACETAMINOPHEN (PERCOCET PO) Take  by mouth.  furosemide (LASIX) 40 mg tablet take 1 tablet by mouth once daily 90 Tab 3    lansoprazole (PREVACID) 30 mg capsule Take 1 Cap by mouth Daily (before breakfast).  90 Cap 3    lovastatin (MEVACOR) 40 mg tablet Take 1 Tab by mouth nightly. 90 Tab 3    PARoxetine (PAXIL) 20 mg tablet Take 1 Tab by mouth daily. 90 Tab 3    GOTU SILVIA HERB PO Take  by mouth.  OLANZapine (ZYPREXA) 10 mg tablet Take 10 mg by mouth nightly. Allergies   Allergen Reactions    Aspirin Other (comments)     \"messes up my kidneys\"    Feldene [Piroxicam] Other (comments)     Kidney damage      Morphine Other (comments)     unconsciousness    Nsaids (Non-Steroidal Anti-Inflammatory Drug) Other (comments)     \"messes up my kidneys\"    Other Medication Not Reported This Time     Mycins      Penicillins Hives    Sulfa (Sulfonamide Antibiotics) Rash    Vancomycin Itching     Possible allergic reaction to Vancomycin. Complained of itching/reddness around forehead/back of neck       Social History   Substance Use Topics    Smoking status: Never Smoker    Smokeless tobacco: Never Used    Alcohol use No       Family History   Problem Relation Age of Onset    Cancer Mother      melanoma    Cancer Father      lung    Heart Disease Maternal Grandmother     Diabetes Other     Hypertension Other     Headache Other     Seizures Other     Coronary Artery Disease Other     Heart Attack Other        Review of Systems  GENERAL: Denies fever or fatigue  CARDIAC: No CP or SOB  PULMONARY: No cough of SOB  MUSCULOSKELETAL: No new joint pain  NEURO: SEE HPI    Examination  Visit Vitals    /60 (BP 1 Location: Right arm, BP Patient Position: Sitting)    Pulse (!) 104    Temp 98.7 °F (37.1 °C) (Oral)    Resp 18    Ht 5' 6\" (1.676 m)    Wt 68.9 kg (152 lb)    SpO2 98%    BMI 24.53 kg/m2       This is a very pleasant 79-year-old female. She is alert and conversant. Her speech is clear. No abnormal movements. No signs of incoordination or ataxia. She ambulates steady with use of rolling walker.      No results found for this or any previous visit (from the past 24 hour(s)). Impression/Plan  Chip Pascual is a 72 y.o. female whose history and physical are consistent with mild cognitive impairment. Patient presents for follow-up memory loss here to discuss results. In the interim denies any significant changes or dangerous behaviors. We discussed diagnostic test results including unremarkable findings on head CT. Serum thyroid and B12 studies within normal limits. She had an EEG complete unremarkable for any epileptiform. We discussed results of neuropsychology evaluation consistent with a mild cognitive impairment. We discussed that approximately 50% of those with MCI can go on to dementing process. We discussed that there is then the consideration for the initiation of such medication such as Aricept or Namenda at this time. Patient and patient's son would like to defer this. Her son today brings up alternative treatments that he would like to initiate. I encouraged him to discuss these alternative therapies with her primary care provider especially if using an alternative therapy over a prescriptive therapy such as her statin. He mentions they will be scheduling an evaluation by dermatologist as recommended by the neuropsychologist.  No medication adjustments. No additional testing recommended at this time. Discussed the importance of doing something daily to keep her mind active. Discussed importance of keeping physically and socially active. Follow-up on an as-needed basis. All questions addressed and patient patient's son are agreeable plan of care. Diagnoses and all orders for this visit:    1. Mild cognitive impairment    Total time: 30 min   Counseling / coordination time: 25 min   > 50% counseling / coordination?: Yes re: symptoms, management, safety, diagnostic test results, answering questions, and plan of care. Signed By: Reyes Barboza NP    This note will not be viewable in [address]     PLEASE NOTE:   Portions of this document may have been produced using voice recognition software. Unrecognized errors in transcription may be present.

## 2018-08-27 NOTE — PATIENT INSTRUCTIONS
[facility] you for [facility]     [facility]. You may receive a survey about your visit. We appreciate you taking time     to complete this survey as we use your feedback to improve our services. We     realize we are not perfect, but we strive to provide excellent care.

## 2018-08-27 NOTE — MR AVS SNAPSHOT
[address] [facility] [address] [facility] 
[phone] Patient: Eyad Terrazas MRN: [id number] RMT:[birth year] Visit Information Date & Time Provider Department Dept. Phone Encounter #  
 8/27/2018  2:15 PM Shyann Trujillo NP [address] at [address] [place] [address] [id number] Follow-up Instructions Return if symptoms worsen or fail to improve. Your Appointments 11/16/2018 11:30 AM  
Office Visit with Marika Tsai MD  
[facility] [address] [place] [address]) Appt Note: 6 month f/u; pt r/s from 12/21/18  
 [address] [facility], [address] [address] [address]  
  
   
 [address] [facility], [facility] Upcoming Health Maintenance Date Due  
 GLAUCOMA SCREENING Q2Y 3/1/2018 Influenza Age 5 to Adult 8/1/2018 MEDICARE YEARLY EXAM 12/6/2018 BREAST CANCER SCRN MAMMOGRAM 8/7/2020 COLONOSCOPY 8/21/2025 DTaP/Tdap/Td series (2 - Td) 12/5/2027 Allergies as of 8/27/2018  Review Complete On: 8/27/2018 By: Kory Mojica LPN Severity Noted Reaction Type Reactions Aspirin    Other (comments) \"messes up my kidneys\" Feldene [Piroxicam]  04/17/2012    Other (comments) Kidney damage Morphine    Other (comments)  
 unconsciousness Nsaids (Non-steroidal Anti-inflammatory Drug)  08/24/2011    Other (comments) \"messes up my kidneys\" Other Medication    Not Reported This Time Mycins Penicillins    Hives Sulfa (Sulfonamide Antibiotics)  04/17/2012    Rash Vancomycin  08/28/2015    Itching Possible allergic reaction to Vancomycin. Complained of itching/reddness around forehead/back of neck Current Immunizations  Reviewed on 7/25/2017 Name Date Influenza High Dose Vaccine PF 12/5/2017 12:26 PM, 10/6/2015 11:46 AM  
 Influenza Vaccine 2/7/2014 Influenza Vaccine Split 12/5/2012, 11/29/2011 Influenza Vaccine Whole 11/4/2010 Pneumococcal Conjugate (PCV-13) 10/6/2015 11:49 AM  
 Pneumococcal Polysaccharide (PPSV-23) 12/5/2012 Zoster Vaccine, Live 12/5/2017 12:27 PM  
  
 Not reviewed this visit You Were Diagnosed With   
  
 Codes Comments Mild cognitive impairment    -  Primary ICD-10-CM: G31.84 ICD-9-CM: 331.83 Vitals BP Pulse Temp Resp Height(growth percentile) Weight(growth percentile) 110/60 (BP 1 Location: Right arm, BP Patient Position: Sitting) (!) 104 98.7 °F (37.1 °C) (Oral) 18 5' 6\" (1.676 m) 152 lb (68.9 kg) SpO2 BMI OB Status Smoking Status 98% 24.53 kg/m2 Hysterectomy Never Smoker Vitals History BMI and BSA Data Body Mass Index Body Surface Area 24.53 kg/m 2 1.79 m 2 Preferred Pharmacy Pharmacy Name Phone  [address] [phone] Your Updated Medication List  
  
   
This list is accurate as of 8/27/18  2:31 PM.  Always use your most recent med list.  
  
  
  
  
 furosemide 40 mg tablet Commonly known as:  LASIX  
take 1 tablet by mouth once daily GOTU SILVIA HERB PO Take  by mouth.  
  
 lansoprazole 30 mg capsule Commonly known as:  PREVACID Take 1 Cap by mouth Daily (before breakfast). lovastatin 40 mg tablet Commonly known as:  MEVACOR Take 1 Tab by mouth nightly. OLANZapine 10 mg tablet Commonly known as:  ZyPREXA Take 10 mg by mouth nightly. oxybutynin chloride XL 10 mg CR tablet Commonly known as:  DITROPAN XL  
TAKE 1 TABLET DAILY PARoxetine 20 mg tablet Commonly known as:  PAXIL Take 1 Tab by mouth daily. PERCOCET PO Take  by mouth.  
  
 potassium chloride SA 10 mEq capsule Commonly known as:  Renner Husky Take 1 Cap by mouth two (2) times a day. therapeutic multivitamin tablet Commonly known as:  [facility] Take 1 Tab by mouth daily. Follow-up Instructions Return if symptoms worsen or fail to improve. Patient Instructions Thank you for choosing [facility] and [facility] for your  
 
care. You may receive a survey about your visit. We appreciate you taking time  
 
to complete this survey as we use your feedback to improve our services. We  
 
realize we are not perfect, but we strive to provide excellent care. Introducing \[facility]\"" & [facility] SERVICES! [facility] introduces [facility] patient portal. Now you can access parts of your medical record, email your doctor's office, and request medication refills online. 1. In your internet browser, go to [email or web address] 2. Click on the First Time User? Click Here link in the Sign In box. You will see the New Member Sign Up page. 3. Enter your [facility] Access Code exactly as it appears below. You will not need to use this code after youve completed the sign-up process. If you do not sign up before the expiration date, you must request a new code. · [facility] Access Code: [email or web address] Expires: 9/25/2018  3:13 PM 
 
4. Enter the last four digits of your Social Security Number (xxxx) and Date of Birth (mm/dd/yyyy) as indicated and click Submit. You will be taken to the next sign-up page. 5. Create a [facility] ID. This will be your [facility] login ID and cannot be changed, so think of one that is secure and easy to remember. 6. Create a [facility] password. You can change your password at any time. 7. Enter your Password Reset Question and Answer. This can be used at a later time if you forget your password. 8. Enter your e-mail address. You will receive e-mail notification when new information is available in [address] 9. Click Sign Up. You can now view and download portions of your medical record. 10. Click the Download Summary menu link to download a portable copy of your medical information. If you have questions, please visit the Frequently Asked Questions section of the [employer] website. Remember, [employer] is NOT to be used for urgent needs. For medical emergencies, dial 911. Now available from your iPhone and Android! Please provide this summary of care documentation to your next provider. Your primary care clinician is listed as Radha Martinez. Chip Cummings. If you have any questions after today's visit, please call [phone].

## 2018-10-25 ENCOUNTER — CLINICAL SUPPORT (OUTPATIENT)
Dept: INTERNAL MEDICINE CLINIC | Age: 73
End: 2018-10-25

## 2018-10-25 DIAGNOSIS — Z23 ENCOUNTER FOR IMMUNIZATION: ICD-10-CM

## 2018-10-25 NOTE — PROGRESS NOTES
Chief Complaint   Patient presents with    Immunization/Injection     Influenza Vaccine     Patient given influenza vaccine, Adjuvanted FLUAD, in right deltoid. Instructed patient to sit and wait 10-20 minutes before leaving the premises so that we can watch for any complications or adverse reactions. Patient given vaccine information statement handout before vaccine was given. Patient tolerated well without adverse reactions or complications.

## 2018-11-16 ENCOUNTER — OFFICE VISIT (OUTPATIENT)
Dept: INTERNAL MEDICINE CLINIC | Age: 73
End: 2018-11-16

## 2018-11-16 VITALS
RESPIRATION RATE: 14 BRPM | BODY MASS INDEX: 23.53 KG/M2 | TEMPERATURE: 98.9 F | DIASTOLIC BLOOD PRESSURE: 70 MMHG | SYSTOLIC BLOOD PRESSURE: 112 MMHG | HEART RATE: 86 BPM | WEIGHT: 146.4 LBS | OXYGEN SATURATION: 94 % | HEIGHT: 66 IN

## 2018-11-16 DIAGNOSIS — E78.5 HYPERLIPIDEMIA LDL GOAL <130: ICD-10-CM

## 2018-11-16 DIAGNOSIS — M06.9 RHEUMATOID ARTHRITIS INVOLVING MULTIPLE JOINTS (HCC): ICD-10-CM

## 2018-11-16 DIAGNOSIS — N18.30 CHRONIC RENAL FAILURE, STAGE 3 (MODERATE) (HCC): ICD-10-CM

## 2018-11-16 DIAGNOSIS — I10 ESSENTIAL HYPERTENSION: Primary | ICD-10-CM

## 2018-11-16 NOTE — PROGRESS NOTES
1. Have you been to the ER, urgent care clinic or hospitalized since your last visit? NO 
      
 
2. Have you seen or consulted any other health care providers outside of the [address] [place] [address] since your last visit (Include any pap smears or colon screening)? NO Do you have an Advanced Directive? NO Would you like information on Advanced Directives?  YES

## 2018-11-16 NOTE — PROGRESS NOTES
Alysa Swanson 1945, is a 72 y.o. female, who is seen today for reevaluation of rheumatoid arthritis, hypertension, GERD, mild cognitive impairment. Her son brought her in today. She says she is eating well and having no chest pain, she uses her walker and has had no falls. Her son says she has mild cognitive impairment and for the last 3 weeks he has been supplementing her diet with coconut oil and feels this has made a difference where she is taking her medicine on her own now without prompting. Past Medical History:  
Diagnosis Date  Chronic anxiety  Depression   
 controlled on Paxil  Diabetes ([facility] [address]  DJD (degenerative joint disease)  GERD (gastroesophageal reflux disease)  High cholesterol  HTN (hypertension)  Hypertension  Low back pain   
 post-laminectomy syndrome and multilevel degenerative disease  Osteoporosis Current Outpatient Medications Medication Sig Dispense Refill  oxybutynin chloride XL (DITROPAN XL) 10 mg CR tablet TAKE 1 TABLET DAILY 90 Tab 3  potassium chloride SA (MICRO-K) 10 mEq capsule Take 1 Cap by mouth two (2) times a day. 180 Cap 3  therapeutic multivitamin (THERAGRAN) tablet Take 1 Tab by mouth daily.  OXYCODONE HCL/ACETAMINOPHEN (PERCOCET PO) Take  by mouth.  furosemide (LASIX) 40 mg tablet take 1 tablet by mouth once daily 90 Tab 3  
 lansoprazole (PREVACID) 30 mg capsule Take 1 Cap by mouth Daily (before breakfast). 90 Cap 3  
 lovastatin (MEVACOR) 40 mg tablet Take 1 Tab by mouth nightly. 90 Tab 3  
 PARoxetine (PAXIL) 20 mg tablet Take 1 Tab by mouth daily. 90 Tab 3  
 GOTU SILVIA HERB PO Take  by mouth.  OLANZapine (ZYPREXA) 10 mg tablet Take 10 mg by mouth nightly. Visit Vitals /70 (BP 1 Location: Left arm, BP Patient Position: Sitting) Pulse 86 Temp 98.9 °F (37.2 °C) (Oral) Resp 14 Ht 5' 6\" (1.676 m) Wt 146 lb 6.4 oz (66.4 kg) SpO2 94% BMI 23.63 kg/m²  
' Carotids are 2+ without bruits. Lungs are clear to percussion. Good breath sounds with no wheezing or crackles. Heart reveals a regular rhythm with normal S1 and S2 no murmur gallop click or rub. Apical impulse is not palpable. Abdomen is soft and nontender with no hepatosplenomegaly or masses and no bruits. Extremities reveal no clubbing cyanosis or edema. She has braces on both ankles. Pulses are intact. Assessment: 1. Hypertension well controlled. She will continue no added salt diet. #2.  Cognitive impairment, she will continue Zyprexa and Paxil as ordered elsewhere. #3.  GERD asymptomatic, she will continue Prevacid 30 mg each morning. #4. Rheumatoid arthritis stable, continue current medication and ankle braces. Follow-up in about 6 months for complete evaluation. Milton Saleh, [address] [facility] [facility] [address] [facility] [address] Please note: This document has been produced using voice recognition software. Unrecognized errors in transcription may be present.

## 2018-12-18 RX ORDER — FUROSEMIDE 40 MG/1
TABLET ORAL
Qty: 90 TAB | Refills: 3 | Status: SHIPPED | OUTPATIENT
Start: 2018-12-18 | End: 2019-11-20 | Stop reason: SDUPTHER

## 2018-12-24 RX ORDER — LANSOPRAZOLE 30 MG/1
CAPSULE, DELAYED RELEASE ORAL
Qty: 90 CAP | Refills: 3 | Status: SHIPPED | OUTPATIENT
Start: 2018-12-24 | End: 2020-12-17 | Stop reason: SDUPTHER

## 2019-02-04 RX ORDER — LOVASTATIN 40 MG/1
TABLET ORAL
Qty: 90 TAB | Refills: 3 | Status: SHIPPED | OUTPATIENT
Start: 2019-02-04 | End: 2020-01-29

## 2019-03-13 RX ORDER — PAROXETINE HYDROCHLORIDE 20 MG/1
20 TABLET, FILM COATED ORAL DAILY
Qty: 90 TAB | Refills: 3 | Status: SHIPPED | OUTPATIENT
Start: 2019-03-13 | End: 2020-03-01

## 2019-03-13 NOTE — TELEPHONE ENCOUNTER
Last Visit: 11/16/2018 with MD Radha Wilson  Next Appointment: 05/20/2019 with MD Radha Wilson  Previous Refill Encounter(s): 06/02/2017 per MD Radha Wilson #90 with 3 refills     Requested Prescriptions     Pending Prescriptions Disp Refills    PARoxetine (PAXIL) 20 mg tablet 90 Tab 3     Sig: Take 1 Tab by mouth daily.

## 2019-03-26 ENCOUNTER — DOCUMENTATION ONLY (OUTPATIENT)
Dept: INTERNAL MEDICINE CLINIC | Age: 74
End: 2019-03-26

## 2019-03-26 NOTE — PROGRESS NOTES
The Prior Authorization request has been approved for Prevacid 30MG OR CPDR. The  authorization is valid from 02/24/2019 through 03/25/2020.

## 2019-04-13 NOTE — TELEPHONE ENCOUNTER
Patient can follow up in the office with Dr. Baldev Goddard after she receives the custom orthotic from [facility] at [facility].     Judith Peters PA-C  5/9/2017   10:01 AM
Patient states Dr Riana Mcfarland referred patient to Dr. Magnolia Simental (sp?) and she just had an appt with him last week and again on the 18th. Patient is inquiring if she still needs to keep the 05-16-17 appt with Dr. Jad Mcfarland. Please advise patient can be reached at [phone].
Patient states she has received her orthotic.   She follow up as scheduled
Tired to call patient, the phone line was busy
Tried to call patient, no answer no machine
street drug/inhalant/medication abuse

## 2019-05-10 ENCOUNTER — HOSPITAL ENCOUNTER (OUTPATIENT)
Dept: LAB | Age: 74
Discharge: HOME | End: 2019-05-10
Payer: MEDICARE

## 2019-05-10 ENCOUNTER — APPOINTMENT (OUTPATIENT)
Dept: INTERNAL MEDICINE CLINIC | Age: 74
End: 2019-05-10

## 2019-05-10 DIAGNOSIS — I10 ESSENTIAL HYPERTENSION: ICD-10-CM

## 2019-05-10 DIAGNOSIS — E78.5 HYPERLIPIDEMIA LDL GOAL <130: ICD-10-CM

## 2019-05-10 LAB
ALBUMIN SERPL-MCNC: 4.3 G/DL (ref 3.4–5)
ALBUMIN/GLOB SERPL: 1.5 {RATIO} (ref 0.8–1.7)
ALP SERPL-CCNC: 94 U/L (ref 45–117)
ALT SERPL-CCNC: 19 U/L (ref 13–56)
ANION GAP SERPL CALC-SCNC: 9 MMOL/L (ref 3–18)
AST SERPL-CCNC: 25 U/L (ref 15–37)
BASOPHILS # BLD: 0 K/UL (ref 0–0.1)
BASOPHILS NFR BLD: 0 % (ref 0–2)
BILIRUB SERPL-MCNC: 0.8 MG/DL (ref 0.2–1)
BUN SERPL-MCNC: 20 MG/DL (ref 7–18)
BUN/CREAT SERPL: 15 (ref 12–20)
CALCIUM SERPL-MCNC: 9.9 MG/DL (ref 8.5–10.1)
CHLORIDE SERPL-SCNC: 102 MMOL/L (ref 100–108)
CHOLEST SERPL-MCNC: 194 MG/DL
CO2 SERPL-SCNC: 32 MMOL/L (ref 21–32)
CREAT SERPL-MCNC: 1.31 MG/DL (ref 0.6–1.3)
DIFFERENTIAL METHOD BLD: ABNORMAL
EOSINOPHIL # BLD: 0.2 K/UL (ref 0–0.4)
EOSINOPHIL NFR BLD: 4 % (ref 0–5)
ERYTHROCYTE [DISTWIDTH] IN BLOOD BY AUTOMATED COUNT: 14.3 % (ref 11.6–14.5)
GLOBULIN SER CALC-MCNC: 2.9 G/DL (ref 2–4)
GLUCOSE SERPL-MCNC: 115 MG/DL (ref 74–99)
HCT VFR BLD AUTO: 44.5 % (ref 35–45)
HDLC SERPL-MCNC: 88 MG/DL (ref 40–60)
HDLC SERPL: 2.2 {RATIO} (ref 0–5)
HGB BLD-MCNC: 13.7 G/DL (ref 12–16)
LDLC SERPL CALC-MCNC: 81 MG/DL (ref 0–100)
LIPID PROFILE,FLP: ABNORMAL
LYMPHOCYTES # BLD: 2 K/UL (ref 0.9–3.6)
LYMPHOCYTES NFR BLD: 39 % (ref 21–52)
MCH RBC QN AUTO: 29.9 PG (ref 24–34)
MCHC RBC AUTO-ENTMCNC: 30.8 G/DL (ref 31–37)
MCV RBC AUTO: 97.2 FL (ref 74–97)
MONOCYTES # BLD: 0.5 K/UL (ref 0.05–1.2)
MONOCYTES NFR BLD: 10 % (ref 3–10)
NEUTS SEG # BLD: 2.4 K/UL (ref 1.8–8)
NEUTS SEG NFR BLD: 47 % (ref 40–73)
PLATELET # BLD AUTO: 245 K/UL (ref 135–420)
PMV BLD AUTO: 9.6 FL (ref 9.2–11.8)
POTASSIUM SERPL-SCNC: 4.6 MMOL/L (ref 3.5–5.5)
PROT SERPL-MCNC: 7.2 G/DL (ref 6.4–8.2)
RBC # BLD AUTO: 4.58 M/UL (ref 4.2–5.3)
SODIUM SERPL-SCNC: 143 MMOL/L (ref 136–145)
T4 FREE SERPL-MCNC: 1 NG/DL (ref 0.7–1.5)
TRIGL SERPL-MCNC: 125 MG/DL (ref ?–150)
TSH SERPL DL<=0.05 MIU/L-ACNC: 2.98 UIU/ML (ref 0.36–3.74)
VLDLC SERPL CALC-MCNC: 25 MG/DL
WBC # BLD AUTO: 5.1 K/UL (ref 4.6–13.2)

## 2019-05-10 PROCEDURE — 84439 ASSAY OF FREE THYROXINE: CPT

## 2019-05-10 PROCEDURE — 84443 ASSAY THYROID STIM HORMONE: CPT

## 2019-05-10 PROCEDURE — 80061 LIPID PANEL: CPT

## 2019-05-10 PROCEDURE — 80053 COMPREHEN METABOLIC PANEL: CPT

## 2019-05-10 PROCEDURE — 85025 COMPLETE CBC W/AUTO DIFF WBC: CPT

## 2019-05-20 ENCOUNTER — OFFICE VISIT (OUTPATIENT)
Dept: INTERNAL MEDICINE CLINIC | Age: 74
End: 2019-05-20

## 2019-05-20 VITALS
TEMPERATURE: 98.7 F | WEIGHT: 139.8 LBS | HEIGHT: 66 IN | RESPIRATION RATE: 12 BRPM | DIASTOLIC BLOOD PRESSURE: 64 MMHG | OXYGEN SATURATION: 97 % | BODY MASS INDEX: 22.47 KG/M2 | HEART RATE: 92 BPM | SYSTOLIC BLOOD PRESSURE: 122 MMHG

## 2019-05-20 DIAGNOSIS — M06.9 RHEUMATOID ARTHRITIS INVOLVING MULTIPLE JOINTS (HCC): ICD-10-CM

## 2019-05-20 DIAGNOSIS — R63.4 WEIGHT LOSS: ICD-10-CM

## 2019-05-20 DIAGNOSIS — N18.30 CHRONIC RENAL FAILURE, STAGE 3 (MODERATE) (HCC): Primary | ICD-10-CM

## 2019-05-20 DIAGNOSIS — I10 PRIMARY HYPERTENSION: ICD-10-CM

## 2019-05-20 DIAGNOSIS — E78.5 HYPERLIPIDEMIA LDL GOAL <130: ICD-10-CM

## 2019-05-20 NOTE — PROGRESS NOTES
Dayo Collins 1945, is a 72 y.o. female, who is seen today for reevaluation of hyperlipidemia impaired fasting glucose chronic renal failure depression overactive bladder GERD rheumatoid arthritis and hypertension. She feels generally well and her son says he is giving her coconut oil to help her brain function. He thinks that is why she has lost weight, she has lost 18 pounds from 11 months ago. She says her appetite is good and she is glad she is lost weight. She is having no reflux as she continues Prevacid. She takes all of her medicine regularly according to her son was with her in the room today. Past Medical History:  
Diagnosis Date  Chronic anxiety  Depression   
 controlled on Paxil  Diabetes ([facility] [address]  DJD (degenerative joint disease)  GERD (gastroesophageal reflux disease)  High cholesterol  HTN (hypertension)  Hypertension  Low back pain   
 post-laminectomy syndrome and multilevel degenerative disease  Osteoporosis Past Surgical History:  
Procedure Laterality Date  HX ADENOIDECTOMY  HX APPENDECTOMY  HX BACK SURGERY    
 PHLD X3  
 HX BREAST BIOPSY Left 8/28/2015 WIDE LOCAL EXCISION LEFT BREAST LESION performed by Anjum Roe MD at [facility] MAIN OR  
 HX HERNIA REPAIR    
 hiatal  
 HX KNEE REPLACEMENT    
 HX ORTHOPAEDIC    
 bilateral shoulder surgery [id number] [facility] [place]  HX TONSILLECTOMY  TOTAL HIP ARTHROPLASTY  4/12  
  total right hip replacement, Dr. Hyacinth Delgado Current Outpatient Medications Medication Sig Dispense Refill  PARoxetine (PAXIL) 20 mg tablet Take 1 Tab by mouth daily. 90 Tab 3  
 lovastatin (MEVACOR) 40 mg tablet TAKE 1 TABLET NIGHTLY 90 Tab 3  
 lansoprazole (PREVACID) 30 mg capsule TAKE 1 CAPSULE DAILY BEFOREBREAKFAST 90 Cap 3  furosemide (LASIX) 40 mg tablet take 1 tablet by mouth once daily 90 Tab 3  
 oxybutynin chloride XL (DITROPAN XL) 10 mg CR tablet TAKE 1 TABLET DAILY 90 Tab 3  potassium chloride SA (MICRO-K) 10 mEq capsule Take 1 Cap by mouth two (2) times a day. 180 Cap 3  therapeutic multivitamin (THERAGRAN) tablet Take 1 Tab by mouth daily.  OXYCODONE HCL/ACETAMINOPHEN (PERCOCET PO) Take  by mouth.  GOTU SILVIA HERB PO Take  by mouth.  OLANZapine (ZYPREXA) 10 mg tablet Take 10 mg by mouth nightly. Allergies Allergen Reactions  Aspirin Other (comments) \"messes up my kidneys\"  Feldene [Piroxicam] Other (comments) Kidney damage  Morphine Other (comments)  
  unconsciousness  Nsaids (Non-Steroidal Anti-Inflammatory Drug) Other (comments) \"messes up my kidneys\"  Other Medication Not Reported This Time Mycins  Penicillins Hives  Sulfa (Sulfonamide Antibiotics) Rash  Vancomycin Itching Possible allergic reaction to Vancomycin. Complained of itching/reddness around forehead/back of neck Social History Socioeconomic History  Marital status:  Spouse name: Not on file  Number of children: Not on file  Years of education: Not on file  Highest education level: Not on file Tobacco Use  Smoking status: Never Smoker  Smokeless tobacco: Never Used Substance and Sexual Activity  Alcohol use: No  
 Drug use: No  
 Sexual activity: Not Currently Visit Vitals /64 (BP 1 Location: Right arm, BP Patient Position: Sitting) Pulse 92 Temp 98.7 °F (37.1 °C) Resp 12 Ht 5' 6\" (1.676 m) Wt 139 lb 12.8 oz (63.4 kg) SpO2 97% BMI 22.56 kg/m² Carotids are 2+ without bruits. No adenopathy or thyromegaly. Lungs are clear to percussion. Good breath sounds with no wheezing or crackles. Heart reveals a regular rhythm with normal S1 and S2 no murmur gallop click or rub. Apical impulse is not palpable. Abdomen is soft and nontender with no hepatosplenomegaly or masses and no bruits.   Extremities reveal no clubbing cyanosis or edema. Both ankles are in braces because of her rheumatoid arthritis. Pulses are 2+. Results for orders placed or performed during the hospital encounter of 05/10/19 CBC WITH AUTOMATED DIFF Result Value Ref Range WBC 5.1 4.6 - 13.2 K/uL  
 RBC 4.58 4.20 - 5.30 M/uL  
 HGB 13.7 12.0 - 16.0 g/dL HCT 44.5 35.0 - 45.0 % MCV 97.2 (H) 74.0 - 97.0 FL  
 MCH 29.9 24.0 - 34.0 PG  
 MCHC 30.8 (L) 31.0 - 37.0 g/dL  
 RDW 14.3 11.6 - 14.5 % PLATELET 312 125 - 745 K/uL MPV 9.6 9.2 - 11.8 FL  
 NEUTROPHILS 47 40 - 73 % LYMPHOCYTES 39 21 - 52 % MONOCYTES 10 3 - 10 % EOSINOPHILS 4 0 - 5 % BASOPHILS 0 0 - 2 %  
 ABS. NEUTROPHILS 2.4 1.8 - 8.0 K/UL  
 ABS. LYMPHOCYTES 2.0 0.9 - 3.6 K/UL  
 ABS. MONOCYTES 0.5 0.05 - 1.2 K/UL  
 ABS. EOSINOPHILS 0.2 0.0 - 0.4 K/UL  
 ABS. BASOPHILS 0.0 0.0 - 0.1 K/UL  
 DF AUTOMATED    
LIPID PANEL Result Value Ref Range LIPID PROFILE Cholesterol, total 194 <200 MG/DL Triglyceride 125 <150 MG/DL  
 HDL Cholesterol 88 (H) 40 - 60 MG/DL  
 LDL, calculated 81 0 - 100 MG/DL VLDL, calculated 25 MG/DL  
 CHOL/HDL Ratio 2.2 0 - 5.0 METABOLIC PANEL, COMPREHENSIVE Result Value Ref Range Sodium 143 136 - 145 mmol/L Potassium 4.6 3.5 - 5.5 mmol/L Chloride 102 100 - 108 mmol/L  
 CO2 32 21 - 32 mmol/L Anion gap 9 3.0 - 18 mmol/L Glucose 115 (H) 74 - 99 mg/dL BUN 20 (H) 7.0 - 18 MG/DL Creatinine 1.31 (H) 0.6 - 1.3 MG/DL  
 BUN/Creatinine ratio 15 12 - 20 GFR est AA 49 (L) >60 ml/min/1.73m2 GFR est non-AA 40 (L) >60 ml/min/1.73m2 Calcium 9.9 8.5 - 10.1 MG/DL Bilirubin, total 0.8 0.2 - 1.0 MG/DL  
 ALT (SGPT) 19 13 - 56 U/L  
 AST (SGOT) 25 15 - 37 U/L Alk. phosphatase 94 45 - 117 U/L Protein, total 7.2 6.4 - 8.2 g/dL Albumin 4.3 3.4 - 5.0 g/dL Globulin 2.9 2.0 - 4.0 g/dL A-G Ratio 1.5 0.8 - 1.7 T4, FREE Result Value Ref Range  T4, Free 1.0 0.7 - 1.5 NG/DL  
 TSH 3RD GENERATION Result Value Ref Range TSH 2.98 0.36 - 3.74 uIU/mL Assessment: #1. History of hypertension now doing very well with weight loss. I suggested she work on maintaining current weight but not lose more weight. #2. Hyperlipidemia doing well, she will continue lovastatin 40 mg each evening. #3. Impaired fasting glucose, she will continue to avoid sweets in her diet and try to maintain current weight. #4.  GERD asymptomatic, she will continue Prevacid 30 mg daily. #5.  History of depression seems to be doing well, she will follow-up with her psychiatrist and continue medication as recommended there. #6.  Chronic renal failure with no change from a year ago, we will recheck kidney function in 1 year or sooner if symptoms develop. Follow-up in 6 months with no lab, mainly to see how she is doing in general and make sure she is not losing excessive weight. Milton PATTERSON [facility], [address] Please note: This document has been produced using voice recognition software. Unrecognized errors in transcription may be present.

## 2019-06-24 RX ORDER — POTASSIUM CHLORIDE 750 MG/1
CAPSULE, EXTENDED RELEASE ORAL
Qty: 180 CAP | Refills: 3 | Status: SHIPPED | OUTPATIENT
Start: 2019-06-24 | End: 2020-06-11

## 2019-08-14 RX ORDER — OXYBUTYNIN CHLORIDE 10 MG/1
TABLET, EXTENDED RELEASE ORAL
Qty: 90 TAB | Refills: 3 | Status: SHIPPED | OUTPATIENT
Start: 2019-08-14 | End: 2020-08-10

## 2019-11-12 ENCOUNTER — HOSPITAL ENCOUNTER (OUTPATIENT)
Dept: MAMMOGRAPHY | Age: 74
Discharge: HOME | End: 2019-11-12
Attending: INTERNAL MEDICINE
Payer: MEDICARE

## 2019-11-12 DIAGNOSIS — Z12.31 VISIT FOR SCREENING MAMMOGRAM: ICD-10-CM

## 2019-11-12 PROCEDURE — 77063 BREAST TOMOSYNTHESIS BI: CPT

## 2019-11-20 RX ORDER — FUROSEMIDE 40 MG/1
TABLET ORAL
Qty: 90 TAB | Refills: 3 | Status: SHIPPED | OUTPATIENT
Start: 2019-11-20 | End: 2020-11-13 | Stop reason: SDUPTHER

## 2019-11-22 ENCOUNTER — OFFICE VISIT (OUTPATIENT)
Dept: INTERNAL MEDICINE CLINIC | Age: 74
End: 2019-11-22

## 2019-11-22 VITALS
DIASTOLIC BLOOD PRESSURE: 72 MMHG | WEIGHT: 141.2 LBS | SYSTOLIC BLOOD PRESSURE: 110 MMHG | OXYGEN SATURATION: 95 % | HEART RATE: 92 BPM | TEMPERATURE: 98.7 F | BODY MASS INDEX: 22.69 KG/M2 | RESPIRATION RATE: 12 BRPM | HEIGHT: 66 IN

## 2019-11-22 DIAGNOSIS — R73.01 IMPAIRED FASTING GLUCOSE: ICD-10-CM

## 2019-11-22 DIAGNOSIS — M06.9 RHEUMATOID ARTHRITIS INVOLVING MULTIPLE JOINTS (HCC): Primary | ICD-10-CM

## 2019-11-22 DIAGNOSIS — Z23 ENCOUNTER FOR IMMUNIZATION: ICD-10-CM

## 2019-11-22 DIAGNOSIS — R63.4 WEIGHT LOSS: ICD-10-CM

## 2019-11-22 DIAGNOSIS — R41.3 MEMORY LOSS: ICD-10-CM

## 2019-11-22 DIAGNOSIS — N18.30 CHRONIC RENAL FAILURE, STAGE 3 (MODERATE) (HCC): ICD-10-CM

## 2019-11-22 DIAGNOSIS — I10 PRIMARY HYPERTENSION: ICD-10-CM

## 2019-11-22 NOTE — PROGRESS NOTES
Corey Gallegos 1945, is a 69 y.o. female, who is seen today for reevaluation of rheumatoid arthritis, GERD, anxiety, impaired fasting glucose and other issues. Her memory is gradually getting worse, she is here with her son as usual.  She denies chest pain or dyspnea. Weight is stable down just a couple of pounds over the last few months. He does not recall any recent reflux symptoms as he continues Prevacid. No apparent urinary problems that she continues Ditropan XL. Past Medical History:   Diagnosis Date    Chronic anxiety     Depression     controlled on Paxil    Diabetes (HCC)     DJD (degenerative joint disease)     GERD (gastroesophageal reflux disease)     High cholesterol     HTN (hypertension)     Hypertension     Low back pain     post-laminectomy syndrome and multilevel degenerative disease    Osteoporosis      Current Outpatient Medications   Medication Sig Dispense Refill    furosemide (LASIX) 40 mg tablet take 1 tablet by mouth once daily 90 Tab 3    oxybutynin chloride XL (DITROPAN XL) 10 mg CR tablet TAKE 1 TABLET DAILY 90 Tab 3    potassium chloride SA (MICRO-K) 10 mEq capsule TAKE 1 CAPSULE TWICE DAILY 180 Cap 3    PARoxetine (PAXIL) 20 mg tablet Take 1 Tab by mouth daily. 90 Tab 3    lovastatin (MEVACOR) 40 mg tablet TAKE 1 TABLET NIGHTLY 90 Tab 3    lansoprazole (PREVACID) 30 mg capsule TAKE 1 CAPSULE DAILY BEFOREBREAKFAST 90 Cap 3    therapeutic multivitamin (THERAGRAN) tablet Take 1 Tab by mouth daily.  OXYCODONE HCL/ACETAMINOPHEN (PERCOCET PO) Take  by mouth.  GOTU SILVIA HERB PO Take  by mouth.  OLANZapine (ZYPREXA) 10 mg tablet Take 10 mg by mouth nightly. Visit Vitals  /72 (BP 1 Location: Left arm, BP Patient Position: Sitting)   Pulse 92   Temp 98.7 °F (37.1 °C) (Oral)   Resp 12   Ht 5' 6\" (1.676 m)   Wt 141 lb 3.2 oz (64 kg)   SpO2 95%   BMI 22.79 kg/m²     Carotids are 2+ without bruits. Lungs are clear to percussion.   Good breath sounds with no wheezing or crackles. Heart reveals a regular rhythm with normal S1 and S2 no murmur gallop click or rub. Apical impulse is not palpable. Abdomen is soft and nontender with no hepatosplenomegaly or masses and no bruits. Extremities reveal no clubbing cyanosis or edema dorsalis pedis pulses. Ankles are somewhat deformed and she has braces on both ankles, with her walker. Assessment: #1. Rheumatoid arthritis getting along quite well with braces on her ankles. She will continue follow-up with her rheumatologist.  #2.  Impaired fasting glucose, we will check glucose in 2 months. She will continue very healthy diet. #3.  GERD asymptomatic, she will continue Prevacid 30 mg daily. #4.  Memory loss but also psychiatric issues, she will continue Zyprexa 10 mg daily under the direction of her psychiatrist.  #5.  Overactive bladder, apparently doing well, she will continue pan XL 10 mg daily. #6.  Chronic renal failure stage III, we will check BUN and creatinine in 6 months. Follow-up 6 months for a complete evaluation. Milton Acosta MD FACP    Please note: This document has been produced using voice recognition software. Unrecognized errors in transcription may be present.

## 2019-11-22 NOTE — PATIENT INSTRUCTIONS
Vaccine Information Statement Influenza (Flu) Vaccine (Inactivated or Recombinant): What You Need to Know Many Vaccine Information Statements are available in [place]ian and other languages. See www.immunize.org/vis Hojas de información sobre vacunas están disponibles en español y en muchos otros idiomas. Visite www.immunize.org/vis 1. Why get vaccinated? Influenza vaccine can prevent influenza (flu). Flu is a contagious disease that spreads around the [facility] every year, usually between October and May. Anyone can get the flu, but it is more dangerous for some people. Infants and young children, people 72years of age and older, pregnant women, and people with certain health conditions or a weakened immune system are at greatest risk of flu complications. Pneumonia, bronchitis, sinus infections and ear infections are examples of flu-related complications. If you have a medical condition, such as heart disease, cancer or diabetes, flu can make it worse. Flu can cause fever and chills, sore throat, muscle aches, fatigue, cough, headache, and runny or stuffy nose. Some people may have vomiting and diarrhea, though this is more common in children than adults. Each year thousands of people in the [facility] die from flu, and many more are hospitalized. Flu vaccine prevents millions of illnesses and flu-related visits to the doctor each year. 2. Influenza vaccines CDC recommends everyone 10months of age and older get vaccinated every flu season. Children 6 months through 6years of age may need 2 doses during a single flu season. Everyone else needs only 1 dose each flu season. It takes about 2 weeks for protection to develop after vaccination. There are many flu viruses, and they are always changing. Each year a new flu vaccine is made to protect against three or four viruses that are likely to cause disease in the upcoming flu season.  Even when the vaccine doesnt exactly match these viruses, it may still provide some protection. Influenza vaccine does not cause flu. Influenza vaccine may be given at the same time as other vaccines. 3. Talk with your health care provider Tell your vaccine provider if the person getting the vaccine: 
 Has had an allergic reaction after a previous dose of influenza vaccine, or has any severe, life-threatening allergies.  Has ever had Guillain-Barré Syndrome (also called GBS). In some cases, your health care provider may decide to postpone influenza vaccination to a future visit. People with minor illnesses, such as a cold, may be vaccinated. People who are moderately or severely ill should usually wait until they recover before getting influenza vaccine. Your health care provider can give you more information. 4. Risks of a reaction  Soreness, redness, and swelling where shot is given, fever, muscle aches, and headache can happen after influenza vaccine.  There may be a very small increased risk of Guillain-Barré Syndrome (GBS) after inactivated influenza vaccine (the flu shot). Ulysses Ricker children who get the flu shot along with pneumococcal vaccine (PCV13), and/or DTaP vaccine at the same time might be slightly more likely to have a seizure caused by fever. Tell your health care provider if a child who is getting flu vaccine has ever had a seizure. People sometimes faint after medical procedures, including vaccination. Tell your provider if you feel dizzy or have vision changes or ringing in the ears. As with any medicine, there is a very remote chance of a vaccine causing a severe allergic reaction, other serious injury, or death. 5. What if there is a serious problem? An allergic reaction could occur after the vaccinated person leaves the clinic.  If you see signs of a severe allergic reaction (hives, swelling of the face and throat, difficulty breathing, a fast heartbeat, dizziness, or weakness), call 9-1-1 and get the person to the nearest hospital. 
 
For other signs that concern you, call your health care provider. Adverse reactions should be reported to the Vaccine Adverse Event Reporting System (VAERS). Your health care provider will usually file this report, or you can do it yourself. Visit the VAERS website at www.vaers. hhs.gov or call 5-745.989.6535. VAERS is only for reporting reactions, and VAERS staff do not give medical advice. 6. The National Vaccine Injury Compensation Program 
 
The [facility] Vaccine Injury Compensation Program (VICP) is a federal program that was created to compensate people who may have been injured by certain vaccines. Visit the VICP website at www.hrsa.gov/vaccinecompensation or call 8-935.158.8211 to learn about the program and about filing a claim. There is a time limit to file a claim for compensation. 7. How can I learn more?  Ask your health care provider.  Call your local or state health department.  Contact the Centers for Disease Control and Prevention (CDC): 
- Call 2-545.185.6130 (1-800-CDC-INFO) or 
- Visit CDCs influenza website at www.cdc.gov/flu Vaccine Information Statement (Interim) Inactivated Influenza Vaccine 8/15/2019 
42 DEJAN Page 978FC-96 Department of Health and [place] Centers for Disease Control and Prevention Office Use Only

## 2020-01-29 RX ORDER — LOVASTATIN 40 MG/1
TABLET ORAL
Qty: 90 TAB | Refills: 3 | Status: SHIPPED | OUTPATIENT
Start: 2020-01-29 | End: 2021-01-27 | Stop reason: SDUPTHER

## 2020-03-01 RX ORDER — PAROXETINE HYDROCHLORIDE 20 MG/1
TABLET, FILM COATED ORAL
Qty: 90 TAB | Refills: 3 | Status: SHIPPED | OUTPATIENT
Start: 2020-03-01 | End: 2021-03-01 | Stop reason: SDUPTHER

## 2020-06-11 RX ORDER — POTASSIUM CHLORIDE 750 MG/1
CAPSULE, EXTENDED RELEASE ORAL
Qty: 180 CAP | Refills: 3 | Status: SHIPPED | OUTPATIENT
Start: 2020-06-11 | End: 2021-06-15 | Stop reason: SDUPTHER

## 2020-08-10 RX ORDER — OXYBUTYNIN CHLORIDE 10 MG/1
TABLET, EXTENDED RELEASE ORAL
Qty: 90 TAB | Refills: 3 | Status: SHIPPED | OUTPATIENT
Start: 2020-08-10 | End: 2021-07-30

## 2020-09-03 ENCOUNTER — TELEPHONE (OUTPATIENT)
Dept: INTERNAL MEDICINE CLINIC | Age: 75
End: 2020-09-03

## 2020-09-24 ENCOUNTER — OFFICE VISIT (OUTPATIENT)
Dept: FAMILY MEDICINE CLINIC | Age: 75
End: 2020-09-24
Payer: MEDICARE

## 2020-09-24 ENCOUNTER — HOSPITAL ENCOUNTER (OUTPATIENT)
Dept: LAB | Age: 75
Discharge: HOME | End: 2020-09-24
Payer: MEDICARE

## 2020-09-24 VITALS
HEART RATE: 102 BPM | RESPIRATION RATE: 18 BRPM | HEIGHT: 66 IN | OXYGEN SATURATION: 94 % | BODY MASS INDEX: 21.38 KG/M2 | TEMPERATURE: 96 F | SYSTOLIC BLOOD PRESSURE: 129 MMHG | DIASTOLIC BLOOD PRESSURE: 74 MMHG | WEIGHT: 133 LBS

## 2020-09-24 DIAGNOSIS — R73.03 PREDIABETES: ICD-10-CM

## 2020-09-24 DIAGNOSIS — E78.5 HYPERLIPIDEMIA LDL GOAL <100: ICD-10-CM

## 2020-09-24 DIAGNOSIS — N39.41 URGENCY INCONTINENCE: ICD-10-CM

## 2020-09-24 DIAGNOSIS — I10 PRIMARY HYPERTENSION: Primary | ICD-10-CM

## 2020-09-24 DIAGNOSIS — E78.00 PURE HYPERCHOLESTEROLEMIA: ICD-10-CM

## 2020-09-24 DIAGNOSIS — M06.9 RHEUMATOID ARTHRITIS INVOLVING MULTIPLE JOINTS (HCC): ICD-10-CM

## 2020-09-24 DIAGNOSIS — M47.817 LUMBOSACRAL SPONDYLOSIS WITHOUT MYELOPATHY: ICD-10-CM

## 2020-09-24 LAB
EST. AVERAGE GLUCOSE BLD GHB EST-MCNC: 108 MG/DL
HBA1C MFR BLD: 5.4 % (ref 4.2–5.6)

## 2020-09-24 PROCEDURE — 1090F PRES/ABSN URINE INCON ASSESS: CPT | Performed by: NURSE PRACTITIONER

## 2020-09-24 PROCEDURE — 36415 COLL VENOUS BLD VENIPUNCTURE: CPT

## 2020-09-24 PROCEDURE — 3017F COLORECTAL CA SCREEN DOC REV: CPT | Performed by: NURSE PRACTITIONER

## 2020-09-24 PROCEDURE — G0463 HOSPITAL OUTPT CLINIC VISIT: HCPCS | Performed by: NURSE PRACTITIONER

## 2020-09-24 PROCEDURE — G8399 PT W/DXA RESULTS DOCUMENT: HCPCS | Performed by: NURSE PRACTITIONER

## 2020-09-24 PROCEDURE — G8420 CALC BMI NORM PARAMETERS: HCPCS | Performed by: NURSE PRACTITIONER

## 2020-09-24 PROCEDURE — G9717 DOC PT DX DEP/BP F/U NT REQ: HCPCS | Performed by: NURSE PRACTITIONER

## 2020-09-24 PROCEDURE — G8427 DOCREV CUR MEDS BY ELIG CLIN: HCPCS | Performed by: NURSE PRACTITIONER

## 2020-09-24 PROCEDURE — G8752 SYS BP LESS 140: HCPCS | Performed by: NURSE PRACTITIONER

## 2020-09-24 PROCEDURE — 83036 HEMOGLOBIN GLYCOSYLATED A1C: CPT

## 2020-09-24 PROCEDURE — G8754 DIAS BP LESS 90: HCPCS | Performed by: NURSE PRACTITIONER

## 2020-09-24 PROCEDURE — 99214 OFFICE O/P EST MOD 30 MIN: CPT | Performed by: NURSE PRACTITIONER

## 2020-09-24 PROCEDURE — G8536 NO DOC ELDER MAL SCRN: HCPCS | Performed by: NURSE PRACTITIONER

## 2020-09-24 PROCEDURE — 1101F PT FALLS ASSESS-DOCD LE1/YR: CPT | Performed by: NURSE PRACTITIONER

## 2020-09-24 PROCEDURE — G9899 SCRN MAM PERF RSLTS DOC: HCPCS | Performed by: NURSE PRACTITIONER

## 2020-09-24 NOTE — PROGRESS NOTES
General Office Visit Note      Patient:  Marii Neri    Subjective:     Nestor Mena is a 70 y.o. y.o. female who complains of   Chief Complaint   Patient presents with    New Patient     Pt is here with her son Ravindra Whiting so that she can establish care with a new PCP due to her previous PCP retiring. She has multiple chronic conditions including HTN, RA (managed by rheumatology), Chronic renal failure stg.3 (managed by nephrology), Chronic pain (managed by [facility] pain management), psychiatric conditions (managed by psychiatry), prediabetes, and hyperlipidemia. Pt has no complaint at this time and would like to have her medications refilled when necessary.      Past Medical History:   Diagnosis Date    Chronic anxiety     Depression     controlled on Paxil    Diabetes (HCC)     DJD (degenerative joint disease)     GERD (gastroesophageal reflux disease)     High cholesterol     HTN (hypertension)     Hypertension     Low back pain     post-laminectomy syndrome and multilevel degenerative disease    Osteoporosis      Past Surgical History:   Procedure Laterality Date    HX ADENOIDECTOMY      HX APPENDECTOMY      HX BACK SURGERY      PHLD X3    HX BREAST BIOPSY Left 8/28/2015    WIDE LOCAL EXCISION LEFT BREAST LESION performed by Fredy High MD at [facility] MAIN OR    HX HERNIA REPAIR      hiatal    HX KNEE REPLACEMENT      HX ORTHOPAEDIC      bilateral shoulder surgery    HX ELVIRA AND BSO  1982    HX TONSILLECTOMY      TOTAL HIP ARTHROPLASTY  4/12     total right hip replacement, Dr. Escobar Gaona History     Socioeconomic History    Marital status:      Spouse name: Not on file    Number of children: Not on file    Years of education: Not on file    Highest education level: Not on file   Tobacco Use    Smoking status: Never Smoker    Smokeless tobacco: Never Used   Substance and Sexual Activity    Alcohol use: No    Drug use: No    Sexual activity: Not Currently     Current Outpatient Medications   Medication Sig Dispense Refill    oxybutynin chloride XL (DITROPAN XL) 10 mg CR tablet TAKE 1 TABLET DAILY 90 Tab 3    potassium chloride SA (MICRO-K) 10 mEq capsule TAKE 1 CAPSULE TWICE DAILY 180 Cap 3    PARoxetine (PAXIL) 20 mg tablet TAKE 1 TABLET DAILY 90 Tab 3    lovastatin (MEVACOR) 40 mg tablet TAKE 1 TABLET NIGHTLY 90 Tab 3    furosemide (LASIX) 40 mg tablet take 1 tablet by mouth once daily 90 Tab 3    lansoprazole (PREVACID) 30 mg capsule TAKE 1 CAPSULE DAILY BEFOREBREAKFAST 90 Cap 3    therapeutic multivitamin (THERAGRAN) tablet Take 1 Tab by mouth daily.  OXYCODONE HCL/ACETAMINOPHEN (PERCOCET PO) Take  by mouth.  GOTU SILVIA HERB PO Take  by mouth.  OLANZapine (ZYPREXA) 10 mg tablet Take 5 mg by mouth nightly. Allergies   Allergen Reactions    Aspirin Other (comments)     \"messes up my kidneys\"    Feldene [Piroxicam] Other (comments)     Kidney damage      Morphine Other (comments)     unconsciousness    Nsaids (Non-Steroidal Anti-Inflammatory Drug) Other (comments)     \"messes up my kidneys\"    Other Medication Not Reported This Time     Mycins      Penicillins Hives    Sulfa (Sulfonamide Antibiotics) Rash    Vancomycin Itching     Possible allergic reaction to Vancomycin. Complained of itching/reddness around forehead/back of neck     The patient has a family history of    REVIEW OF SYSTEMS  Review of Systems   Constitutional: Negative for chills, fever and weight loss. Respiratory: Negative for cough and shortness of breath. Cardiovascular: Negative for chest pain and palpitations. Gastrointestinal: Negative for nausea. Musculoskeletal: Positive for back pain and joint pain. Neurological: Positive for weakness.        Objective:     Visit Vitals  /74   Pulse (!) 102   Temp (!) 96 °F (35.6 °C) (Temporal)   Resp 18   Ht 5' 6\" (1.676 m)   Wt 133 lb (60.3 kg)   SpO2 94%   BMI 21.47 kg/m²       Current Outpatient Medications   Medication Instructions    furosemide (LASIX) 40 mg tablet take 1 tablet by mouth once daily    NURIA VEGA HERB PO Oral    lansoprazole (PREVACID) 30 mg capsule TAKE 1 CAPSULE DAILY BEFOREBREAKFAST    lovastatin (MEVACOR) 40 mg tablet TAKE 1 TABLET NIGHTLY    OLANZapine (ZYPREXA) 5 mg, Oral, EVERY BEDTIME    oxybutynin chloride XL (DITROPAN XL) 10 mg CR tablet TAKE 1 TABLET DAILY    OXYCODONE HCL/ACETAMINOPHEN (PERCOCET PO) Oral    PARoxetine (PAXIL) 20 mg tablet TAKE 1 TABLET DAILY    potassium chloride SA (MICRO-K) 10 mEq capsule TAKE 1 CAPSULE TWICE DAILY    therapeutic multivitamin (THERAGRAN) tablet 1 Tab, Oral, DAILY        PHYSICAL EXAM  Physical Exam  Vitals signs and nursing note reviewed. Constitutional:       Appearance: Normal appearance. Neck:      Musculoskeletal: Normal range of motion and neck supple. Cardiovascular:      Rate and Rhythm: Regular rhythm. Tachycardia present. Pulses: Normal pulses. Heart sounds: Normal heart sounds. Pulmonary:      Effort: Pulmonary effort is normal.      Breath sounds: Normal breath sounds. Abdominal:      General: Abdomen is flat. Bowel sounds are normal.      Palpations: Abdomen is soft. Musculoskeletal:         General: Deformity present. Cervical back: She exhibits decreased range of motion and deformity. Thoracic back: She exhibits decreased range of motion and deformity. Skin:     General: Skin is warm and dry. Neurological:      General: No focal deficit present. Mental Status: She is alert and oriented to person, place, and time. Psychiatric:         Attention and Perception: Attention normal.         Mood and Affect: Mood normal.         Speech: Speech normal.         Behavior: Behavior normal.         Cognition and Memory: Memory is impaired. Assessment/Plan:     Diagnoses and all orders for this visit:    1. Primary hypertension  -     CBC WITH AUTOMATED DIFF;  Future  -     METABOLIC PANEL, COMPREHENSIVE; Future    2. Hyperlipidemia LDL goal <100    3. Pure hypercholesterolemia  -     LIPID PANEL; Future    4. Prediabetes  -     HEMOGLOBIN A1C WITH EAG; Future    5. Rheumatoid arthritis involving multiple joints (HCC)    6. Urgency incontinence    7. Lumbosacral spondylosis without myelopathy        Follow-up and Dispositions    · Return Keep follow-up appointment on 11/18/20. Disclaimer:    I have discussed the diagnosis with the patient and the intended plan as seen above. The patient understands our medical plan. The risks, benefits and significant side effects of all medications have been reviewed. Anticipated time course and progression of condition reviewed. All questions have been addressed. She received an after visit summary, with information reviewed, and questions answered. Where appropriate, she is instructed to call the clinic if she has not been notified either by phone or through [address] with the results of her tests or with an appointment plan for any referrals within 1 week(s). The patient  is to call if her condition worsens or fails to improve or if significant side effects are experienced.        Clarita Marley, ELSI

## 2020-09-24 NOTE — PROGRESS NOTES
Sabra Basilio presents today for No chief complaint on file. Is someone accompanying this pt? yes    Is the patient using any DME equipment during OV? Son, Weston Fairchild Patient's son states no change in medications. Depression Screening:  3 most recent PHQ Screens 9/2/2014   PHQ Not Done -   Little interest or pleasure in doing things Not at all   Feeling down, depressed, irritable, or hopeless Not at all   Total Score PHQ 2 0       Learning Assessment:  Learning Assessment 7/6/2016   PRIMARY LEARNER Patient   HIGHEST LEVEL OF EDUCATION - PRIMARY LEARNER  -   BARRIERS PRIMARY LEARNER -   CO-LEARNER CAREGIVER -   PRIMARY LANGUAGE ENGLISH   LEARNER PREFERENCE PRIMARY LISTENING     DEMONSTRATION   ANSWERED BY patient   RELATIONSHIP SELF       Abuse Screening:  Abuse Screening Questionnaire 8/3/2017   Do you ever feel afraid of your partner? N   Are you in a relationship with someone who physically or mentally threatens you? N   Is it safe for you to go home? Y       Fall Risk  Fall Risk Assessment, last 12 mths 11/22/2019   Able to walk? Yes   Fall in past 12 months? No   Fall with injury? -   Number of falls in past 12 months -   Fall Risk Score -       ADL  ADL Assessment 12/5/2017   Feeding yourself No Help Needed   Getting from bed to chair No Help Needed   Getting dressed No Help Needed   Bathing or showering No Help Needed   Walk across the room (includes cane/walker) Help Needed   Using the telphone No Help Needed   Taking your medications Help Needed   Preparing meals Help Needed   Managing money (expenses/bills) Help Needed   Moderately strenuous housework (laundry) Help Needed   Shopping for personal items (toiletries/medicines) Help Needed   Shopping for groceries Help Needed   Driving Help Needed   Climbing a flight of stairs Help Needed   Getting to places beyond walking distances No Help Needed       Health Maintenance reviewed and discussed and ordered per Provider.     Health Maintenance Due   Topic Date Due    Shingrix Vaccine Age 49> (1 of 2) 08/04/1997    A1C test (Diabetic or Prediabetic)  05/26/2018    Medicare Yearly Exam  12/06/2018    Lipid Screen  05/10/2020    Flu Vaccine (1) 09/01/2020   . Coordination of Care:  1. Have you been to the ER, urgent care clinic since your last visit? Hospitalized since your last visit? no    2. Have you seen or consulted any other health care providers outside of the [address] [place] [address] since your last visit? Include any pap smears or colon screening.  no

## 2020-09-24 NOTE — PATIENT INSTRUCTIONS
Back Pain: Care Instructions Your Care Instructions Back pain has many possible causes. It is often related to problems with muscles and ligaments of the back. It may also be related to problems with the nerves, discs, or bones of the back. Moving, lifting, standing, sitting, or sleeping in an awkward way can strain the back. Sometimes you don't notice the injury until later. Arthritis is another common cause of back pain. Although it may hurt a lot, back pain usually improves on its own within several weeks. Most people recover in 12 weeks or less. Using good home treatment and being careful not to stress your back can help you feel better sooner. Follow-up care is a key part of your treatment and safety. Be sure to make and go to all appointments, and call your doctor if you are having problems. It's also a good idea to know your test results and keep a list of the medicines you take. How can you care for yourself at home? · Sit or lie in positions that are most comfortable and reduce your pain. Try one of these positions when you lie down: ? Lie on your back with your knees bent and supported by large pillows. ? Lie on the floor with your legs on the seat of a sofa or chair. ? Lie on your side with your knees and hips bent and a pillow between your legs. ? Lie on your stomach if it does not make pain worse. · Do not sit up in bed, and avoid soft couches and twisted positions. Bed rest can help relieve pain at first, but it delays healing. Avoid bed rest after the first day of back pain. · Change positions every 30 minutes. If you must sit for long periods of time, take breaks from sitting. Get up and walk around, or lie in a comfortable position. · Try using a heating pad on a low or medium setting for 15 to 20 minutes every 2 or 3 hours. Try a warm shower in place of one session with the heating pad. · You can also try an ice pack for 10 to 15 minutes every 2 to 3 hours. Put a thin cloth between the ice pack and your skin. · Take pain medicines exactly as directed. ? If the doctor gave you a prescription medicine for pain, take it as prescribed. ? If you are not taking a prescription pain medicine, ask your doctor if you can take an over-the-counter medicine. · Take short walks several times a day. You can start with 5 to 10 minutes, 3 or 4 times a day, and work up to longer walks. Walk on level surfaces and avoid hills and stairs until your back is better. · Return to work and other activities as soon as you can. Continued rest without activity is usually not good for your back. · To prevent future back pain, do exercises to stretch and strengthen your back and stomach. Learn how to use good posture, safe lifting techniques, and proper body mechanics. When should you call for help? Call your doctor now or seek immediate medical care if: 
  · You have new or worsening numbness in your legs.  
  · You have new or worsening weakness in your legs. (This could make it hard to stand up.)  
  · You lose control of your bladder or bowels. Watch closely for changes in your health, and be sure to contact your doctor if: 
  · You have a fever, lose weight, or don't feel well.  
  · You do not get better as expected. Where can you learn more? Go to http://dar-shawna.info/ Enter B321 in the search box to learn more about \"Back Pain: Care Instructions. \" Current as of: March 2, 2020               Content Version: 12.6 © 1177-8030 [employer]. Care instructions adapted under license by [employer] (which disclaims liability or warranty for this information). If you have questions about a medical condition or this instruction, always ask your healthcare professional. [place] [address] any warranty or liability for your use of this information. Prediabetes: Care Instructions Overview Prediabetes is a warning sign that you're at risk for getting type 2 diabetes. It means that your blood sugar is higher than it should be. But it's not high enough to be diabetes. The food you eat naturally turns into sugar. Your body uses the sugar for energy. Normally, an organ called the pancreas makes insulin. And insulin allows the sugar in your blood to get into your body's cells. But sometimes the body can't use insulin the right way. So the sugar stays in your blood instead. This is called insulin resistance. The buildup of sugar in your blood means you have prediabetes. The good news is that you may be able to prevent or delay diabetes. Making small lifestyle changes, like getting active and changing your eating habits, may help you get your blood sugar back to normal. You can work with your doctor to make a treatment plan. Follow-up care is a key part of your treatment and safety. Be sure to make and go to all appointments, and call your doctor if you are having problems. It's also a good idea to know your test results and keep a list of the medicines you take. How can you care for yourself at home? · Watch your weight. A healthy weight helps your body use insulin properly. · Limit the amount of calories, sweets, and unhealthy fat you eat. Ask your doctor if you should see a dietitian. A registered dietitian can help you create meal plans that fit your lifestyle. · Get at least 30 minutes of exercise on most days of the week. Exercise helps control your blood sugar. It also helps you maintain a healthy weight. Walking is a good choice. You also may want to do other activities, such as running, swimming, cycling, or playing tennis or team sports. · Do not smoke. Smoking can make prediabetes worse. If you need help quitting, talk to your doctor about stop-smoking programs and medicines. These can increase your chances of quitting for good. · If your doctor prescribed medicines, take them exactly as prescribed. Call your doctor if you think you are having a problem with your medicine. You will get more details on the specific medicines your doctor prescribes. When should you call for help? Watch closely for changes in your health, and be sure to contact your doctor if: 
  · You have any symptoms of diabetes. These may include: 
? Being thirsty more often. ? Urinating more. ? Being hungrier. ? Losing weight. ? Being very tired. ? Having blurry vision.  
  · You have a wound that will not heal.  
  · You have an infection that will not go away.  
  · You have problems with your blood pressure.  
  · You want more information about diabetes and how you can keep from getting it. Where can you learn more? Go to http://www.gray.com/ Enter I222 in the search box to learn more about \"Prediabetes: Care Instructions. \" Current as of: December 20, 2019               Content Version: 12.6 © 3482-7275 [employer]. Care instructions adapted under license by [employer] (which disclaims liability or warranty for this information). If you have questions about a medical condition or this instruction, always ask your healthcare professional. [address] any warranty or liability for your use of this information.

## 2020-09-29 ENCOUNTER — APPOINTMENT (OUTPATIENT)
Dept: FAMILY MEDICINE CLINIC | Age: 75
End: 2020-09-29

## 2020-09-29 ENCOUNTER — HOSPITAL ENCOUNTER (OUTPATIENT)
Dept: LAB | Age: 75
Discharge: HOME | End: 2020-09-29
Payer: MEDICARE

## 2020-09-29 DIAGNOSIS — E78.00 PURE HYPERCHOLESTEROLEMIA: ICD-10-CM

## 2020-09-29 DIAGNOSIS — I10 PRIMARY HYPERTENSION: ICD-10-CM

## 2020-09-29 LAB
ALBUMIN SERPL-MCNC: 3.8 G/DL (ref 3.4–5)
ALBUMIN/GLOB SERPL: 1.1 {RATIO} (ref 0.8–1.7)
ALP SERPL-CCNC: 105 U/L (ref 45–117)
ALT SERPL-CCNC: 27 U/L (ref 13–56)
ANION GAP SERPL CALC-SCNC: 5 MMOL/L (ref 3–18)
AST SERPL-CCNC: 31 U/L (ref 10–38)
BASOPHILS # BLD: 0 K/UL (ref 0–0.1)
BASOPHILS NFR BLD: 0 % (ref 0–2)
BILIRUB SERPL-MCNC: 0.4 MG/DL (ref 0.2–1)
BUN SERPL-MCNC: 18 MG/DL (ref 7–18)
BUN/CREAT SERPL: 15 (ref 12–20)
CALCIUM SERPL-MCNC: 9.6 MG/DL (ref 8.5–10.1)
CHLORIDE SERPL-SCNC: 100 MMOL/L (ref 100–111)
CHOLEST SERPL-MCNC: 199 MG/DL
CO2 SERPL-SCNC: 32 MMOL/L (ref 21–32)
CREAT SERPL-MCNC: 1.2 MG/DL (ref 0.6–1.3)
DIFFERENTIAL METHOD BLD: ABNORMAL
EOSINOPHIL # BLD: 0.1 K/UL (ref 0–0.4)
EOSINOPHIL NFR BLD: 2 % (ref 0–5)
ERYTHROCYTE [DISTWIDTH] IN BLOOD BY AUTOMATED COUNT: 14.1 % (ref 11.6–14.5)
GLOBULIN SER CALC-MCNC: 3.5 G/DL (ref 2–4)
GLUCOSE SERPL-MCNC: 152 MG/DL (ref 74–99)
HCT VFR BLD AUTO: 41.6 % (ref 35–45)
HDLC SERPL-MCNC: 91 MG/DL (ref 40–60)
HDLC SERPL: 2.2 {RATIO} (ref 0–5)
HGB BLD-MCNC: 13.6 G/DL (ref 12–16)
LDLC SERPL CALC-MCNC: 90.4 MG/DL (ref 0–100)
LIPID PROFILE,FLP: ABNORMAL
LYMPHOCYTES # BLD: 1.9 K/UL (ref 0.9–3.6)
LYMPHOCYTES NFR BLD: 30 % (ref 21–52)
MCH RBC QN AUTO: 29.5 PG (ref 24–34)
MCHC RBC AUTO-ENTMCNC: 32.7 G/DL (ref 31–37)
MCV RBC AUTO: 90.2 FL (ref 74–97)
MONOCYTES # BLD: 0.6 K/UL (ref 0.05–1.2)
MONOCYTES NFR BLD: 10 % (ref 3–10)
NEUTS SEG # BLD: 3.7 K/UL (ref 1.8–8)
NEUTS SEG NFR BLD: 58 % (ref 40–73)
PLATELET # BLD AUTO: 305 K/UL (ref 135–420)
PMV BLD AUTO: 9 FL (ref 9.2–11.8)
POTASSIUM SERPL-SCNC: 4.4 MMOL/L (ref 3.5–5.5)
PROT SERPL-MCNC: 7.3 G/DL (ref 6.4–8.2)
RBC # BLD AUTO: 4.61 M/UL (ref 4.2–5.3)
SODIUM SERPL-SCNC: 137 MMOL/L (ref 136–145)
TRIGL SERPL-MCNC: 88 MG/DL (ref ?–150)
VLDLC SERPL CALC-MCNC: 17.6 MG/DL
WBC # BLD AUTO: 6.4 K/UL (ref 4.6–13.2)

## 2020-09-29 PROCEDURE — 36415 COLL VENOUS BLD VENIPUNCTURE: CPT

## 2020-09-29 PROCEDURE — 80061 LIPID PANEL: CPT

## 2020-09-29 PROCEDURE — 80053 COMPREHEN METABOLIC PANEL: CPT

## 2020-09-29 PROCEDURE — 85025 COMPLETE CBC W/AUTO DIFF WBC: CPT

## 2020-11-13 NOTE — TELEPHONE ENCOUNTER
Last prescribed by former PCP    Last Visit: 9/24/20 with ELSI Nicolas  Next Appointment: 11/18/20 with MD Latrice Madison  Previous Refill Encounter(s): 11/20/19 #90 with 3 refills    Requested Prescriptions     Pending Prescriptions Disp Refills    furosemide (LASIX) 40 mg tablet 90 Tab 1     Sig: Take 1 Tab by mouth daily.

## 2020-11-18 ENCOUNTER — OFFICE VISIT (OUTPATIENT)
Dept: FAMILY MEDICINE CLINIC | Age: 75
End: 2020-11-18
Payer: MEDICARE

## 2020-11-18 VITALS
SYSTOLIC BLOOD PRESSURE: 131 MMHG | HEIGHT: 66 IN | WEIGHT: 134 LBS | OXYGEN SATURATION: 98 % | HEART RATE: 119 BPM | RESPIRATION RATE: 18 BRPM | BODY MASS INDEX: 21.53 KG/M2 | TEMPERATURE: 97.6 F | DIASTOLIC BLOOD PRESSURE: 73 MMHG

## 2020-11-18 DIAGNOSIS — Z23 NEEDS FLU SHOT: ICD-10-CM

## 2020-11-18 DIAGNOSIS — Z00.00 MEDICARE ANNUAL WELLNESS VISIT, SUBSEQUENT: Primary | ICD-10-CM

## 2020-11-18 PROCEDURE — G8754 DIAS BP LESS 90: HCPCS | Performed by: FAMILY MEDICINE

## 2020-11-18 PROCEDURE — G0439 PPPS, SUBSEQ VISIT: HCPCS | Performed by: FAMILY MEDICINE

## 2020-11-18 PROCEDURE — 90694 VACC AIIV4 NO PRSRV 0.5ML IM: CPT

## 2020-11-18 PROCEDURE — G8752 SYS BP LESS 140: HCPCS | Performed by: FAMILY MEDICINE

## 2020-11-18 PROCEDURE — G8427 DOCREV CUR MEDS BY ELIG CLIN: HCPCS | Performed by: FAMILY MEDICINE

## 2020-11-18 PROCEDURE — 90471 IMMUNIZATION ADMIN: CPT

## 2020-11-18 PROCEDURE — G8420 CALC BMI NORM PARAMETERS: HCPCS | Performed by: FAMILY MEDICINE

## 2020-11-18 PROCEDURE — G9717 DOC PT DX DEP/BP F/U NT REQ: HCPCS | Performed by: FAMILY MEDICINE

## 2020-11-18 PROCEDURE — G9899 SCRN MAM PERF RSLTS DOC: HCPCS | Performed by: FAMILY MEDICINE

## 2020-11-18 PROCEDURE — G8399 PT W/DXA RESULTS DOCUMENT: HCPCS | Performed by: FAMILY MEDICINE

## 2020-11-18 PROCEDURE — G8536 NO DOC ELDER MAL SCRN: HCPCS | Performed by: FAMILY MEDICINE

## 2020-11-18 PROCEDURE — 3017F COLORECTAL CA SCREEN DOC REV: CPT | Performed by: FAMILY MEDICINE

## 2020-11-18 PROCEDURE — 1101F PT FALLS ASSESS-DOCD LE1/YR: CPT | Performed by: FAMILY MEDICINE

## 2020-11-18 NOTE — PROGRESS NOTES
This is the Subsequent Medicare Annual Wellness Exam, performed 12 months or more after the Initial AWV or the last Subsequent AWV    I have reviewed the patient's medical history in detail and updated the computerized patient record. She is new to this provider  She is here with her son who assists with the history  She has no particular complaints  Health history has been reviewed. Depression Risk Factor Screening:     3 most recent PHQ Screens 9/2/2014   PHQ Not Done -   Little interest or pleasure in doing things Not at all   Feeling down, depressed, irritable, or hopeless Not at all   Total Score PHQ 2 0       Alcohol Risk Screen   Do you average more than 1 drink per night or more than 7 drinks a week:  No    On any one occasion in the past three months have you have had more than 3 drinks containing alcohol:  No        Functional Ability and Level of Safety:   Hearing: Hearing is good. Activities of Daily Living: The home contains: no safety equipment. Patient does total self care     Ambulation: with difficulty, uses a rollator     Fall Risk:  Fall Risk Assessment, last 12 mths 9/24/2020   Able to walk? Yes   Fall in past 12 months? No   Fall with injury? -   Number of falls in past 12 months -   Fall Risk Score -     Abuse Screen:  Patient is not abused       Cognitive Screening   Has your family/caregiver stated any concerns about your memory: mild memory problems.       Cognitive Screening: cognition intact     PE:   Visit Vitals  /73 (BP 1 Location: Left arm, BP Patient Position: Sitting)   Pulse (!) 119   Temp 97.6 °F (36.4 °C) (Temporal)   Resp 18   Ht 5' 6\" (1.676 m)   Wt 134 lb (60.8 kg)   SpO2 98%   BMI 21.63 kg/m²     General appearance: alert, cooperative, no distress, appears stated age    Lungs: clear to auscultation bilaterally  Heart: regular rate and rhythm, S1, S2 normal, no murmur, click, rub or gallop    Extremities: extremities normal, atraumatic, no cyanosis or edema      Assessment/Plan   Education and counseling provided:  Are appropriate based on today's review and evaluation  End-of-Life planning (with patient's consent)    Diagnoses and all orders for this visit:    1. Medicare annual wellness visit, subsequent    2. Needs flu shot  -     FLU (FLUAD QUAD INFLUENZA VACCINE,QUAD,ADJUVANTED)        As above, pt well and stable   treatment plan as listed below  Orders Placed This Encounter    Influenza Vaccine, QUAD, 65 Yrs +  IM  (Fluad 38734 )     Follow-up and Dispositions    · Return in about 4 months (around 3/18/2021). This has been fully explained to the patient, who indicates understanding. An After Visit Summary was printed and given to the patient.       Health Maintenance Due     Health Maintenance Due   Topic Date Due    Foot Exam Q1  08/04/1955    Eye Exam Retinal or Dilated  [birth year]04/1955    Shingrix Vaccine Age 50> (1 of 2) 08/04/1997    MICROALBUMIN Q1  05/26/2018       Patient Care Team   Patient Care Team:  Layne Messina MD as PCP - General (Family Medicine)  Layne Messina MD as PCP [facility] Provider  Ramón Quiroz RN as [facility] (Internal Medicine)  Piedad Park MD (Cardiology)  Zeyad Terrazas MD (Orthopedic Surgery)  Shahrzad Benavides MD (Gastroenterology)  Leia Andrew MD (Rheumatology)  Soto Moralez MD (Surgical Oncology)  Miriam Silveira MD (Endocrinology)  Nayana Boswell MD (Physical Medicine and Rehabilitation)  Ambreen Valadez MD (Orthopedic Surgery)    History     Patient Active Problem List   Diagnosis Code    Postlaminectomy syndrome, lumbar region M96.1    Lumbosacral spondylosis without myelopathy M47.817    Lumbosacral radiculopathy M54.17    Spasm of muscle M62.838    Encounter for long-term (current) use of high-risk medication Z79.899    DJD (degenerative joint disease) M19.90    HTN (hypertension) I10    Urgency incontinence N39.41    Major depression F32.9  Nausea R11.0    Hyperlipidemia E78.5    Chronic pain syndrome G89.4    Prediabetes R73.03    Insomnia G47.00    Primary hypertension I10    Rheumatoid arthritis involving multiple joints (HCC) M06.9    Back pain M54.9    Chest pain R07.9    Overactive bladder N32.81    Hyperlipidemia LDL goal <130 E78.5    Chronic renal failure, stage 3 (moderate) N18.30    Hyperlipidemia LDL goal <100 E78.5    Weight loss R63.4    Diabetes (HCC) E11.9    GERD (gastroesophageal reflux disease) K21.9    High cholesterol E78.00    Hypertension I10    Low back pain M54.5    Chronic anxiety F41.9    Depression F32.9     Past Medical History:   Diagnosis Date    Chronic anxiety     Depression     controlled on Paxil    Diabetes (HCC)     DJD (degenerative joint disease)     GERD (gastroesophageal reflux disease)     High cholesterol     HTN (hypertension)     Hypertension     Low back pain     post-laminectomy syndrome and multilevel degenerative disease    Osteoporosis       Past Surgical History:   Procedure Laterality Date    HX ADENOIDECTOMY      HX APPENDECTOMY      HX BACK SURGERY      PHLD X3    HX BREAST BIOPSY Left 8/28/2015    WIDE LOCAL EXCISION LEFT BREAST LESION performed by Len Naranjo MD at [facility] MAIN OR    HX HERNIA REPAIR      hiatal    HX KNEE REPLACEMENT      HX ORTHOPAEDIC      bilateral shoulder surgery    HX ELVIRA AND BSO  1982    HX TONSILLECTOMY      TOTAL HIP ARTHROPLASTY  4/12     total right hip replacement, Dr. Lakisha Alfredo     Current Outpatient Medications   Medication Sig Dispense Refill    oxybutynin chloride XL (DITROPAN XL) 10 mg CR tablet TAKE 1 TABLET DAILY 90 Tab 3    potassium chloride SA (MICRO-K) 10 mEq capsule TAKE 1 CAPSULE TWICE DAILY 180 Cap 3    PARoxetine (PAXIL) 20 mg tablet TAKE 1 TABLET DAILY 90 Tab 3    lovastatin (MEVACOR) 40 mg tablet TAKE 1 TABLET NIGHTLY 90 Tab 3    lansoprazole (PREVACID) 30 mg capsule TAKE 1 CAPSULE DAILY BEFOREBREAKFAST 90 Cap 3    therapeutic multivitamin (THERAGRAN) tablet Take 1 Tab by mouth daily.  OXYCODONE HCL/ACETAMINOPHEN (PERCOCET PO) Take  by mouth.  GOTU SILVIA HERB PO Take  by mouth.  OLANZapine (ZYPREXA) 10 mg tablet Take 5 mg by mouth nightly.  furosemide (LASIX) 40 mg tablet Take 1 Tab by mouth daily. 90 Tab 1     Allergies   Allergen Reactions    Aspirin Other (comments)     \"messes up my kidneys\"    Feldene [Piroxicam] Other (comments)     Kidney damage      Morphine Other (comments)     unconsciousness    Nsaids (Non-Steroidal Anti-Inflammatory Drug) Other (comments)     \"messes up my kidneys\"    Other Medication Not Reported This Time     Mycins      Penicillins Hives    Sulfa (Sulfonamide Antibiotics) Rash    Vancomycin Itching     Possible allergic reaction to Vancomycin. Complained of itching/reddness around forehead/back of neck       Family History   Problem Relation Age of Onset    Cancer Mother         melanoma    Cancer Father         lung    Heart Disease Maternal Grandmother     Diabetes Other     Hypertension Other     Headache Other     Seizures Other     Coronary Artery Disease Other     Heart Attack Other      Social History     Tobacco Use    Smoking status: Former Smoker    Smokeless tobacco: Never Used   Substance Use Topics    Alcohol use:  No

## 2020-11-18 NOTE — PATIENT INSTRUCTIONS
Medicare Wellness Visit, Female The best way to live healthy is to have a lifestyle where you eat a well-balanced diet, exercise regularly, limit alcohol use, and quit all forms of tobacco/nicotine, if applicable. Regular preventive services are another way to keep healthy. Preventive services (vaccines, screening tests, monitoring & exams) can help personalize your care plan, which helps you manage your own care. Screening tests can find health problems at the earliest stages, when they are easiest to treat. [facility] follows the current, evidence-based guidelines published by the [facility] Hannon ([facility]) when recommending preventive services for our patients. Because we follow these guidelines, sometimes recommendations change over time as research supports it. (For example, mammograms used to be recommended annually. Even though Medicare will still pay for an annual mammogram, the newer guidelines recommend a mammogram every two years for women of average risk). Of course, you and your doctor may decide to screen more often for some diseases, based on your risk and your co-morbidities (chronic disease you are already diagnosed with). Preventive services for you include: - Medicare offers their members a free annual wellness visit, which is time for you and your primary care provider to discuss and plan for your preventive service needs. Take advantage of this benefit every year! 
-All adults over the age of 72 should receive the recommended pneumonia vaccines. Current USPSTF guidelines recommend a series of two vaccines for the best pneumonia protection.  
-All adults should have a flu vaccine yearly and a tetanus vaccine every 10 years.  
-All adults age 48 and older should receive the shingles vaccines (series of two vaccines). -All adults age 38-68 who are overweight should have a diabetes screening test once every three years. -All adults born between [birth year] and 1963 should be screened once for Hepatitis C. 
-Other screening tests and preventive services for persons with diabetes include: an eye exam to screen for diabetic retinopathy, a kidney function test, a foot exam, and stricter control over your cholesterol.  
-Cardiovascular screening for adults with routine risk involves an electrocardiogram (ECG) at intervals determined by your doctor.  
-Colorectal cancer screenings should be done for adults age 54-65 with no increased risk factors for colorectal cancer. There are a number of acceptable methods of screening for this type of cancer. Each test has its own benefits and drawbacks. Discuss with your doctor what is most appropriate for you during your annual wellness visit. The different tests include: colonoscopy (considered the best screening method), a fecal occult blood test, a fecal DNA test, and sigmoidoscopy. 
 
-A bone mass density test is recommended when a woman turns 65 to screen for osteoporosis. This test is only recommended one time, as a screening. Some providers will use this same test as a disease monitoring tool if you already have osteoporosis. -Breast cancer screenings are recommended every other year for women of normal risk, age 54-69. 
-Cervical cancer screenings for women over age 72 are only recommended with certain risk factors. Here is a list of your current Health Maintenance items (your personalized list of preventive services) with a due date: 
Health Maintenance Due Topic Date Due  
 Diabetic Foot Care  08/04/1955 Bonita Vizcarra Eye Exam  08/04/1955  Shingles Vaccine (1 of 2) 08/04/1997  Albumin Urine Test  05/26/2018

## 2020-11-19 RX ORDER — FUROSEMIDE 40 MG/1
40 TABLET ORAL DAILY
Qty: 90 TAB | Refills: 1 | Status: SHIPPED | OUTPATIENT
Start: 2020-11-19 | End: 2021-05-19

## 2020-12-17 NOTE — TELEPHONE ENCOUNTER
This was last prescribed by former PCP    Last Visit: 11/18/20 with MD Patt Strong  Next Appointment: Joel Mauricio to follow-up in 4 months  Previous Refill Encounter(s): 12/24/18 #90 with 3 refills    Requested Prescriptions     Pending Prescriptions Disp Refills    lansoprazole (PREVACID) 30 mg capsule 90 Cap 3     Sig: Take 1 Cap by mouth Daily (before breakfast).

## 2020-12-24 RX ORDER — LANSOPRAZOLE 30 MG/1
30 CAPSULE, DELAYED RELEASE ORAL
Qty: 90 CAP | Refills: 3 | Status: SHIPPED | OUTPATIENT
Start: 2020-12-24 | End: 2021-12-23

## 2021-01-27 NOTE — TELEPHONE ENCOUNTER
Last Visit: 11/18/20 with MD Jai Roberts  Next Appointment: Advised to follow-up in 4 months  Previous Refill Encounter(s): 1/29/20 #90 with 3 refills    Requested Prescriptions     Pending Prescriptions Disp Refills    lovastatin (MEVACOR) 40 mg tablet 90 Tab 3     Sig: Take 1 Tab by mouth nightly.

## 2021-01-29 RX ORDER — LOVASTATIN 40 MG/1
40 TABLET ORAL
Qty: 90 TAB | Refills: 3 | Status: SHIPPED | OUTPATIENT
Start: 2021-01-29 | End: 2022-01-15

## 2021-03-01 NOTE — TELEPHONE ENCOUNTER
Last Visit: 11/18/20 with MD Jai Roberts  Next Appointment: Advised to follow-up in 4 months  Previous Refill Encounter(s): 3/1/20 #90 with 3 refills    Requested Prescriptions     Pending Prescriptions Disp Refills    PARoxetine (PAXIL) 20 mg tablet 90 Tab 3     Sig: Take 1 Tab by mouth daily.

## 2021-03-02 RX ORDER — PAROXETINE HYDROCHLORIDE 20 MG/1
20 TABLET, FILM COATED ORAL DAILY
Qty: 90 TAB | Refills: 3 | Status: SHIPPED | OUTPATIENT
Start: 2021-03-02 | End: 2022-01-30

## 2021-05-19 RX ORDER — FUROSEMIDE 40 MG/1
TABLET ORAL
Qty: 90 TABLET | Refills: 1 | Status: SHIPPED | OUTPATIENT
Start: 2021-05-19 | End: 2021-11-13

## 2021-06-02 ENCOUNTER — OFFICE VISIT (OUTPATIENT)
Dept: ORTHOPEDIC SURGERY | Age: 76
End: 2021-06-02
Payer: MEDICARE

## 2021-06-02 VITALS
WEIGHT: 134 LBS | OXYGEN SATURATION: 95 % | RESPIRATION RATE: 15 BRPM | HEART RATE: 88 BPM | TEMPERATURE: 96.4 F | HEIGHT: 66 IN | BODY MASS INDEX: 21.53 KG/M2

## 2021-06-02 DIAGNOSIS — M19.071 PRIMARY OSTEOARTHRITIS OF BOTH FEET: Primary | ICD-10-CM

## 2021-06-02 DIAGNOSIS — M19.072 PRIMARY OSTEOARTHRITIS OF BOTH FEET: Primary | ICD-10-CM

## 2021-06-02 DIAGNOSIS — M79.672 LEFT FOOT PAIN: ICD-10-CM

## 2021-06-02 DIAGNOSIS — M25.551 HIP PAIN, RIGHT: ICD-10-CM

## 2021-06-02 PROCEDURE — G8756 NO BP MEASURE DOC: HCPCS | Performed by: ORTHOPAEDIC SURGERY

## 2021-06-02 PROCEDURE — 99213 OFFICE O/P EST LOW 20 MIN: CPT | Performed by: ORTHOPAEDIC SURGERY

## 2021-06-02 PROCEDURE — 72170 X-RAY EXAM OF PELVIS: CPT | Performed by: ORTHOPAEDIC SURGERY

## 2021-06-02 PROCEDURE — G8536 NO DOC ELDER MAL SCRN: HCPCS | Performed by: ORTHOPAEDIC SURGERY

## 2021-06-02 PROCEDURE — G8427 DOCREV CUR MEDS BY ELIG CLIN: HCPCS | Performed by: ORTHOPAEDIC SURGERY

## 2021-06-02 PROCEDURE — 73630 X-RAY EXAM OF FOOT: CPT | Performed by: ORTHOPAEDIC SURGERY

## 2021-06-02 PROCEDURE — G8399 PT W/DXA RESULTS DOCUMENT: HCPCS | Performed by: ORTHOPAEDIC SURGERY

## 2021-06-02 PROCEDURE — 1090F PRES/ABSN URINE INCON ASSESS: CPT | Performed by: ORTHOPAEDIC SURGERY

## 2021-06-02 PROCEDURE — G9899 SCRN MAM PERF RSLTS DOC: HCPCS | Performed by: ORTHOPAEDIC SURGERY

## 2021-06-02 PROCEDURE — 3017F COLORECTAL CA SCREEN DOC REV: CPT | Performed by: ORTHOPAEDIC SURGERY

## 2021-06-02 PROCEDURE — 1101F PT FALLS ASSESS-DOCD LE1/YR: CPT | Performed by: ORTHOPAEDIC SURGERY

## 2021-06-02 PROCEDURE — G8420 CALC BMI NORM PARAMETERS: HCPCS | Performed by: ORTHOPAEDIC SURGERY

## 2021-06-02 PROCEDURE — G9717 DOC PT DX DEP/BP F/U NT REQ: HCPCS | Performed by: ORTHOPAEDIC SURGERY

## 2021-06-02 NOTE — PROGRESS NOTES
I did that after 1 to 2 hours to 3 hours ago or so sorry                   AMBULATORY PROGRESS NOTE      Patient: Mildred Bill             MRN: [id number]     SSN: [id number] Body mass index is 21.63 kg/m². YOB: 1945     AGE: 70 y.o. EX: female    PCP: Mando Blandon MD       IMPRESSION //  DIAGNOSIS AND TREATMENT PLAN        Mildred Bill has a diagnosis of:      She has severe bilateral planovalgus alignment. She can benefit from new custom braces. She has profound osteoporosis as well as her severely poor quality bone. So reconstruction be quite difficult. Son, mentions that he can hear some clicking, popping, of the right hip when she ambulates. She is a total hip replacement in, done many years ago, location unknown. Looks like she is S-ROM right total hip placement, some asymmetric wear of the poly cup/metal ball interface. She does not appear to have any pain when she ambulates limited, but the son says he can hear a squeaking, and popping. Have her see Dr. Maxine Felton, respectively, for an additional opinion and evaluation of this right hip. DIAGNOSES    1. Primary osteoarthritis of both feet    2. Hip pain, right    3. Left foot pain        Orders Placed This Encounter    Generic Supply Order     Custom Bilateral ankle braces    Patient had the following in the past: AFO brace on left foot and SMO brace on right foot. Please use judgement on deciding which bilateral ankle braces are needed.  AMB POC XRAY, FOOT; COMPLETE, 3+ VIEW     ASK ALL FEMALE PATIENTS IF PREGNANT     Order Specific Question:   Reason for Exam     Answer:   PAIN    [[id number]] Pelvis 1-2 views     Order Specific Question:   Weight bearing? Answer:   No          PLAN:  1.  orthotics and prosthetics for custom bracing/  I wrote a DME order: Custom Bilateral ankle braces Patient had the following in the past: AFO brace on left foot and SMO brace on right foot.  Please use judgement on deciding which bilateral ankle braces are needed. 2. I will obtain 3-view x-ray of left foot and 1/2-view of right pelvis in the office today. RTO-2-month RTO    Ten Martínez  expresses understanding of the diagnosis, treatment plan, and all of their proposed questions were answered to their satisfaction. Patient education has been provided re the diagnoses. HPI //  Kathy Pi IS A 70 y.o. female who is a/an  established patient, presenting to my outpatient office for evaluation of  the following chief complaint(s):     Chief Complaint   Patient presents with    Foot Pain     left     Patient presents today with her son. Her son recalls 1 month ago, the patient was complaining about the orthotic I prescribed was causing her pain. As a result, the patient stopped wearing the orthotics and the patient's son noticed loud popping sounds in the patient's right hip. However, when wearing her slippers, the patient's son notices the popping noises in her right hip was subtle. Pt's son notes the patient had a total right hip replacement on the right and is unsure of the specialist who did it. He notes the patient had back surgery done by Dr. Munir Matthew. Pt also has low density in her back. Patient's son reports a bone protruding and skin callous in the patient's left heel. Visit Vitals  Pulse 88   Temp (!) 96.4 °F (35.8 °C)   Resp 15   Ht 5' 6\" (1.676 m)   Wt 134 lb (60.8 kg)   SpO2 95%   BMI 21.63 kg/m²       Appearance: Alert, well appearing and pleasant patient who is in no distress, oriented to person, place/time, and who follows commands. This patient is accompanied in the examination room by her  son. There is signs of: no dementia  Psychiatric: Affect/mood are appropriate. Speech normal in context and clarity, memory intact grossly, no involuntary movements - tremors.   Patient arrives to office via: with assistive device: Wheelchair  H EENT (2): Head normocephalic & atraumatic. Eye: pupils are round// EOM are intact // Neck: ROM WNL  // Hearings Intact   Respiratory: Breathing non labored     ANKLE/FOOT left    Gait: uses assistive device   Tenderness: mild to the left medial talonavicular and navicular cuneiform region. Cutaneous: Skin is intact, no wounds or abrasions. WNL. Joint Motion: Does not have full hindfoot motion, on this left side, at the talonavicular region in particular. She is a planovalgus alignment left foot, with more limited motion, again of his left hindfoot. He can dorsiflex her left ankle to neutral, plantar flex her to 25 degrees, full eversion. Not full inversion. Joint / Tendon Stability: No Ankle or Subtalar instability or joint laxity. No peroneal sublux ability or dislocation  Alignment: severe planovalgus alighment, left achilles tendon contracture  Neuro Motor/Sensory: NL/NL  Vascular: NL foot/ankle pulses,   Lymphatics: No extremity lymphedema, No calf swelling, no tenderness to calf muscles. CHART REVIEW     Nadege Garvin has been experiencing pain and discomfort confirmed as outlined in the pain assessment outlined below.  was reviewed by Esteban Larson MD on 6/2/2021. Pain Assessment  6/2/2021   Location of Pain Hip   Pain Location Comment -   Location Modifiers Right   Severity of Pain 7   Quality of Pain Aching   Duration of Pain A few hours   Frequency of Pain Several times daily   Aggravating Factors Bending;Straightening;Stretching;Kneeling;Exercise;Squatting;Standing;Walking;Stairs   Aggravating Factors Comment -   Limiting Behavior Yes   Relieving Factors Rest   Relieving Factors Comment -   Result of Injury No        Nadege Garvin  has a past medical history of Chronic anxiety, Depression, Diabetes ([facility] [address] DJD (degenerative joint disease), GERD (gastroesophageal reflux disease), High cholesterol, HTN (hypertension), Hypertension, Low back pain, and Osteoporosis. Patients is employed at:         Past Medical History:   Diagnosis Date    Chronic anxiety     Depression     controlled on Paxil    Diabetes ([address]     DJD (degenerative joint disease)     GERD (gastroesophageal reflux disease)     High cholesterol     HTN (hypertension)     Hypertension     Low back pain     post-laminectomy syndrome and multilevel degenerative disease    Osteoporosis      Past Surgical History:   Procedure Laterality Date    HX ADENOIDECTOMY      HX APPENDECTOMY      HX BACK SURGERY      PHLD X3    HX BREAST BIOPSY Left 8/28/2015    WIDE LOCAL EXCISION LEFT BREAST LESION performed by Remberto Del Valle MD at [address] [place] [address] HX HERNIA REPAIR      hiatal    HX KNEE REPLACEMENT      HX ORTHOPAEDIC      bilateral shoulder surgery    HX ELVIRA AND BSO  1982    HX TONSILLECTOMY      UT TOTAL HIP ARTHROPLASTY  4/12     total right hip replacement, Dr. Arelis Gunderson     Current Outpatient Medications   Medication Sig    furosemide (LASIX) 40 mg tablet take 1 tablet by mouth once daily    PARoxetine (PAXIL) 20 mg tablet Take 1 Tab by mouth daily.  lovastatin (MEVACOR) 40 mg tablet Take 1 Tab by mouth nightly.  lansoprazole (PREVACID) 30 mg capsule Take 1 Cap by mouth Daily (before breakfast).  oxybutynin chloride XL (DITROPAN XL) 10 mg CR tablet TAKE 1 TABLET DAILY    potassium chloride SA (MICRO-K) 10 mEq capsule TAKE 1 CAPSULE TWICE DAILY    therapeutic multivitamin (THERAGRAN) tablet Take 1 Tab by mouth daily.  OXYCODONE HCL/ACETAMINOPHEN (PERCOCET PO) Take  by mouth.  GOTU SILVIA HERB PO Take  by mouth.  OLANZapine (ZYPREXA) 10 mg tablet Take 5 mg by mouth nightly. No current facility-administered medications for this visit.      Allergies   Allergen Reactions    Aspirin Other (comments)     \"messes up my kidneys\"    Feldene [Piroxicam] Other (comments)     Kidney damage      Morphine Other (comments)     unconsciousness    Nsaids (Non-Steroidal Anti-Inflammatory Drug) Other (comments)     \"messes up my kidneys\"    Other Medication Not Reported This Time     Mycins      Penicillins Hives    Sulfa (Sulfonamide Antibiotics) Rash    Vancomycin Itching     Possible allergic reaction to Vancomycin. Complained of itching/reddness around forehead/back of neck     Social History     Occupational History    Occupation: office work     Comment: [employer]   Tobacco Use    Smoking status: Former Smoker    Smokeless tobacco: Never Used   Substance and Sexual Activity    Alcohol use: No    Drug use: No    Sexual activity: Not Currently     Family History   Problem Relation Age of Onset    Cancer Mother         melanoma    Cancer Father         lung    Heart Disease Maternal Grandmother     Diabetes Other     Hypertension Other     Headache Other     Seizures Other     Coronary Artery Disease Other     Heart Attack Other         DIAGNOSTIC LAB DATA      Lab Results   Component Value Date/Time    Hemoglobin A1c 5.4 09/24/2020 03:25 PM    Hemoglobin A1c 5.4 05/26/2017 09:59 AM    Hemoglobin A1c 5.6 03/04/2015 02:55 PM    //   Lab Results   Component Value Date/Time    Glucose 152 (H) 09/29/2020 01:16 PM    Glucose,  (H) 01/24/2018 11:49 AM        No results found for: [id number], [id number]      Lab Results   Component Value Date/Time    Vitamin D 25-Hydroxy 48.7 04/01/2016 09:53 AM         REVIEW OF SYSTEMS : 6/2/2021  ALL BELOW ARE Negative except : SEE HPI     All other systems reviewed and are negative. 12 point review of systems otherwise negative unless noted in HPI. DIAGNOSTIC IMAGING /ORDERS     3 views, of the left foot, shows generalized osteopenia, quite significant osteopenia presumed osteoporosis, abduction, that at the talonavicular region, some OA changes seen in the left calcaneocuboid joint, and a significant bunion deformity is present, as well as some calcium deposits at the noninsertional portion of the left Achilles tendon.     AP pelvis: Shows that looks like a right total hip replacement, looks like an S-ROM right total hip modular total hip replacement with screws transfixing her acetabulum. There is some hardware at the SI joints as well. There are some OA changes seen in the left hip, house and as well as a coxa valgus type alignment to the left hip on the singular AP view of the left hip. Going back to the right hip, again there is a total hip replacement, without fracture. It looks like there may be some asymmetry in the acetabular liner and the total hip ball, as there is more space seen at the inferior medial portion of the acetabular component/head space,.     I have reviewed the results of the above study. The interpretation of this study is my professional opinion. On this date 06/02/2021 I have spent 25 minutes reviewing previous notes, test results and face to face with the patient discussing the diagnosis and importance of compliance with the treatment plan as well as documenting on the day of the visit. An electronic signature was used to authenticate this note. Disclaimer: Sections of this note are dictated using utilizing voice recognition software, which may have resulted in some phonetic based errors in grammar and contents. Even though attempts were made to correct all the mistakes, some may have been missed, and remained in the body of the document. If questions arise, please contact our department. Russ Anand may have a reminder for a \"due or due soon\" health maintenance. I have asked that she contact her primary care provider for follow-up on this health maintenance. Washington Cruz, as dictated by Soledad Schwarz MD  6/2/2021  8:24 AM

## 2021-06-02 NOTE — PATIENT INSTRUCTIONS
Ankle: Exercises Introduction Here are some examples of exercises for you to try. The exercises may be suggested for a condition or for rehabilitation. Start each exercise slowly. Ease off the exercises if you start to have pain. You will be told when to start these exercises and which ones will work best for you. How to do the exercises 'Alphabet' exercise 1. Trace the alphabet with your toe. This helps your ankle move in all directions. Side-to-side knee swing exercise 1. Sit in a chair with your foot flat on the floor. 2. Slowly move your knee from side to side while keeping your foot pressed flat. 3. Continue this exercise for 2 to 3 minutes. Towel curl 1. While sitting, place your foot on a towel on the floor and scrunch the towel toward you with your toes. 2. Then use your toes to push the towel away from you. 3. Make this exercise more challenging by placing a weighted object, such as a soup can, on the other end of the towel. Towel stretch 1. Sit with your legs extended and knees straight. 2. Place a towel around your foot just under the toes. 3. Hold each end of the towel in each hand, with your hands above your knees. 4. Pull back with the towel so that your foot stretches toward you. 5. Hold the position for at least 15 to 30 seconds. 6. Repeat 2 to 4 times a session, up to 5 sessions a day. Ankle eversion exercise 1. Start by sitting with your foot flat on the floor and pushing it outward against an immovable object such as the wall or heavy furniture. Hold for about 6 seconds, then relax. Repeat 8 to 12 times. 2. After you feel comfortable with this, try using rubber tubing looped around the outside of your feet for resistance. Push your foot out to the side against the tubing, and then count to 10 as you slowly bring your foot back to the middle. Repeat 8 to 12 times. Isometric opposition exercises 1.  While sitting, put your feet together flat on the floor. 
2. Press your injured foot inward against your other foot. Hold for about 6 seconds, and relax. Repeat 8 to 12 times. 3. Then place the heel of your other foot on top of the injured one. Push down with the top heel while trying to push up with your injured foot. Hold for about 6 seconds, and relax. Repeat 8 to 12 times. Follow-up care is a key part of your treatment and safety. Be sure to make and go to all appointments, and call your doctor if you are having problems. It's also a good idea to know your test results and keep a list of the medicines you take. Where can you learn more? Go to http://www.gray.com/ Enter R008 in the search box to learn more about \"Ankle: Exercises. \" Current as of: November 16, 2020               Content Version: 12.8 © 4863-3477 [employer]. Care instructions adapted under license by [employer] (which disclaims liability or warranty for this information). If you have questions about a medical condition or this instruction, always ask your healthcare professional. [address] any warranty or liability for your use of this information.

## 2021-06-15 NOTE — TELEPHONE ENCOUNTER
Last Visit: 11/18/20 with MD Jammie Harada  Next Appointment: Advised to follow-up in 4 months  Previous Refill Encounter(s): 6/11/20 #180 with 3 refills    Requested Prescriptions     Pending Prescriptions Disp Refills    potassium chloride SA (MICRO-K) 10 mEq capsule 180 Capsule 1     Sig: Take 1 Capsule by mouth two (2) times a day.

## 2021-06-19 RX ORDER — POTASSIUM CHLORIDE 750 MG/1
10 CAPSULE, EXTENDED RELEASE ORAL 2 TIMES DAILY
Qty: 180 CAPSULE | Refills: 0 | Status: SHIPPED | OUTPATIENT
Start: 2021-06-19 | End: 2021-09-17

## 2021-07-30 RX ORDER — OXYBUTYNIN CHLORIDE 10 MG/1
TABLET, EXTENDED RELEASE ORAL
Qty: 90 TABLET | Refills: 3 | Status: SHIPPED | OUTPATIENT
Start: 2021-07-30 | End: 2022-06-23

## 2021-08-03 PROBLEM — I10 HTN (HYPERTENSION): Status: RESOLVED | Noted: 2021-08-03 | Resolved: 2021-08-03

## 2021-08-10 ENCOUNTER — OFFICE VISIT (OUTPATIENT)
Dept: FAMILY MEDICINE CLINIC | Age: 76
End: 2021-08-10
Payer: MEDICARE

## 2021-08-10 VITALS
SYSTOLIC BLOOD PRESSURE: 133 MMHG | WEIGHT: 133.2 LBS | HEIGHT: 66 IN | BODY MASS INDEX: 21.41 KG/M2 | RESPIRATION RATE: 12 BRPM | TEMPERATURE: 97.9 F | HEART RATE: 108 BPM | DIASTOLIC BLOOD PRESSURE: 68 MMHG | OXYGEN SATURATION: 93 %

## 2021-08-10 DIAGNOSIS — M06.9 RHEUMATOID ARTHRITIS INVOLVING MULTIPLE JOINTS (HCC): ICD-10-CM

## 2021-08-10 PROCEDURE — 1090F PRES/ABSN URINE INCON ASSESS: CPT | Performed by: FAMILY MEDICINE

## 2021-08-10 PROCEDURE — G8536 NO DOC ELDER MAL SCRN: HCPCS | Performed by: FAMILY MEDICINE

## 2021-08-10 PROCEDURE — G8399 PT W/DXA RESULTS DOCUMENT: HCPCS | Performed by: FAMILY MEDICINE

## 2021-08-10 PROCEDURE — 3017F COLORECTAL CA SCREEN DOC REV: CPT | Performed by: FAMILY MEDICINE

## 2021-08-10 PROCEDURE — 1101F PT FALLS ASSESS-DOCD LE1/YR: CPT | Performed by: FAMILY MEDICINE

## 2021-08-10 PROCEDURE — G8420 CALC BMI NORM PARAMETERS: HCPCS | Performed by: FAMILY MEDICINE

## 2021-08-10 PROCEDURE — G9717 DOC PT DX DEP/BP F/U NT REQ: HCPCS | Performed by: FAMILY MEDICINE

## 2021-08-10 PROCEDURE — G9899 SCRN MAM PERF RSLTS DOC: HCPCS | Performed by: FAMILY MEDICINE

## 2021-08-10 PROCEDURE — G8752 SYS BP LESS 140: HCPCS | Performed by: FAMILY MEDICINE

## 2021-08-10 PROCEDURE — G8754 DIAS BP LESS 90: HCPCS | Performed by: FAMILY MEDICINE

## 2021-08-10 PROCEDURE — 99214 OFFICE O/P EST MOD 30 MIN: CPT | Performed by: FAMILY MEDICINE

## 2021-08-10 PROCEDURE — G0463 HOSPITAL OUTPT CLINIC VISIT: HCPCS | Performed by: FAMILY MEDICINE

## 2021-08-10 PROCEDURE — G8427 DOCREV CUR MEDS BY ELIG CLIN: HCPCS | Performed by: FAMILY MEDICINE

## 2021-08-10 NOTE — PROGRESS NOTES
Chief Complaint   Patient presents with    Medication Evaluation     discuss pain management regimen        Pt preferred language for health care discussion is english. Is someone accompanying this pt? Yes son    Is the patient using any DME equipment during [address]? walker    Depression Screening:  3 most recent PHQ Screens 9/2/2014 6/4/2014   PHQ Not Done - Active Diagnosis of Depression or Bipolar Disorder   Little interest or pleasure in doing things Not at all -   Feeling down, depressed, irritable, or hopeless Not at all -   Total Score PHQ 2 0 -       Learning Assessment:  Learning Assessment 7/6/2016 8/25/2015 2/7/2014 12/5/2013   PRIMARY LEARNER Patient Patient Patient Patient   HIGHEST LEVEL OF EDUCATION - PRIMARY LEARNER  - - [address] LEARNER - - NONE -   [address] CAREGIVER - - No -   PRIMARY LANGUAGE ENGLISH ENGLISH ENGLISH ENGLISH   LEARNER PREFERENCE PRIMARY LISTENING LISTENING DEMONSTRATION DEMONSTRATION     DEMONSTRATION - - -   ANSWERED BY patient patient patient patient   RELATIONSHIP SELF SELF SELF SELF       Abuse Screening:  Abuse Screening Questionnaire 9/24/2020 8/3/2017 9/2/2014   Do you ever feel afraid of your partner? N N N   Are you in a relationship with someone who physically or mentally threatens you? N N N   Is it safe for you to go home? Y Y Y       Fall Risk  Fall Risk Assessment, last 12 mths 9/24/2020   Able to walk? Yes   Fall in past 12 months? No   Number of falls in past 12 months -   Fall with injury? -           Advance Directive:  1. Do you have an advance directive in place? Patient Reply:no    2. If not, would you like material regarding how to put one in place? Patient Reply: no      Coordination of Care:  1. Have you been to the ER, urgent care clinic since your last visit? Hospitalized since your last visit? no    2. Have you seen or consulted any other health care providers outside of the [address] [place] [address] since your last visit? Include any pap smears or colon screening.  Yes [facility]

## 2021-08-10 NOTE — PROGRESS NOTES
HPI:  Mariela Sanchez is a 78 y.o. female who presents today with   Chief Complaint   Patient presents with    Medication Evaluation     discuss pain management regimen         Pt is in pain management; Her son is concerned about the care of his mom at the current pain management center  Pt has had chronic back pain and is on pain medication as listed below. Pt is prescribed  5 percocet daily; but at a previous pain management visit she was cut down to 4 pills a day. Son states his mother had some increased confusion on the lower dose of percocet. Was advised by the pain management provider that the decreased dose of percocetwas likely not the cause of her increased confusion. He was advised to have pt see her PCP for possible change in percocet dosing. essentially son wants to know if this provider could prescribe pt her 5 percocet per day. . She does have follow up with pain management. Pt has RA. Eventually pt states that he thinks the cut down on the medication is better for her brain. Pt has hip pain as well; She has had hip surgery. She also wears orthotics on the lower ankles; Pt sees ortho for the ankle. PMH,  Meds, Allergies, Family History, Social history reviewed            3 most recent PHQ Screens 9/2/2014   PHQ Not Done -   Little interest or pleasure in doing things Not at all   Feeling down, depressed, irritable, or hopeless Not at all   Total Score PHQ 2 0               PMH,  Meds, Allergies, Family History, Social history reviewed      Current Outpatient Medications   Medication Sig Dispense Refill    oxybutynin chloride XL (DITROPAN XL) 10 mg CR tablet TAKE 1 TABLET DAILY 90 Tablet 3    potassium chloride SA (MICRO-K) 10 mEq capsule Take 1 Capsule by mouth two (2) times a day. 180 Capsule 0    furosemide (LASIX) 40 mg tablet take 1 tablet by mouth once daily 90 Tablet 1    PARoxetine (PAXIL) 20 mg tablet Take 1 Tab by mouth daily.  90 Tab 3    lovastatin (MEVACOR) 40 mg tablet Take 1 Tab by mouth nightly. 90 Tab 3    lansoprazole (PREVACID) 30 mg capsule Take 1 Cap by mouth Daily (before breakfast). 90 Cap 3    therapeutic multivitamin (THERAGRAN) tablet Take 1 Tab by mouth daily.  OXYCODONE HCL/ACETAMINOPHEN (PERCOCET PO) Take  by mouth.  GOTU SILVIA HERB PO Take  by mouth.  OLANZapine (ZYPREXA) 10 mg tablet Take 5 mg by mouth nightly. Allergies   Allergen Reactions    Aspirin Other (comments)     \"messes up my kidneys\"    Feldene [Piroxicam] Other (comments)     Kidney damage      Morphine Other (comments)     unconsciousness    Nsaids (Non-Steroidal Anti-Inflammatory Drug) Other (comments)     \"messes up my kidneys\"    Other Medication Not Reported This Time     Mycins      Penicillins Hives    Sulfa (Sulfonamide Antibiotics) Rash    Vancomycin Itching     Possible allergic reaction to Vancomycin. Complained of itching/reddness around forehead/back of neck                  ROS as per HPI      Visit Vitals  /68 (BP 1 Location: Right arm, BP Patient Position: Sitting, BP Cuff Size: Adult)   Pulse (!) 108   Temp 97.9 °F (36.6 °C) (Temporal)   Resp 12   Ht 5' 6\" (1.676 m)   Wt 133 lb 3.2 oz (60.4 kg)   SpO2 93%   BMI 21.50 kg/m²     Physical Exam   Pt mostly silent during this visit and does not appear in distress. General appearance: alert, cooperative, no distress, appears stated age  Neck: supple, symmetrical, trachea midline, no adenopathy, thyroid: not enlarged, symmetric, no tenderness/mass/nodules, no carotid bruit and no JVD  Lungs: clear to auscultation bilaterally  Heart: regular rate and rhythm, S1, S2 normal, no murmur, click, rub or gallop  Extremities: extremities normal, atraumatic, no cyanosis or edema      Assessment/Plan:    Diagnoses and all orders for this visit:    1. Rheumatoid arthritis involving multiple joints ([address]      As above  Patient advised to continue with her current pain management consultation.   I have advised the son the not able to prescribe the 5 Percocet a day for  per his request.  She is to continue current management care plans. Follow-up and Dispositions    · Return in about 4 months (around 12/10/2021). An After Visit Summary was printed and given to the patient. This has been fully explained to the patient, who indicates understanding.   Time 32 minutes              Johnny Rosario MD

## 2021-08-10 NOTE — PATIENT INSTRUCTIONS
Rheumatoid Arthritis (RA): Care Instructions  Your Care Instructions     Arthritis is a common health problem in which the joints are inflamed. There are many types of arthritis. In rheumatoid arthritis, the body's own immune system attacks the joints. This causes pain, stiffness, and swelling in the joints, especially in the hands and feet. It can become hard to open jars, write, and do other daily tasks. Sometimes rheumatoid arthritis can also cause bumps to form under the skin. Over time, rheumatoid arthritis can damage and deform joints. Early treatment with medicines may reduce your chances of having a lasting disability. Follow-up care is a key part of your treatment and safety. Be sure to make and go to all appointments, and call your doctor if you are having problems. It's also a good idea to know your test results and keep a list of the medicines you take. How can you care for yourself at home? · If your doctor recommends it, get more exercise. Walking is a good choice. If your knees or ankles hurt, try riding a stationary bike or swimming. · Move each joint gently through its full range of motion once or twice a day. · Rest joints when they are sore or overworked. Short rest breaks may help more than staying in bed. · Reach and stay at a healthy weight. Regular exercise and a healthy diet will help you do this. Extra weight can strain the joints, especially the knees and hips, and make the pain worse. Losing even a few pounds may help. · Get enough calcium and vitamin D to help prevent osteoporosis, which causes thin bones. Talk to your doctor about how much you should take. · Protect your joints from injury. Do not overuse them. Try to limit or avoid activities that cause joint pain or swelling. Use special kitchen tools and other self-help devices as well as walkers, splints, or canes if needed. · Use heat to ease pain. Take warm showers or baths. Use hot packs or a heating pad set on low. Sleep under a warm electric blanket. · Put ice or a cold pack on the area for 10 to 20 minutes at a time. Put a thin cloth between the ice and your skin. · Take pain medicines exactly as directed. ? If the doctor gave you a prescription medicine for pain, take it as prescribed. ? If you are not taking a prescription pain medicine, ask your doctor if you can take an over-the-counter medicine. · Take an active role in managing your condition. Set up a treatment plan with your doctor, and learn as much as you can about rheumatoid arthritis. This will help you control pain and stay active. When should you call for help? Call your doctor now or seek immediate medical care if:    · You have a fever or a rash along with joint pain.     · You have joint pain that is so severe that you cannot use the joint at all.     · You have sudden swelling, redness, or pain in one or more joints, and you do not know why.     · You have back or neck pain along with weakness in your arms or legs.     · You have a loss of bowel or bladder control. Watch closely for changes in your health, and be sure to contact your doctor if:    · You have joint pain that lasts for more than 6 weeks.     · You have side effects from your arthritis medicines, such as stomach pain, nausea, heartburn, or dark and tarlike stools. Where can you learn more? Go to http://www.gray.com/  Enter K205 in the search box to learn more about \"Rheumatoid Arthritis (RA): Care Instructions. \"  Current as of: August 5, 2020               Content Version: 12.8  © 2006-2021 [employer]. Care instructions adapted under license by [employer] (which disclaims liability or warranty for this information). If you have questions about a medical condition or this instruction, always ask your healthcare professional. [place] [address] any warranty or liability for your use of this information.

## 2021-09-17 RX ORDER — POTASSIUM CHLORIDE 750 MG/1
CAPSULE, EXTENDED RELEASE ORAL
Qty: 180 CAPSULE | Refills: 0 | Status: SHIPPED | OUTPATIENT
Start: 2021-09-17 | End: 2021-12-18

## 2021-09-23 ENCOUNTER — TELEPHONE (OUTPATIENT)
Dept: FAMILY MEDICINE CLINIC | Age: 76
End: 2021-09-23

## 2021-09-23 NOTE — TELEPHONE ENCOUNTER
Dr. Arie Kline from [facility] pain management called to speak with pt's pcp - wants to discuss decreasing pain medications and concerns regarding pt's caregiver. She ended the call while holding.      [phone]

## 2021-09-27 DIAGNOSIS — E78.00 PURE HYPERCHOLESTEROLEMIA: ICD-10-CM

## 2021-09-27 DIAGNOSIS — R73.03 PREDIABETES: Primary | ICD-10-CM

## 2021-09-27 DIAGNOSIS — E78.5 HYPERLIPIDEMIA LDL GOAL <100: ICD-10-CM

## 2021-11-13 RX ORDER — FUROSEMIDE 40 MG/1
TABLET ORAL
Qty: 90 TABLET | Refills: 1 | Status: SHIPPED | OUTPATIENT
Start: 2021-11-13 | End: 2022-04-29

## 2021-12-18 RX ORDER — POTASSIUM CHLORIDE 750 MG/1
CAPSULE, EXTENDED RELEASE ORAL
Qty: 180 CAPSULE | Refills: 0 | Status: SHIPPED | OUTPATIENT
Start: 2021-12-18 | End: 2022-06-23 | Stop reason: ALTCHOICE

## 2021-12-22 ENCOUNTER — OFFICE VISIT (OUTPATIENT)
Dept: NEUROLOGY | Age: 76
End: 2021-12-22
Payer: MEDICARE

## 2021-12-22 VITALS
DIASTOLIC BLOOD PRESSURE: 50 MMHG | RESPIRATION RATE: 18 BRPM | HEIGHT: 66 IN | SYSTOLIC BLOOD PRESSURE: 100 MMHG | BODY MASS INDEX: 21.86 KG/M2 | OXYGEN SATURATION: 97 % | HEART RATE: 98 BPM | WEIGHT: 136 LBS

## 2021-12-22 DIAGNOSIS — G31.84 MILD COGNITIVE IMPAIRMENT: ICD-10-CM

## 2021-12-22 DIAGNOSIS — R41.3 MEMORY LOSS: Primary | ICD-10-CM

## 2021-12-22 PROCEDURE — G9899 SCRN MAM PERF RSLTS DOC: HCPCS | Performed by: NURSE PRACTITIONER

## 2021-12-22 PROCEDURE — G8752 SYS BP LESS 140: HCPCS | Performed by: NURSE PRACTITIONER

## 2021-12-22 PROCEDURE — 1090F PRES/ABSN URINE INCON ASSESS: CPT | Performed by: NURSE PRACTITIONER

## 2021-12-22 PROCEDURE — G8420 CALC BMI NORM PARAMETERS: HCPCS | Performed by: NURSE PRACTITIONER

## 2021-12-22 PROCEDURE — G8754 DIAS BP LESS 90: HCPCS | Performed by: NURSE PRACTITIONER

## 2021-12-22 PROCEDURE — G8536 NO DOC ELDER MAL SCRN: HCPCS | Performed by: NURSE PRACTITIONER

## 2021-12-22 PROCEDURE — G8427 DOCREV CUR MEDS BY ELIG CLIN: HCPCS | Performed by: NURSE PRACTITIONER

## 2021-12-22 PROCEDURE — 3017F COLORECTAL CA SCREEN DOC REV: CPT | Performed by: NURSE PRACTITIONER

## 2021-12-22 PROCEDURE — G8399 PT W/DXA RESULTS DOCUMENT: HCPCS | Performed by: NURSE PRACTITIONER

## 2021-12-22 PROCEDURE — G0463 HOSPITAL OUTPT CLINIC VISIT: HCPCS | Performed by: NURSE PRACTITIONER

## 2021-12-22 PROCEDURE — G9717 DOC PT DX DEP/BP F/U NT REQ: HCPCS | Performed by: NURSE PRACTITIONER

## 2021-12-22 PROCEDURE — 1101F PT FALLS ASSESS-DOCD LE1/YR: CPT | Performed by: NURSE PRACTITIONER

## 2021-12-22 PROCEDURE — 99204 OFFICE O/P NEW MOD 45 MIN: CPT | Performed by: NURSE PRACTITIONER

## 2021-12-22 NOTE — PROGRESS NOTES
Gretta Newell presents today for   Chief Complaint   Patient presents with    Neurologic Problem     follow up       Is someone accompanying this pt? Yes, son    Is the patient using any DME equipment during [address]? no    Depression Screening:  3 most recent PHQ Screens 9/2/2014   PHQ Not Done -   Little interest or pleasure in doing things Not at all   Feeling down, depressed, irritable, or hopeless Not at all   Total Score PHQ 2 0       Learning Assessment:  Learning Assessment 7/6/2016   PRIMARY LEARNER Patient   HIGHEST LEVEL OF EDUCATION - PRIMARY LEARNER  -   BARRIERS PRIMARY LEARNER -   CO-LEARNER CAREGIVER -   PRIMARY LANGUAGE ENGLISH   LEARNER PREFERENCE PRIMARY LISTENING     DEMONSTRATION   ANSWERED BY patient   RELATIONSHIP SELF       Abuse Screening:  Abuse Screening Questionnaire 8/10/2021   Do you ever feel afraid of your partner? N   Are you in a relationship with someone who physically or mentally threatens you? N   Is it safe for you to go home? Y       Fall Risk  Fall Risk Assessment, last 12 mths 12/22/2021   Able to walk? Yes   Fall in past 12 months? 0   Do you feel unsteady? 0   Are you worried about falling 0   Number of falls in past 12 months -   Fall with injury? -         Coordination of Care:  1. Have you been to the ER, urgent care clinic since your last visit? Hospitalized since your last visit? no    2. Have you seen or consulted any other health care providers outside of the [address] [place] [address] since your last visit? Include any pap smears or colon screening.  no

## 2021-12-22 NOTE — PROGRESS NOTES
[facility]  [address], [address], [place], [address]  [address] [place], [address]  Office:  [phone]  Fax: [phone]    Referring: Micah Foster  PCP: Jarett Andrea MD      Chief Complaint   Patient presents with    Neurologic Problem     memory concern        HPI:  This is a 78year old female who presents for evaluation of memory concerns. Last seen in office in August of 2018. She is accompanied in office today by her son Ronda Adame. Patient's son entirety of the history today. She was referred by Micah Jade for neuropsychiatric evaluation for evaluation of mild cognitive impairment per referral placed in media section. Patient with history of chronic pain on opioids. Initially seen in January 2018 for concerns of memory loss. She completed extensive work-up including neuropsychological testing complete by Dr. Papo Alicia in April-May of 2018. Report consistent with mild cognitive impairment. She has head CT dated February of 2018 with no acute findings. She completed serological testing for metabolic etiology that was unremarkable. She also had unremarkable EEG in February of 2018 that was reported unremarkable. Her son brings her in today and explains a history starting 18 months ago of her pain management provider at [facility] working to decrease her opioids. He tells me they went from oxycodone-APAP  mg 5 pills a day to 4 pills a day. He reports it took 8 months for patient to return to her baseline due to increased memory concerns and confusion upon weaning off the medications. He tells me of a history involving understanding that mom needs to have her opioids decreased but has concerns regarding weaning schedule. She is now prescribed 3 tabs of oxycodone-APAP  mg daily per [place] . He had hoped they would wean her down more conservatively to 1/4 or a 1/2 tab instead of dropping down a whole pill at a time.     Son reports dissatisfaction with patient's pain management provider and has tried to secure a new pain management provider, but was not accepted from one and is now awaiting evaluation to continue treatment. He has concerns about seizures. Denies recent seizures or falls. No previously reported seizure per documentation. Since last seen in 2018, he has utilized over the counter aids for memory and said she had done pretty well until 18 months ago. Patient requires help with her medications. The son uses a pill organizer and she takes them. He is helping her pay bills and she will sign them. She is not driving. Does not report aggression or hallucinations. No wandering reported. Son tells me he does not have power of  at this time. History of psychiatric disorder and it is unclear who her current psychiatrist is at this time. On Zyprexa and Paxil. No other reported concerns at this time. Social History     Socioeconomic History    Marital status:      Spouse name: Not on file    Number of children: Not on file    Years of education: Not on file    Highest education level: Not on file   Occupational History    Occupation: office work     Comment: [employer]   Tobacco Use    Smoking status: Former Smoker    Smokeless tobacco: Never Used   Vaping Use    Vaping Use: Never used   Substance and Sexual Activity    Alcohol use: No    Drug use: No    Sexual activity: Not Currently   Other Topics Concern    Not on file   Social History Narrative    Not on file     Social Determinants of Health     Financial Resource Strain:     Difficulty of Paying Living Expenses: Not on file   Food Insecurity:     Worried About [address] in the Last Year: Not on file    [address] in the Last Year: Not on file   Transportation Needs:     Lack of Transportation (Medical): Not on file    Lack of Transportation (Non-Medical):  Not on file   Physical Activity:     Days of Exercise per Week: Not on file    Minutes of Exercise per Session: Not on file   Stress:     Feeling of Stress : Not on file   Social Connections:     Frequency of Communication with Friends and Family: Not on file    Frequency of Social Gatherings with Friends and Family: Not on file    Attends [employer] Services: Not on file    Active Member of Clubs or Organizations: Not on file    Attends Club or Organization Meetings: Not on file    Marital Status: Not on file   Intimate Partner Violence:     Fear of Current or Ex-Partner: Not on file    Emotionally Abused: Not on file    Physically Abused: Not on file    Sexually Abused: Not on file   Housing Stability:     Unable to Pay for Housing in the Last Year: Not on file    Number of [place] in the Last Year: Not on file    Unstable Housing in the Last Year: Not on file       Family History   Problem Relation Age of Onset    Cancer Mother         melanoma    Cancer Father         lung    Heart Disease Maternal Grandmother     Diabetes Other     Hypertension Other     Headache Other     Seizures Other     Coronary Art Dis Other     Heart Attack Other        Current Outpatient Medications   Medication Sig Dispense Refill    potassium chloride SA (MICRO-K) 10 mEq capsule TAKE 1 CAPSULE TWICE DAILY 180 Capsule 0    furosemide (LASIX) 40 mg tablet take 1 tablet by mouth once daily 90 Tablet 1    oxybutynin chloride XL (DITROPAN XL) 10 mg CR tablet TAKE 1 TABLET DAILY 90 Tablet 3    PARoxetine (PAXIL) 20 mg tablet Take 1 Tab by mouth daily. 90 Tab 3    lovastatin (MEVACOR) 40 mg tablet Take 1 Tab by mouth nightly. 90 Tab 3    therapeutic multivitamin (THERAGRAN) tablet Take 1 Tab by mouth daily.  OXYCODONE HCL/ACETAMINOPHEN (PERCOCET PO) Take  by mouth.  GOTU SILVIA HERB PO Take  by mouth.  OLANZapine (ZYPREXA) 10 mg tablet Take 5 mg by mouth nightly.          Past Medical History:   Diagnosis Date    Chronic anxiety     Depression     controlled on Paxil    Diabetes ([facility] [address]     RAYA (degenerative joint disease)     GERD (gastroesophageal reflux disease)     High cholesterol     HTN (hypertension)     Hypertension     Low back pain     post-laminectomy syndrome and multilevel degenerative disease    Osteoporosis        Past Surgical History:   Procedure Laterality Date    HX ADENOIDECTOMY      HX APPENDECTOMY      HX BACK SURGERY      PHLD X3    HX BREAST BIOPSY Left 8/28/2015    WIDE LOCAL EXCISION LEFT BREAST LESION performed by Ramez Brown MD at [facility] MAIN OR    HX HERNIA REPAIR      hiatal    HX KNEE REPLACEMENT      HX ORTHOPAEDIC      bilateral shoulder surgery    HX ELVIRA AND BSO  1982    HX TONSILLECTOMY      [place] TOTAL HIP ARTHROPLASTY  4/12     total right hip replacement, Dr. Ansari Poplin       Allergies   Allergen Reactions    Aspirin Other (comments)     \"messes up my kidneys\"    Feldene [Piroxicam] Other (comments)     Kidney damage      Morphine Other (comments)     unconsciousness    Nsaids (Non-Steroidal Anti-Inflammatory Drug) Other (comments)     \"messes up my kidneys\"    Other Medication Not Reported This Time     Mycins      Penicillins Hives    Sulfa (Sulfonamide Antibiotics) Rash    Vancomycin Itching     Possible allergic reaction to Vancomycin.  Complained of itching/reddness around forehead/back of neck       Patient Active Problem List   Diagnosis Code    Postlaminectomy syndrome, lumbar region M96.1    Lumbosacral spondylosis without myelopathy M47.817    Lumbosacral radiculopathy M54.17    Spasm of muscle M62.838    Encounter for long-term (current) use of high-risk medication Z79.899    DJD (degenerative joint disease) M19.90    Urgency incontinence N39.41    Major depression F32.9    Nausea R11.0    Chronic pain syndrome G89.4    Prediabetes R73.03    Insomnia G47.00    Rheumatoid arthritis involving multiple joints (HCC) M06.9    Back pain M54.9    Chest pain R07.9    Overactive bladder N32.81    Hyperlipidemia LDL goal <130 E78.5  Chronic renal failure, stage 3 (moderate) (HCC) N18.30    Hyperlipidemia LDL goal <100 E78.5    Weight loss R63.4    Diabetes (HCC) E11.9    GERD (gastroesophageal reflux disease) K21.9    High cholesterol E78.00    Hypertension I10    Low back pain M54.50    Chronic anxiety F41.9    Depression F32. A         Review of Systems:   Does not report additional concerns 12 pt ROS negative. PHYSICAL EXAMINATION:      VITAL SIGNS:    Visit Vitals  BP (!) 100/50   Pulse 98   Resp 18   Ht 5' 6\" (1.676 m)   Wt 61.7 kg (136 lb)   SpO2 97%   BMI 21.95 kg/m²       This is a very pleasant 72-year-old female. She is alert and in no apparent distress. Does appear drowsy at times and will intermittently close her eyes during examination. Speech is clear. She is disoriented to time. Originally is unable to tell me the month or the year. She is able to tell me the upcoming holiday is [place] and with prompting she is able to tell me [place] is in December. She does not know the day of the week or the date. She is oriented to [place]. She is unable to tell me the country we are in. She is unable to tell me the current president. No facial asymmetry. Tongue midline. Equal shoulder shrug. Finger-nose-finger intact. She ambulates with use of rolling walker. Impression/Plan  Lilli Shearer is a 78 y.o. female whose history and physical are consistent with history of mild cognitive impairment and concerns of worsening memory as related to weaning off opioids per patient's son. Also history of psychiatric disorder. This is a patient who I saw in office last in August 2018. She was initially seen for memory concern on January of 2018.   Previous evaluation includes an unremarkable head CT from 2018, unremarkable serum metabolic evaluation, and neuropsychological testing complete by [facility] provider Dr. Nel Guerra consistent with mild cognitive impairment with recommendation for continued more comprehensive evaluation for dementia complete by gerontologist Dr. Autumn Duong. To my knowledge she never established with gerontologist.  In regarding to her opioid use, at this time we discussed I am not able to clear patient for increased dose, or to provide patient with opioids. We dicussed this office does not manage pain or prescribe narcotics for such. We discussed what I can offer is a referral to our neuropsychologist, Dr. Claudeen Puff, for repeat neuropsychological testing in regards to previously diagnosed mild cognitive impairment and concerns of worsening memory after said decrease in opioids. Of note, no previous mention of history of seizures in patient recorded upon chart review. Patient was drowsy and disorient to place and time in office today. She rated her pain at a zero when vitals were obtained.  checked and patient prescribed 90 tabs of oxycodoneacetaminophen 10325 mg, receiving 90 tablets for a 30-day supply as prescribed by Dr. Izabel Weiss. Filled on 12/21/2021. They will follow up with Dr. Claudeen Puff moving forward and discuss her recommendations pending completion of testing. Maintain routine follow-ups with patient's primary care provider. Encourage her to see gerontologist for their professional recommendation. It appears she just received a new referral of recent provided by [facility]. No routine follow-ups warranted with neurology at this time. All questions addressed and patient's son is agreeable with plan of care. Diagnoses and all orders for this visit:    1. Memory loss  -     REFERRAL TO NEUROPSYCHOLOGY    2. Mild cognitive impairment  -     REFERRAL TO NEUROPSYCHOLOGY        I spent 45 minutes with the patient in face-to-face consultation, with 35 minutes spent in counseling and coordination of care as described above. Signed By: Lele Rivera NP          PLEASE NOTE:   Portions of this document may have been produced using voice recognition software.  Unrecognized errors in transcription may be present.

## 2021-12-27 ENCOUNTER — TELEPHONE (OUTPATIENT)
Dept: FAMILY MEDICINE CLINIC | Age: 76
End: 2021-12-27

## 2021-12-27 NOTE — TELEPHONE ENCOUNTER
Spoke with patient son today. States mother will not be able to get percocet refilled by pain management- [facility] until she cognitive assessment completed. States he was advised that this could take up to 5 weeks. Will need pain medication refilled around 1/21/2022. States pain management is decreasing amount of tablets patient is getting per month. States since they have decreased medication patient has been having issues with memory. States currently looking for another pain management provider due to this. States he has discussed this with Dr. Car Gallagher previously but was advised that there nothing she could do. Would like to know if the provider has any recommendations for the patient at this time.

## 2021-12-27 NOTE — TELEPHONE ENCOUNTER
----- Message from [place] sent at 12/23/2021  1:49 PM EST -----  Subject: Message to Provider    QUESTIONS  Information for Provider? patients son, Dakotah Rodriguez, called bc there is an issue   with her referral for pain management. he is worried about her running out   of her medication and asked for a call back to discuss the issue with the   office. we attempted to warm transfer but was not able to get through.   ---------------------------------------------------------------------------  --------------  [address]  What is the best way for the office to contact you? OK to leave message on   voicemail  Preferred Call Back Phone Number? [phone]  ---------------------------------------------------------------------------  --------------  SCRIPT ANSWERS  Relationship to Patient?  Third Party  Representative Name? Tashia Ching

## 2022-01-15 RX ORDER — LOVASTATIN 40 MG/1
TABLET ORAL
Qty: 90 TABLET | Refills: 3 | Status: SHIPPED | OUTPATIENT
Start: 2022-01-15 | End: 2022-02-14 | Stop reason: SDUPTHER

## 2022-01-30 RX ORDER — PAROXETINE HYDROCHLORIDE 20 MG/1
TABLET, FILM COATED ORAL
Qty: 90 TABLET | Refills: 3 | Status: SHIPPED | OUTPATIENT
Start: 2022-01-30

## 2022-02-14 ENCOUNTER — OFFICE VISIT (OUTPATIENT)
Dept: FAMILY MEDICINE CLINIC | Age: 77
End: 2022-02-14
Payer: MEDICARE

## 2022-02-14 VITALS
HEART RATE: 110 BPM | HEIGHT: 66 IN | DIASTOLIC BLOOD PRESSURE: 77 MMHG | SYSTOLIC BLOOD PRESSURE: 139 MMHG | RESPIRATION RATE: 12 BRPM | OXYGEN SATURATION: 95 % | TEMPERATURE: 96.8 F | BODY MASS INDEX: 22.85 KG/M2 | WEIGHT: 142.2 LBS

## 2022-02-14 DIAGNOSIS — M06.9 RHEUMATOID ARTHRITIS INVOLVING MULTIPLE JOINTS (HCC): ICD-10-CM

## 2022-02-14 DIAGNOSIS — Z23 NEEDS FLU SHOT: ICD-10-CM

## 2022-02-14 DIAGNOSIS — Z00.00 MEDICARE ANNUAL WELLNESS VISIT, SUBSEQUENT: Primary | ICD-10-CM

## 2022-02-14 DIAGNOSIS — E11.22 TYPE 2 DIABETES MELLITUS WITH STAGE 3B CHRONIC KIDNEY DISEASE, WITHOUT LONG-TERM CURRENT USE OF INSULIN (HCC): ICD-10-CM

## 2022-02-14 DIAGNOSIS — N18.32 TYPE 2 DIABETES MELLITUS WITH STAGE 3B CHRONIC KIDNEY DISEASE, WITHOUT LONG-TERM CURRENT USE OF INSULIN (HCC): ICD-10-CM

## 2022-02-14 DIAGNOSIS — Z12.31 OTHER SCREENING MAMMOGRAM: ICD-10-CM

## 2022-02-14 PROCEDURE — G9717 DOC PT DX DEP/BP F/U NT REQ: HCPCS | Performed by: FAMILY MEDICINE

## 2022-02-14 PROCEDURE — 90694 VACC AIIV4 NO PRSRV 0.5ML IM: CPT | Performed by: FAMILY MEDICINE

## 2022-02-14 PROCEDURE — G8536 NO DOC ELDER MAL SCRN: HCPCS | Performed by: FAMILY MEDICINE

## 2022-02-14 PROCEDURE — 3046F HEMOGLOBIN A1C LEVEL >9.0%: CPT | Performed by: FAMILY MEDICINE

## 2022-02-14 PROCEDURE — 3017F COLORECTAL CA SCREEN DOC REV: CPT | Performed by: FAMILY MEDICINE

## 2022-02-14 PROCEDURE — 2022F DILAT RTA XM EVC RTNOPTHY: CPT | Performed by: FAMILY MEDICINE

## 2022-02-14 PROCEDURE — G9899 SCRN MAM PERF RSLTS DOC: HCPCS | Performed by: FAMILY MEDICINE

## 2022-02-14 PROCEDURE — 1101F PT FALLS ASSESS-DOCD LE1/YR: CPT | Performed by: FAMILY MEDICINE

## 2022-02-14 PROCEDURE — G8752 SYS BP LESS 140: HCPCS | Performed by: FAMILY MEDICINE

## 2022-02-14 PROCEDURE — G8427 DOCREV CUR MEDS BY ELIG CLIN: HCPCS | Performed by: FAMILY MEDICINE

## 2022-02-14 PROCEDURE — G8399 PT W/DXA RESULTS DOCUMENT: HCPCS | Performed by: FAMILY MEDICINE

## 2022-02-14 PROCEDURE — G0439 PPPS, SUBSEQ VISIT: HCPCS | Performed by: FAMILY MEDICINE

## 2022-02-14 PROCEDURE — G8420 CALC BMI NORM PARAMETERS: HCPCS | Performed by: FAMILY MEDICINE

## 2022-02-14 PROCEDURE — G8754 DIAS BP LESS 90: HCPCS | Performed by: FAMILY MEDICINE

## 2022-02-14 RX ORDER — LOVASTATIN 40 MG/1
40 TABLET ORAL DAILY
Qty: 90 TABLET | Refills: 3 | Status: SHIPPED | OUTPATIENT
Start: 2022-02-14

## 2022-02-14 NOTE — PROGRESS NOTES
1. \"Have you been to the ER, urgent care clinic since your last visit? Hospitalized since your last visit? \" No    2. \"Have you seen or consulted any other health care providers outside of the [address] [place] [address] since your last visit? \" Yes pain management, psych      3. For patients aged 39-70: Has the patient had a colonoscopy / FIT/ Cologuard? No      If the patient is female:    4. For patients aged 41-77: Has the patient had a mammogram within the past 2 years? No      5. For patients aged 21-65: Has the patient had a pap smear? NA - based on age or sex      Patient received influenza vaccine 0.5ml in right deltoid. Tolerated well. No signs or symptoms of distress noted. Most current VIS given and consent signed.

## 2022-02-14 NOTE — PATIENT INSTRUCTIONS
Medicare Wellness Visit, Female     The best way to live healthy is to have a lifestyle where you eat a well-balanced diet, exercise regularly, limit alcohol use, and quit all forms of tobacco/nicotine, if applicable. Regular preventive services are another way to keep healthy. Preventive services (vaccines, screening tests, monitoring & exams) can help personalize your care plan, which helps you manage your own care. Screening tests can find health problems at the earliest stages, when they are easiest to treat. [facility] follows the current, evidence-based guidelines published by the [facility]annon ([facility]) when recommending preventive services for our patients. Because we follow these guidelines, sometimes recommendations change over time as research supports it. (For example, mammograms used to be recommended annually. Even though Medicare will still pay for an annual mammogram, the newer guidelines recommend a mammogram every two years for women of average risk). Of course, you and your doctor may decide to screen more often for some diseases, based on your risk and your co-morbidities (chronic disease you are already diagnosed with). Preventive services for you include:  - Medicare offers their members a free annual wellness visit, which is time for you and your primary care provider to discuss and plan for your preventive service needs. Take advantage of this benefit every year!  -All adults over the age of 72 should receive the recommended pneumonia vaccines. Current USPSTF guidelines recommend a series of two vaccines for the best pneumonia protection.   -All adults should have a flu vaccine yearly and a tetanus vaccine every 10 years.   -All adults age 48 and older should receive the shingles vaccines (series of two vaccines).       -All adults age 38-68 who are overweight should have a diabetes screening test once every three years.   -All adults born between [birth year] and 1965 should be screened once for Hepatitis C.  -Other screening tests and preventive services for persons with diabetes include: an eye exam to screen for diabetic retinopathy, a kidney function test, a foot exam, and stricter control over your cholesterol.   -Cardiovascular screening for adults with routine risk involves an electrocardiogram (ECG) at intervals determined by your doctor.   -Colorectal cancer screenings should be done for adults age 54-65 with no increased risk factors for colorectal cancer. There are a number of acceptable methods of screening for this type of cancer. Each test has its own benefits and drawbacks. Discuss with your doctor what is most appropriate for you during your annual wellness visit. The different tests include: colonoscopy (considered the best screening method), a fecal occult blood test, a fecal DNA test, and sigmoidoscopy.    -A bone mass density test is recommended when a woman turns 65 to screen for osteoporosis. This test is only recommended one time, as a screening. Some providers will use this same test as a disease monitoring tool if you already have osteoporosis. -Breast cancer screenings are recommended every other year for women of normal risk, age 54-69.  -Cervical cancer screenings for women over age 72 are only recommended with certain risk factors.      Here is a list of your current Health Maintenance items (your personalized list of preventive services) with a due date:  Health Maintenance Due   Topic Date Due    COVID-19 Vaccine (1) Never done    Diabetic Foot Care  Never done    Eye Exam  Never done    Depression Monitoring  Never done    Colorectal Screening  Never done    Shingles Vaccine (1 of 2) Never done    Albumin Urine Test  05/26/2018    Hemoglobin A1C    09/24/2021    Cholesterol Test   09/29/2021    Mammogram  11/12/2021

## 2022-02-14 NOTE — PROGRESS NOTES
This is the Subsequent Medicare Annual Wellness Exam, performed 12 months or more after the Initial AWV or the last Subsequent AWV    I have reviewed the patient's medical history in detail and updated the computerized patient record. Patient is here with her son, Scarlet Holland  He assists with the history. Patient, today, however is able to give some of her own history elements as well today. She has been undergoing evaluation for memory changes with neurology. Her son inquires about her Prevacid which we prior authorized and it was concluded that the medication is not covered by her insurance. Also additionally states that patient has not had any symptoms suggestive of reflux in over a year. It has been decided that the patient will just stay off the Prevacid. Patient' s son then inquires about patient's Mevacor. Patient son states that [facility] has not received the prescription. I reviewed with the patient that a prescription was sent in January 15, 2020 2240 supply with 3 refills. That her son says [facility]  had not received the prescription for mevacor , a  new prescription was sent to [facility] for patient's mvacor as per the orders. Assessment/Plan   Education and counseling provided:  Are appropriate based on today's review and evaluation  End-of-Life planning (with patient's consent)    1. Medicare annual wellness visit, subsequent  2. Type 2 diabetes mellitus with stage 3b chronic kidney disease, without long-term current use of insulin ([facility] [address]  Assessment & Plan:   well controlled, continue current treatment plan  3. Rheumatoid arthritis involving multiple joints (HCC)  Assessment & Plan:  Stable and currently under the care of pain management. 4. Other screening mammogram  -     [facility] 3D NORA W MAMMO BI SCREENING INCL CAD; Future  5.  Needs flu shot  -     FLU (FLUAD QUAD INFLUENZA VACCINE,QUAD,ADJUVANTED)         As above  Patient stable  Son was advised that patient does have lab orders in the system that patient can schedule an appointment to have  done at her convenience. Flu shot today  Follow-up and Dispositions    · Return in about 4 months (around 6/14/2022) for diabetes, htn. This has been fully explained to the patient, who indicates understanding. An After Visit Summary was printed and given to the patient. Depression Risk Factor Screening     3 most recent PHQ Screens 2/14/2022   PHQ Not Done Patient refuses   Little interest or pleasure in doing things Not at all   Feeling down, depressed, irritable, or hopeless Not at all   Total Score PHQ 2 0       Alcohol & Drug Abuse Risk Screen    Do you average more than 1 drink per night or more than 7 drinks a week:  No    On any one occasion in the past three months have you have had more than 3 drinks containing alcohol:  No          Functional Ability and Level of Safety    Hearing: Hearing is good. Activities of Daily Living: The home contains: grab bars and in the shower   Patient does total self care      Ambulation: with mild difficulty     Fall Risk:  Fall Risk Assessment, last 12 mths 2/14/2022   Able to walk? Yes   Fall in past 12 months? 0   Do you feel unsteady? 1   Are you worried about falling 1   Number of falls in past 12 months -   Fall with injury?  -      Abuse Screen:  Patient is not abused       Cognitive Screening    Has your family/caregiver stated any concerns about your memory: no     Cognitive Screening: cognition is intact    Health Maintenance Due     Health Maintenance Due   Topic Date Due    COVID-19 Vaccine (1) Never done    Foot Exam Q1  Never done    Eye Exam Retinal or Dilated  Never done    Depression Monitoring  Never done    Colorectal Cancer Screening Combo  Never done    Shingrix Vaccine Age 50> (1 of 2) Never done    MICROALBUMIN Q1  05/26/2018    A1C test (Diabetic or Prediabetic)  09/24/2021    Lipid Screen  09/29/2021    Breast Cancer Screen Mammogram  11/12/2021 Patient Care Team   Patient Care Team:  Shun Snow MD as PCP - General (Family Medicine)  Shun Snow MD as PCP - [facility]  Jeffrey Matta RN as [facility] (Internal Medicine)  La Nena Yan MD (Cardiology)  Frankey Gong, MD (Orthopedic Surgery)  Margarita Johns MD (Gastroenterology)  Sanjeev Andrew MD (Rheumatology)  Chelsi Aiken MD (Surgical Oncology)  Andra Miguel MD (Endocrinology)  Badnar Roe MD (Physical Medicine and Rehabilitation)  Carolina Mcgowan MD (Orthopedic Surgery)  Laura Lee, PHD as Consulting Provider (Psychology)      Visit Vitals  /77 (BP 1 Location: Right arm, BP Patient Position: Sitting, BP Cuff Size: Adult)   Pulse (!) 110   Temp 96.8 °F (36 °C) (Temporal)   Resp 12   Ht 5' 6\" (1.676 m)   Wt 142 lb 3.2 oz (64.5 kg)   SpO2 95%   BMI 22.95 kg/m²     General appearance: alert, cooperative, no distress, appears stated age    Lungs: clear to auscultation bilaterally  Heart: regular rate and rhythm, S1, S2 normal, no murmur, click, rub or gallop  Extremities: extremities normal, atraumatic, no cyanosis or edema    History     Patient Active Problem List   Diagnosis Code    Postlaminectomy syndrome, lumbar region M96.1    Lumbosacral spondylosis without myelopathy M47.817    Lumbosacral radiculopathy M54.17    Spasm of muscle M62.838    Encounter for long-term (current) use of high-risk medication Z79.899    DJD (degenerative joint disease) M19.90    Urgency incontinence N39.41    Major depression F32.9    Nausea R11.0    Chronic pain syndrome G89.4    Prediabetes R73.03    Insomnia G47.00    Rheumatoid arthritis involving multiple joints ([address] M06.9    Back pain M54.9    Chest pain R07.9    Overactive bladder N32.81    Hyperlipidemia LDL goal <130 E78.5    Chronic renal failure, stage 3 (moderate) ([address] N18.30    Hyperlipidemia LDL goal <100 E78.5    Weight loss R63.4    Diabetes ([facility] E11.9    GERD (gastroesophageal reflux disease) K21.9    High cholesterol E78.00    Hypertension I10    Low back pain M54.50    Chronic anxiety F41.9    Depression F32. A     Past Medical History:   Diagnosis Date    Chronic anxiety     Depression     controlled on Paxil    Diabetes (HCC)     DJD (degenerative joint disease)     GERD (gastroesophageal reflux disease)     High cholesterol     HTN (hypertension)     Hypertension     Low back pain     post-laminectomy syndrome and multilevel degenerative disease    Osteoporosis       Past Surgical History:   Procedure Laterality Date    HX ADENOIDECTOMY      HX APPENDECTOMY      HX BACK SURGERY      PHLD X3    HX BREAST BIOPSY Left 8/28/2015    WIDE LOCAL EXCISION LEFT BREAST LESION performed by Heinz Curling, MD at [address] HX HERNIA REPAIR      hiatal    HX KNEE REPLACEMENT      HX ORTHOPAEDIC      bilateral shoulder surgery    HX ELVIRA AND BSO  1982    HX TONSILLECTOMY      GA TOTAL HIP ARTHROPLASTY  4/12     total right hip replacement, Dr. Colleen Obrien     Current Outpatient Medications   Medication Sig Dispense Refill    lovastatin (MEVACOR) 40 mg tablet Take 1 Tablet by mouth daily. 90 Tablet 3    PARoxetine (PAXIL) 20 mg tablet TAKE 1 TABLET DAILY 90 Tablet 3    potassium chloride SA (MICRO-K) 10 mEq capsule TAKE 1 CAPSULE TWICE DAILY 180 Capsule 0    furosemide (LASIX) 40 mg tablet take 1 tablet by mouth once daily 90 Tablet 1    oxybutynin chloride XL (DITROPAN XL) 10 mg CR tablet TAKE 1 TABLET DAILY 90 Tablet 3    therapeutic multivitamin (THERAGRAN) tablet Take 1 Tab by mouth daily.  OXYCODONE HCL/ACETAMINOPHEN (PERCOCET PO) Take  by mouth.  GOTU SILVIA HERB PO Take  by mouth.  OLANZapine (ZYPREXA) 10 mg tablet Take 5 mg by mouth nightly.        Allergies   Allergen Reactions    Aspirin Other (comments)     \"messes up my kidneys\"    Feldene [Piroxicam] Other (comments)     Kidney damage      Morphine Other (comments)     unconsciousness    Nsaids (Non-Steroidal Anti-Inflammatory Drug) Other (comments)     \"messes up my kidneys\"    Other Medication Not Reported This Time     Mycins      Penicillins Hives    Sulfa (Sulfonamide Antibiotics) Rash    Vancomycin Itching     Possible allergic reaction to Vancomycin.  Complained of itching/reddness around forehead/back of neck       Family History   Problem Relation Age of Onset    Cancer Mother         melanoma    Cancer Father         lung    Heart Disease Maternal Grandmother     Diabetes Other     Hypertension Other     Headache Other     Seizures Other     Coronary Art Dis Other     Heart Attack Other      Social History     Tobacco Use    Smoking status: Former Smoker    Smokeless tobacco: Never Used   Substance Use Topics    Alcohol use: No         Caro Wills

## 2022-03-18 PROBLEM — R63.4 WEIGHT LOSS: Status: ACTIVE | Noted: 2019-05-20

## 2022-03-18 PROBLEM — E78.5 HYPERLIPIDEMIA LDL GOAL <100: Status: ACTIVE | Noted: 2017-12-05

## 2022-03-19 PROBLEM — N18.30 CHRONIC RENAL FAILURE, STAGE 3 (MODERATE) (HCC): Status: ACTIVE | Noted: 2017-06-02

## 2022-03-20 PROBLEM — E78.5 HYPERLIPIDEMIA LDL GOAL <130: Status: ACTIVE | Noted: 2017-06-02

## 2022-04-07 ENCOUNTER — PATIENT MESSAGE (OUTPATIENT)
Dept: FAMILY MEDICINE CLINIC | Age: 77
End: 2022-04-07

## 2022-04-25 ENCOUNTER — TELEPHONE (OUTPATIENT)
Dept: MAMMOGRAPHY | Age: 77
End: 2022-04-25

## 2022-04-29 RX ORDER — FUROSEMIDE 40 MG/1
TABLET ORAL
Qty: 90 TABLET | Refills: 1 | Status: SHIPPED | OUTPATIENT
Start: 2022-04-29 | End: 2022-10-14

## 2022-06-21 ENCOUNTER — OFFICE VISIT (OUTPATIENT)
Dept: FAMILY MEDICINE CLINIC | Age: 77
End: 2022-06-21
Payer: MEDICARE

## 2022-06-21 ENCOUNTER — HOSPITAL ENCOUNTER (OUTPATIENT)
Dept: LAB | Age: 77
Discharge: HOME | End: 2022-06-21
Payer: MEDICARE

## 2022-06-21 VITALS
OXYGEN SATURATION: 93 % | DIASTOLIC BLOOD PRESSURE: 68 MMHG | SYSTOLIC BLOOD PRESSURE: 116 MMHG | WEIGHT: 134 LBS | HEIGHT: 66 IN | HEART RATE: 97 BPM | TEMPERATURE: 98.4 F | BODY MASS INDEX: 21.53 KG/M2 | RESPIRATION RATE: 12 BRPM

## 2022-06-21 DIAGNOSIS — E78.00 PURE HYPERCHOLESTEROLEMIA: ICD-10-CM

## 2022-06-21 DIAGNOSIS — I10 PRIMARY HYPERTENSION: ICD-10-CM

## 2022-06-21 DIAGNOSIS — I10 PRIMARY HYPERTENSION: Primary | ICD-10-CM

## 2022-06-21 DIAGNOSIS — E78.5 HYPERLIPIDEMIA LDL GOAL <100: ICD-10-CM

## 2022-06-21 DIAGNOSIS — R73.03 PREDIABETES: ICD-10-CM

## 2022-06-21 LAB
EST. AVERAGE GLUCOSE BLD GHB EST-MCNC: 97 MG/DL
HBA1C MFR BLD: 5 % (ref 4.2–5.6)

## 2022-06-21 PROCEDURE — G8399 PT W/DXA RESULTS DOCUMENT: HCPCS | Performed by: FAMILY MEDICINE

## 2022-06-21 PROCEDURE — 80053 COMPREHEN METABOLIC PANEL: CPT

## 2022-06-21 PROCEDURE — 36415 COLL VENOUS BLD VENIPUNCTURE: CPT

## 2022-06-21 PROCEDURE — 3017F COLORECTAL CA SCREEN DOC REV: CPT | Performed by: FAMILY MEDICINE

## 2022-06-21 PROCEDURE — 80061 LIPID PANEL: CPT

## 2022-06-21 PROCEDURE — 1090F PRES/ABSN URINE INCON ASSESS: CPT | Performed by: FAMILY MEDICINE

## 2022-06-21 PROCEDURE — 83036 HEMOGLOBIN GLYCOSYLATED A1C: CPT

## 2022-06-21 PROCEDURE — G0463 HOSPITAL OUTPT CLINIC VISIT: HCPCS | Performed by: FAMILY MEDICINE

## 2022-06-21 PROCEDURE — G8427 DOCREV CUR MEDS BY ELIG CLIN: HCPCS | Performed by: FAMILY MEDICINE

## 2022-06-21 PROCEDURE — G8752 SYS BP LESS 140: HCPCS | Performed by: FAMILY MEDICINE

## 2022-06-21 PROCEDURE — G9899 SCRN MAM PERF RSLTS DOC: HCPCS | Performed by: FAMILY MEDICINE

## 2022-06-21 PROCEDURE — 1124F ACP DISCUSS-NO DSCNMKR DOCD: CPT | Performed by: FAMILY MEDICINE

## 2022-06-21 PROCEDURE — G8754 DIAS BP LESS 90: HCPCS | Performed by: FAMILY MEDICINE

## 2022-06-21 PROCEDURE — G8420 CALC BMI NORM PARAMETERS: HCPCS | Performed by: FAMILY MEDICINE

## 2022-06-21 PROCEDURE — G9717 DOC PT DX DEP/BP F/U NT REQ: HCPCS | Performed by: FAMILY MEDICINE

## 2022-06-21 PROCEDURE — 1101F PT FALLS ASSESS-DOCD LE1/YR: CPT | Performed by: FAMILY MEDICINE

## 2022-06-21 PROCEDURE — G8536 NO DOC ELDER MAL SCRN: HCPCS | Performed by: FAMILY MEDICINE

## 2022-06-21 PROCEDURE — 99214 OFFICE O/P EST MOD 30 MIN: CPT | Performed by: FAMILY MEDICINE

## 2022-06-21 NOTE — PROGRESS NOTES
1. \"Have you been to the ER, urgent care clinic since your last visit? Hospitalized since your last visit? \" Yes,urgent care [id number] ago for fall     2. \"Have you seen or consulted any other health care providers outside of the [address] [place] [address] since your last visit? \" No     3. For patients aged 39-70: Has the patient had a colonoscopy / FIT/ Cologuard? No      If the patient is female:    4. For patients aged 41-77: Has the patient had a mammogram within the past 2 years? No      5. For patients aged 21-65: Has the patient had a pap smear?  NA - based on age or sex

## 2022-06-21 NOTE — PROGRESS NOTES
HPI:  Adrienne Shaw is a 78 y.o. female who presents today with   Chief Complaint   Patient presents with    Depression     4 m f/u     Hypertension   Patient here to follow-up on high blood pressure and depression. Patient's last visit here was November 2020. She is here with her son who assists with the history. Patient's interim history has been reviewed with her son. Her blood pressure is stable today  Patient does have some memory disturbances. Patient has had hypokalemia. Her son elected not to give her the oral solution of potassium per his choice. Patient is in need of follow-up potassium level. Her son was advised that patient's  Seroquel interacted with the oral solid forms of potassium. A previous potassium solution was sent to patient's pharmacy. She has not had her potassium in 2 months. Her son states that he would prefer patient to be on solid form of potassium and was advised that this is a contraindication with her Detrol and that med would not be changed to oral form. Son proceeded to express his concerns with patient's potassium  not being changed to oral form of potassium per his request.  Patient advised that due to the contraindication with the patient's  Ditropan, even though that was his preference and even though his mother did not have any gastric erosions with the combination of these two meds, this provider preferred to avoid this potential risk and the potassium should remain in solution form. (Of note, patient also expressed concerns about his not receiving a medication he requested as a refill. When reviewed patient's complaint with him, he was advised that the medication he requested was not ordered by this provider and as such an adequate assessment including history and physical exam was necessary to determine the appropriateness of the medication he requested for himself. Dimitry Barcenas )    3 most recent PHQ Screens 2/14/2022   PHQ Not Done Patient refuses Little interest or pleasure in doing things Not at all   Feeling down, depressed, irritable, or hopeless Not at all   Total Score PHQ 2 0               PMH,  Meds, Allergies, Family History, Social history reviewed      Current Outpatient Medications   Medication Sig Dispense Refill    furosemide (LASIX) 40 mg tablet take 1 tablet by mouth once daily 90 Tablet 1    lovastatin (MEVACOR) 40 mg tablet Take 1 Tablet by mouth daily. 90 Tablet 3    PARoxetine (PAXIL) 20 mg tablet TAKE 1 TABLET DAILY 90 Tablet 3    therapeutic multivitamin (THERAGRAN) tablet Take 1 Tab by mouth daily.  OXYCODONE HCL/ACETAMINOPHEN (PERCOCET PO) Take  by mouth.  GOTU SILVIA HERB PO Take  by mouth.  OLANZapine (ZYPREXA) 10 mg tablet Take 5 mg by mouth nightly.  oxybutynin chloride XL (DITROPAN XL) 10 mg CR tablet TAKE 1 TABLET DAILY 90 Tablet 3    potassium chloride (KAON 10%) 20 mEq/15 mL solution Take 7.5 mL by mouth two (2) times a day. (Patient not taking: Reported on 6/21/2022) 480 mL 1        Allergies   Allergen Reactions    Aspirin Other (comments)     \"messes up my kidneys\"    Feldene [Piroxicam] Other (comments)     Kidney damage      Morphine Other (comments)     unconsciousness    Nsaids (Non-Steroidal Anti-Inflammatory Drug) Other (comments)     \"messes up my kidneys\"    Other Medication Not Reported This Time     Mycins      Penicillins Hives    Sulfa (Sulfonamide Antibiotics) Rash    Vancomycin Itching     Possible allergic reaction to Vancomycin.  Complained of itching/reddness around forehead/back of neck                  ROS   As per HPI    Visit Vitals  /68 (BP 1 Location: Left upper arm, BP Patient Position: Sitting, BP Cuff Size: Adult)   Pulse 97   Temp 98.4 °F (36.9 °C) (Temporal)   Resp 12   Ht 5' 6\" (1.676 m)   Wt 134 lb (60.8 kg)   SpO2 93%   BMI 21.63 kg/m²     Physical Exam   General appearance: alert, cooperative, no distress, appears stated age    Lungs: clear to auscultation bilaterally  Heart: regular rate and rhythm, S1, S2 normal, no murmur, click, rub or gallop    Extremities: extremities normal, atraumatic, no cyanosis or edema      Assessment/Plan:    Diagnoses and all orders for this visit:    1. Primary hypertension  -     METABOLIC PANEL, COMPREHENSIVE; Future    2. Prediabetes  -     METABOLIC PANEL, COMPREHENSIVE  -     LIPID PANEL  -     HEMOGLOBIN A1C WITH EAG    3. Hyperlipidemia LDL goal <100  -     LIPID PANEL    4. Pure hypercholesterolemia  -     LIPID PANEL      As above  Patient is clinically stable. Labs as ordered  An After Visit Summary was printed and given to the patient. This has been fully explained to the patient, who indicates understanding. Niki Ochoa MD    Follow-up and Dispositions    · Return in about 4 months (around 10/21/2022).

## 2022-06-21 NOTE — PATIENT INSTRUCTIONS
High Blood Pressure: Care Instructions  Overview     It's normal for blood pressure to go up and down throughout the day. But if it stays up, you have high blood pressure. Another name for high blood pressure is hypertension. Despite what a lot of people think, high blood pressure usually doesn't cause headaches or make you feel dizzy or lightheaded. It usually has no symptoms. But it does increase your risk of stroke, heart attack, and other problems. You and your doctor will talk about your risks of these problems based on your blood pressure. Your doctor will give you a goal for your blood pressure. Your goal will be based on your health and your age. Lifestyle changes, such as eating healthy and being active, are always important to help lower blood pressure. You might also take medicine to reach your blood pressure goal.  Follow-up care is a key part of your treatment and safety. Be sure to make and go to all appointments, and call your doctor if you are having problems. It's also a good idea to know your test results and keep a list of the medicines you take. How can you care for yourself at home? Medical treatment  · If you stop taking your medicine, your blood pressure will go back up. You may take one or more types of medicine to lower your blood pressure. Be safe with medicines. Take your medicine exactly as prescribed. Call your doctor if you think you are having a problem with your medicine. · Talk to your doctor before you start taking aspirin every day. Aspirin can help certain people lower their risk of a heart attack or stroke. But taking aspirin isn't right for everyone, because it can cause serious bleeding. · See your doctor regularly. You may need to see the doctor more often at first or until your blood pressure comes down. · If you are taking blood pressure medicine, talk to your doctor before you take decongestants or anti-inflammatory medicine, such as ibuprofen.  Some of these medicines can raise blood pressure. · Learn how to check your blood pressure at home. Lifestyle changes  · Stay at a healthy weight. This is especially important if you put on weight around the waist. Losing even 10 pounds can help you lower your blood pressure. · If your doctor recommends it, get more exercise. Walking is a good choice. Bit by bit, increase the amount you walk every day. Try for at least 30 minutes on most days of the week. You also may want to swim, bike, or do other activities. · Avoid or limit alcohol. Talk to your doctor about whether you can drink any alcohol. · Try to limit how much sodium you eat to less than 2,300 milligrams (mg) a day. Your doctor may ask you to try to eat less than 1,500 mg a day. · Eat plenty of fruits (such as bananas and oranges), vegetables, legumes, whole grains, and low-fat dairy products. · Lower the amount of saturated fat in your diet. Saturated fat is found in animal products such as milk, cheese, and meat. Limiting these foods may help you lose weight and also lower your risk for heart disease. · Do not smoke. Smoking increases your risk for heart attack and stroke. If you need help quitting, talk to your doctor about stop-smoking programs and medicines. These can increase your chances of quitting for good. When should you call for help? Call 911  anytime you think you may need emergency care. This may mean having symptoms that suggest that your blood pressure is causing a serious heart or blood vessel problem. Your blood pressure may be over 180/120. For example, call 911 if:    · You have symptoms of a heart attack. These may include:  ? Chest pain or pressure, or a strange feeling in the chest.  ? Sweating. ? Shortness of breath. ? Nausea or vomiting. ? Pain, pressure, or a strange feeling in the back, neck, jaw, or upper belly or in one or both shoulders or arms. ? Lightheadedness or sudden weakness.   ? A fast or irregular heartbeat.     · You have symptoms of a stroke. These may include:  ? Sudden numbness, tingling, weakness, or loss of movement in your face, arm, or leg, especially on only one side of your body. ? Sudden vision changes. ? Sudden trouble speaking. ? Sudden confusion or trouble understanding simple statements. ? Sudden problems with walking or balance. ? A sudden, severe headache that is different from past headaches.     · You have severe back or belly pain. Do not wait until your blood pressure comes down on its own. Get help right away. Call your doctor now or seek immediate care if:    · Your blood pressure is much higher than normal (such as 180/120 or higher), but you don't have symptoms.     · You think high blood pressure is causing symptoms, such as:  ? Severe headache.  ? Blurry vision. Watch closely for changes in your health, and be sure to contact your doctor if:    · Your blood pressure measures higher than your doctor recommends at least 2 times. That means the top number is higher or the bottom number is higher, or both.     · You think you may be having side effects from your blood pressure medicine. Where can you learn more? Go to http://www.gray.com/  Enter [id number] in the search box to learn more about \"High Blood Pressure: Care Instructions. \"  Current as of: January 10, 2022               Content Version: 13.2  © 2006-2022 [employer]. Care instructions adapted under license by [employer] (which disclaims liability or warranty for this information). If you have questions about a medical condition or this instruction, always ask your healthcare professional. [place] [address] any warranty or liability for your use of this information.

## 2022-06-22 LAB
ALBUMIN SERPL-MCNC: 3.7 G/DL (ref 3.4–5)
ALBUMIN/GLOB SERPL: 1.1 {RATIO} (ref 0.8–1.7)
ALP SERPL-CCNC: 83 U/L (ref 45–117)
ALT SERPL-CCNC: 16 U/L (ref 13–56)
ANION GAP SERPL CALC-SCNC: 8 MMOL/L (ref 3–18)
AST SERPL-CCNC: 31 U/L (ref 10–38)
BILIRUB SERPL-MCNC: 0.4 MG/DL (ref 0.2–1)
BUN SERPL-MCNC: 17 MG/DL (ref 7–18)
BUN/CREAT SERPL: 17 (ref 12–20)
CALCIUM SERPL-MCNC: 10.1 MG/DL (ref 8.5–10.1)
CHLORIDE SERPL-SCNC: 96 MMOL/L (ref 100–111)
CHOLEST SERPL-MCNC: 187 MG/DL
CO2 SERPL-SCNC: 38 MMOL/L (ref 21–32)
CREAT SERPL-MCNC: 0.98 MG/DL (ref 0.6–1.3)
GLOBULIN SER CALC-MCNC: 3.3 G/DL (ref 2–4)
GLUCOSE SERPL-MCNC: 108 MG/DL (ref 74–99)
HDLC SERPL-MCNC: 72 MG/DL (ref 40–60)
HDLC SERPL: 2.6 {RATIO} (ref 0–5)
LDLC SERPL CALC-MCNC: 97.8 MG/DL (ref 0–100)
LIPID PROFILE,FLP: ABNORMAL
POTASSIUM SERPL-SCNC: 2.9 MMOL/L (ref 3.5–5.5)
PROT SERPL-MCNC: 7 G/DL (ref 6.4–8.2)
SODIUM SERPL-SCNC: 142 MMOL/L (ref 136–145)
TRIGL SERPL-MCNC: 86 MG/DL (ref ?–150)
VLDLC SERPL CALC-MCNC: 17.2 MG/DL

## 2022-06-23 RX ORDER — OXYBUTYNIN CHLORIDE 10 MG/1
TABLET, EXTENDED RELEASE ORAL
Qty: 90 TABLET | Refills: 3 | Status: SHIPPED | OUTPATIENT
Start: 2022-06-23

## 2022-06-27 NOTE — PROGRESS NOTES
Patient's son is aware of the potassium of 2.9. Patient son has not given patient her potassium as prescribed by this provider for the last several weeks. Son contacted and stated that he would prefer the patient to remain on Ditropan and therefore will administer the oral form of potassium to the patient. Per clinical care tech notes, patient does have a current prescription  that will be sent out to her through her mail order pharmacy for the potassium solution.

## 2022-07-18 ENCOUNTER — TELEPHONE (OUTPATIENT)
Dept: ORTHOPEDIC SURGERY | Age: 77
End: 2022-07-18

## 2022-07-18 NOTE — TELEPHONE ENCOUNTER
Patient son , Zainab Dale called and said that the patient fell 2 1/2 months ago. That the patient Right Knee is swollen . That the patient was seen at Urgent Care and then by her PCP. That the PCP  told them the patient needed to be seen by Ortho. Mr. Venkat Worrell is asking if  could see the patient sometime this week or Next week. Patient last seen by Chen Bravo on 6/2/21 for the Right Hip. Has never been seen for the Knee. Zainab Dale tel. [phone].

## 2022-07-19 ENCOUNTER — OFFICE VISIT (OUTPATIENT)
Dept: ORTHOPEDIC SURGERY | Age: 77
End: 2022-07-19
Payer: MEDICARE

## 2022-07-19 VITALS
DIASTOLIC BLOOD PRESSURE: 72 MMHG | WEIGHT: 134 LBS | SYSTOLIC BLOOD PRESSURE: 115 MMHG | HEART RATE: 100 BPM | TEMPERATURE: 97.8 F | OXYGEN SATURATION: 96 % | HEIGHT: 66 IN | BODY MASS INDEX: 21.53 KG/M2

## 2022-07-19 DIAGNOSIS — Z96.659 PERIPROSTHETIC SUPRACONDYLAR FRACTURE OF FEMUR, INITIAL ENCOUNTER: ICD-10-CM

## 2022-07-19 DIAGNOSIS — G89.29 CHRONIC PAIN OF RIGHT KNEE: Primary | ICD-10-CM

## 2022-07-19 DIAGNOSIS — M97.8XXA PERIPROSTHETIC SUPRACONDYLAR FRACTURE OF FEMUR, INITIAL ENCOUNTER: ICD-10-CM

## 2022-07-19 DIAGNOSIS — M25.561 CHRONIC PAIN OF RIGHT KNEE: Primary | ICD-10-CM

## 2022-07-19 PROCEDURE — 73560 X-RAY EXAM OF KNEE 1 OR 2: CPT | Performed by: PHYSICIAN ASSISTANT

## 2022-07-19 PROCEDURE — 3017F COLORECTAL CA SCREEN DOC REV: CPT | Performed by: PHYSICIAN ASSISTANT

## 2022-07-19 PROCEDURE — G9899 SCRN MAM PERF RSLTS DOC: HCPCS | Performed by: PHYSICIAN ASSISTANT

## 2022-07-19 PROCEDURE — 99213 OFFICE O/P EST LOW 20 MIN: CPT | Performed by: PHYSICIAN ASSISTANT

## 2022-07-19 PROCEDURE — G8399 PT W/DXA RESULTS DOCUMENT: HCPCS | Performed by: PHYSICIAN ASSISTANT

## 2022-07-19 PROCEDURE — 1090F PRES/ABSN URINE INCON ASSESS: CPT | Performed by: PHYSICIAN ASSISTANT

## 2022-07-19 PROCEDURE — G9717 DOC PT DX DEP/BP F/U NT REQ: HCPCS | Performed by: PHYSICIAN ASSISTANT

## 2022-07-19 PROCEDURE — G8420 CALC BMI NORM PARAMETERS: HCPCS | Performed by: PHYSICIAN ASSISTANT

## 2022-07-19 PROCEDURE — G8754 DIAS BP LESS 90: HCPCS | Performed by: PHYSICIAN ASSISTANT

## 2022-07-19 PROCEDURE — G8427 DOCREV CUR MEDS BY ELIG CLIN: HCPCS | Performed by: PHYSICIAN ASSISTANT

## 2022-07-19 PROCEDURE — 1124F ACP DISCUSS-NO DSCNMKR DOCD: CPT | Performed by: PHYSICIAN ASSISTANT

## 2022-07-19 PROCEDURE — G8752 SYS BP LESS 140: HCPCS | Performed by: PHYSICIAN ASSISTANT

## 2022-07-19 PROCEDURE — 1101F PT FALLS ASSESS-DOCD LE1/YR: CPT | Performed by: PHYSICIAN ASSISTANT

## 2022-07-19 PROCEDURE — G8536 NO DOC ELDER MAL SCRN: HCPCS | Performed by: PHYSICIAN ASSISTANT

## 2022-07-19 NOTE — PROGRESS NOTES
Roberth Moctezuma  1945   Chief Complaint   Patient presents with    Knee Pain     right        HISTORY OF PRESENT ILLNESS  Roberth Moctezuma is a 78 y.o. female who presents today for evaluation of right knee pain. Patient son presents with her and is her caregiver. He states she fell about 3 months ago and landed on the knee. She was barely able to walk around and now is moving slightly better. Pain is a 0/10. Patient with dementia and unable to give better history. Son notes she has had surgery a long time ago on the knees. Has tried following treatments: Injections:NO; Brace:NO; Therapy:NO; Cane/Crutch:YES       Allergies   Allergen Reactions    Aspirin Other (comments)     \"messes up my kidneys\"    Feldene [Piroxicam] Other (comments)     Kidney damage      Morphine Other (comments)     unconsciousness    Nsaids (Non-Steroidal Anti-Inflammatory Drug) Other (comments)     \"messes up my kidneys\"    Other Medication Not Reported This Time     Mycins      Penicillins Hives    Sulfa (Sulfonamide Antibiotics) Rash    Vancomycin Itching     Possible allergic reaction to Vancomycin.  Complained of itching/reddness around forehead/back of neck        Past Medical History:   Diagnosis Date    Chronic anxiety     Depression     controlled on Paxil    Diabetes (HCC)     DJD (degenerative joint disease)     GERD (gastroesophageal reflux disease)     High cholesterol     HTN (hypertension)     Hypertension     Low back pain     post-laminectomy syndrome and multilevel degenerative disease    Osteoporosis       Social History     Socioeconomic History    Marital status:      Spouse name: Not on file    Number of children: Not on file    Years of education: Not on file    Highest education level: Not on file   Occupational History    Occupation: office work     Comment: [employer]   Tobacco Use    Smoking status: Former    Smokeless tobacco: Never   Vaping Use    Vaping Use: Never used   Substance and Sexual Activity Alcohol use: No    Drug use: No    Sexual activity: Not Currently   Other Topics Concern    Not on file   Social History Narrative    Not on file     Social Determinants of Health     Financial Resource Strain: Not on file   Food Insecurity: Not on file   Transportation Needs: Not on file   Physical Activity: Not on file   Stress: Not on file   Social Connections: Not on file   Intimate Partner Violence: Not on file   Housing Stability: Not on file      Past Surgical History:   Procedure Laterality Date    HX ADENOIDECTOMY      HX APPENDECTOMY      HX BACK SURGERY      PHLD X3    HX BREAST BIOPSY Left 8/28/2015    WIDE LOCAL EXCISION LEFT BREAST LESION performed by Dana Erwin MD at [facility] MAIN OR    HX HERNIA REPAIR      hiatal    HX KNEE REPLACEMENT      HX ORTHOPAEDIC      bilateral shoulder surgery    HX ELVIRA AND BSO  1982    HX TONSILLECTOMY      [place] TOTAL HIP ARTHROPLASTY  4/12     total right hip replacement, Dr. Airam Bliss      Family History   Problem Relation Age of Onset    Cancer Mother         melanoma    Cancer Father         lung    Heart Disease Maternal Grandmother     Diabetes Other     Hypertension Other     Headache Other     Seizures Other     Coronary Art Dis Other     Heart Attack Other       Current Outpatient Medications   Medication Sig    oxybutynin chloride XL (DITROPAN XL) 10 mg CR tablet TAKE 1 TABLET DAILY    furosemide (LASIX) 40 mg tablet take 1 tablet by mouth once daily    lovastatin (MEVACOR) 40 mg tablet Take 1 Tablet by mouth daily. PARoxetine (PAXIL) 20 mg tablet TAKE 1 TABLET DAILY    therapeutic multivitamin (THERAGRAN) tablet Take 1 Tab by mouth daily. OXYCODONE HCL/ACETAMINOPHEN (PERCOCET PO) Take  by mouth. GOTU SILVIA HERB PO Take  by mouth. OLANZapine (ZYPREXA) 10 mg tablet Take 5 mg by mouth nightly. potassium chloride (KAON 10%) 20 mEq/15 mL solution Take 7.5 mL by mouth two (2) times a day.  (Patient not taking: Reported on 6/21/2022)     No current facility-administered medications for this visit. REVIEW OF SYSTEM   Patient denies: Weight loss, Fever/Chills, HA, Visual changes, Fatigue, Chest pain, SOB, Abdominal pain, N/V/D/C, Blood in stool or urine, Edema. Pertinent positive as above in HPI. All others were negative    PHYSICAL EXAM:   Visit Vitals  /72 (BP 1 Location: Right arm, BP Patient Position: Sitting, BP Cuff Size: Adult)   Pulse 100   Temp 97.8 °F (36.6 °C)   Ht 5' 6\" (1.676 m)   Wt 134 lb (60.8 kg)   SpO2 96%   BMI 21.63 kg/m²     The patient is a well-developed, well-nourished female   in no acute distress. The patient is alert and oriented times three. The patient is alert and oriented times three. Mood and affect are normal.  LYMPHATIC: lymph nodes are not enlarged and are within normal limits  SKIN: normal in color and non tender to palpation. There are no bruises or abrasions noted. NEUROLOGICAL: Motor sensory exam is within normal limits. Reflexes are equal bilaterally. There is normal sensation to pinprick and light touch  MUSCULOSKELETAL:  Examination Right knee   Skin Intact, noted deformity with enlarged condyles   Range of motion    Effusion +   Medial joint line tenderness -   Lateral joint line tenderness -   Tenderness Pes Bursa -   Tenderness insertion MCL -   Tenderness insertion LCL -   Aungs -   Patella crepitus -   Patella grind -   Lachman -   Pivot shift -   Anterior drawer -   Posterior drawer -   Varus stress -   Valgus stress -   Neurovascular Intact   Calf Swelling and Tenderness to Palpation -   Stef's Test -   Hamstring Cord Tightness -              IMAGIN view x-rays of the right knee taken in the office on 2022 read and reviewed by myself reveal comminuted telescoped fracture of femoral implant in the femur with significant callus formation    IMPRESSION:      ICD-10-CM ICD-9-CM    1.  Chronic pain of right knee  M25.561 719.46 AMB POC XRAY, KNEE; 1/2 VIEWS    G89.29 338.29 2. Periprosthetic supracondylar fracture of femur, initial encounter  M97. 8XXA 996.44     Z96.659 V43.65            PLAN:   1. Patient with a comminuted prosthetic fracture in the distal femur that has healed. Discussed with the patient's son that this has healed and there may not be any surgical fix to the issue. We will refer to Dr. Amy Amador for second opinion on this as we are not specialized in total knee arthroplasty. This patient was seen and evaluated individually by Dr. Robbie Preciado who agrees with the plan  Risk factors include: dementia  2. No cortisone injection indicated today   3. No Physical/Occupational Therapy indicated today  4. No diagnostic test indicated today:   5. No durable medical equipment indicated today  6. Yes  referral indicated today   7. No medications indicated today:   8.  No Narcotic indicated today   RTC with Dr. Darrold Gilford, PA-C [facility]

## 2022-07-28 ENCOUNTER — OFFICE VISIT (OUTPATIENT)
Dept: ORTHOPEDIC SURGERY | Age: 77
End: 2022-07-28
Payer: MEDICARE

## 2022-07-28 VITALS — TEMPERATURE: 98.2 F | WEIGHT: 134 LBS | BODY MASS INDEX: 21.53 KG/M2 | HEIGHT: 66 IN

## 2022-07-28 DIAGNOSIS — M25.561 CHRONIC PAIN OF RIGHT KNEE: ICD-10-CM

## 2022-07-28 DIAGNOSIS — Z91.81 HISTORY OF RECENT FALL: Primary | ICD-10-CM

## 2022-07-28 DIAGNOSIS — F03.91 DEMENTIA WITH BEHAVIORAL DISTURBANCE, UNSPECIFIED DEMENTIA TYPE: ICD-10-CM

## 2022-07-28 DIAGNOSIS — Z96.653 HISTORY OF BILATERAL KNEE REPLACEMENT: ICD-10-CM

## 2022-07-28 DIAGNOSIS — G89.29 CHRONIC PAIN OF RIGHT KNEE: ICD-10-CM

## 2022-07-28 PROCEDURE — G8420 CALC BMI NORM PARAMETERS: HCPCS | Performed by: ORTHOPAEDIC SURGERY

## 2022-07-28 PROCEDURE — G9717 DOC PT DX DEP/BP F/U NT REQ: HCPCS | Performed by: ORTHOPAEDIC SURGERY

## 2022-07-28 PROCEDURE — 1090F PRES/ABSN URINE INCON ASSESS: CPT | Performed by: ORTHOPAEDIC SURGERY

## 2022-07-28 PROCEDURE — G8427 DOCREV CUR MEDS BY ELIG CLIN: HCPCS | Performed by: ORTHOPAEDIC SURGERY

## 2022-07-28 PROCEDURE — 3017F COLORECTAL CA SCREEN DOC REV: CPT | Performed by: ORTHOPAEDIC SURGERY

## 2022-07-28 PROCEDURE — 99214 OFFICE O/P EST MOD 30 MIN: CPT | Performed by: ORTHOPAEDIC SURGERY

## 2022-07-28 PROCEDURE — 1101F PT FALLS ASSESS-DOCD LE1/YR: CPT | Performed by: ORTHOPAEDIC SURGERY

## 2022-07-28 PROCEDURE — 1124F ACP DISCUSS-NO DSCNMKR DOCD: CPT | Performed by: ORTHOPAEDIC SURGERY

## 2022-07-28 PROCEDURE — G8399 PT W/DXA RESULTS DOCUMENT: HCPCS | Performed by: ORTHOPAEDIC SURGERY

## 2022-07-28 PROCEDURE — G9899 SCRN MAM PERF RSLTS DOC: HCPCS | Performed by: ORTHOPAEDIC SURGERY

## 2022-07-28 PROCEDURE — G8536 NO DOC ELDER MAL SCRN: HCPCS | Performed by: ORTHOPAEDIC SURGERY

## 2022-07-28 PROCEDURE — G8756 NO BP MEASURE DOC: HCPCS | Performed by: ORTHOPAEDIC SURGERY

## 2022-07-28 NOTE — PROGRESS NOTES
Patient: Ting Veliz                MRN: [id number]       SSN: [id number]  YOB: 1945        AGE: 78 y.o. SEX: female  Body mass index is 21.63 kg/m². PCP: Jose Guadalupe Palomo MD  07/28/22    Ms. Randal Francisco with her son regarding her right knee issues she is had a right knee replacement that I did approximately 16 years ago and she fell about 3 4 months ago [facility] and eventually followed up with one of my associates diagnosed her as having a distal femoral fracture she landed directly on the knee and essentially compressed actually the the bone on that side has a leg length discrepancy and likely has a somewhat loose prosthesis. She is unfortunately undergone severe dementia and and and is basically household ambulator currently not having a lot of pain and is got to see some pictures of what the knee looked like right after the injury with extensive bruising today's exam she is alert but not oriented and could not remember my name. Hips rotate adequately she comes out about 5 degrees extension minus and bends to 115 degrees the quads are intact. She does have a leg length discrepancy being shorter on the affected extremity    Previous AP and lateral images show that the fracture has essentially complete been a compressive 1 without major displacement and likely loosening of the components.     I think we should manage her nonoperatively at this time I recommend a hinged knee brace for longer distances some physical therapy as well she may weightbearing as tolerated and like to see him back in about a month to see how things are going with regards to the brace management it is possible to revise the knee but she is fairly demented with low demand will play things by her thank you    REVIEW OF SYSTEMS:      CON: negative  EYE: negative   ENT: negative  RESP: negative  GI:    negative   :  negative  MSK: Positive  A twelve point review of systems was completed, positives noted and all other systems were reviewed and are negative          Past Medical History:   Diagnosis Date    Chronic anxiety     Depression     controlled on Paxil    Diabetes (HCC)     DJD (degenerative joint disease)     GERD (gastroesophageal reflux disease)     High cholesterol     HTN (hypertension)     Hypertension     Low back pain     post-laminectomy syndrome and multilevel degenerative disease    Osteoporosis        Family History   Problem Relation Age of Onset    Cancer Mother         melanoma    Cancer Father         lung    Heart Disease Maternal Grandmother     Diabetes Other     Hypertension Other     Headache Other     Seizures Other     Coronary Art Dis Other     Heart Attack Other        Current Outpatient Medications   Medication Sig Dispense Refill    oxybutynin chloride XL (DITROPAN XL) 10 mg CR tablet TAKE 1 TABLET DAILY 90 Tablet 3    furosemide (LASIX) 40 mg tablet take 1 tablet by mouth once daily 90 Tablet 1    potassium chloride (KAON 10%) 20 mEq/15 mL solution Take 7.5 mL by mouth two (2) times a day. (Patient not taking: Reported on 6/21/2022) 480 mL 1    lovastatin (MEVACOR) 40 mg tablet Take 1 Tablet by mouth daily. 90 Tablet 3    PARoxetine (PAXIL) 20 mg tablet TAKE 1 TABLET DAILY 90 Tablet 3    therapeutic multivitamin (THERAGRAN) tablet Take 1 Tab by mouth daily. OXYCODONE HCL/ACETAMINOPHEN (PERCOCET PO) Take  by mouth. GOTU SILVIA HERB PO Take  by mouth. OLANZapine (ZYPREXA) 10 mg tablet Take 5 mg by mouth nightly.          Allergies   Allergen Reactions    Aspirin Other (comments)     \"messes up my kidneys\"    Feldene [Piroxicam] Other (comments)     Kidney damage      Morphine Other (comments)     unconsciousness    Nsaids (Non-Steroidal Anti-Inflammatory Drug) Other (comments)     \"messes up my kidneys\"    Other Medication Not Reported This Time     Mycins      Penicillins Hives    Sulfa (Sulfonamide Antibiotics) Rash    Vancomycin Itching     Possible allergic reaction to Vancomycin. Complained of itching/reddness around forehead/back of neck       Past Surgical History:   Procedure Laterality Date    HX ADENOIDECTOMY      HX APPENDECTOMY      HX BACK SURGERY      PHLD X3    HX BREAST BIOPSY Left 8/28/2015    WIDE LOCAL EXCISION LEFT BREAST LESION performed by Dana Erwin MD at [facility] MAIN OR    HX HERNIA REPAIR      hiatal    HX KNEE REPLACEMENT      HX ORTHOPAEDIC      bilateral shoulder surgery    HX ELVIRA AND BSO  1982    HX TONSILLECTOMY      [place] TOTAL HIP ARTHROPLASTY  4/12     total right hip replacement, Dr. Rikki Daniel History    Marital status:      Spouse name: Not on file    Number of children: Not on file    Years of education: Not on file    Highest education level: Not on file   Occupational History    Occupation: office work     Comment: [employer]   Tobacco Use    Smoking status: Former    Smokeless tobacco: Never   Vaping Use    Vaping Use: Never used   Substance and Sexual Activity    Alcohol use: No    Drug use: No    Sexual activity: Not Currently   Other Topics Concern    Not on file   Social History Narrative    Not on file     Social Determinants of Health     Financial Resource Strain: Not on file   Food Insecurity: Not on file   Transportation Needs: Not on file   Physical Activity: Not on file   Stress: Not on file   Social Connections: Not on file   Intimate Partner Violence: Not on file   Housing Stability: Not on file       Visit Vitals  Temp 98.2 °F (36.8 °C) (Temporal)   Ht 5' 6\" (1.676 m)   Wt 134 lb (60.8 kg)   BMI 21.63 kg/m²         PHYSICAL EXAMINATION:  GENERAL: Alert and oriented x3, in no acute distress, well-developed, well-nourished, afebrile. HEART: No JVD. EYES: No scleral icterus   NECK: No significant lymphadenopathy   LUNGS: No respiratory compromise or indrawing  ABDOMEN: Soft, non-tender, non-distended.          Note: This note was completed using voice recognition software. Any typographical/name errors or mistakes are unintentional.    Electronically signed by:  Jarvis Salazar MD

## 2022-08-01 ENCOUNTER — TELEPHONE (OUTPATIENT)
Dept: PHYSICAL THERAPY | Age: 77
End: 2022-08-01

## 2022-08-10 ENCOUNTER — HOSPITAL ENCOUNTER (OUTPATIENT)
Dept: PHYSICAL THERAPY | Age: 77
Discharge: HOME | End: 2022-08-10
Attending: ORTHOPAEDIC SURGERY
Payer: MEDICARE

## 2022-08-10 PROCEDURE — 97535 SELF CARE MNGMENT TRAINING: CPT

## 2022-08-10 PROCEDURE — 97163 PT EVAL HIGH COMPLEX 45 MIN: CPT

## 2022-08-10 PROCEDURE — 97110 THERAPEUTIC EXERCISES: CPT

## 2022-08-10 NOTE — PROGRESS NOTES
[facility]  [address] [facility] [address]  [place], [address]  [phone] fax    Plan of Care/ Statement of Necessity for Physical Therapy Services    Patient name: Brandon Tamayo Start of Care: 8/10/2022   Referral source: Jason Rajput MD : 1945    Medical Diagnosis: Pain in right knee [M25.561]  Payor: [place] MEDICARE / Plan: VA MEDICARE PART A & B / Product Type: Medicare /  Onset Date:4 months ago    Treatment Diagnosis: Right knee pain   Prior Hospitalization: see medical history Provider#: [id number]   Medications: Verified on Patient summary List    Comorbidities: B TKR; anxiety and depression; DM; HTN; back pain; osteoporosis; sever dementia   Prior Level of Function: Ambulated shorts distances; required assistance with ADLs and daily activities       The Plan of Care and following information is based on the information from the initial evaluation. Assessment/ key information: 78y.o. year old female presents with CC of right knee s/p fall 4 months ago with subsequent femoral fx and loosening of prosthesis. Impairments noted today: severe valgus B ankle deformity; decreased right knee AROM; decreased B LE strength; impaired gait/balance;impaired bed mobility and transfers. Due to severe dementia and declined physical status, patient is not a candidate for skilled outpatient PT. Evaluation Complexity History HIGH Complexity :3+ comorbidities / personal factors will impact the outcome/ POC ; Examination HIGH Complexity : 4+ Standardized tests and measures addressing body structure, function, activity limitation and / or participation in recreation  ;Presentation HIGH Complexity : Unstable and unpredictable characteristics  ; Clinical Decision Making HIGH Complexity : FOTO score of 1- 25   Overall Complexity Rating: HIGH   Problem List: pain affecting function, decrease ROM, decrease strength, impaired gait/ balance, decrease ADL/ functional abilitiies, decrease activity tolerance, decrease flexibility/ joint mobility, and decrease transfer abilities   Treatment Plan may include any combination of the following: Therapeutic exercise and Patient education  Patient / Family readiness to learn indicated by: asking questions  Persons(s) to be included in education: patient (P) and family support person (FSP);list son  Barriers to Learning/Limitations: yes;  cognitive  Patient Goal (s): to increase strength as much as possible  Patient Self Reported Health Status: fair  Rehabilitation Potential: poor    Short Term Goals: To be accomplished in 1 treatments: Instruct patient in extensive HEP. Frequency / Duration: Patient to be seen 1 times per week for 1 treatments. Patient/ Caregiver education and instruction: Diagnosis, prognosis, exercises   [x]  Plan of care has been reviewed with PTA    Certification Period: 8/10/22-8/10/22  Norm Began, PT 8/10/2022 3:33 PM    ________________________________________________________________________    I certify that the above Therapy Services are being furnished while the patient is under my care. I agree with the treatment plan and certify that this therapy is necessary.     [de-identified] Signature:____________________  Date:____________Time: _________     Lionel Vergara MD  Please sign and return to [facility] [address] [place], [place] [address]ic [facility]  [address] [place] [address] [address]  [phone] fax

## 2022-08-10 NOTE — PROGRESS NOTES
PT DAILY TREATMENT NOTE 10-18    Patient Name: Simona Fink  Date:8/10/2022  : 1945  [x]  Patient  Verified  Payor: VA MEDICARE / Plan: VA MEDICARE PART A & B / Product Type: Medicare /    In time:247  Out time:325  Total Treatment Time (min): 38  Visit #: 1 of 1    Medicare/BCBS Only   Total Timed Codes (min):  23 1:1 Treatment Time:  38       Treatment Area: Pain in right knee [M25.561]    SUBJECTIVE  Pain Level (0-10 scale): 2-3  Any medication changes, allergies to medications, adverse drug reactions, diagnosis change, or new procedure performed?: [x] No    [] Yes (see summary sheet for update)  Subjective functional status/changes:   [] No changes reported  See eval    OBJECTIVE    15 min [x]Eval                  []Re-Eval       15 min Therapeutic Exercise:  [] See flow sheet :   Rationale: increase ROM and increase strength to improve the patients ability to perform ALDs     8 min Self Care/Home Management: HeP progression and refer to podiatrist for B ankle/foot deformity    Rationale: increase ROM and increase strength  to improve the patients ability to perform ADLs    With   [] TE   [] TA   [] neuro   [] other: Patient Education: [x] Review HEP    [] Progressed/Changed HEP based on:   [] positioning   [] body mechanics   [] transfers   [] heat/ice application    [] other:      Other Objective/Functional Measures:      Pain Level (0-10 scale) post treatment: 2-3    ASSESSMENT/Changes in Function: see POC       [x]  See Plan of Care  []  See progress note/recertification  []  See Discharge Summary         Progress towards goals / Updated goals:  Short Term Goals: To be accomplished in 1 treatments: Instruct patient in extensive HEP.     PLAN  []  Upgrade activities as tolerated     []  Continue plan of care  []  Update interventions per flow sheet       [x]  Discharge due to:_not a skilled outpatient patient   []  Other:_      MARVIN Roberts, CMTPT 8/10/2022  3:44 PM    Future Appointments Date Time Provider Rhea Louann   8/26/2022  3:45 PM Wolfgang Low MD [facility] AMB   10/24/2022  3:00 PM Annette Ortiz MD [facility] BS AMB

## 2022-08-15 RX ORDER — POTASSIUM CHLORIDE 20MEQ/15ML
LIQUID (ML) ORAL
Qty: 480 ML | Refills: 1 | Status: SHIPPED | OUTPATIENT
Start: 2022-08-15 | End: 2022-10-24 | Stop reason: SDUPTHER

## 2022-08-19 NOTE — PROGRESS NOTES
[facility]  [address] [facility] [address]  [place], [address]  [phone] fax    Physical Therapy Discharge Summary  Patient name: Agapito Vegas Start of Care: 8/10/22   Referral source: Hilaria Alberts MD : 1945   Medical/Treatment Diagnosis: Pain in right knee [M25.561]  Payor: VA MEDICARE / Plan: VA MEDICARE PART A & B / Product Type: Medicare /  Onset Date:4 months ago     Prior Hospitalization: see medical history Provider#: [id number]   Medications: Verified on Patient Summary List    Comorbidities: B TKR; anxiety and depression; DM; HTN; back pain; osteoporosis; sever dementia   Prior Level of Function: Ambulated shorts distances; required assistance with ADLs and daily activities   Visits from Start of Care: 1    Missed Visits: 0  Reporting Period : 8/10/22 to 8/10/22      Summary of Care:  Short Term Goals: To be accomplished in 1 treatments:   Instruct patient in extensive HEP  Current: goal met  ASSESSMENT/RECOMMENDATIONS:  [x]Discontinue therapy: []Patient has reached or is progressing toward set goals      []Patient is non-compliant or has abdicated      []Due to lack of appreciable progress towards set Ul. Sarah Centeno, PT 2022 9:07 AM

## 2022-08-23 ENCOUNTER — TELEPHONE (OUTPATIENT)
Dept: ORTHOPEDIC SURGERY | Age: 77
End: 2022-08-23

## 2022-08-23 NOTE — TELEPHONE ENCOUNTER
Contacted son at this time. Informed him that information was faxed [facility] at [facility]

## 2022-08-23 NOTE — TELEPHONE ENCOUNTER
Patient son, Zonia Garland called and said that [facility] needs the order for the patient Custom Hinged Knee Brace, and the Office Notes. To fax it over to [facility]   Fax # [phone]    Zonia Garland tel. [phone]. Note : Order is from 07/28/22 from [address] [place] [address]

## 2022-09-13 NOTE — TELEPHONE ENCOUNTER
Order and last office note faxed to [facility] clinic. Patient son notified and verbalized understanding.

## 2022-09-13 NOTE — TELEPHONE ENCOUNTER
Patient son called again and said that [address] did not receive the order or the notes. Mr. Reginald Lebron is asking that the order from Dr. Jose Flores for the Custom Hinged knee brace be faxed to :    [facility]  Fax # [phone]. Mr. Medelnn Bhakti. [phone].

## 2022-09-22 ENCOUNTER — OFFICE VISIT (OUTPATIENT)
Dept: ORTHOPEDIC SURGERY | Age: 77
End: 2022-09-22
Payer: MEDICARE

## 2022-09-22 VITALS — HEART RATE: 113 BPM | BODY MASS INDEX: 22.6 KG/M2 | TEMPERATURE: 97.1 F | OXYGEN SATURATION: 95 % | WEIGHT: 140 LBS

## 2022-09-22 DIAGNOSIS — M25.561 CHRONIC PAIN OF RIGHT KNEE: ICD-10-CM

## 2022-09-22 DIAGNOSIS — G89.29 CHRONIC PAIN OF RIGHT KNEE: ICD-10-CM

## 2022-09-22 DIAGNOSIS — Z96.653 HISTORY OF BILATERAL KNEE REPLACEMENT: Primary | ICD-10-CM

## 2022-09-22 DIAGNOSIS — M79.672 LEFT FOOT PAIN: ICD-10-CM

## 2022-09-22 PROCEDURE — 73562 X-RAY EXAM OF KNEE 3: CPT | Performed by: PHYSICIAN ASSISTANT

## 2022-09-22 PROCEDURE — 1124F ACP DISCUSS-NO DSCNMKR DOCD: CPT | Performed by: PHYSICIAN ASSISTANT

## 2022-09-22 PROCEDURE — 1090F PRES/ABSN URINE INCON ASSESS: CPT | Performed by: PHYSICIAN ASSISTANT

## 2022-09-22 PROCEDURE — G8420 CALC BMI NORM PARAMETERS: HCPCS | Performed by: PHYSICIAN ASSISTANT

## 2022-09-22 PROCEDURE — G9717 DOC PT DX DEP/BP F/U NT REQ: HCPCS | Performed by: PHYSICIAN ASSISTANT

## 2022-09-22 PROCEDURE — 99213 OFFICE O/P EST LOW 20 MIN: CPT | Performed by: PHYSICIAN ASSISTANT

## 2022-09-22 PROCEDURE — G8399 PT W/DXA RESULTS DOCUMENT: HCPCS | Performed by: PHYSICIAN ASSISTANT

## 2022-09-22 PROCEDURE — G8756 NO BP MEASURE DOC: HCPCS | Performed by: PHYSICIAN ASSISTANT

## 2022-09-22 PROCEDURE — G8427 DOCREV CUR MEDS BY ELIG CLIN: HCPCS | Performed by: PHYSICIAN ASSISTANT

## 2022-09-22 PROCEDURE — 1101F PT FALLS ASSESS-DOCD LE1/YR: CPT | Performed by: PHYSICIAN ASSISTANT

## 2022-09-22 PROCEDURE — G8536 NO DOC ELDER MAL SCRN: HCPCS | Performed by: PHYSICIAN ASSISTANT

## 2022-09-22 PROCEDURE — 3017F COLORECTAL CA SCREEN DOC REV: CPT | Performed by: PHYSICIAN ASSISTANT

## 2022-09-22 NOTE — PROGRESS NOTES
Subjective:       Patient ID:  Juan Luis Mcgrath is a 63 y.o. male who presents for   Chief Complaint   Patient presents with    Lesion    Skin Check     No h/o nmsc or mm    Lesion  - Initial  Affected locations: right 5th digit, subungual.  Duration: 2 months  Signs / symptoms: tender (slight)  Aggravated by: nothing  Relieving factors/Treatments tried: nothing        Review of Systems   Skin: Negative for itching and rash.   Hematologic/Lymphatic: Does not bruise/bleed easily.        Objective:    Physical Exam   Constitutional: He appears well-developed and well-nourished. No distress.   Neurological: He is alert and oriented to person, place, and time. He is not disoriented.   Psychiatric: He has a normal mood and affect.   Skin:   Areas Examined (abnormalities noted in diagram):   Nails and Digits Inspection Performed             Diagram Legend     Erythematous scaling macule/papule c/w actinic keratosis       Vascular papule c/w angioma      Pigmented verrucoid papule/plaque c/w seborrheic keratosis      Yellow umbilicated papule c/w sebaceous hyperplasia      Irregularly shaped tan macule c/w lentigo     1-2 mm smooth white papules consistent with Milia      Movable subcutaneous cyst with punctum c/w epidermal inclusion cyst      Subcutaneous movable cyst c/w pilar cyst      Firm pink to brown papule c/w dermatofibroma      Pedunculated fleshy papule(s) c/w skin tag(s)      Evenly pigmented macule c/w junctional nevus     Mildly variegated pigmented, slightly irregular-bordered macule c/w mildly atypical nevus      Flesh colored to evenly pigmented papule c/w intradermal nevus       Pink pearly papule/plaque c/w basal cell carcinoma      Erythematous hyperkeratotic cursted plaque c/w SCC      Surgical scar with no sign of skin cancer recurrence      Open and closed comedones      Inflammatory papules and pustules      Verrucoid papule consistent consistent with wart     Erythematous eczematous patches and  [address] [facility], [address] [facility]  [phone]           Patient: Daniel Essex                MRN: [id number]       SSN: [id number]  YOB: 1945        AGE: 78 y.o. SEX: female  Body mass index is 22.6 kg/m². PCP: Adam Felix MD  09/22/22      This office note has been dictated. REVIEW OF SYSTEMS:  Constitutional: Negative for fever, chills, weight loss and malaise/fatigue. HENT: Negative. Eyes: Negative. Respiratory: Negative. Cardiovascular: Negative. Gastrointestinal: No bowel incontinence or constipation. Genitourinary: No bladder incontinence or saddle anesthesia. Skin: Negative. Neurological: Negative. Endo/Heme/Allergies: Negative. Psychiatric/Behavioral: Negative. Musculoskeletal: As per HPI above. Past Medical History:   Diagnosis Date    Chronic anxiety     Depression     controlled on Paxil    Diabetes (HCC)     DJD (degenerative joint disease)     GERD (gastroesophageal reflux disease)     High cholesterol     HTN (hypertension)     Hypertension     Low back pain     post-laminectomy syndrome and multilevel degenerative disease    Osteoporosis          Current Outpatient Medications:     potassium chloride (KAON 10%) 20 mEq/15 mL solution, MIX 1 AND 1/2 TEASPOONFULS (=7.5ML) IN LIQUID AND     DRINK TWO TIMES A DAY, Disp: 480 mL, Rfl: 1    oxybutynin chloride XL (DITROPAN XL) 10 mg CR tablet, TAKE 1 TABLET DAILY, Disp: 90 Tablet, Rfl: 3    furosemide (LASIX) 40 mg tablet, take 1 tablet by mouth once daily, Disp: 90 Tablet, Rfl: 1    lovastatin (MEVACOR) 40 mg tablet, Take 1 Tablet by mouth daily. , Disp: 90 Tablet, Rfl: 3    PARoxetine (PAXIL) 20 mg tablet, TAKE 1 TABLET DAILY, Disp: 90 Tablet, Rfl: 3    therapeutic multivitamin (THERAGRAN) tablet, Take 1 Tab by mouth daily. , Disp: , Rfl:     OXYCODONE HCL/ACETAMINOPHEN (PERCOCET PO), Take  by mouth., Disp: , Rfl:     Leyda Truong HERB PO, Take  by mouth., Disp: , Rfl:     OLANZapine (ZYPREXA) 10 mg tablet, Take 5 mg by mouth nightly., Disp: , Rfl:     Allergies   Allergen Reactions    Aspirin Other (comments)     \"messes up my kidneys\"    Feldene [Piroxicam] Other (comments)     Kidney damage      Morphine Other (comments)     unconsciousness    Nsaids (Non-Steroidal Anti-Inflammatory Drug) Other (comments)     \"messes up my kidneys\"    Other Medication Not Reported This Time     Mycins      Penicillins Hives    Sulfa (Sulfonamide Antibiotics) Rash    Vancomycin Itching     Possible allergic reaction to Vancomycin.  Complained of itching/reddness around forehead/back of neck       Social History     Socioeconomic History    Marital status:      Spouse name: Not on file    Number of children: Not on file    Years of education: Not on file    Highest education level: Not on file   Occupational History    Occupation: office work     Comment: [employer]   Tobacco Use    Smoking status: Former    Smokeless tobacco: Never   Vaping Use    Vaping Use: Never used   Substance and Sexual Activity    Alcohol use: No    Drug use: No    Sexual activity: Not Currently   Other Topics Concern    Not on file   Social History Narrative    Not on file     Social Determinants of Health     Financial Resource Strain: Not on file   Food Insecurity: Not on file   Transportation Needs: Not on file   Physical Activity: Not on file   Stress: Not on file   Social Connections: Not on file   Intimate Partner Violence: Not on file   Housing Stability: Not on file       Past Surgical History:   Procedure Laterality Date    HX ADENOIDECTOMY      HX APPENDECTOMY      HX BACK SURGERY      PHLD X3    HX BREAST BIOPSY Left 8/28/2015    WIDE LOCAL EXCISION LEFT BREAST LESION performed by Torie Snyder MD at [address] [place] [address]      hiatal    HX KNEE REPLACEMENT      HX ORTHOPAEDIC      bilateral shoulder surgery    HX ELVIRA AND BSO  1982    HX TONSILLECTOMY      HI plaques     Dystrophic onycholytic nail with subungual debris c/w onychomycosis     Umbilicated papule    Erythematous-base heme-crusted tan verrucoid plaque consistent with inflamed seborrheic keratosis     Erythematous Silvery Scaling Plaque c/w Psoriasis     See annotation              Assessment / Plan:        Neoplasm of uncertain behavior of skin - right 5th subungual digit  Verruca vs acralfibrokeratoma vs glomus tumor vs SCC vs other. Doubt malignancy  Rec biopsy by Dr. Whitehead-Madrigal             Follow up if symptoms worsen or fail to improve.   TOTAL HIP ARTHROPLASTY  4/12     total right hip replacement, Dr. Alyce Watson         Patient seen evaluated today for her right knee. She presents today with her son for evaluation. She has had a previous right knee replacement done. She did have a fall injuring her right knee and was seen by Dr. Alyce Watson about 2 months ago. She is a minimal ambulator. She does have little bit of stiffness in the morning. She gets a pain pill per her son and she feels better. They have seen a podiatrist, Dr. Sneha Mccormack and hopefully getting some orthotics and some diabetic shoes for her. Patient denies recent fevers, chills, chest pain, SOB, or injuries. No recent systemic changes noted. A 12-point review of systems is performed today. Pertinent positives are noted. All other systems reviewed and otherwise are negative. Physical exam: General: Alert and oriented x3, nad.  well-developed, well nourished. normal affect, AF. NC/AT, EOMI, neck supple, trachea midline, no JVD present. Breathing is non-labored. Examination of lower extremities reveals pain-free range of motion the hips. There is no pain to palpation trochanter bursa. Negative straight leg raise. Negative calf tenderness. Negative Homans. No signs of DVT present. The right knee reveals skin intact. There is no erythema or ecchymosis noted. There are no signs for infection or cellulitis present. She does have some laxity in the knee both in extension and flexion. Radiographs obtained in office today 9/22/2022 at the [facility] location including AP, lateral, skyline of the right knee shows evidence of previous medial and lateral condylar fractures which have healed up. This shows some instability in the knee to the medial side with the MCL deficient knee. Assessment: Status post right knee replacement, status post fall with healed medial and lateral femoral condylar fractures    Plan: At this point, they are scheduled to get a hinged knee brace. This will be done next week. She is weightbearing as tolerated. She will continue activities as tolerated. With her current mental and medical conditions surgical intervention is not suggested nor recommended at this time.     Assessment:              Martin MACIAS PA-C, ATC

## 2022-10-14 RX ORDER — FUROSEMIDE 40 MG/1
TABLET ORAL
Qty: 90 TABLET | Refills: 1 | Status: SHIPPED | OUTPATIENT
Start: 2022-10-14

## 2022-10-24 ENCOUNTER — OFFICE VISIT (OUTPATIENT)
Dept: FAMILY MEDICINE CLINIC | Age: 77
End: 2022-10-24
Payer: MEDICARE

## 2022-10-24 VITALS
WEIGHT: 125.6 LBS | RESPIRATION RATE: 12 BRPM | OXYGEN SATURATION: 96 % | HEART RATE: 99 BPM | TEMPERATURE: 99.1 F | DIASTOLIC BLOOD PRESSURE: 63 MMHG | SYSTOLIC BLOOD PRESSURE: 111 MMHG | BODY MASS INDEX: 20.18 KG/M2 | HEIGHT: 66 IN

## 2022-10-24 DIAGNOSIS — E78.5 HYPERLIPIDEMIA LDL GOAL <100: ICD-10-CM

## 2022-10-24 DIAGNOSIS — E87.6 HYPOKALEMIA: Primary | ICD-10-CM

## 2022-10-24 DIAGNOSIS — R73.09 ELEVATED HEMOGLOBIN A1C: ICD-10-CM

## 2022-10-24 DIAGNOSIS — I10 PRIMARY HYPERTENSION: ICD-10-CM

## 2022-10-24 PROCEDURE — 3078F DIAST BP <80 MM HG: CPT | Performed by: FAMILY MEDICINE

## 2022-10-24 PROCEDURE — 1124F ACP DISCUSS-NO DSCNMKR DOCD: CPT | Performed by: FAMILY MEDICINE

## 2022-10-24 PROCEDURE — 1090F PRES/ABSN URINE INCON ASSESS: CPT | Performed by: FAMILY MEDICINE

## 2022-10-24 PROCEDURE — G8427 DOCREV CUR MEDS BY ELIG CLIN: HCPCS | Performed by: FAMILY MEDICINE

## 2022-10-24 PROCEDURE — 99214 OFFICE O/P EST MOD 30 MIN: CPT | Performed by: FAMILY MEDICINE

## 2022-10-24 PROCEDURE — 1101F PT FALLS ASSESS-DOCD LE1/YR: CPT | Performed by: FAMILY MEDICINE

## 2022-10-24 PROCEDURE — G8399 PT W/DXA RESULTS DOCUMENT: HCPCS | Performed by: FAMILY MEDICINE

## 2022-10-24 PROCEDURE — G8754 DIAS BP LESS 90: HCPCS | Performed by: FAMILY MEDICINE

## 2022-10-24 PROCEDURE — G8420 CALC BMI NORM PARAMETERS: HCPCS | Performed by: FAMILY MEDICINE

## 2022-10-24 PROCEDURE — G8752 SYS BP LESS 140: HCPCS | Performed by: FAMILY MEDICINE

## 2022-10-24 PROCEDURE — 3017F COLORECTAL CA SCREEN DOC REV: CPT | Performed by: FAMILY MEDICINE

## 2022-10-24 PROCEDURE — G0463 HOSPITAL OUTPT CLINIC VISIT: HCPCS | Performed by: FAMILY MEDICINE

## 2022-10-24 PROCEDURE — G8536 NO DOC ELDER MAL SCRN: HCPCS | Performed by: FAMILY MEDICINE

## 2022-10-24 PROCEDURE — G9717 DOC PT DX DEP/BP F/U NT REQ: HCPCS | Performed by: FAMILY MEDICINE

## 2022-10-24 PROCEDURE — 3074F SYST BP LT 130 MM HG: CPT | Performed by: FAMILY MEDICINE

## 2022-10-24 RX ORDER — POTASSIUM CHLORIDE 20MEQ/15ML
LIQUID (ML) ORAL
Qty: 480 ML | Refills: 1 | Status: SHIPPED | OUTPATIENT
Start: 2022-10-24

## 2022-10-24 NOTE — PROGRESS NOTES
1. \"Have you been to the ER, urgent care clinic since your last visit? Hospitalized since your last visit? \" No    2. \"Have you seen or consulted any other health care providers outside of the [address] [place] [address] since your last visit? \" No     3. For patients aged 39-70: Has the patient had a colonoscopy / FIT/ Cologuard? No      If the patient is female:    4. For patients aged 41-77: Has the patient had a mammogram within the past 2 years? NA - based on age or sex      11. For patients aged 21-65: Has the patient had a pap smear?  NA - based on age or sex

## 2022-10-24 NOTE — PROGRESS NOTES
HPI:  Beth Anthony is a 78 y.o. female who presents today with   Chief Complaint   Patient presents with    Cholesterol Problem     4 mf/u    Blood sugar problem    Hypertension      She has been well. She is here with her son who assists with given the history. She has reexamined potassium about liquid form. She tolerates med well. Has a ophthalmology and podiatrist recently      Lab Results   Component Value Date/Time    Cholesterol, total 187 06/21/2022 03:12 PM    HDL Cholesterol 72 (H) 06/21/2022 03:12 PM    LDL, calculated 97.8 06/21/2022 03:12 PM    VLDL, calculated 17.2 06/21/2022 03:12 PM    Triglyceride 86 06/21/2022 03:12 PM    CHOL/HDL Ratio 2.6 06/21/2022 03:12 PM     Lab Results   Component Value Date/Time    Sodium 142 06/21/2022 03:12 PM    Potassium 2.9 (LL) 06/21/2022 03:12 PM    Chloride 96 (L) 06/21/2022 03:12 PM    CO2 38 (H) 06/21/2022 03:12 PM    Anion gap 8 06/21/2022 03:12 PM    Glucose 108 (H) 06/21/2022 03:12 PM    BUN 17 06/21/2022 03:12 PM    Creatinine 0.98 06/21/2022 03:12 PM    BUN/Creatinine ratio 17 06/21/2022 03:12 PM    GFR est AA >60 06/21/2022 03:12 PM    GFR est non-AA 55 (L) 06/21/2022 03:12 PM    Calcium 10.1 06/21/2022 03:12 PM    Bilirubin, total 0.4 06/21/2022 03:12 PM    Alk.  phosphatase 83 06/21/2022 03:12 PM    Protein, total 7.0 06/21/2022 03:12 PM    Albumin 3.7 06/21/2022 03:12 PM    Globulin 3.3 06/21/2022 03:12 PM    A-G Ratio 1.1 06/21/2022 03:12 PM    ALT (SGPT) 16 06/21/2022 03:12 PM    AST (SGOT) 31 06/21/2022 03:12 PM     Lab Results   Component Value Date/Time    Hemoglobin A1c 5.0 06/21/2022 03:12 PM           3 most recent PHQ Screens 10/24/2022   PHQ Not Done -   Little interest or pleasure in doing things Not at all   Feeling down, depressed, irritable, or hopeless Not at all   Total Score PHQ 2 0               PMH,  Meds, Allergies, Family History, Social history reviewed      Current Outpatient Medications   Medication Sig Dispense Refill potassium chloride (KAON 10%) 20 mEq/15 mL solution MIX 1 AND 1/2 TEASPOONFULS (=7.5ML) IN LIQUID AND     DRINK TWO TIMES A  mL 1    furosemide (LASIX) 40 mg tablet take 1 tablet by mouth once daily 90 Tablet 1    oxybutynin chloride XL (DITROPAN XL) 10 mg CR tablet TAKE 1 TABLET DAILY 90 Tablet 3    lovastatin (MEVACOR) 40 mg tablet Take 1 Tablet by mouth daily. 90 Tablet 3    PARoxetine (PAXIL) 20 mg tablet TAKE 1 TABLET DAILY 90 Tablet 3    therapeutic multivitamin (THERAGRAN) tablet Take 1 Tab by mouth daily. OXYCODONE HCL/ACETAMINOPHEN (PERCOCET PO) Take  by mouth. GOTU SILVIA HERB PO Take  by mouth. OLANZapine (ZYPREXA) 10 mg tablet Take 5 mg by mouth nightly. Allergies   Allergen Reactions    Aspirin Other (comments)     \"messes up my kidneys\"    Feldene [Piroxicam] Other (comments)     Kidney damage      Morphine Other (comments)     unconsciousness    Nsaids (Non-Steroidal Anti-Inflammatory Drug) Other (comments)     \"messes up my kidneys\"    Other Medication Not Reported This Time     Mycins      Penicillins Hives    Sulfa (Sulfonamide Antibiotics) Rash    Vancomycin Itching     Possible allergic reaction to Vancomycin.  Complained of itching/reddness around forehead/back of neck                  ROS as per HPI      Visit Vitals  /63 (BP 1 Location: Right arm, BP Patient Position: Sitting, BP Cuff Size: Adult)   Pulse 99   Temp 99.1 °F (37.3 °C) (Temporal)   Resp 12   Ht 5' 6\" (1.676 m)   Wt 125 lb 9.6 oz (57 kg)   SpO2 96%   BMI 20.27 kg/m²     Physical Exam    General appearance: alert, cooperative, no distress, appears stated age  Neck: supple, symmetrical, trachea midline, no adenopathy, thyroid: not enlarged, symmetric, no tenderness/mass/nodules, no carotid bruit and no JVD  Lungs: clear to auscultation bilaterally  Heart: regular rate and rhythm, S1, S2 normal, no murmur, click, rub or gallop    Extremities: extremities normal, atraumatic, no cyanosis or edema      Assessment/Plan:    Diagnoses and all orders for this visit:    1. Hypokalemia  -     POTASSIUM; Future    2. Primary hypertension    3. Hyperlipidemia LDL goal <100  -     LIPID PANEL; Future  -     METABOLIC PANEL, COMPREHENSIVE; Future    4. Elevated hemoglobin A1c  -     HEMOGLOBIN A1C WITH EAG; Future    Other orders  -     potassium chloride (KAON 10%) 20 mEq/15 mL solution; MIX 1 AND 1/2 TEASPOONFULS (=7.5ML) IN LIQUID AND     DRINK TWO TIMES A DAY      As above  Patient stable  Recheck potassium  Refilled potassium supplement  Follow-up and Dispositions    Return in about 6 months (around 4/24/2023). This has been fully explained to the patient, who indicates understanding. An After Visit Summary was printed and given to the patient. Follow-up and Dispositions    Return in about 6 months (around 4/24/2023).             Blanca Beckman MD

## 2022-10-24 NOTE — PATIENT INSTRUCTIONS
DASH Diet: Care Instructions  Your Care Instructions     The DASH diet is an eating plan that can help lower your blood pressure. DASH stands for Dietary Approaches to Stop Hypertension. Hypertension is high blood pressure. The DASH diet focuses on eating foods that are high in calcium, potassium, and magnesium. These nutrients can lower blood pressure. The foods that are highest in these nutrients are fruits, vegetables, low-fat dairy products, nuts, seeds, and legumes. But taking calcium, potassium, and magnesium supplements instead of eating foods that are high in those nutrients does not have the same effect. The DASH diet also includes whole grains, fish, and poultry. The DASH diet is one of several lifestyle changes your doctor may recommend to lower your high blood pressure. Your doctor may also want you to decrease the amount of sodium in your diet. Lowering sodium while following the DASH diet can lower blood pressure even further than just the DASH diet alone. Follow-up care is a key part of your treatment and safety. Be sure to make and go to all appointments, and call your doctor if you are having problems. It's also a good idea to know your test results and keep a list of the medicines you take. How can you care for yourself at home? Following the DASH diet  Eat 4 to 5 servings of fruit each day. A serving is 1 medium-sized piece of fruit, ½ cup chopped or canned fruit, 1/4 cup dried fruit, or 4 ounces (½ cup) of fruit juice. Choose fruit more often than fruit juice. Eat 4 to 5 servings of vegetables each day. A serving is 1 cup of lettuce or raw leafy vegetables, ½ cup of chopped or cooked vegetables, or 4 ounces (½ cup) of vegetable juice. Choose vegetables more often than vegetable juice. Get 2 to 3 servings of low-fat and fat-free dairy each day. A serving is 8 ounces of milk, 1 cup of yogurt, or 1 ½ ounces of cheese. Eat 6 to 8 servings of grains each day.  A serving is 1 slice of bread, 1 ounce of dry cereal, or ½ cup of cooked rice, pasta, or cooked cereal. Try to choose whole-grain products as much as possible. Limit lean meat, poultry, and fish to 2 servings each day. A serving is 3 ounces, about the size of a deck of cards. Eat 4 to 5 servings of nuts, seeds, and legumes (cooked dried beans, lentils, and split peas) each week. A serving is 1/3 cup of nuts, 2 tablespoons of seeds, or ½ cup of cooked beans or peas. Limit fats and oils to 2 to 3 servings each day. A serving is 1 teaspoon of vegetable oil or 2 tablespoons of salad dressing. Limit sweets and added sugars to 5 servings or less a week. A serving is 1 tablespoon jelly or jam, ½ cup sorbet, or 1 cup of lemonade. Eat less than 2,300 milligrams (mg) of sodium a day. If you limit your sodium to 1,500 mg a day, you can lower your blood pressure even more. Be aware that all of these are the suggested number of servings for people who eat 1,800 to 2,000 calories a day. Your recommended number of servings may be different if you need more or fewer calories. Tips for success  Start small. Do not try to make dramatic changes to your diet all at once. You might feel that you are missing out on your favorite foods and then be more likely to not follow the plan. Make small changes, and stick with them. Once those changes become habit, add a few more changes. Try some of the following:  Make it a goal to eat a fruit or vegetable at every meal and at snacks. This will make it easy to get the recommended amount of fruits and vegetables each day. Try yogurt topped with fruit and nuts for a snack or healthy dessert. Add lettuce, tomato, cucumber, and onion to sandwiches. Combine a ready-made pizza crust with low-fat mozzarella cheese and lots of vegetable toppings. Try using tomatoes, squash, spinach, broccoli, carrots, cauliflower, and onions.   Have a variety of cut-up vegetables with a low-fat dip as an appetizer instead of chips and dip.  Sprinkle sunflower seeds or chopped almonds over salads. Or try adding chopped walnuts or almonds to cooked vegetables. Try some vegetarian meals using beans and peas. Add garbanzo or kidney beans to salads. Make burritos and tacos with mashed riley beans or black beans. Where can you learn more? Go to http://www.erickson.com/  Enter H967 in the search box to learn more about \"DASH Diet: Care Instructions. \"  Current as of: January 10, 2022               Content Version: 13.4  © 3422-3115 [employer]. Care instructions adapted under license by [employer] (which disclaims liability or warranty for this information). If you have questions about a medical condition or this instruction, always ask your healthcare professional. [address] any warranty or liability for your use of this information.

## 2022-11-18 RX ORDER — PAROXETINE HYDROCHLORIDE 20 MG/1
TABLET, FILM COATED ORAL
Qty: 90 TABLET | Refills: 3 | Status: SHIPPED | OUTPATIENT
Start: 2022-11-18

## 2022-12-06 RX ORDER — LOVASTATIN 40 MG/1
TABLET ORAL
Qty: 90 TABLET | Refills: 3 | Status: SHIPPED | OUTPATIENT
Start: 2022-12-06

## 2022-12-13 NOTE — TELEPHONE ENCOUNTER
Last Appointment 10/24/2022  Next Appointment NONE      Requested Prescriptions     Pending Prescriptions Disp Refills    lovastatin (MEVACOR) 40 mg tablet 90 Tablet 3     Si Tablet daily.

## 2022-12-16 RX ORDER — LOVASTATIN 40 MG/1
40 TABLET ORAL DAILY
Qty: 90 TABLET | Refills: 3 | Status: SHIPPED | OUTPATIENT
Start: 2022-12-16

## 2023-01-13 RX ORDER — POTASSIUM CHLORIDE 20MEQ/15ML
LIQUID (ML) ORAL
Qty: 480 ML | Refills: 1 | Status: SHIPPED | OUTPATIENT
Start: 2023-01-13

## 2023-01-28 ENCOUNTER — APPOINTMENT (OUTPATIENT)
Dept: CT IMAGING | Age: 78
DRG: 040 | End: 2023-01-28
Attending: EMERGENCY MEDICINE
Payer: MEDICARE

## 2023-01-28 ENCOUNTER — HOSPITAL ENCOUNTER (INPATIENT)
Age: 78
LOS: 11 days | Discharge: HOME HEALTH | DRG: 040 | End: 2023-02-08
Attending: EMERGENCY MEDICINE | Admitting: INTERNAL MEDICINE
Payer: MEDICARE

## 2023-01-28 ENCOUNTER — APPOINTMENT (OUTPATIENT)
Dept: CT IMAGING | Age: 78
DRG: 040 | End: 2023-01-28
Attending: STUDENT IN AN ORGANIZED HEALTH CARE EDUCATION/TRAINING PROGRAM
Payer: MEDICARE

## 2023-01-28 ENCOUNTER — APPOINTMENT (OUTPATIENT)
Dept: GENERAL RADIOLOGY | Age: 78
DRG: 040 | End: 2023-01-28
Attending: EMERGENCY MEDICINE
Payer: MEDICARE

## 2023-01-28 DIAGNOSIS — F03.90 DEMENTIA WITHOUT BEHAVIORAL DISTURBANCE, PSYCHOTIC DISTURBANCE, MOOD DISTURBANCE, OR ANXIETY, UNSPECIFIED DEMENTIA SEVERITY, UNSPECIFIED DEMENTIA TYPE (HCC): ICD-10-CM

## 2023-01-28 DIAGNOSIS — Z71.89 ADVANCED CARE PLANNING/COUNSELING DISCUSSION: ICD-10-CM

## 2023-01-28 DIAGNOSIS — G40.909 SEIZURE DISORDER (HCC): Primary | ICD-10-CM

## 2023-01-28 DIAGNOSIS — R53.81 DEBILITY: ICD-10-CM

## 2023-01-28 DIAGNOSIS — Z51.5 ENCOUNTER FOR PALLIATIVE CARE: ICD-10-CM

## 2023-01-28 DIAGNOSIS — R07.9 CHEST PAIN, UNSPECIFIED TYPE: ICD-10-CM

## 2023-01-28 DIAGNOSIS — K21.9 GASTROESOPHAGEAL REFLUX DISEASE WITHOUT ESOPHAGITIS: ICD-10-CM

## 2023-01-28 DIAGNOSIS — R56.9 SEIZURE (HCC): ICD-10-CM

## 2023-01-28 DIAGNOSIS — N63.20 MASS OF LEFT BREAST, UNSPECIFIED QUADRANT: ICD-10-CM

## 2023-01-28 DIAGNOSIS — N17.9 AKI (ACUTE KIDNEY INJURY) (HCC): ICD-10-CM

## 2023-01-28 DIAGNOSIS — M54.9 BACK PAIN, UNSPECIFIED BACK LOCATION, UNSPECIFIED BACK PAIN LATERALITY, UNSPECIFIED CHRONICITY: ICD-10-CM

## 2023-01-28 DIAGNOSIS — I21.4 NSTEMI (NON-ST ELEVATED MYOCARDIAL INFARCTION) (HCC): ICD-10-CM

## 2023-01-28 DIAGNOSIS — F41.9 CHRONIC ANXIETY: ICD-10-CM

## 2023-01-28 LAB
ALBUMIN SERPL-MCNC: 3.8 G/DL (ref 3.4–5)
ALBUMIN/GLOB SERPL: 1 (ref 0.8–1.7)
ALP SERPL-CCNC: 68 U/L (ref 45–117)
ALT SERPL-CCNC: 19 U/L (ref 13–56)
AMPHET UR QL SCN: NEGATIVE
ANION GAP SERPL CALC-SCNC: 8 MMOL/L (ref 3–18)
APPEARANCE UR: CLEAR
APTT PPP: 26.1 SEC (ref 23–36.4)
APTT PPP: 60.6 SEC (ref 23–36.4)
APTT PPP: 72.6 SEC (ref 23–36.4)
AST SERPL-CCNC: 31 U/L (ref 10–38)
ATRIAL RATE: 93 BPM
BACTERIA URNS QL MICRO: NEGATIVE /HPF
BARBITURATES UR QL SCN: NEGATIVE
BASOPHILS # BLD: 0.1 K/UL (ref 0–0.1)
BASOPHILS NFR BLD: 1 % (ref 0–2)
BENZODIAZ UR QL: NEGATIVE
BILIRUB SERPL-MCNC: 0.5 MG/DL (ref 0.2–1)
BILIRUB UR QL: NEGATIVE
BUN SERPL-MCNC: 19 MG/DL (ref 7–18)
BUN/CREAT SERPL: 13 (ref 12–20)
CALCIUM SERPL-MCNC: 10.3 MG/DL (ref 8.5–10.1)
CALCULATED P AXIS, ECG09: 43 DEGREES
CALCULATED R AXIS, ECG10: 30 DEGREES
CALCULATED T AXIS, ECG11: 7 DEGREES
CANNABINOIDS UR QL SCN: NEGATIVE
CHLORIDE SERPL-SCNC: 98 MMOL/L (ref 100–111)
CK SERPL-CCNC: 42 U/L (ref 26–192)
CO2 SERPL-SCNC: 32 MMOL/L (ref 21–32)
COCAINE UR QL SCN: NEGATIVE
COLOR UR: YELLOW
COVID-19 RAPID TEST, COVR: NOT DETECTED
CREAT SERPL-MCNC: 1.48 MG/DL (ref 0.6–1.3)
DIAGNOSIS, 93000: NORMAL
DIFFERENTIAL METHOD BLD: ABNORMAL
EOSINOPHIL # BLD: 0.1 K/UL (ref 0–0.4)
EOSINOPHIL NFR BLD: 2 % (ref 0–5)
EPITH CASTS URNS QL MICRO: NORMAL /LPF (ref 0–5)
ERYTHROCYTE [DISTWIDTH] IN BLOOD BY AUTOMATED COUNT: 14.2 % (ref 11.6–14.5)
ETHANOL SERPL-MCNC: <3 MG/DL (ref 0–3)
FLUAV AG NPH QL IA: NEGATIVE
FLUBV AG NOSE QL IA: NEGATIVE
GLOBULIN SER CALC-MCNC: 3.8 G/DL (ref 2–4)
GLUCOSE SERPL-MCNC: 150 MG/DL (ref 74–99)
GLUCOSE UR STRIP.AUTO-MCNC: NEGATIVE MG/DL
HCT VFR BLD AUTO: 41.3 % (ref 35–45)
HDSCOM,HDSCOM: NORMAL
HGB BLD-MCNC: 13.5 G/DL (ref 12–16)
HGB UR QL STRIP: NEGATIVE
IMM GRANULOCYTES # BLD AUTO: 0 K/UL (ref 0–0.04)
IMM GRANULOCYTES NFR BLD AUTO: 0 % (ref 0–0.5)
KETONES UR QL STRIP.AUTO: NEGATIVE MG/DL
LACTATE BLD-SCNC: 1.41 MMOL/L (ref 0.4–2)
LEUKOCYTE ESTERASE UR QL STRIP.AUTO: ABNORMAL
LYMPHOCYTES # BLD: 3 K/UL (ref 0.9–3.6)
LYMPHOCYTES NFR BLD: 41 % (ref 21–52)
MAGNESIUM SERPL-MCNC: 2.1 MG/DL (ref 1.6–2.6)
MCH RBC QN AUTO: 29 PG (ref 24–34)
MCHC RBC AUTO-ENTMCNC: 32.7 G/DL (ref 31–37)
MCV RBC AUTO: 88.6 FL (ref 78–100)
METHADONE UR QL: NEGATIVE
MONOCYTES # BLD: 0.7 K/UL (ref 0.05–1.2)
MONOCYTES NFR BLD: 9 % (ref 3–10)
NEUTS SEG # BLD: 3.5 K/UL (ref 1.8–8)
NEUTS SEG NFR BLD: 47 % (ref 40–73)
NITRITE UR QL STRIP.AUTO: NEGATIVE
NRBC # BLD: 0 K/UL (ref 0–0.01)
NRBC BLD-RTO: 0 PER 100 WBC
OPIATES UR QL: NEGATIVE
P-R INTERVAL, ECG05: 148 MS
PCP UR QL: NEGATIVE
PH UR STRIP: 7 (ref 5–8)
PLATELET # BLD AUTO: 308 K/UL (ref 135–420)
PMV BLD AUTO: 9.1 FL (ref 9.2–11.8)
POTASSIUM SERPL-SCNC: 3.6 MMOL/L (ref 3.5–5.5)
PROT SERPL-MCNC: 7.6 G/DL (ref 6.4–8.2)
PROT UR STRIP-MCNC: NEGATIVE MG/DL
Q-T INTERVAL, ECG07: 364 MS
QRS DURATION, ECG06: 74 MS
QTC CALCULATION (BEZET), ECG08: 452 MS
RBC # BLD AUTO: 4.66 M/UL (ref 4.2–5.3)
RBC #/AREA URNS HPF: NEGATIVE /HPF (ref 0–5)
SODIUM SERPL-SCNC: 138 MMOL/L (ref 136–145)
SOURCE, COVRS: NORMAL
SP GR UR REFRACTOMETRY: 1.01 (ref 1–1.03)
TROPONIN-HIGH SENSITIVITY: 331 NG/L (ref 0–54)
TROPONIN-HIGH SENSITIVITY: 3910 NG/L (ref 0–54)
TROPONIN-HIGH SENSITIVITY: 5132 NG/L (ref 0–54)
TROPONIN-HIGH SENSITIVITY: 5442 NG/L (ref 0–54)
UROBILINOGEN UR QL STRIP.AUTO: 0.2 EU/DL (ref 0.2–1)
VENTRICULAR RATE, ECG03: 93 BPM
WBC # BLD AUTO: 7.4 K/UL (ref 4.6–13.2)
WBC URNS QL MICRO: NORMAL /HPF (ref 0–4)

## 2023-01-28 PROCEDURE — 85730 THROMBOPLASTIN TIME PARTIAL: CPT

## 2023-01-28 PROCEDURE — 71275 CT ANGIOGRAPHY CHEST: CPT

## 2023-01-28 PROCEDURE — 70450 CT HEAD/BRAIN W/O DYE: CPT

## 2023-01-28 PROCEDURE — 87804 INFLUENZA ASSAY W/OPTIC: CPT

## 2023-01-28 PROCEDURE — 80053 COMPREHEN METABOLIC PANEL: CPT

## 2023-01-28 PROCEDURE — 87635 SARS-COV-2 COVID-19 AMP PRB: CPT

## 2023-01-28 PROCEDURE — 74011250636 HC RX REV CODE- 250/636: Performed by: EMERGENCY MEDICINE

## 2023-01-28 PROCEDURE — 93005 ELECTROCARDIOGRAM TRACING: CPT

## 2023-01-28 PROCEDURE — 74011250637 HC RX REV CODE- 250/637: Performed by: STUDENT IN AN ORGANIZED HEALTH CARE EDUCATION/TRAINING PROGRAM

## 2023-01-28 PROCEDURE — 82077 ASSAY SPEC XCP UR&BREATH IA: CPT

## 2023-01-28 PROCEDURE — 82550 ASSAY OF CK (CPK): CPT

## 2023-01-28 PROCEDURE — 99285 EMERGENCY DEPT VISIT HI MDM: CPT

## 2023-01-28 PROCEDURE — 84484 ASSAY OF TROPONIN QUANT: CPT

## 2023-01-28 PROCEDURE — 81001 URINALYSIS AUTO W/SCOPE: CPT

## 2023-01-28 PROCEDURE — 83735 ASSAY OF MAGNESIUM: CPT

## 2023-01-28 PROCEDURE — 83605 ASSAY OF LACTIC ACID: CPT

## 2023-01-28 PROCEDURE — 85025 COMPLETE CBC W/AUTO DIFF WBC: CPT

## 2023-01-28 PROCEDURE — 65270000046 HC RM TELEMETRY

## 2023-01-28 PROCEDURE — 74011000636 HC RX REV CODE- 636: Performed by: STUDENT IN AN ORGANIZED HEALTH CARE EDUCATION/TRAINING PROGRAM

## 2023-01-28 PROCEDURE — 71045 X-RAY EXAM CHEST 1 VIEW: CPT

## 2023-01-28 PROCEDURE — 80307 DRUG TEST PRSMV CHEM ANLYZR: CPT

## 2023-01-28 PROCEDURE — 74011250636 HC RX REV CODE- 250/636: Performed by: STUDENT IN AN ORGANIZED HEALTH CARE EDUCATION/TRAINING PROGRAM

## 2023-01-28 RX ORDER — HEPARIN SODIUM 10000 [USP'U]/100ML
12-25 INJECTION, SOLUTION INTRAVENOUS
Status: DISCONTINUED | OUTPATIENT
Start: 2023-01-28 | End: 2023-01-30

## 2023-01-28 RX ORDER — HEPARIN SODIUM 1000 [USP'U]/ML
60 INJECTION, SOLUTION INTRAVENOUS; SUBCUTANEOUS ONCE
Status: COMPLETED | OUTPATIENT
Start: 2023-01-28 | End: 2023-01-28

## 2023-01-28 RX ORDER — ATORVASTATIN CALCIUM 10 MG/1
10 TABLET, FILM COATED ORAL DAILY
Status: DISCONTINUED | OUTPATIENT
Start: 2023-01-29 | End: 2023-01-30

## 2023-01-28 RX ORDER — OLANZAPINE 2.5 MG/1
5 TABLET ORAL
Status: DISCONTINUED | OUTPATIENT
Start: 2023-01-28 | End: 2023-02-08 | Stop reason: HOSPADM

## 2023-01-28 RX ORDER — ASPIRIN 325 MG
325 TABLET ORAL DAILY
Status: DISCONTINUED | OUTPATIENT
Start: 2023-01-28 | End: 2023-02-08 | Stop reason: HOSPADM

## 2023-01-28 RX ORDER — OXYBUTYNIN CHLORIDE 5 MG/1
10 TABLET, EXTENDED RELEASE ORAL DAILY
Status: DISCONTINUED | OUTPATIENT
Start: 2023-01-29 | End: 2023-02-08 | Stop reason: HOSPADM

## 2023-01-28 RX ORDER — FUROSEMIDE 40 MG/1
40 TABLET ORAL DAILY
Status: DISCONTINUED | OUTPATIENT
Start: 2023-01-29 | End: 2023-01-30

## 2023-01-28 RX ORDER — SODIUM CHLORIDE 9 MG/ML
150 INJECTION, SOLUTION INTRAVENOUS ONCE
Status: COMPLETED | OUTPATIENT
Start: 2023-01-28 | End: 2023-01-28

## 2023-01-28 RX ORDER — HEPARIN SODIUM 1000 [USP'U]/ML
3000 INJECTION, SOLUTION INTRAVENOUS; SUBCUTANEOUS
Status: COMPLETED | OUTPATIENT
Start: 2023-01-28 | End: 2023-01-28

## 2023-01-28 RX ORDER — PAROXETINE HYDROCHLORIDE 20 MG/1
20 TABLET, FILM COATED ORAL DAILY
Status: DISCONTINUED | OUTPATIENT
Start: 2023-01-29 | End: 2023-02-08 | Stop reason: HOSPADM

## 2023-01-28 RX ADMIN — SODIUM CHLORIDE 150 ML/HR: 9 INJECTION, SOLUTION INTRAVENOUS at 05:35

## 2023-01-28 RX ADMIN — IOPAMIDOL 72 ML: 755 INJECTION, SOLUTION INTRAVENOUS at 08:42

## 2023-01-28 RX ADMIN — OLANZAPINE 5 MG: 5 TABLET, FILM COATED ORAL at 21:10

## 2023-01-28 RX ADMIN — HEPARIN SODIUM 3400 UNITS: 1000 INJECTION INTRAVENOUS; SUBCUTANEOUS at 10:06

## 2023-01-28 RX ADMIN — HEPARIN SODIUM 3000 UNITS: 1000 INJECTION INTRAVENOUS; SUBCUTANEOUS at 22:32

## 2023-01-28 RX ADMIN — SODIUM CHLORIDE 1000 ML: 9 INJECTION, SOLUTION INTRAVENOUS at 07:42

## 2023-01-28 RX ADMIN — ASPIRIN 325 MG ORAL TABLET 325 MG: 325 PILL ORAL at 09:36

## 2023-01-28 RX ADMIN — HEPARIN SODIUM 12 UNITS/KG/HR: 10000 INJECTION, SOLUTION INTRAVENOUS at 10:05

## 2023-01-28 NOTE — ED NOTES
Shift change report given to Lorri Luong by Francois Mace RN. Report included the following information SBAR, ED Summary, and MAR.

## 2023-01-28 NOTE — Clinical Note
Status[de-identified] INPATIENT [101]   Type of Bed: Telemetry [19]   Cardiac Monitoring Required?: Yes   Inpatient Hospitalization Certified Necessary for the Following Reasons: 3.  Patient receiving treatment that can only be provided in an inpatient setting (further clarification in H&P documentation)   Admitting Diagnosis: NSTEMI (non-ST elevated myocardial infarction) [facility]) [[id number]]   Admitting Physician: Ambar Neville [[id number]]   Attending Physician: Twan Mcknight   Estimated Length of Stay: 3-4 Midnights   Discharge Plan[de-identified] Home with Office Follow-up

## 2023-01-28 NOTE — ED NOTES
Patient's son arrived and states patient has seizure like activity at home.  states that he was unaware of the time frame, but remembered that he was told that his Mother had seizures as a child. Seizure pads applied to stretcher for safety. Patient is alert and oriented and answering all questions without hesitation now. Patient continues to deny pain or discomfort.

## 2023-01-28 NOTE — ED TRIAGE NOTES
Per EMS patient's son called 911 because patient was not acting like herself. Per EMS patient's son states that patient is usually alert and oriented and very cooperative. Per EMS, patient would not cooperate with them for taking vital signs or asking questions. Upon arrival to ED treatment area, patient is quiet but agrees to be looked at by the ER doctor. Patient also denies pain.

## 2023-01-28 NOTE — ED NOTES
Assuming care of patient. Patient resting in stretcher. Alert to self. Respirations even and unlabored.

## 2023-01-29 LAB
APTT PPP: 103.5 SEC (ref 23–36.4)
APTT PPP: 117.9 SEC (ref 23–36.4)
APTT PPP: 67.8 SEC (ref 23–36.4)
BASOPHILS # BLD: 0 K/UL (ref 0–0.1)
BASOPHILS NFR BLD: 0 % (ref 0–2)
DIFFERENTIAL METHOD BLD: ABNORMAL
EOSINOPHIL # BLD: 0 K/UL (ref 0–0.4)
EOSINOPHIL NFR BLD: 0 % (ref 0–5)
ERYTHROCYTE [DISTWIDTH] IN BLOOD BY AUTOMATED COUNT: 14.5 % (ref 11.6–14.5)
GLUCOSE BLD STRIP.AUTO-MCNC: 100 MG/DL (ref 70–110)
HCT VFR BLD AUTO: 30.1 % (ref 35–45)
HGB BLD-MCNC: 10 G/DL (ref 12–16)
IMM GRANULOCYTES # BLD AUTO: 0 K/UL (ref 0–0.04)
IMM GRANULOCYTES NFR BLD AUTO: 0 % (ref 0–0.5)
LYMPHOCYTES # BLD: 1.6 K/UL (ref 0.9–3.6)
LYMPHOCYTES NFR BLD: 21 % (ref 21–52)
MCH RBC QN AUTO: 29.3 PG (ref 24–34)
MCHC RBC AUTO-ENTMCNC: 33.2 G/DL (ref 31–37)
MCV RBC AUTO: 88.3 FL (ref 78–100)
MONOCYTES # BLD: 0.7 K/UL (ref 0.05–1.2)
MONOCYTES NFR BLD: 9 % (ref 3–10)
NEUTS SEG # BLD: 5.3 K/UL (ref 1.8–8)
NEUTS SEG NFR BLD: 68 % (ref 40–73)
NRBC # BLD: 0 K/UL (ref 0–0.01)
NRBC BLD-RTO: 0 PER 100 WBC
PLATELET # BLD AUTO: 217 K/UL (ref 135–420)
PMV BLD AUTO: 9 FL (ref 9.2–11.8)
RBC # BLD AUTO: 3.41 M/UL (ref 4.2–5.3)
WBC # BLD AUTO: 7.7 K/UL (ref 4.6–13.2)

## 2023-01-29 PROCEDURE — 74011250636 HC RX REV CODE- 250/636: Performed by: STUDENT IN AN ORGANIZED HEALTH CARE EDUCATION/TRAINING PROGRAM

## 2023-01-29 PROCEDURE — 85730 THROMBOPLASTIN TIME PARTIAL: CPT

## 2023-01-29 PROCEDURE — 82962 GLUCOSE BLOOD TEST: CPT

## 2023-01-29 PROCEDURE — 74011250636 HC RX REV CODE- 250/636: Performed by: EMERGENCY MEDICINE

## 2023-01-29 PROCEDURE — 85025 COMPLETE CBC W/AUTO DIFF WBC: CPT

## 2023-01-29 PROCEDURE — 65270000046 HC RM TELEMETRY

## 2023-01-29 PROCEDURE — 74011250637 HC RX REV CODE- 250/637: Performed by: STUDENT IN AN ORGANIZED HEALTH CARE EDUCATION/TRAINING PROGRAM

## 2023-01-29 RX ORDER — LORAZEPAM 2 MG/ML
1 INJECTION INTRAMUSCULAR
Status: COMPLETED | OUTPATIENT
Start: 2023-01-29 | End: 2023-01-29

## 2023-01-29 RX ORDER — HALOPERIDOL 5 MG/ML
2.5 INJECTION INTRAMUSCULAR
Status: DISCONTINUED | OUTPATIENT
Start: 2023-01-29 | End: 2023-02-08 | Stop reason: HOSPADM

## 2023-01-29 RX ORDER — HALOPERIDOL 5 MG/ML
2 INJECTION INTRAMUSCULAR
Status: COMPLETED | OUTPATIENT
Start: 2023-01-29 | End: 2023-01-29

## 2023-01-29 RX ADMIN — FUROSEMIDE 40 MG: 40 TABLET ORAL at 09:12

## 2023-01-29 RX ADMIN — HALOPERIDOL LACTATE 2.5 MG: 5 INJECTION, SOLUTION INTRAMUSCULAR at 20:59

## 2023-01-29 RX ADMIN — ATORVASTATIN CALCIUM 10 MG: 20 TABLET, FILM COATED ORAL at 09:11

## 2023-01-29 RX ADMIN — LORAZEPAM 1 MG: 2 INJECTION INTRAMUSCULAR; INTRAVENOUS at 01:28

## 2023-01-29 RX ADMIN — LORAZEPAM 1 MG: 2 INJECTION INTRAMUSCULAR; INTRAVENOUS at 02:10

## 2023-01-29 RX ADMIN — HALOPERIDOL LACTATE 2.5 MG: 5 INJECTION, SOLUTION INTRAMUSCULAR at 10:43

## 2023-01-29 RX ADMIN — OXYBUTYNIN CHLORIDE 10 MG: 5 TABLET, EXTENDED RELEASE ORAL at 09:12

## 2023-01-29 RX ADMIN — ASPIRIN 325 MG ORAL TABLET 325 MG: 325 PILL ORAL at 09:11

## 2023-01-29 RX ADMIN — LORAZEPAM 1 MG: 2 INJECTION INTRAMUSCULAR; INTRAVENOUS at 23:49

## 2023-01-29 RX ADMIN — PAROXETINE HYDROCHLORIDE 20 MG: 20 TABLET, FILM COATED ORAL at 09:12

## 2023-01-29 RX ADMIN — HALOPERIDOL LACTATE 2 MG: 5 INJECTION, SOLUTION INTRAMUSCULAR at 03:12

## 2023-01-29 NOTE — ED NOTES
Pt repeatedly pulling and IVs, attempting to get out of bed. 2 IVs initiated. Heparin infusing. Pt cleaned and placed on hospital bed with purewick in place.

## 2023-01-29 NOTE — ED NOTES
Patient is in bed , resting with eyes closed. Patient is on the monitor. Even and normal respirations.

## 2023-01-29 NOTE — ED NOTES
Pt resting comfortably in hospital bed, arousable by touch and voice, resp even/ulabored. Pt awaiting bed assignment. Will continue to monitor.

## 2023-01-29 NOTE — ED NOTES
Patient is in bed , resting with eyes open. Patient is alert and disoriented. Patient continues to pull of all cardiac and vital sign monitoring. Patient has seizure pads in place at this time.

## 2023-01-29 NOTE — ED NOTES
Pt awake and pulling at IVs. Pt medicated with 0900 meds. Pt given applesauce with meds. Will continue to monitor.

## 2023-01-29 NOTE — ED NOTES
Pt given lunch tray. Attempted to assist pt in eating. Pt tasted food. Declines eating at this time.

## 2023-01-29 NOTE — ED NOTES
Pt continues pulling at IVs, attempting to get out of hospital bed. Pt medicated for agitation per PRN orders.

## 2023-01-30 PROBLEM — N63.20 BREAST MASS, LEFT: Status: ACTIVE | Noted: 2023-01-30

## 2023-01-30 PROBLEM — N17.9 AKI (ACUTE KIDNEY INJURY) (HCC): Status: ACTIVE | Noted: 2023-01-30

## 2023-01-30 PROBLEM — R56.9 SEIZURE (HCC): Status: ACTIVE | Noted: 2023-01-30

## 2023-01-30 LAB
APTT PPP: 105.9 SEC (ref 23–36.4)
BASOPHILS # BLD: 0 K/UL (ref 0–0.1)
BASOPHILS NFR BLD: 1 % (ref 0–2)
DIFFERENTIAL METHOD BLD: ABNORMAL
EOSINOPHIL # BLD: 0.1 K/UL (ref 0–0.4)
EOSINOPHIL NFR BLD: 1 % (ref 0–5)
ERYTHROCYTE [DISTWIDTH] IN BLOOD BY AUTOMATED COUNT: 14.5 % (ref 11.6–14.5)
GLUCOSE BLD STRIP.AUTO-MCNC: 92 MG/DL (ref 70–110)
HCT VFR BLD AUTO: 29.7 % (ref 35–45)
HGB BLD-MCNC: 9.8 G/DL (ref 12–16)
IMM GRANULOCYTES # BLD AUTO: 0 K/UL (ref 0–0.04)
IMM GRANULOCYTES NFR BLD AUTO: 0 % (ref 0–0.5)
LYMPHOCYTES # BLD: 2 K/UL (ref 0.9–3.6)
LYMPHOCYTES NFR BLD: 28 % (ref 21–52)
MCH RBC QN AUTO: 28.9 PG (ref 24–34)
MCHC RBC AUTO-ENTMCNC: 33 G/DL (ref 31–37)
MCV RBC AUTO: 87.6 FL (ref 78–100)
MONOCYTES # BLD: 0.7 K/UL (ref 0.05–1.2)
MONOCYTES NFR BLD: 9 % (ref 3–10)
NEUTS SEG # BLD: 4.4 K/UL (ref 1.8–8)
NEUTS SEG NFR BLD: 61 % (ref 40–73)
NRBC # BLD: 0 K/UL (ref 0–0.01)
NRBC BLD-RTO: 0 PER 100 WBC
PLATELET # BLD AUTO: 211 K/UL (ref 135–420)
PMV BLD AUTO: 9.2 FL (ref 9.2–11.8)
RBC # BLD AUTO: 3.39 M/UL (ref 4.2–5.3)
WBC # BLD AUTO: 7.1 K/UL (ref 4.6–13.2)

## 2023-01-30 PROCEDURE — 95816 EEG AWAKE AND DROWSY: CPT | Performed by: INTERNAL MEDICINE

## 2023-01-30 PROCEDURE — 74011250636 HC RX REV CODE- 250/636: Performed by: INTERNAL MEDICINE

## 2023-01-30 PROCEDURE — 85730 THROMBOPLASTIN TIME PARTIAL: CPT

## 2023-01-30 PROCEDURE — 77030040392 HC DRSG OPTIFOAM MDII -A

## 2023-01-30 PROCEDURE — 82962 GLUCOSE BLOOD TEST: CPT

## 2023-01-30 PROCEDURE — 2709999900 HC NON-CHARGEABLE SUPPLY

## 2023-01-30 PROCEDURE — 99223 1ST HOSP IP/OBS HIGH 75: CPT | Performed by: INTERNAL MEDICINE

## 2023-01-30 PROCEDURE — 85025 COMPLETE CBC W/AUTO DIFF WBC: CPT

## 2023-01-30 PROCEDURE — 74011250637 HC RX REV CODE- 250/637: Performed by: STUDENT IN AN ORGANIZED HEALTH CARE EDUCATION/TRAINING PROGRAM

## 2023-01-30 PROCEDURE — 74011000250 HC RX REV CODE- 250: Performed by: INTERNAL MEDICINE

## 2023-01-30 PROCEDURE — 77030038269 HC DRN EXT URIN PURWCK BARD -A

## 2023-01-30 PROCEDURE — 74011250637 HC RX REV CODE- 250/637: Performed by: INTERNAL MEDICINE

## 2023-01-30 PROCEDURE — 65270000046 HC RM TELEMETRY

## 2023-01-30 PROCEDURE — 77030018842 HC SOL IRR SOD CL 9% BAXT -A

## 2023-01-30 RX ORDER — SODIUM CHLORIDE 0.9 % (FLUSH) 0.9 %
5-40 SYRINGE (ML) INJECTION EVERY 8 HOURS
Status: DISCONTINUED | OUTPATIENT
Start: 2023-01-30 | End: 2023-02-08 | Stop reason: HOSPADM

## 2023-01-30 RX ORDER — ATORVASTATIN CALCIUM 40 MG/1
40 TABLET, FILM COATED ORAL
Status: DISCONTINUED | OUTPATIENT
Start: 2023-01-30 | End: 2023-02-08 | Stop reason: HOSPADM

## 2023-01-30 RX ORDER — LACTULOSE 10 G/15ML
SOLUTION ORAL
COMMUNITY
Start: 2023-01-03

## 2023-01-30 RX ORDER — LEVETIRACETAM 500 MG/1
500 TABLET ORAL 2 TIMES DAILY
Status: DISCONTINUED | OUTPATIENT
Start: 2023-01-31 | End: 2023-02-08 | Stop reason: HOSPADM

## 2023-01-30 RX ORDER — OXYCODONE AND ACETAMINOPHEN 10; 325 MG/1; MG/1
1 TABLET ORAL
COMMUNITY
Start: 2023-01-05 | End: 2023-02-08

## 2023-01-30 RX ORDER — LEVETIRACETAM 10 MG/ML
1000 INJECTION INTRAVASCULAR ONCE
Status: COMPLETED | OUTPATIENT
Start: 2023-01-30 | End: 2023-01-30

## 2023-01-30 RX ORDER — CHOLECALCIFEROL (VITAMIN D3) 125 MCG
5 CAPSULE ORAL
Status: COMPLETED | OUTPATIENT
Start: 2023-01-30 | End: 2023-02-01

## 2023-01-30 RX ORDER — LORAZEPAM 2 MG/ML
1 INJECTION INTRAMUSCULAR
Status: DISCONTINUED | OUTPATIENT
Start: 2023-01-30 | End: 2023-02-08 | Stop reason: HOSPADM

## 2023-01-30 RX ORDER — ACETAMINOPHEN 325 MG/1
650 TABLET ORAL
Status: DISCONTINUED | OUTPATIENT
Start: 2023-01-30 | End: 2023-02-08 | Stop reason: HOSPADM

## 2023-01-30 RX ORDER — SODIUM CHLORIDE 9 MG/ML
100 INJECTION, SOLUTION INTRAVENOUS CONTINUOUS
Status: DISPENSED | OUTPATIENT
Start: 2023-01-30 | End: 2023-01-31

## 2023-01-30 RX ORDER — POLYETHYLENE GLYCOL 3350 17 G/17G
17 POWDER, FOR SOLUTION ORAL DAILY PRN
Status: DISCONTINUED | OUTPATIENT
Start: 2023-01-30 | End: 2023-02-08 | Stop reason: HOSPADM

## 2023-01-30 RX ORDER — ACETAMINOPHEN 650 MG/1
650 SUPPOSITORY RECTAL
Status: DISCONTINUED | OUTPATIENT
Start: 2023-01-30 | End: 2023-02-08 | Stop reason: HOSPADM

## 2023-01-30 RX ORDER — PROMETHAZINE HYDROCHLORIDE 12.5 MG/1
12.5 TABLET ORAL
Status: DISCONTINUED | OUTPATIENT
Start: 2023-01-30 | End: 2023-02-08 | Stop reason: HOSPADM

## 2023-01-30 RX ORDER — OLANZAPINE 5 MG/1
5 TABLET ORAL
COMMUNITY
Start: 2022-12-29

## 2023-01-30 RX ORDER — ONDANSETRON 2 MG/ML
4 INJECTION INTRAMUSCULAR; INTRAVENOUS
Status: DISCONTINUED | OUTPATIENT
Start: 2023-01-30 | End: 2023-02-08 | Stop reason: HOSPADM

## 2023-01-30 RX ORDER — SODIUM CHLORIDE 0.9 % (FLUSH) 0.9 %
5-40 SYRINGE (ML) INJECTION AS NEEDED
Status: DISCONTINUED | OUTPATIENT
Start: 2023-01-30 | End: 2023-02-08 | Stop reason: HOSPADM

## 2023-01-30 RX ADMIN — FUROSEMIDE 40 MG: 40 TABLET ORAL at 09:38

## 2023-01-30 RX ADMIN — OXYBUTYNIN CHLORIDE 10 MG: 5 TABLET, EXTENDED RELEASE ORAL at 09:30

## 2023-01-30 RX ADMIN — LEVETIRACETAM 1000 MG: 1000 INJECTION, SOLUTION INTRAVENOUS at 21:38

## 2023-01-30 RX ADMIN — OLANZAPINE 5 MG: 5 TABLET, FILM COATED ORAL at 21:53

## 2023-01-30 RX ADMIN — ASPIRIN 325 MG ORAL TABLET 325 MG: 325 PILL ORAL at 09:38

## 2023-01-30 RX ADMIN — ATORVASTATIN CALCIUM 40 MG: 40 TABLET, FILM COATED ORAL at 21:53

## 2023-01-30 RX ADMIN — SODIUM CHLORIDE, PRESERVATIVE FREE 10 ML: 5 INJECTION INTRAVENOUS at 21:54

## 2023-01-30 RX ADMIN — OLANZAPINE 5 MG: 5 TABLET, FILM COATED ORAL at 00:29

## 2023-01-30 RX ADMIN — PAROXETINE HYDROCHLORIDE 20 MG: 20 TABLET, FILM COATED ORAL at 09:30

## 2023-01-30 RX ADMIN — SODIUM CHLORIDE 100 ML/HR: 9 INJECTION, SOLUTION INTRAVENOUS at 21:34

## 2023-01-30 RX ADMIN — Medication 5 MG: at 21:53

## 2023-01-30 RX ADMIN — ATORVASTATIN CALCIUM 10 MG: 20 TABLET, FILM COATED ORAL at 09:38

## 2023-01-30 NOTE — ED NOTES
Found pt out of bed standing at foot of bed. Pt had urinated on floor. Bed changed. Pt put back to bed. Bed alarm on. Siderails up x4. New purewick placed on pt and attached to suction.

## 2023-01-30 NOTE — ED NOTES
Bedside shift change report given to Henrique Rivas RN (oncoming nurse) by Deb Damon RN (offgoing nurse). Report included the following information SBAR, Kardex, ED Summary, Intake/Output, and MAR.

## 2023-01-30 NOTE — ED PROVIDER NOTES
EMERGENCY DEPARTMENT HISTORY AND PHYSICAL EXAM      Date: 1/28/2023  Patient Name: Ryan Urbina      History of Presenting Illness     Chief Complaint   Patient presents with    Altered mental status       Location/Duration/Severity/Modifying factors   Chief Complaint   Patient presents with    Altered mental status       HPI:  Ryan Urbina is a 78 y.o. female with history as listed presents with a concern of having a seizure. Patient's son arrived home and found his mother having seizure like activity. He called the paramedics and upon arrival they found patient to be postictal.    states that he was unaware of the time frame how long his mother was seizure. However, he remembered that he was told that his Mother had seizures as a child but none when she was an adult. Location: home  Duration: unlnown   Quality: seizures  Severity: severe  Modifying Factors: patient postical.      Additional History: Wilmer has severe dementia     There are no other complaints, changes, or physical findings at this time.     PCP: Jose Caraballo MD    Current Facility-Administered Medications   Medication Dose Route Frequency Provider Last Rate Last Admin    haloperidol lactate (HALDOL) injection 2.5 mg  2.5 mg IntraVENous Q4H PRN Sabrina Huston MD   2.5 mg at 01/29/23 2059    heparin (porcine) 25,000 units in 0.45% saline 250 ml infusion  12-25 Units/kg/hr IntraVENous TITRATE Meryle Herrlich, Amy K, MD 8.5 mL/hr at 01/30/23 0719 15 Units/kg/hr at 01/30/23 0719    aspirin tablet 325 mg  325 mg Oral DAILY Laureen Davis MD   325 mg at 01/30/23 5319    furosemide (LASIX) tablet 40 mg  40 mg Oral DAILY Meryle Herrlich, Amy K, MD   40 mg at 01/30/23 0938    atorvastatin (LIPITOR) tablet 10 mg  10 mg Oral DAILY Dionne Chairez MD   10 mg at 01/30/23 0938    OLANZapine (ZyPREXA) tablet 5 mg  5 mg Oral QHS Dionne Chairez MD   5 mg at 01/30/23 0029    oxybutynin chloride XL (DITROPAN XL) tablet 10 mg  10 mg Oral DAILY Dionne Chairez MD TOMAS   10 mg at 01/30/23 0930    PARoxetine (PAXIL) tablet 20 mg  20 mg Oral DAILY Dionne Chairez MD   20 mg at 01/30/23 0930     Current Outpatient Medications   Medication Sig Dispense Refill    potassium chloride (KAON 10%) 20 mEq/15 mL solution MIX 1 AND 1/2 TEASPOONFULS (=7.5ML) IN LIQUID AND     DRINK TWO TIMES A DAY. 480 mL 1    lovastatin (MEVACOR) 40 mg tablet Take 1 Tablet by mouth daily. 90 Tablet 3    PARoxetine (PAXIL) 20 mg tablet TAKE 1 TABLET DAILY 90 Tablet 3    furosemide (LASIX) 40 mg tablet take 1 tablet by mouth once daily 90 Tablet 1    oxybutynin chloride XL (DITROPAN XL) 10 mg CR tablet TAKE 1 TABLET DAILY 90 Tablet 3    therapeutic multivitamin (THERAGRAN) tablet Take 1 Tab by mouth daily. OXYCODONE HCL/ACETAMINOPHEN (PERCOCET PO) Take  by mouth. GOTU SILVIA HERB PO Take  by mouth. OLANZapine (ZYPREXA) 10 mg tablet Take 5 mg by mouth nightly.          Past History     Past Medical History:  Past Medical History:   Diagnosis Date    Chronic anxiety     Depression     controlled on Paxil    Diabetes (HCC)     DJD (degenerative joint disease)     GERD (gastroesophageal reflux disease)     High cholesterol     HTN (hypertension)     Hypertension     Low back pain     post-laminectomy syndrome and multilevel degenerative disease    Osteoporosis        Past Surgical History:  Past Surgical History:   Procedure Laterality Date    HX ADENOIDECTOMY      HX APPENDECTOMY      HX BACK SURGERY      PHLD X3    HX BREAST BIOPSY Left 8/28/2015    WIDE LOCAL EXCISION LEFT BREAST LESION performed by Aba Perez MD at [facility] MAIN OR    HX HERNIA REPAIR      hiatal    HX KNEE REPLACEMENT      HX ORTHOPAEDIC      bilateral shoulder surgery    HX ELVIRA AND BSO  1982    HX TONSILLECTOMY      NV TOTAL HIP ARTHROPLASTY  4/12     total right hip replacement, Dr. Ravinder Mckeon       Family History:  Family History   Problem Relation Age of Onset    Cancer Mother         melanoma    Cancer Father         lung Heart Disease Maternal Grandmother     Diabetes Other     Hypertension Other     Headache Other     Seizures Other     Coronary Art Dis Other     Heart Attack Other        Social History:  Social History     Tobacco Use    Smoking status: Former    Smokeless tobacco: Never   Vaping Use    Vaping Use: Never used   Substance Use Topics    Alcohol use: No    Drug use: No       Allergies: Allergies   Allergen Reactions    Aspirin Other (comments)     \"messes up my kidneys\"    Feldene [Piroxicam] Other (comments)     Kidney damage      Morphine Other (comments)     unconsciousness    Nsaids (Non-Steroidal Anti-Inflammatory Drug) Other (comments)     \"messes up my kidneys\"    Other Medication Not Reported This Time     Mycins      Penicillins Hives    Sulfa (Sulfonamide Antibiotics) Rash    Vancomycin Itching     Possible allergic reaction to Vancomycin. Complained of itching/reddness around forehead/back of neck         Review of Systems     Review of Systems   Unable to perform ROS: Dementia     Physical Exam     Physical Exam  Vitals and nursing note reviewed. Constitutional:       General: She is not in acute distress. Appearance: She is well-developed. She is ill-appearing. She is not diaphoretic. HENT:      Head: Normocephalic. Right Ear: External ear normal.      Left Ear: External ear normal.      Mouth/Throat:      Pharynx: No oropharyngeal exudate. Eyes:      General: No scleral icterus. Right eye: No discharge. Left eye: No discharge. Conjunctiva/sclera: Conjunctivae normal.      Pupils: Pupils are equal, round, and reactive to light. Neck:      Thyroid: No thyromegaly. Vascular: No JVD. Trachea: No tracheal deviation. Cardiovascular:      Rate and Rhythm: Normal rate and regular rhythm. Heart sounds: Normal heart sounds. No murmur heard. No friction rub. No gallop. Pulmonary:      Effort: Pulmonary effort is normal. No respiratory distress. Breath sounds: Normal breath sounds. No stridor. No wheezing or rales. Chest:      Chest wall: No tenderness. Abdominal:      General: Bowel sounds are normal. There is no distension. Palpations: Abdomen is soft. There is no mass. Tenderness: There is no abdominal tenderness. There is no guarding or rebound. Musculoskeletal:         General: No tenderness. Normal range of motion. Cervical back: Normal range of motion and neck supple. Lymphadenopathy:      Cervical: No cervical adenopathy. Skin:     General: Skin is warm and dry. Coloration: Skin is not pale. Findings: No erythema or rash. Neurological:      Mental Status: She is alert. Mental status is at baseline. She is confused. GCS: GCS eye subscore is 4. GCS verbal subscore is 5. GCS motor subscore is 6. Cranial Nerves: No cranial nerve deficit. Motor: No abnormal muscle tone. Coordination: Coordination normal.      Deep Tendon Reflexes: Reflexes normal.   Psychiatric:         Cognition and Memory: Memory is impaired.      Lab and Diagnostic Study Results     Labs -  Recent Results (from the past 24 hour(s))   PTT    Collection Time: 01/29/23 12:00 PM   Result Value Ref Range    aPTT 117.9 (H) 23.0 - 36.4 SEC   PTT    Collection Time: 01/29/23  6:52 PM   Result Value Ref Range    aPTT 103.5 (H) 23.0 - 36.4 SEC   GLUCOSE, POC    Collection Time: 01/29/23 10:53 PM   Result Value Ref Range    Glucose (POC) 100 70 - 110 mg/dL   PTT    Collection Time: 01/30/23  5:00 AM   Result Value Ref Range    aPTT 105.9 (H) 23.0 - 36.4 SEC   CBC WITH AUTOMATED DIFF    Collection Time: 01/30/23  5:00 AM   Result Value Ref Range    WBC 7.1 4.6 - 13.2 K/uL    RBC 3.39 (L) 4.20 - 5.30 M/uL    HGB 9.8 (L) 12.0 - 16.0 g/dL    HCT 29.7 (L) 35.0 - 45.0 %    MCV 87.6 78.0 - 100.0 FL    MCH 28.9 24.0 - 34.0 PG    MCHC 33.0 31.0 - 37.0 g/dL    RDW 14.5 11.6 - 14.5 %    PLATELET 994 639 - 220 K/uL    MPV 9.2 9.2 - 11.8 FL    NRBC 0.0 0  WBC    ABSOLUTE NRBC 0.00 0.00 - 0.01 K/uL    NEUTROPHILS 61 40 - 73 %    LYMPHOCYTES 28 21 - 52 %    MONOCYTES 9 3 - 10 %    EOSINOPHILS 1 0 - 5 %    BASOPHILS 1 0 - 2 %    IMMATURE GRANULOCYTES 0 0.0 - 0.5 %    ABS. NEUTROPHILS 4.4 1.8 - 8.0 K/UL    ABS. LYMPHOCYTES 2.0 0.9 - 3.6 K/UL    ABS. MONOCYTES 0.7 0.05 - 1.2 K/UL    ABS. EOSINOPHILS 0.1 0.0 - 0.4 K/UL    ABS. BASOPHILS 0.0 0.0 - 0.1 K/UL    ABS. IMM. GRANS. 0.0 0.00 - 0.04 K/UL    DF AUTOMATED           Radiologic Studies -   CTA CHEST ABD PELV W WO CONT   Final Result      No acute or active appearing vascular abnormality is evident. There is a pathologically enlarged left axillary lymph node. There is asymmetric   breast parenchyma, much more prominent and thicker on the left which may   represent a mass. . The most recent mammogram in this system, November 2019, did   not reveal slight asymmetry nor adenopathy. . Please correlate with breast   physical examination. Unless recently performed elsewhere mammographic   correlation is recommended. The appearance is concerning for possible breast   neoplasm with axillary adenopathy. Rectosigmoid thickening-possible colitis versus pseudothickening. All CT scans at this facility are performed using dose optimization technique as   appropriate to the performed exam, to include automated exposure control,   adjustment of the mA and/or kV according to patient's size (Including   appropriate matching for site-specific examinations), or use of iterative   reconstruction technique. XR CHEST PORT   Final Result      No active or acute cardiopulmonary process is evident. CT HEAD WO CONT   Final Result      1. No acute intracranial pathology.                  Procedures and Critical Care       Performed by: Ag Alexis MD    Procedures         Ag Alexis MD    Medical Decision Making and ED Course   - I am the first and primary provider for this patient AND AM THE PRIMARY PROVIDER OF RECORD. - I reviewed the vital signs, available nursing notes, past medical history, past surgical history, family history and social history. - Initial assessment performed. The patients presenting problems have been discussed, and the staff are in agreement with the care plan formulated and outlined with them. I have encouraged them to ask questions as they arise throughout their visit. Vital Signs-Reviewed the patient's vital signs. Patient Vitals for the past 12 hrs:   Pulse Resp BP SpO2   01/30/23 0938 86 -- (!) 110/54 --   01/30/23 0756 71 16 (!) 112/55 94 %   01/30/23 0031 (!) 104 27 130/66 98 %   01/29/23 2332 (!) 103 21 123/66 98 %         Provider Notes (Medical Decision Making): new onset seizure, brain tumor, hypoglycemia, viral syndrome, Covid 19, influzenza, sepsis,     MDM     ED Course:  Wilmer had stroke, seizure, sepsis, MI , metabolic, work-up      ED Course as of 01/30/23 1057   Sun Jan 29, 2023   2342 By the nursing staff to see the patient since the patient has been agitated. I did see the patient who is responding to the nurse with a clear speech appropriately. Patient might benefit from a sleep aid. She is currently receiving Haldol as needed for agitation.  Sai Nassar      ED Course User Gui Lee MD     ------------------------------------------------------------------------------------------------------------  CxR: interpreted by me    EKG: interpreted by me    Lab tests: interpreted by me    Critical Care Time:  The services I provided to this patient were to treat and/or prevent clinically significant deterioration that could result in the failure of one or more body systems and/or organ systems due to new onset seizure, NSTEMI    Services included the following:  -reviewing nursing notes and old charts  -vital sign assessments  -direct patient care  -medication orders and management  -interpreting and reviewing diagnostic studies/labs  -re-evaluations  -documentation time    Start time: 4:10 am    END time: 5:30 am    Aggregate critical care time was 91 minutes, which includes only time during which I was engaged in work directly related to the patient's care as described above, whether I was at bedside or elsewhere in the Emergency Department. It did not include time spent performing other reported procedures or the services of residents, students, nurses, or advance practice providers. Jordon Franz MD    11:15 AM       Consultations:       Consultations:  Hospitalist - consulted. He accepted patient for admission. Disposition         Admitted to step down unit.       Diagnosis     Clinical Impression: severe dementia, new onset seizure, NSTEMI    Attestations:    Jamari Hawthorne MD

## 2023-01-30 NOTE — ED NOTES
Report given to Randy Radford RN for transfer to [facility] 4N. SBAR, meds, VS. Awaiting transport.

## 2023-01-30 NOTE — ED NOTES
Spoke with Dr. Rocio Finley, hospitalist regarding purple and swollen left tongue. Dr. Rocio Finley stated he agrees with stopping Heparin and monitoring pt but prefers ED MD to make final decision based on his assessment. Dr. Charles Marin notified.

## 2023-01-30 NOTE — ED NOTES
Pt resting comfortably in stretcher at this time. Pt arousable by touch and voice, responded by saying \"ok,\" to commands, respirations even and unlabored, 99% on room air, Heparin rate verified by Mariel Flores RN, running at 15 units/kg/hr, no rate change due to Heparin protocol, aPTT of 103.5. Bed low and locked, side rails times 2, will continue to monitor pt.

## 2023-01-30 NOTE — ED NOTES
Pt drank 8oz water and ate 8oz apple sauce with morning meds. Oriented to person and place. Repositioned in bed. SR x2 and call bell in reach.

## 2023-01-30 NOTE — ED NOTES
Pt agitated at moment, attempted to pull out IV and purewick. HR 92, 98% on room air,  Medicated as per [facility], will continue to monitor pt closely.

## 2023-01-30 NOTE — ED NOTES
Pt unable to swallow med with water. Provided pt with couple bites of apple sauce with PO med, pt tolerated well. Will continue to monitor.

## 2023-01-30 NOTE — ED NOTES
Son at bedside visiting for short time, reported that pt's left side of tongue dark colored and mildly swollen. Denies pain or difficulty swallowing. Alert and oriented to person and place. Dr. Charles Marin nt and will examine.

## 2023-01-30 NOTE — ED NOTES
Cleaned pt up in bed, chux pad placed under pt, placed new depends and purewick on pt at this time, provided pt with new sheets and warm blanket, repositioned pt on left side, bed low and locked, side rail times 2, will continue to monitor pt closely.

## 2023-01-30 NOTE — ED NOTES
Assisted pt with meal at this time. Pt ate 3 bites of cake off meal tray and couple sips of water, pt refuses to have more food at this time, will continue to monitor.

## 2023-01-30 NOTE — ED NOTES
Pt resting in bed comfortably, arousable by touch and voice, 95% on room air, respirations even and unlabored, waiting for bed assignment, bed low and locked side rail times 2, will continue to monitor.

## 2023-01-30 NOTE — ED NOTES
Vimal aguirre of bed assignment at [facility] and all questions answered. [facility] here for transport to [facility] room 470.

## 2023-01-30 NOTE — ED NOTES
Sleeping, wakes to voice and oriented to name only, falls back asleep immediately when undisturbed. Heparin infusing without incident.

## 2023-01-30 NOTE — ED NOTES
Informed MD that pt agitated, attempting to take out IVs, and attempted to get out of bed, placed bed alarm on bed for pt at this time, will continue to monitor.

## 2023-01-30 NOTE — ED NOTES
Bedside shift change report given to Lisa Alanis RN (oncoming nurse) by Ede Valverde RN (offgoing nurse). Report included the following information SBAR, ED Summary, and MAR.

## 2023-01-30 NOTE — ED NOTES
Brief changed and Purewick repositioned with pericare and new brief, redness noted to b/l groin area. Placed on right side No complaints at present. Bed alarm in place, Srx2 and call bell in reach.

## 2023-01-30 NOTE — ED NOTES
TRANSFER - OUT REPORT:    Verbal report given to Toño Baltazar RN(name) on Timur Matthews  being transferred to [facility] 4N 470(unit) for routine progression of care       Report consisted of patients Situation, Background, Assessment and   Recommendations(SBAR). Information from the following report(s) SBAR, MAR was reviewed with the receiving nurse. Lines:   Peripheral IV 01/29/23 Right Antecubital (Active)       Peripheral IV 01/29/23 Left Wrist (Active)   Site Assessment Clean, dry, & intact 01/29/23 1046        Opportunity for questions and clarification was provided.       Patient transported with:   Monitor

## 2023-01-30 NOTE — ED NOTES
Pt resting comfortably in bed at this time, arousable by touch and voice, 94% on room air, respirations even and unlabored, rate verified Heparin, aPTT 105.9, Heparin therapeutic due to protocol, waiting for bed assignment, bed low and locked, side rails times 2, will continue to monitor.

## 2023-01-31 ENCOUNTER — APPOINTMENT (OUTPATIENT)
Dept: NON INVASIVE DIAGNOSTICS | Age: 78
DRG: 040 | End: 2023-01-31
Attending: STUDENT IN AN ORGANIZED HEALTH CARE EDUCATION/TRAINING PROGRAM
Payer: MEDICARE

## 2023-01-31 ENCOUNTER — APPOINTMENT (OUTPATIENT)
Dept: MRI IMAGING | Age: 78
DRG: 040 | End: 2023-01-31
Attending: INTERNAL MEDICINE
Payer: MEDICARE

## 2023-01-31 LAB
AMMONIA PLAS-SCNC: 24 UMOL/L (ref 11–32)
ANION GAP SERPL CALC-SCNC: 6 MMOL/L (ref 3–18)
BNP SERPL-MCNC: 397 PG/ML (ref 0–1800)
BUN SERPL-MCNC: 11 MG/DL (ref 7–18)
BUN/CREAT SERPL: 12 (ref 12–20)
CALCIUM SERPL-MCNC: 8.7 MG/DL (ref 8.5–10.1)
CHLORIDE SERPL-SCNC: 108 MMOL/L (ref 100–111)
CO2 SERPL-SCNC: 29 MMOL/L (ref 21–32)
CREAT SERPL-MCNC: 0.95 MG/DL (ref 0.6–1.3)
CRP SERPL-MCNC: 1.7 MG/DL (ref 0–0.3)
ECHO AO ARCH DIAM: 2.5 CM
ECHO AO ROOT DIAM: 3.2 CM
ECHO AO ROOT INDEX: 1.99 CM/M2
ECHO AV AREA PEAK VELOCITY: 1.6 CM2
ECHO AV AREA VTI: 1.9 CM2
ECHO AV AREA/BSA PEAK VELOCITY: 1 CM2/M2
ECHO AV AREA/BSA VTI: 1.2 CM2/M2
ECHO AV MEAN GRADIENT: 4 MMHG
ECHO AV MEAN VELOCITY: 0.9 M/S
ECHO AV PEAK GRADIENT: 8 MMHG
ECHO AV PEAK VELOCITY: 1.4 M/S
ECHO AV VELOCITY RATIO: 0.71
ECHO AV VTI: 24.5 CM
ECHO EST RA PRESSURE: 8 MMHG
ECHO LA VOL 2C: 22 ML (ref 22–52)
ECHO LA VOL 4C: 20 ML (ref 22–52)
ECHO LA VOL BP: 22 ML (ref 22–52)
ECHO LA VOL/BSA BIPLANE: 14 ML/M2 (ref 16–34)
ECHO LA VOLUME AREA LENGTH: 23 ML
ECHO LA VOLUME INDEX A2C: 14 ML/M2 (ref 16–34)
ECHO LA VOLUME INDEX A4C: 12 ML/M2 (ref 16–34)
ECHO LA VOLUME INDEX AREA LENGTH: 14 ML/M2 (ref 16–34)
ECHO LV E' LATERAL VELOCITY: 16 CM/S
ECHO LV E' SEPTAL VELOCITY: 9 CM/S
ECHO LV EDV A2C: 57 ML
ECHO LV EDV A4C: 38 ML
ECHO LV EDV BP: 49 ML (ref 56–104)
ECHO LV EDV INDEX A4C: 24 ML/M2
ECHO LV EDV INDEX BP: 30 ML/M2
ECHO LV EDV NDEX A2C: 35 ML/M2
ECHO LV EJECTION FRACTION A2C: 73 %
ECHO LV EJECTION FRACTION A4C: 69 %
ECHO LV EJECTION FRACTION BIPLANE: 72 % (ref 55–100)
ECHO LV ESV A2C: 15 ML
ECHO LV ESV A4C: 12 ML
ECHO LV ESV BP: 14 ML (ref 19–49)
ECHO LV ESV INDEX A2C: 9 ML/M2
ECHO LV ESV INDEX A4C: 7 ML/M2
ECHO LV ESV INDEX BP: 9 ML/M2
ECHO LV FRACTIONAL SHORTENING: 33 % (ref 28–44)
ECHO LV INTERNAL DIMENSION DIASTOLE INDEX: 2.24 CM/M2
ECHO LV INTERNAL DIMENSION DIASTOLIC: 3.6 CM (ref 3.9–5.3)
ECHO LV INTERNAL DIMENSION SYSTOLIC INDEX: 1.49 CM/M2
ECHO LV INTERNAL DIMENSION SYSTOLIC: 2.4 CM
ECHO LV IVSD: 0.7 CM (ref 0.6–0.9)
ECHO LV MASS 2D: 59.7 G (ref 67–162)
ECHO LV MASS INDEX 2D: 37.1 G/M2 (ref 43–95)
ECHO LV POSTERIOR WALL DIASTOLIC: 0.6 CM (ref 0.6–0.9)
ECHO LV RELATIVE WALL THICKNESS RATIO: 0.33
ECHO LVOT AREA: 2.3 CM2
ECHO LVOT AV VTI INDEX: 0.8
ECHO LVOT DIAM: 1.7 CM
ECHO LVOT MEAN GRADIENT: 2 MMHG
ECHO LVOT PEAK GRADIENT: 4 MMHG
ECHO LVOT PEAK VELOCITY: 1 M/S
ECHO LVOT STROKE VOLUME INDEX: 27.5 ML/M2
ECHO LVOT SV: 44.2 ML
ECHO LVOT VTI: 19.5 CM
ECHO MV A VELOCITY: 0.93 M/S
ECHO MV E DECELERATION TIME (DT): 99.7 MS
ECHO MV E VELOCITY: 0.54 M/S
ECHO MV E/A RATIO: 0.58
ECHO MV E/E' LATERAL: 3.38
ECHO MV E/E' RATIO (AVERAGED): 4.69
ECHO MV E/E' SEPTAL: 6
ECHO RA AREA 4C: 7.7 CM2
ECHO RV BASAL DIMENSION: 2.4 CM
ECHO RV FREE WALL PEAK S': 12 CM/S
ECHO RV TAPSE: 2 CM (ref 1.7–?)
ERYTHROCYTE [DISTWIDTH] IN BLOOD BY AUTOMATED COUNT: 14.6 % (ref 11.6–14.5)
ERYTHROCYTE [SEDIMENTATION RATE] IN BLOOD: 29 MM/HR (ref 0–30)
FERRITIN SERPL-MCNC: 296 NG/ML (ref 8–388)
FOLATE SERPL-MCNC: >20 NG/ML (ref 3.1–17.5)
GLUCOSE SERPL-MCNC: 101 MG/DL (ref 74–99)
HCT VFR BLD AUTO: 29.6 % (ref 35–45)
HGB BLD-MCNC: 9.8 G/DL (ref 12–16)
IRON SATN MFR SERPL: 17 % (ref 20–50)
IRON SERPL-MCNC: 30 UG/DL (ref 50–175)
MAGNESIUM SERPL-MCNC: 1.5 MG/DL (ref 1.6–2.6)
MCH RBC QN AUTO: 28.9 PG (ref 24–34)
MCHC RBC AUTO-ENTMCNC: 33.1 G/DL (ref 31–37)
MCV RBC AUTO: 87.3 FL (ref 78–100)
NRBC # BLD: 0 K/UL (ref 0–0.01)
NRBC BLD-RTO: 0 PER 100 WBC
PLATELET # BLD AUTO: 223 K/UL (ref 135–420)
PMV BLD AUTO: 9.3 FL (ref 9.2–11.8)
POTASSIUM SERPL-SCNC: 2.5 MMOL/L (ref 3.5–5.5)
POTASSIUM SERPL-SCNC: 3.6 MMOL/L (ref 3.5–5.5)
PROCALCITONIN SERPL-MCNC: <0.05 NG/ML
RBC # BLD AUTO: 3.39 M/UL (ref 4.2–5.3)
SODIUM SERPL-SCNC: 143 MMOL/L (ref 136–145)
TIBC SERPL-MCNC: 176 UG/DL (ref 250–450)
TSH SERPL DL<=0.05 MIU/L-ACNC: 1.26 UIU/ML (ref 0.36–3.74)
VIT B12 SERPL-MCNC: >2000 PG/ML (ref 211–911)
WBC # BLD AUTO: 6.2 K/UL (ref 4.6–13.2)

## 2023-01-31 PROCEDURE — 85652 RBC SED RATE AUTOMATED: CPT

## 2023-01-31 PROCEDURE — 84132 ASSAY OF SERUM POTASSIUM: CPT

## 2023-01-31 PROCEDURE — 74011250636 HC RX REV CODE- 250/636: Performed by: HOSPITALIST

## 2023-01-31 PROCEDURE — 82607 VITAMIN B-12: CPT

## 2023-01-31 PROCEDURE — 77030038269 HC DRN EXT URIN PURWCK BARD -A

## 2023-01-31 PROCEDURE — 2709999900 HC NON-CHARGEABLE SUPPLY

## 2023-01-31 PROCEDURE — 74011250637 HC RX REV CODE- 250/637: Performed by: STUDENT IN AN ORGANIZED HEALTH CARE EDUCATION/TRAINING PROGRAM

## 2023-01-31 PROCEDURE — 83880 ASSAY OF NATRIURETIC PEPTIDE: CPT

## 2023-01-31 PROCEDURE — APPSS30 APP SPLIT SHARED TIME 16-30 MINUTES: Performed by: INTERNAL MEDICINE

## 2023-01-31 PROCEDURE — 93306 TTE W/DOPPLER COMPLETE: CPT

## 2023-01-31 PROCEDURE — 77030018842 HC SOL IRR SOD CL 9% BAXT -A

## 2023-01-31 PROCEDURE — A9577 INJ MULTIHANCE: HCPCS | Performed by: INTERNAL MEDICINE

## 2023-01-31 PROCEDURE — 36415 COLL VENOUS BLD VENIPUNCTURE: CPT

## 2023-01-31 PROCEDURE — 74011250636 HC RX REV CODE- 250/636: Performed by: NURSE PRACTITIONER

## 2023-01-31 PROCEDURE — 84443 ASSAY THYROID STIM HORMONE: CPT

## 2023-01-31 PROCEDURE — 74011250636 HC RX REV CODE- 250/636: Performed by: INTERNAL MEDICINE

## 2023-01-31 PROCEDURE — 99232 SBSQ HOSP IP/OBS MODERATE 35: CPT | Performed by: NURSE PRACTITIONER

## 2023-01-31 PROCEDURE — 93005 ELECTROCARDIOGRAM TRACING: CPT

## 2023-01-31 PROCEDURE — 94762 N-INVAS EAR/PLS OXIMTRY CONT: CPT

## 2023-01-31 PROCEDURE — 74011250637 HC RX REV CODE- 250/637: Performed by: INTERNAL MEDICINE

## 2023-01-31 PROCEDURE — 74011250636 HC RX REV CODE- 250/636: Performed by: PHYSICIAN ASSISTANT

## 2023-01-31 PROCEDURE — 74011000250 HC RX REV CODE- 250: Performed by: INTERNAL MEDICINE

## 2023-01-31 PROCEDURE — 83540 ASSAY OF IRON: CPT

## 2023-01-31 PROCEDURE — 82728 ASSAY OF FERRITIN: CPT

## 2023-01-31 PROCEDURE — 70553 MRI BRAIN STEM W/O & W/DYE: CPT

## 2023-01-31 PROCEDURE — 83735 ASSAY OF MAGNESIUM: CPT

## 2023-01-31 PROCEDURE — 86140 C-REACTIVE PROTEIN: CPT

## 2023-01-31 PROCEDURE — 84145 PROCALCITONIN (PCT): CPT

## 2023-01-31 PROCEDURE — 82140 ASSAY OF AMMONIA: CPT

## 2023-01-31 PROCEDURE — 85027 COMPLETE CBC AUTOMATED: CPT

## 2023-01-31 PROCEDURE — 65270000029 HC RM PRIVATE

## 2023-01-31 PROCEDURE — 99222 1ST HOSP IP/OBS MODERATE 55: CPT | Performed by: INTERNAL MEDICINE

## 2023-01-31 RX ORDER — POTASSIUM CHLORIDE 7.45 MG/ML
10 INJECTION INTRAVENOUS
Status: ACTIVE | OUTPATIENT
Start: 2023-01-31 | End: 2023-01-31

## 2023-01-31 RX ORDER — AMOXICILLIN 250 MG
1 CAPSULE ORAL DAILY
Status: DISCONTINUED | OUTPATIENT
Start: 2023-02-01 | End: 2023-02-08 | Stop reason: HOSPADM

## 2023-01-31 RX ORDER — SODIUM CHLORIDE 9 MG/ML
50 INJECTION, SOLUTION INTRAVENOUS CONTINUOUS
Status: DISCONTINUED | OUTPATIENT
Start: 2023-01-31 | End: 2023-01-31

## 2023-01-31 RX ORDER — POTASSIUM CHLORIDE 7.45 MG/ML
10 INJECTION INTRAVENOUS
Status: COMPLETED | OUTPATIENT
Start: 2023-01-31 | End: 2023-01-31

## 2023-01-31 RX ORDER — MAGNESIUM SULFATE HEPTAHYDRATE 40 MG/ML
2 INJECTION, SOLUTION INTRAVENOUS ONCE
Status: COMPLETED | OUTPATIENT
Start: 2023-01-31 | End: 2023-01-31

## 2023-01-31 RX ADMIN — Medication 5 MG: at 22:31

## 2023-01-31 RX ADMIN — SODIUM CHLORIDE 50 ML/HR: 9 INJECTION, SOLUTION INTRAVENOUS at 12:19

## 2023-01-31 RX ADMIN — OLANZAPINE 5 MG: 5 TABLET, FILM COATED ORAL at 22:31

## 2023-01-31 RX ADMIN — SODIUM CHLORIDE, PRESERVATIVE FREE 10 ML: 5 INJECTION INTRAVENOUS at 22:31

## 2023-01-31 RX ADMIN — POTASSIUM CHLORIDE 10 MEQ: 7.46 INJECTION, SOLUTION INTRAVENOUS at 06:26

## 2023-01-31 RX ADMIN — POTASSIUM CHLORIDE 10 MEQ: 7.46 INJECTION, SOLUTION INTRAVENOUS at 07:45

## 2023-01-31 RX ADMIN — ASPIRIN 325 MG ORAL TABLET 325 MG: 325 PILL ORAL at 10:48

## 2023-01-31 RX ADMIN — ATORVASTATIN CALCIUM 40 MG: 40 TABLET, FILM COATED ORAL at 22:31

## 2023-01-31 RX ADMIN — POTASSIUM CHLORIDE 10 MEQ: 7.46 INJECTION, SOLUTION INTRAVENOUS at 09:44

## 2023-01-31 RX ADMIN — PAROXETINE HYDROCHLORIDE 20 MG: 20 TABLET, FILM COATED ORAL at 10:48

## 2023-01-31 RX ADMIN — POTASSIUM CHLORIDE 10 MEQ: 7.46 INJECTION, SOLUTION INTRAVENOUS at 11:03

## 2023-01-31 RX ADMIN — SODIUM CHLORIDE, PRESERVATIVE FREE 10 ML: 5 INJECTION INTRAVENOUS at 06:27

## 2023-01-31 RX ADMIN — LEVETIRACETAM 500 MG: 500 TABLET, FILM COATED ORAL at 17:29

## 2023-01-31 RX ADMIN — MAGNESIUM SULFATE HEPTAHYDRATE 2 G: 40 INJECTION, SOLUTION INTRAVENOUS at 09:44

## 2023-01-31 RX ADMIN — LEVETIRACETAM 500 MG: 500 TABLET, FILM COATED ORAL at 10:48

## 2023-01-31 RX ADMIN — GADOBENATE DIMEGLUMINE 10 ML: 529 INJECTION, SOLUTION INTRAVENOUS at 20:40

## 2023-01-31 RX ADMIN — SODIUM CHLORIDE, PRESERVATIVE FREE 10 ML: 5 INJECTION INTRAVENOUS at 15:04

## 2023-01-31 RX ADMIN — OXYBUTYNIN CHLORIDE 10 MG: 5 TABLET, EXTENDED RELEASE ORAL at 10:48

## 2023-01-31 NOTE — H&P
History & Physical    Patient: Roseline Ramey MRN: [id number]  [facility]: [id number]    YOB: 1945  Age: 78 y.o. Sex: female      DOA: 1/28/2023  CC: Seizure    PCP: Maureen Camarillo MD       HPI:     Roseline Ramey is a 78 y.o. female with medical co-morbidities including hypertension, hyperlipidemia, depression, osteoporosis, dementia, history of childhood seizure, presented from home after her son witnessed her having tonic-clonic seizure. Per her son, she was found in the living room screaming at 3 in the morning, when he approached her he noted that she was having her arms and legs in the outstretched positions and shaking rhythmically. He reports that he tried to call her name but she was not responding to his call. He reported that her body was stiff and tight. He reported that she has been in normal state of health with the exception of having flulike symptoms in the preceding 2 weeks where she has not been eating and drinking regularly. He denied that she experienced fever or chill. Per her record, she had history of seizure disorder as a child, however, she did not have further seizure episode since high school. Hence, she has not been on antiseizure medication. EMS found her to be noncooperative and confused with the evaluation. At [facility] ED, her son reported that she was back in normal state with otherwise mild confusion. Per nursing report, she was alert and oriented with ability to answer questions without hesitation. At [facility] ER, initial laboratory work-up showed negative alcohol level, stable LFT, renal function with BUN 19, creatinine 1.48 (0.89 in June/22), CBC was within normal limit, CK 42, troponin 331, negative drug screen, CT of head was negative for acute intracranial pathology, chest x-ray without acute cardiopulmonary process. UA with trace of leukocyte esterase but without WBC.   She was treated symptomatically and was plan for home discharge however on repeat high sensitive troponin, it increased to 5442. ER MD spoke with cardiologist and start this patient on heparin drip for concern of NSTEMI. Note EKG was in sinus rhythm without ST changes. Patient has been boarding at [facility] ER in the last 2 days, she had episode of agitation required sedative medications. She has tolerated heparin drip however her son noted her tongue to be edematous and bleeding hence her heparin drip was discontinued. Review of Systems  GENERAL: No fever, No chill, + malaise   HEENT: No change in vision, no ear ache, tinnitus, no sore throat or sinus congestion. NECK: No pain or stiffness. PULMONARY: No shortness of breath, no cough or wheeze. Cardiovascular: no pnd / orthopnea, no Chest Pain  GASTROINTESTINAL: No abd pain, No nausea/vomiting, No diarrhea, No melena or bright red blood per rectum. GENITOURINARY: No urinary frequency, No urgency or pain with urination. MUSCULOSKELETAL: No joint or muscle pain, no back pain, no recent trauma. DERMATOLOGIC: No rash, no itching, no lesions. ENDOCRINE: No polyuria, polydipsia, NO heat or cold intolerance. No recent change in weight. HEMATOLOGICAL: No easy bruising or bleeding.    NEUROLOGIC: No headache, + seizures, + generalized weakness         Past Medical History:   Diagnosis Date    Chronic anxiety     Depression     controlled on Paxil    Diabetes (HCC)     DJD (degenerative joint disease)     GERD (gastroesophageal reflux disease)     High cholesterol     HTN (hypertension)     Hypertension     Low back pain     post-laminectomy syndrome and multilevel degenerative disease    Osteoporosis        Past Surgical History:   Procedure Laterality Date    HX ADENOIDECTOMY      HX APPENDECTOMY      HX BACK SURGERY      PHLD X3    HX BREAST BIOPSY Left 8/28/2015    WIDE LOCAL EXCISION LEFT BREAST LESION performed by Moises Trevino MD at [facility]      hiatal    HX KNEE REPLACEMENT HX ORTHOPAEDIC      bilateral shoulder surgery    HX ELVIRA AND BSO  1982    HX TONSILLECTOMY      CT TOTAL HIP ARTHROPLASTY  4/12     total right hip replacement, Dr. Sandy Santiago       Family History   Problem Relation Age of Onset    Cancer Mother         melanoma    Cancer Father         lung    Heart Disease Maternal Grandmother     Diabetes Other     Hypertension Other     Headache Other     Seizures Other     Coronary Art Dis Other     Heart Attack Other        Social History     Socioeconomic History    Marital status:    Occupational History    Occupation: office work     Comment: [employer]   Tobacco Use    Smoking status: Former    Smokeless tobacco: Never   Vaping Use    Vaping Use: Never used   Substance and Sexual Activity    Alcohol use: No    Drug use: No    Sexual activity: Not Currently     Social Determinants of Health     Financial Resource Strain: Low Risk     Difficulty of Paying Living Expenses: Not very hard   Food Insecurity: No Food Insecurity    Worried About Running Out of Food in the Last Year: Never true    Ran Out of Food in the Last Year: Never true       Prior to Admission medications    Medication Sig Start Date End Date Taking? Authorizing Provider   Constulose 10 gram/15 mL solution take 30 milliliters by mouth three times a day if needed for constipation 1/3/23   Provider, Historical   OLANZapine (ZyPREXA) 5 mg tablet Take 5 mg by mouth nightly. 12/29/22   Provider, Historical   oxyCODONE-acetaminophen (PERCOCET 10)  mg per tablet Take 1 Tablet by mouth every six (6) hours as needed for Pain. 1/5/23   Provider, Historical   potassium chloride (KAON 10%) 20 mEq/15 mL solution MIX 1 AND 1/2 TEASPOONFULS (=7.5ML) IN LIQUID AND     DRINK TWO TIMES A DAY. 1/13/23   Yolis Velazquez MD   lovastatin (MEVACOR) 40 mg tablet Take 1 Tablet by mouth daily.  12/16/22   Yolis Velazquez MD   PARoxetine (PAXIL) 20 mg tablet TAKE 1 TABLET DAILY 11/18/22   Yolis Velazquez MD   furosemide (LASIX) 40 mg tablet take 1 tablet by mouth once daily 10/14/22   Branden Oates MD   oxybutynin chloride XL (DITROPAN XL) 10 mg CR tablet TAKE 1 TABLET DAILY 6/23/22   Branden Oates MD   therapeutic multivitamin [facility]) tablet Take 1 Tablet by mouth daily. Indications: treatment to prevent vitamin deficiency    Provider, Historical   GOTU SILVIA HERB PO Take  by mouth. Provider, Historical       Allergies   Allergen Reactions    Aspirin Other (comments)     \"messes up my kidneys\"    Feldene [Piroxicam] Other (comments)     Kidney damage      Morphine Other (comments)     unconsciousness    Nsaids (Non-Steroidal Anti-Inflammatory Drug) Other (comments)     \"messes up my kidneys\"    Other Medication Not Reported This Time     Mycins      Penicillins Hives    Sulfa (Sulfonamide Antibiotics) Rash    Vancomycin Itching     Possible allergic reaction to Vancomycin. Complained of itching/reddness around forehead/back of neck              Physical Exam:      Visit Vitals  BP (!) 103/51   Pulse (!) 101   Temp 98.3 °F (36.8 °C)   Resp 20   Wt 56.7 kg (125 lb)   SpO2 97%   BMI 20.18 kg/m²       Physical Exam:  Tele: Sinus  General:  Cooperative, Not in acute distress, speaks in full sentence while in bed  HEENT: PERRL, EOMI, supple neck, no JVD, dry oral mucosa  Ecchymotic tongue, no infection. No active bleeding. Cardiovascular: S1S2 regular, no rub/gallop   Pulmonary: + Air entry bilaterally, no wheezing, no crackle  GI:  Soft, non tender, non distended, +bs, no guarding   Extremities:  No pedal edema, +distal pulses appreciated   Neuro: AOx2, moving all extremities, no gross deficit.       Lab/Data Review:  Labs: Results:       Chemistry Recent Labs     01/28/23  0429   *      K 3.6   CL 98*   CO2 32   BUN 19*   CREA 1.48*   CA 10.3*   AGAP 8   BUCR 13   AP 68   TP 7.6   ALB 3.8   GLOB 3.8   AGRAT 1.0      CBC w/Diff Recent Labs     01/30/23  0500 01/29/23  0458 01/28/23  0429   WBC 7.1 7.7 7.4 RBC 3.39* 3.41* 4.66   HGB 9.8* 10.0* 13.5   HCT 29.7* 30.1* 41.3    217 308   GRANS 61 68 47   LYMPH 28 21 41   EOS 1 0 2      Coagulation Recent Labs     01/30/23  0500 01/29/23  1852   APTT 105.9* 103.5*       Iron/Ferritin No results for input(s): IRON in the last 72 hours. No lab exists for component: TIBCCALC   BNP ProBNP   Cardiac Enzymes HS-Troponin:   Liver Enzymes Recent Labs     01/28/23  0429   TP 7.6   ALB 3.8   AP 68      Thyroid Studies Lab Results   Component Value Date/Time    TSH 2.98 05/10/2019 10:01 AM          All Micro Results       Procedure Component Value Units Date/Time    COVID-19 RAPID TEST [[id number]] Collected: 01/28/23 0950    Order Status: Completed Specimen: Nasopharyngeal Updated: 01/28/23 1020     Specimen source Nasopharyngeal        COVID-19 rapid test Not detected        Comment: Rapid Abbott ID Now       Rapid NAAT:  The specimen is NEGATIVE for SARS-CoV-2, the novel coronavirus associated with COVID-19. Negative results should be treated as presumptive and, if inconsistent with clinical signs and symptoms or necessary for patient management, should be tested with an alternative molecular assay. Negative results do not preclude SARS-CoV-2 infection and should not be used as the sole basis for patient management decisions. This test has been authorized by the FDA under an Emergency Use Authorization (EUA) for use by authorized laboratories.    Fact sheet for Healthcare Providers: ConventionUpdate.co.nz  Fact sheet for Patients: ConventionUpdate.co.nz       Methodology: Isothermal Nucleic Acid Amplification         INFLUENZA A & B AG (RAPID TEST) [[id number]] Collected: 01/28/23 0950    Order Status: Completed Specimen: Nasopharyngeal from Nasal washing Updated: 01/28/23 1016     Influenza A Antigen Negative        Comment: A negative result does not exclude influenza virus infection, seasonal or H1N1 due to suboptimal sensitivity. If influenza is circulating in your community, a diagnosis of influenza should be considered based on a patients clinical presentation and empiric antiviral treatment should be considered, if indicated. Influenza B Antigen Negative                 Imaging Reviewed:  CT Results (most recent):  Results from Hospital Encounter encounter on 01/28/23    CTA CHEST ABD PELV W WO CONT    Narrative  CTA CHEST,  ABDOMEN AND PELVIS WITH CONTRAST    COMPARISON: None    INDICATIONS: Back pain    TECHNIQUE:    Noncontrast CT was done initially through the chest, abdomen, and pelvis. Contrast enhanced CT was then performed following the uneventful administration  of 100 cc of Isovue-370. Scanning was done with a multislice scanner. Volumetric data acquisition was  performed through the chest, abdomen, and pelvis. Reconstructions were created  in the axial, coronal, and sagittal planes. The data was also loaded on an  independent imaging workstation. Postprocessing was performed with indication  wheezing creation of MIPS as well as 3D shaded surface virtual reality  angiographic images without and with bone removal.    CT CHEST FINDINGS:    The lungs show a granuloma in the left base. No consolidation is evident. .  There is no pneumothorax or pleural fluid or pleural plaque evident. .  There is no mediastinal adenopathy or mass. .  There is breast asymmetry with for confluent/dense appearing parenchyma on the  left  There is an enlarged left axillary node measuring 1.8 x 2.4 cm  . CT ABDOMEN FINDINGS:    Liver: Liver and biliary tree are unremarkable in appearance. Spleen: Negative. Left Kidney: Small cyst noted. Otherwise unremarkable. Right Kidney: Negative. Pancreas: Negative. Adrenal Glands: Negative. Stomach, Small Bowel And Colon: The stomach and small bowel are unremarkable. The appendix is not identified. There is a large stool burden. There is mild  rectosigmoid wall thickening.   Lymph Nodes: No adenopathy is evident. .  Abdominal wall is unremarkable. Peritoneal Spaces: There is no free fluid or free air. The bladder is incompletely distended but unremarkable. Vascular:    Ascending Aorta: The sinuses of Valsalva measure 2.8 x 2.8 x 3 cm. The sinotubular ridge measures 2.5 x 2.6 cm  The maximal ascending aortic diameter is 3 x 3.2 cm. .  There is no dissection or other acute abnormality apparent. Aortic Arch: The mid arch measures 2.7 x 2.7 cm with minimal atheromatous  disease apparent. Wes Ling Vessels: The brachiocephalic vessels are widely patent. .    Descending Thoracic Aorta: Tapers from 2.3 x 2.4 cm to 2 x 2.2 cm with minimal  atheromatous abnormality and no evidence of dissection or acute aortic  syndrome. .    Abdominal Aorta: There is mild atheromatous irregularity. There is no aneurysm. The aorta tapers to a diameter of 1.5 cm at the bifurcation. .    Visceral Branches: Celiac, SMA, renals, and VIDA are patent    Iliofemoral runoff is unremarkable bilaterally    Osseous Structures Of Abdomen And Pelvis: No acute or aggressive abnormalities. Previous right hip replacement and displacement of the lesser trochanter. Previous lumbar surgery. Impression  No acute or active appearing vascular abnormality is evident. There is a pathologically enlarged left axillary lymph node. There is asymmetric  breast parenchyma, much more prominent and thicker on the left which may  represent a mass. . The most recent mammogram in this system, November 2019, did  not reveal slight asymmetry nor adenopathy. . Please correlate with breast  physical examination. Unless recently performed elsewhere mammographic  correlation is recommended. The appearance is concerning for possible breast  neoplasm with axillary adenopathy. Rectosigmoid thickening-possible colitis versus pseudothickening.       All CT scans at this facility are performed using dose optimization technique as  appropriate to the performed exam, to include automated exposure control,  adjustment of the mA and/or kV according to patient's size (Including  appropriate matching for site-specific examinations), or use of iterative  reconstruction technique. Assessment:   Active Problems:    Seizure, this could be considered as new seizure despite the fact that she has had seizure in her childhood years. Currently, she is stable without further report of seizure. Of note, antiepileptic medication was not started at [facility] ED. Elevated troponin, this has downtrended. No EKG change or chest pain on presentation. This could possibly associated with seizure episode versus true NSTEMI/ACS. Her heparin drip is currently stopped due to bleeding from her tongue. Injured tongue, bleeding from her tongue associated with heparin use. This have resolved with cessation of heparin drip. Enlarged left axillary lymph node as well as asymmetric breast parenchyma which could represent a breast mass as finding on CT a chest.  This is concerning for possible breast neoplasm with axillary adenopathy. Rectosigmoid thickening as seen on CT, she is currently without any abdominal pain. Hypertension  Hyperlipidemia  Dementia without behavior disturbance  Depression  JEIMY, likely prerenal.  CPKs was negative. Plan:     Admit to telemetry for close monitoring per AHA guidelines, currently her heparin drip has been discontinued due to concern for bleeding from her tongue. She has had at least more than 36 hours of anticoagulation, she remains on aspirin, statin. Cardiology consult to follow-up tomorrow. Echocardiogram to follow-up. Check lipid, check hemoglobin A1c  As for her seizure disorder, this is considered new, hence she will need MRI of the brain with and without contrast.  EEG order. Seizure precaution.   We will load her up with Keppra, she will continue with oral Keppra twice daily tomorrow  Will have neurologist evaluate her tomorrow. We will continue IV fluid for now  She can eat as she is tolerated  As for her left breast mass with axillary adenopathy, she will need mammography work-up. I believe this could be done outpatient, however if MRI of the brain is significant for mets, then she will need oncology consult for further care. Stopped her Lasix. Continue Zyprexa, Paxil, oxybutynin.   Avoid nephrotoxic meds  Pepcid  PT OT follow-up      Spoke with her son [phone] with clinical update    Risk of deterioration:  []Low    [x]Moderate  []High     Prophylaxis:  []Lovenox  []Coumadin  []Hep SQ  [x]SCDs  [x]H2B/PPI     Disposition:  []Home w/ Family   [x] PT,OT,RN   []SNF/LTC   []SAH/Rehab     Discussed Code Status:         [x]Full Code      []DNR         ___________________________________________________     Care Plan discussed with:    [x]Patient   [x]Family    []ED Care Manager  [x]ED Doc   [x]Specialist :  Total Time Coordinating Admission:   70   minutes    []Total Critical Care Time:       Daniel Grissom MD  1/30/2023, 7:16 PM

## 2023-01-31 NOTE — PROGRESS NOTES
MRI Screening form needs to be filled out and faxed to [phone] MRI can be scheduled. If unable to obtain information from pt, [facility] needs to be contacted.  If pt is claustro or will need pain meds, please have ordered in advance in order to facilitate exam.

## 2023-01-31 NOTE — ROUTINE PROCESS
Bedside and Verbal shift change report given to Hortenisa Yang RN (oncoming nurse) by Salinas Rodriguez RN  (offgoing nurse). Report included the following information SBAR, Kardex, MAR, and Recent Results.

## 2023-01-31 NOTE — CONSULTS
Cardiology Initial Patient Referral Note    Cardiology referral request from Dr. Wilma Rios for evaluation and management/treatment of elevated troponin    Date of  Admission: 1/28/2023  4:10 AM   Primary Care Physician:  Stacey Wallace MD    Attending Cardiologist: Dr. Leonora Galaviz:     -Altered mental status, seizure  -Elevated troponin, uncertain significance, ? Demand  -Hypokalemia, K 2.5 with Mg 1.5 on 1/31/2023  -HTN  -DM  -Hyperlipidemia    No Primary cardiologist; consult by Dr. Herb Thornton:     -Echo pending, to be completed today, will review results as able. -Pt receiving IV KCl per primary team; will give 2 gm IV Mg as well. Would recommend to keep K > 4 and Mg > 2 from cardiac standpoint; further replacement as per primary team.  -Pt currently appears to be suboptimal candidate for further cardiac evaluation at this time. Pt okay to eat today from cardiac standpoint, primary team was advised. -Further recommendations based on test results, hospital course.    -------------------------------------  Patient admitted with seizure and confusion but found to have increased troponin. Patient still confused denies chest pain. Echocardiogram today reviewed shows hyperdynamic left ventricle. Plan conservative therapy for now. Dr. Ginette Pascual, interventionalist, to see tomorrow to make final decision on medical therapy versus stress test versus heart catheterization. I saw, examined, and evaluated this patient and performed the substantive portion of the encounter for > 50% of the time including extensive history, physical exam, and medical decision making as discussed with patient and next-of-kin as needed. I personally reviewed the patient's labs, tests, vitals, orders, medications, updated history, and other providers assessments as well. I personally agree with the findings as stated and the plan as documented. Yudelka Fuentes MD       History of Present Illness: This is a 78 y.o. female admitted for NSTEMI (non-ST elevated myocardial infarction) ([facility] [address] [I21.4]. Patient complains of: altered mental status      Kailey Cross is a 78 y.o. female who presented to the hospital due to altered mental status. On chart review, it appears that pt's son called 911 because pt was not acting like herself. EMS reported that pt would not cooperate with them for taking VS or asking questions. Pt's son in the ER reported that the pt had seizure-like activity at home. Pt states \"I don't know\" when asked why she is in the hospital and has complete negative ROS.       Cardiac risk factors: dyslipidemia, diabetes mellitus, hypertension, post-menopausal      Review of Symptoms:  Except as stated above include:  Constitutional:  negative  Respiratory:  negative  Cardiovascular:  negative  Gastrointestinal: negative  Genitourinary:  negative  Musculoskeletal:  Negative  Neurological:  As per HPI  Dermatological:  Negative  Endocrinological: Negative  Psychological:  Negative     Past Medical History:     Past Medical History:   Diagnosis Date    Chronic anxiety     Depression     controlled on Paxil    Diabetes (HCC)     DJD (degenerative joint disease)     GERD (gastroesophageal reflux disease)     High cholesterol     HTN (hypertension)     Hypertension     Low back pain     post-laminectomy syndrome and multilevel degenerative disease    Osteoporosis          Social History:     Social History     Socioeconomic History    Marital status:    Occupational History    Occupation: office work     Comment: [employer]   Tobacco Use    Smoking status: Former    Smokeless tobacco: Never   Vaping Use    Vaping Use: Never used   Substance and Sexual Activity    Alcohol use: No    Drug use: No    Sexual activity: Not Currently     Social Determinants of Health     Financial Resource Strain: Low Risk     Difficulty of Paying Living Expenses: Not very hard   Food Insecurity: No Food Insecurity    Worried About Running Out of Food in the Last Year: Never true    Ran Out of Food in the Last Year: Never true        Family History:     Family History   Problem Relation Age of Onset    Cancer Mother         melanoma    Cancer Father         lung    Heart Disease Maternal Grandmother     Diabetes Other     Hypertension Other     Headache Other     Seizures Other     Coronary Art Dis Other     Heart Attack Other         Medications: Allergies   Allergen Reactions    Aspirin Other (comments)     \"messes up my kidneys\"    Feldene [Piroxicam] Other (comments)     Kidney damage      Morphine Other (comments)     unconsciousness    Nsaids (Non-Steroidal Anti-Inflammatory Drug) Other (comments)     \"messes up my kidneys\"    Other Medication Not Reported This Time     Mycins      Penicillins Hives    Sulfa (Sulfonamide Antibiotics) Rash    Vancomycin Itching     Possible allergic reaction to Vancomycin.  Complained of itching/reddness around forehead/back of neck        Current Facility-Administered Medications   Medication Dose Route Frequency    potassium chloride 10 mEq in 100 ml IVPB  10 mEq IntraVENous Q1H    atorvastatin (LIPITOR) tablet 40 mg  40 mg Oral QHS    sodium chloride (NS) flush 5-40 mL  5-40 mL IntraVENous Q8H    sodium chloride (NS) flush 5-40 mL  5-40 mL IntraVENous PRN    acetaminophen (TYLENOL) tablet 650 mg  650 mg Oral Q6H PRN    Or    acetaminophen (TYLENOL) suppository 650 mg  650 mg Rectal Q6H PRN    polyethylene glycol (MIRALAX) packet 17 g  17 g Oral DAILY PRN    promethazine (PHENERGAN) tablet 12.5 mg  12.5 mg Oral Q6H PRN    Or    ondansetron (ZOFRAN) injection 4 mg  4 mg IntraVENous Q6H PRN    melatonin tablet 5 mg  5 mg Oral QHS    LORazepam (ATIVAN) injection 1 mg  1 mg IntraVENous Q2H PRN    levETIRAcetam (KEPPRA) tablet 500 mg  500 mg Oral BID    haloperidol lactate (HALDOL) injection 2.5 mg  2.5 mg IntraVENous Q4H PRN    aspirin tablet 325 mg  325 mg Oral DAILY    OLANZapine (ZyPREXA) tablet 5 mg  5 mg Oral QHS    oxybutynin chloride XL (DITROPAN XL) tablet 10 mg  10 mg Oral DAILY    PARoxetine (PAXIL) tablet 20 mg  20 mg Oral DAILY         Physical Exam:   Visit Vitals  BP (!) 101/57 (BP 1 Location: Right upper arm, BP Patient Position: At rest)   Pulse 74   Temp 98.3 °F (36.8 °C)   Resp 18   Wt 54.7 kg (120 lb 9.5 oz)   SpO2 97%   BMI 19.46 kg/m²       BP Readings from Last 3 Encounters:   01/31/23 (!) 101/57   10/24/22 111/63   07/19/22 115/72     Pulse Readings from Last 3 Encounters:   01/31/23 74   10/24/22 99   09/22/22 (!) 113     Wt Readings from Last 3 Encounters:   01/31/23 54.7 kg (120 lb 9.5 oz)   10/24/22 57 kg (125 lb 9.6 oz)   09/22/22 63.5 kg (140 lb)       General:  alert, cooperative, no distress, appears stated age  Neck:  supple  Lungs:  clear to auscultation bilaterally  Heart:  regular rate and rhythm  Abdomen:  abdomen is soft without significant tenderness, masses, organomegaly or guarding  Extremities:  atraumatic, no edema  Skin: Warm and dry.    Neuro: alert, oriented to self, states year is 1999, no involuntary movements  Psych: non focal     Data Review:     Recent Labs     01/31/23  0255 01/30/23  0500 01/29/23  0458   WBC 6.2 7.1 7.7   HGB 9.8* 9.8* 10.0*   HCT 29.6* 29.7* 30.1*    211 217     Recent Labs     01/31/23  0255      K 2.5*      CO2 29   *   BUN 11   CREA 0.95   CA 8.7   MG 1.5*       Results for orders placed or performed during the hospital encounter of 01/28/23   EKG, 12 LEAD, INITIAL   Result Value Ref Range    Ventricular Rate 93 BPM    Atrial Rate 93 BPM    P-R Interval 148 ms    QRS Duration 74 ms    Q-T Interval 364 ms    QTC Calculation (Bezet) 452 ms    Calculated P Axis 43 degrees    Calculated R Axis 30 degrees    Calculated T Axis 7 degrees    Diagnosis       Sinus rhythm with premature atrial complexes  Low voltage QRS  Cannot rule out Anterior infarct (cited on or before 28-JAN-2023)  Abnormal ECG  When compared with ECG of 25-AUG-2015 16:09,  premature atrial complexes are now present  Confirmed by Tiffanie Salguero MD, --- ([id number]) on 2023 9:17:41 AM     Results for orders placed or performed in visit on 16   AMB POC EKG ROUTINE W/ 12 LEADS, INTER & REP    Narrative    EKG: nonspecific ST and T waves changes, sinus tachycardia, HR  faster than 16   Results for orders placed or performed during the hospital encounter of 08/06/15   ECG HOLTER MONITOR, UP TO 48 HRS    Narrative                               Holter Monitoring Report                               [facility]                      [place], [address]                                         Test Date:    2015  [facility] Name:     Abelardo Rivera             Department:     Patient ID:   [id number]                Room:           Gender:                                Technician:   Tahmina  :                         Requested By:  Carrie Haro Number:                          Reading MD:   Thelma Fernandez MD                             Interpretive Statements  Layman James was in [place] [address]. The average heart rate, excluding ectopy, was 78 BPM with a minimum of 50 BPM  at  08:54D2 and a maximum of 129 BPM at   15:44D1. Heart beats, including ectopy, totaled 045311 beats. VENTRICULAR ECTOPICS totaled 9  averaging  0.4 per hour  with 4 single, 0  paired, 0 trigeminy and 0 R on T. There was 1 VENTRICULAR TACHYCARDIA with 5 beats 15:52D1 at a rate of 207  BPM.    SUPRAVENTRICULAR ECTOPICS totaled 195  averaging  8.6 per hour  ,with 184  single and 2 paired beats. There was 1 SUPRAVENTRICULAR TACHYCARDIA with 9 beats at 22:10D1 at a rate  of 127 BPM.  1. Rhythm is sinus. 2. AR and QRS are within normal limits. 3. Rare(4) single PVCs with (1) short 5 beat run of non-sustained VENTRICULAR  TACHYCARDIA noted. 4. Occassional (184) single APC's with (1) short 9 beat run of svt noted.   5. No symptoms reported in the diary. Electronically signed on 08-11-15 09:29:04 CDT by Meryle Haines, MD     Last Lipid:    Lab Results   Component Value Date/Time    Cholesterol, total 187 06/21/2022 03:12 PM    HDL Cholesterol 72 (H) 06/21/2022 03:12 PM    LDL, calculated 97.8 06/21/2022 03:12 PM    Triglyceride 86 06/21/2022 03:12 PM    CHOL/HDL Ratio 2.6 06/21/2022 03:12 PM       Cardiographics:     EKG Results       Procedure 720 Value Units Date/Time    EKG, 12 LEAD, SUBSEQUENT [[id number]]     Order Status: Sent     EKG, 12 LEAD, INITIAL [[id number]] Collected: 01/28/23 0819    Order Status: Completed Updated: 01/28/23 0917     Ventricular Rate 93 BPM      Atrial Rate 93 BPM      P-R Interval 148 ms      QRS Duration 74 ms      Q-T Interval 364 ms      QTC Calculation (Bezet) 452 ms      Calculated P Axis 43 degrees      Calculated R Axis 30 degrees      Calculated T Axis 7 degrees      Diagnosis --     Sinus rhythm with premature atrial complexes  Low voltage QRS  Cannot rule out Anterior infarct (cited on or before 28-JAN-2023)  Abnormal ECG  When compared with ECG of 25-AUG-2015 16:09,  premature atrial complexes are now present  Confirmed by Sadia Galindo MD, --- ([id number]) on 1/28/2023 9:17:41 AM                      XR Results (most recent):  Results from [place] encounter on 01/28/23    XR CHEST PORT    Narrative  CHEST PORTABLE 0537 hours    COMPARISON: None. INDICATIONS: Seizure. FINDINGS:    Portable single view chest demonstrates the patient is rotated toward and  leaning towards the left Lungs: Clear. Cardiac Silhouette And Mediastinal Contours: Normal.    Pleural Spaces: No pneumothorax or pleural effusion evident. Bones And Soft Tissues: Unremarkable for age. Impression  No active or acute cardiopulmonary process is evident.         Signed By: Blayne Costello PA-C     January 31, 2023

## 2023-01-31 NOTE — PROGRESS NOTES
[facility] Hospitalist Group  Progress Note    Patient: Jovany Johnson Age: 78 y.o. : 1945 MR#: [id number] SSN: [id number]  Date: 2023     Subjective:   Alert and oriented to self, confused at times during the interview. Assessment/Plan:   1. Seizure, new onset. History of childhood seizures  2. Elevated troponin  3. Electrolyte derangement  4. HTN  5. HLD   6. History of dementia   7. DMT2  8. Breast asymmetry with for confluent/dense appearing parenchyma on the left with enlarged left axillary node measuring 1.8 x 2.4 cm     Plan  Neurology consult and appreciate assistance. Continue seizure precautions and continue keppra. Cardiology consult and appreciate assistance. Recommendations to review echo with no further cardiac evaluation at this time. Replete potasium and magnesium. Monitor metabolic panel and renal function   MRI pending  Case management consult for discharge planning. Home health is requested by family. Lives in the home with her son. PT/OT eval and treat. Updated son Osmel Isbell today. Will need to further update him on the incidental findings of the breast abnormality seen on the CT. She will need a follow up mammogram at discharge. 30 minutes in total spent today in preparation for visit, review of external notes and test results, review of test results, order placement, obtaining history, physical exam of the patient, and/or counseling the patient concerning diagnosis, test results, treatment plan and speaking with physicians and nurses involved in patient's care. Additional time spent in review and independent interpretation of external notes, imaging, labs via hospital EMR and/or Care Everywhere, as well as pre-charting activity all of which occur on the day of service.     10 minutes were spent in Preparation to see Patient and Obtaining and Reviewing Separate History  5 minutes were spent in Examination, Counseling, & Educating Patient  5 minutes were spent (if any) in [place] [address] with Nursing Staff and Physicians  10 minutes were spent Documenting in EHR and Interpreting Tests Independently    A Total of 30 minutes were spent seeing this Patient. Case discussed with:  [x]Patient  [x]Family  [x]Nursing  []Case Management  DVT Prophylaxis:  []Lovenox  []Hep SQ  [x]SCDs  []Coumadin   []On Heparin gtt    Objective:   VS: Visit Vitals  /73   Pulse 87   Temp 97.6 °F (36.4 °C)   Resp 16   Ht 5' 6\" (1.676 m)   Wt 54.4 kg (120 lb)   SpO2 98%   BMI 19.37 kg/m²      Tmax/24hrs: Temp (24hrs), Av.9 °F (36.6 °C), Min:97.2 °F (36.2 °C), Max:98.3 °F (36.8 °C)    Intake/Output Summary (Last 24 hours) at 2023 1450  Last data filed at 2023 0000  Gross per 24 hour   Intake 120 ml   Output --   Net 120 ml       General:         Alert, cooperative, no acute distress    HEENT:           NC, Atraumatic. PERRLA, anicteric sclerae. Lungs:            CTA bilaterally. No Wheezing/Rhonchi/Rales  Heart:              RRR, No murmur, No Rubs, No Gallops  Abdomen:      Soft, Non distended, Non tender. +Bowel sounds, no HSM  Extremities:   No c/c/e  Psych:              Good insight. Not anxious or agitated. Neurologic:     Alert and oriented X self.   No acute neurological deficits     Labs:    Recent Results (from the past 24 hour(s))   METABOLIC PANEL, BASIC    Collection Time: 23  2:55 AM   Result Value Ref Range    Sodium 143 136 - 145 mmol/L    Potassium 2.5 (LL) 3.5 - 5.5 mmol/L    Chloride 108 100 - 111 mmol/L    CO2 29 21 - 32 mmol/L    Anion gap 6 3.0 - 18 mmol/L    Glucose 101 (H) 74 - 99 mg/dL    BUN 11 7.0 - 18 MG/DL    Creatinine 0.95 0.6 - 1.3 MG/DL    BUN/Creatinine ratio 12 12 - 20      eGFR >60 >60 ml/min/1.73m2    Calcium 8.7 8.5 - 10.1 MG/DL   MAGNESIUM    Collection Time: 23  2:55 AM   Result Value Ref Range    Magnesium 1.5 (L) 1.6 - 2.6 mg/dL   CBC W/O DIFF    Collection Time: 23  2:55 AM   Result Value Ref Range    WBC 6.2 4.6 - 13.2 K/uL    RBC 3.39 (L) 4.20 - 5.30 M/uL    HGB 9.8 (L) 12.0 - 16.0 g/dL    HCT 29.6 (L) 35.0 - 45.0 %    MCV 87.3 78.0 - 100.0 FL    MCH 28.9 24.0 - 34.0 PG    MCHC 33.1 31.0 - 37.0 g/dL    RDW 14.6 (H) 11.6 - 14.5 %    PLATELET 471 196 - 710 K/uL    MPV 9.3 9.2 - 11.8 FL    NRBC 0.0 0  WBC    ABSOLUTE NRBC 0.00 0.00 - 0.01 K/uL   IRON PROFILE    Collection Time: 01/31/23  2:55 AM   Result Value Ref Range    Iron 30 (L) 50 - 175 ug/dL    TIBC 176 (L) 250 - 450 ug/dL    Iron % saturation 17 (L) 20 - 50 %   FERRITIN    Collection Time: 01/31/23  2:55 AM   Result Value Ref Range    Ferritin 296 8 - 388 NG/ML   VITAMIN B12 & FOLATE    Collection Time: 01/31/23  2:55 AM   Result Value Ref Range    Vitamin B12 >2,000 (H) 211 - 911 pg/mL    Folate >20.0 (H) 3.10 - 17.50 ng/mL   PROCALCITONIN    Collection Time: 01/31/23  2:55 AM   Result Value Ref Range    Procalcitonin <0.05 ng/mL   NT-PRO BNP    Collection Time: 01/31/23  2:55 AM   Result Value Ref Range    NT pro- 0 - 1,800 PG/ML   TSH 3RD GENERATION    Collection Time: 01/31/23  2:55 AM   Result Value Ref Range    TSH 1.26 0.36 - 3.74 uIU/mL   SED RATE (ESR)    Collection Time: 01/31/23  2:55 AM   Result Value Ref Range    Sed rate, automated 29 0 - 30 mm/hr   C REACTIVE PROTEIN, QT    Collection Time: 01/31/23  2:55 AM   Result Value Ref Range    C-Reactive protein 1.7 (H) 0 - 0.3 mg/dL   AMMONIA    Collection Time: 01/31/23  2:55 AM   Result Value Ref Range    Ammonia, plasma 24 11 - 32 UMOL/L   ECHO ADULT COMPLETE    Collection Time: 01/31/23 10:20 AM   Result Value Ref Range    IVSd 0.7 0.6 - 0.9 cm    LVIDd 3.6 (A) 3.9 - 5.3 cm    LVIDs 2.4 cm    LVOT Diameter 1.7 cm    LVPWd 0.6 0.6 - 0.9 cm    EF BP 72 55 - 100 %    LV Ejection Fraction A2C 73 %    LV Ejection Fraction A4C 69 %    LV EDV A2C 57 mL    LV EDV A4C 38 mL    LV EDV BP 49 (A) 56 - 104 mL    LV ESV A2C 15 mL    LV ESV A4C 12 mL    LV ESV BP 14 (A) 19 - 49 mL LVOT Peak Gradient 4 mmHg    LVOT Mean Gradient 2 mmHg    LVOT SV 44.2 ml    LVOT Peak Velocity 1.0 m/s    LVOT VTI 19.5 cm    RV Free Wall Peak S' 12 cm/s    LA Volume A/L 23 mL    LA Volume 2C 22 22 - 52 mL    LA Volume 4C 20 (A) 22 - 52 mL    LA Volume BP 22 22 - 52 mL    AV Area by Peak Velocity 1.6 cm2    AV Area by VTI 1.9 cm2    AV Peak Gradient 8 mmHg    AV Mean Gradient 4 mmHg    AV Peak Velocity 1.4 m/s    AV Mean Velocity 0.9 m/s    AV VTI 24.5 cm    MV A Velocity 0.93 m/s    MV E Wave Deceleration Time 99.7 ms    MV E Velocity 0.54 m/s    LV E' Lateral Velocity 16 cm/s    LV E' Septal Velocity 9 cm/s    TAPSE 2.0 1.7 cm    Aortic Root 3.2 cm    Fractional Shortening 2D 33 28 - 44 %    LV ESV Index BP 9 mL/m2    LV EDV Index BP 30 mL/m2    LV ESV Index A4C 7 mL/m2    LV EDV Index A4C 24 mL/m2    LV ESV Index A2C 9 mL/m2    LV EDV Index A2C 35 mL/m2    LVIDd Index 2.24 cm/m2    LVIDs Index 1.49 cm/m2    LV RWT Ratio 0.33     LV Mass 2D 59.7 (A) 67 - 162 g    LV Mass 2D Index 37.1 (A) 43 - 95 g/m2    MV E/A 0.58     E/E' Ratio (Averaged) 4.69     E/E' Lateral 3.38     E/E' Septal 6.00     LA Volume Index BP 14 (A) 16 - 34 ml/m2    LA Volume Index A/L 14 16 - 34 mL/m2    LVOT Stroke Volume Index 27.5 mL/m2    LVOT Area 2.3 cm2    LA Volume Index 2C 14 (A) 16 - 34 mL/m2    LA Volume Index 4C 12 (A) 16 - 34 mL/m2    Ao Root Index 1.99 cm/m2    AV Velocity Ratio 0.71     LVOT:AV VTI Index 0.80     HERNANDO/BSA VTI 1.2 cm2/m2    HERNANDO/BSA Peak Velocity 1.0 cm2/m2    RV Basal Dimension 2.4 cm    RA Area 4C 7.7 cm2    Est. RA Pressure 8 mmHg    Aortic Arch 2.5 cm       Signed By: Kassy Stiles NP     January 31, 2023

## 2023-01-31 NOTE — PROGRESS NOTES
Problem: Unstable angina/NSTEMI: Day of Admission/Day 1  Goal: Off Pathway (Use only if patient is Off Pathway)  Outcome: Progressing Towards Goal  Goal: Activity/Safety  Outcome: Progressing Towards Goal  Goal: Consults, if ordered  Outcome: Progressing Towards Goal  Goal: Diagnostic Test/Procedures  Outcome: Progressing Towards Goal  Goal: Nutrition/Diet  Outcome: Progressing Towards Goal  Goal: Medications  Outcome: Progressing Towards Goal  Goal: Respiratory  Outcome: Progressing Towards Goal  Goal: Treatments/Interventions/Procedures  Outcome: Progressing Towards Goal  Goal: Psychosocial  Outcome: Progressing Towards Goal  Goal: *Hemodynamically stable  Outcome: Progressing Towards Goal  Goal: *Lungs clear or at baseline  Outcome: Progressing Towards Goal

## 2023-01-31 NOTE — PROGRESS NOTES
conducted an initial consultation and Spiritual Assessment for Rangel Albarran, who is a 78 y.o.,female. Patients Primary Language is: [place]. According to the patients EMR Bahai Affiliation is: Christian. The reason the Patient came to the hospital is:   Patient Active Problem List    Diagnosis Date Noted    Seizure ([facility] [address] 01/30/2023    Breast mass, left 01/30/2023    JEIMY (acute kidney injury) ([facility] [address] 01/30/2023    NSTEMI (non-ST elevated myocardial infarction) ([facility] [address] 01/28/2023    Diabetes ([facility] [address]     GERD (gastroesophageal reflux disease)     High cholesterol     Hypertension     Low back pain     Chronic anxiety     Depression     Weight loss 05/20/2019    Hyperlipidemia LDL goal <100 12/05/2017    Hyperlipidemia LDL goal <130 06/02/2017    Chronic renal failure, stage 3 (moderate) ([facility] [address] 06/02/2017    Overactive bladder 04/08/2016    Chest pain 01/04/2016    Back pain     Rheumatoid arthritis involving multiple joints ([facility] [address] 10/06/2015    Insomnia 08/15/2014    Prediabetes 02/07/2014    Chronic pain syndrome 06/13/2013    Nausea 06/06/2013    Major depression 04/17/2012    Urgency incontinence 01/22/2012    DJD (degenerative joint disease)     Postlaminectomy syndrome, lumbar region     Lumbosacral spondylosis without myelopathy     Lumbosacral radiculopathy     Spasm of muscle     Encounter for long-term (current) use of high-risk medication         The  provided the following Interventions:  Initiated a relationship of care and support. Listened empathically. Provided information about Spiritual Care Services. Chart reviewed. The following outcomes where achieved:  Patient is not interested at this time in completing an Advance Medical Directive.  confirmed Patient's Bahai Affiliation. Patient expressed gratitude for 's visit. Assessment:  Patient does not have any Episcopal/cultural needs that will affect patients preferences in health care.   There are no spiritual or Alevism issues which require intervention at this time. Plan:  Chaplains will continue to follow and will provide pastoral care on an as needed/requested basis.  recommends bedside caregivers page  on duty if patient shows signs of acute spiritual or emotional distress.     [address]  [phone])

## 2023-02-01 ENCOUNTER — HOME HEALTH ADMISSION (OUTPATIENT)
Dept: HOME HEALTH SERVICES | Facility: HOME HEALTH | Age: 78
End: 2023-02-01
Payer: MEDICARE

## 2023-02-01 LAB
ANION GAP SERPL CALC-SCNC: 6 MMOL/L (ref 3–18)
ATRIAL RATE: 73 BPM
BUN SERPL-MCNC: 10 MG/DL (ref 7–18)
BUN/CREAT SERPL: 11 (ref 12–20)
CALCIUM SERPL-MCNC: 8.6 MG/DL (ref 8.5–10.1)
CALCULATED P AXIS, ECG09: 30 DEGREES
CALCULATED R AXIS, ECG10: 24 DEGREES
CALCULATED T AXIS, ECG11: 19 DEGREES
CHLORIDE SERPL-SCNC: 109 MMOL/L (ref 100–111)
CO2 SERPL-SCNC: 27 MMOL/L (ref 21–32)
CREAT SERPL-MCNC: 0.88 MG/DL (ref 0.6–1.3)
DIAGNOSIS, 93000: NORMAL
ERYTHROCYTE [DISTWIDTH] IN BLOOD BY AUTOMATED COUNT: 14.7 % (ref 11.6–14.5)
GLUCOSE BLD STRIP.AUTO-MCNC: 106 MG/DL (ref 70–110)
GLUCOSE BLD STRIP.AUTO-MCNC: 115 MG/DL (ref 70–110)
GLUCOSE SERPL-MCNC: 94 MG/DL (ref 74–99)
HCT VFR BLD AUTO: 33.4 % (ref 35–45)
HGB BLD-MCNC: 11 G/DL (ref 12–16)
MAGNESIUM SERPL-MCNC: 2 MG/DL (ref 1.6–2.6)
MCH RBC QN AUTO: 28.9 PG (ref 24–34)
MCHC RBC AUTO-ENTMCNC: 32.9 G/DL (ref 31–37)
MCV RBC AUTO: 87.7 FL (ref 78–100)
NRBC # BLD: 0 K/UL (ref 0–0.01)
NRBC BLD-RTO: 0 PER 100 WBC
P-R INTERVAL, ECG05: 174 MS
PLATELET # BLD AUTO: 230 K/UL (ref 135–420)
PMV BLD AUTO: 9.2 FL (ref 9.2–11.8)
POTASSIUM SERPL-SCNC: 3 MMOL/L (ref 3.5–5.5)
POTASSIUM SERPL-SCNC: 3.9 MMOL/L (ref 3.5–5.5)
Q-T INTERVAL, ECG07: 420 MS
QRS DURATION, ECG06: 80 MS
QTC CALCULATION (BEZET), ECG08: 462 MS
RBC # BLD AUTO: 3.81 M/UL (ref 4.2–5.3)
SODIUM SERPL-SCNC: 142 MMOL/L (ref 136–145)
VENTRICULAR RATE, ECG03: 73 BPM
WBC # BLD AUTO: 9.7 K/UL (ref 4.6–13.2)

## 2023-02-01 PROCEDURE — 2709999900 HC NON-CHARGEABLE SUPPLY

## 2023-02-01 PROCEDURE — 84132 ASSAY OF SERUM POTASSIUM: CPT

## 2023-02-01 PROCEDURE — 99232 SBSQ HOSP IP/OBS MODERATE 35: CPT | Performed by: INTERNAL MEDICINE

## 2023-02-01 PROCEDURE — 74011250637 HC RX REV CODE- 250/637: Performed by: NURSE PRACTITIONER

## 2023-02-01 PROCEDURE — 97116 GAIT TRAINING THERAPY: CPT

## 2023-02-01 PROCEDURE — 97161 PT EVAL LOW COMPLEX 20 MIN: CPT

## 2023-02-01 PROCEDURE — 97166 OT EVAL MOD COMPLEX 45 MIN: CPT

## 2023-02-01 PROCEDURE — 95816 EEG AWAKE AND DROWSY: CPT

## 2023-02-01 PROCEDURE — 97535 SELF CARE MNGMENT TRAINING: CPT

## 2023-02-01 PROCEDURE — 99222 1ST HOSP IP/OBS MODERATE 55: CPT | Performed by: NURSE PRACTITIONER

## 2023-02-01 PROCEDURE — 99232 SBSQ HOSP IP/OBS MODERATE 35: CPT | Performed by: NURSE PRACTITIONER

## 2023-02-01 PROCEDURE — APPSS30 APP SPLIT SHARED TIME 16-30 MINUTES: Performed by: NURSE PRACTITIONER

## 2023-02-01 PROCEDURE — 36573 INSJ PICC RS&I 5 YR+: CPT | Performed by: HOSPITALIST

## 2023-02-01 PROCEDURE — 82962 GLUCOSE BLOOD TEST: CPT

## 2023-02-01 PROCEDURE — 74011250637 HC RX REV CODE- 250/637: Performed by: STUDENT IN AN ORGANIZED HEALTH CARE EDUCATION/TRAINING PROGRAM

## 2023-02-01 PROCEDURE — 83735 ASSAY OF MAGNESIUM: CPT

## 2023-02-01 PROCEDURE — 65270000029 HC RM PRIVATE

## 2023-02-01 PROCEDURE — 36415 COLL VENOUS BLD VENIPUNCTURE: CPT

## 2023-02-01 PROCEDURE — 74011250637 HC RX REV CODE- 250/637: Performed by: INTERNAL MEDICINE

## 2023-02-01 PROCEDURE — 74011250636 HC RX REV CODE- 250/636: Performed by: NURSE PRACTITIONER

## 2023-02-01 PROCEDURE — 85027 COMPLETE CBC AUTOMATED: CPT

## 2023-02-01 PROCEDURE — 74011000250 HC RX REV CODE- 250: Performed by: INTERNAL MEDICINE

## 2023-02-01 RX ORDER — POTASSIUM CHLORIDE 7.45 MG/ML
10 INJECTION INTRAVENOUS
Status: COMPLETED | OUTPATIENT
Start: 2023-02-01 | End: 2023-02-01

## 2023-02-01 RX ORDER — POTASSIUM CHLORIDE 20 MEQ/1
40 TABLET, EXTENDED RELEASE ORAL 2 TIMES DAILY
Status: DISCONTINUED | OUTPATIENT
Start: 2023-02-01 | End: 2023-02-08 | Stop reason: HOSPADM

## 2023-02-01 RX ADMIN — ATORVASTATIN CALCIUM 40 MG: 40 TABLET, FILM COATED ORAL at 22:42

## 2023-02-01 RX ADMIN — Medication 5 MG: at 22:42

## 2023-02-01 RX ADMIN — PAROXETINE HYDROCHLORIDE 20 MG: 20 TABLET, FILM COATED ORAL at 10:18

## 2023-02-01 RX ADMIN — LEVETIRACETAM 500 MG: 500 TABLET, FILM COATED ORAL at 10:18

## 2023-02-01 RX ADMIN — SODIUM CHLORIDE, PRESERVATIVE FREE 10 ML: 5 INJECTION INTRAVENOUS at 06:12

## 2023-02-01 RX ADMIN — SODIUM CHLORIDE, PRESERVATIVE FREE 10 ML: 5 INJECTION INTRAVENOUS at 22:42

## 2023-02-01 RX ADMIN — SENNOSIDES AND DOCUSATE SODIUM 1 TABLET: 50; 8.6 TABLET ORAL at 10:18

## 2023-02-01 RX ADMIN — OXYBUTYNIN CHLORIDE 10 MG: 5 TABLET, EXTENDED RELEASE ORAL at 10:18

## 2023-02-01 RX ADMIN — POTASSIUM CHLORIDE 10 MEQ: 7.46 INJECTION, SOLUTION INTRAVENOUS at 11:52

## 2023-02-01 RX ADMIN — POTASSIUM CHLORIDE 40 MEQ: 1500 TABLET, EXTENDED RELEASE ORAL at 17:06

## 2023-02-01 RX ADMIN — ASPIRIN 325 MG ORAL TABLET 325 MG: 325 PILL ORAL at 10:18

## 2023-02-01 RX ADMIN — POTASSIUM CHLORIDE 10 MEQ: 7.46 INJECTION, SOLUTION INTRAVENOUS at 13:50

## 2023-02-01 RX ADMIN — SODIUM CHLORIDE, PRESERVATIVE FREE 10 ML: 5 INJECTION INTRAVENOUS at 17:07

## 2023-02-01 RX ADMIN — POTASSIUM CHLORIDE 40 MEQ: 1500 TABLET, EXTENDED RELEASE ORAL at 11:52

## 2023-02-01 RX ADMIN — OLANZAPINE 5 MG: 5 TABLET, FILM COATED ORAL at 22:42

## 2023-02-01 RX ADMIN — LEVETIRACETAM 500 MG: 500 TABLET, FILM COATED ORAL at 17:06

## 2023-02-01 NOTE — PROGRESS NOTES
N.O. for K+ 10 mEq IV x4 to start at 2000. Pt completed 4 runs of K+ on day shift for K+ of 2.5. No redraw completed post K+ infusions. Hospitalist on-call notified and of previous K+ result and pt receiving 4 runs of K+. Hospitalist gave N.O. for a STAT K+ redraw. K+ redraw resulted with 3.6. Hospitalist perfect served, awaiting return [facility]. Hospitalist on-call returned perfect serve, stated to hold K+ at this time.

## 2023-02-01 NOTE — CONSULTS
[id number] [facility]: [phone])  [facility]: [address]: [phone]    Patient Name: Kailey Cross  YOB: 1945    Date of Initial Consult: 2/1/2023   Reason for Consult: goals of care   Requesting Provider: Dr Beka Caldwell    Primary Care Physician: Derwood Osler, MD      SUMMARY:   Kailey Cross is a 78y.o. year old with a past history of hypertension, depression dementia, diabetes , who was admitted on 1/28/2023 from home with a diagnosis of possible seizure . Current medical issues leading to Palliative Medicine involvement include: 78year old female who has a history of dementia which is a life limiting chronic illness. Palliative medicine is consulted for goals of care. PALLIATIVE DIAGNOSES:   Encounter for palliative care / goals of care   Advanced care plan discussions   Dementia   Seizure   Debility        PLAN:   Encounter for palliative care / goals of care Ms Jennifer Flores seen along with Ms Ava Ingram RN and MARVA Sosa. She is alert very pleasantly confused. She is not able to participate in her goals of care discussions. We called her son Luiz Gomez. He is very over whelmed with her care. She lives with her son Luiz Gomez. Our team offered support as he navigates his mother's health journey. Goals of care broached including benefits and burdens. Difficult question for son. He became a bit tearful over the phone. He told us he has not been asked this question before and he has not previously spoken to his mother about it. He voiced his understanding these efforts will not cure, treat her underlying dementia ( cognitive dysfunction) and could leave her more debilitated. He was not able to make a decision today. We plan on meeting Luiz Gomez at bedside tomorrow 2/2/2023 at 1400 to further discuss goals of care. Current goals of care full code with full interventions.    Advanced care plan discussions patient is not currently able to complete an AMD. Sada Laguna tells us he is only child. He is legal next of kin. Dementia per son patient as o/p dx'd with mild cognitive dysfunction. She is confused at home, however it seems to improve with vitamins per son. Son tells us she does not seem to brush her teeth and not bathing as frequently as before. Debility lives her son ambulates at home. Confusion, short term memory loss. Having difficulty with ADL's. She is able to feed herself  Initial consult note routed to primary continuity provider  Communicated plan of care with: Palliative IDT, son Billy Stands Proxy Stated Goals: Prolong life      TREATMENT PREFERENCES:   Code Status: Full Code    Advance Care Planning:  [] [facility] Interdisciplinary Team has updated the ACP Navigator with [address] and Patient Capacity    Primary Decision [facility] ([address]):   Primary Decision Maker: Edgar Odonnell - [phone]  No AMD on file   Medical Interventions: Full interventions Son Sada Laguna is legal next of kin         Other:  As far as possible, the palliative care team has discussed with patient / health care proxy about goals of care / treatment preferences for patient.      HISTORY:     History obtained from: chart and son     CHIEF COMPLAINT: dementia     HPI/SUBJECTIVE:    The patient is:   [] Verbal and participatory  [x] Non-participatory due to: confusion   Please see summary     Clinical Pain Assessment (nonverbal scale for nonverbal patients): Clinical Pain Assessment  Severity: 0     Activity (Movement): Lying quietly, normal position    Duration: for how long has pt been experiencing pain (e.g., 2 days, 1 month, years)  Frequency: how often pain is an issue (e.g., several times per day, once every few days, constant)     FUNCTIONAL ASSESSMENT:     Palliative Performance Scale (PPS):  PPS: 60    ECOG  ECOG Status : Limited self-care     PSYCHOSOCIAL/SPIRITUAL SCREENING:      Any spiritual / Ep[employer]opalian concerns:unable to assess due to confusion   [] Yes /  [] No    Caregiver Burnout:  [x] Yes /  [] No /  [] No Caregiver Present      Anticipatory grief assessment: unable to assess due to confusion   [] Normal  / [] Maladaptive        REVIEW OF SYSTEMS:     Positive and pertinent negative findings in ROS are noted above in HPI. The following systems were [] reviewed / [x] unable to be reviewed as noted in HPI  Other findings are noted below. Systems: constitutional, ears/nose/mouth/throat, respiratory, gastrointestinal, genitourinary, musculoskeletal, integumentary, neurologic, psychiatric, endocrine. Positive findings noted below. Modified ESAS Completed by: provider           Pain: 0           Dyspnea: 0           Stool Occurrence(s): 1        PHYSICAL EXAM:     Wt Readings from Last 3 Encounters:   02/01/23 52.2 kg (115 lb 1.3 oz)   10/24/22 57 kg (125 lb 9.6 oz)   09/22/22 63.5 kg (140 lb)     Blood pressure 105/62, pulse 85, temperature 97.1 °F (36.2 °C), resp. rate 14, height 5' 6\" (1.676 m), weight 52.2 kg (115 lb 1.3 oz), SpO2 98 %.   Pain:  Pain Scale 1: Numeric (0 - 10)  Pain Intensity 1: 0                 Last bowel movement: x 3 2/1/2023     Constitutional: very pleasant confused female who is reclining in bed feeding herself lunch in NAD  Eyes: pupils equal  ENMT: moist MM   Cardiovascular: regular rhythm  Respiratory: breathing not labored  Gastrointestinal: soft non-tender  Skin: warm, dry  Neurologic: alert oriented x 1   Psychiatric: full affect, no hallucinations         HISTORY:     Active Problems:    NSTEMI (non-ST elevated myocardial infarction) ([facility] [address] (1/28/2023)      Seizure ([facility] [address] (1/30/2023)      Breast mass, left (1/30/2023)      JEIMY (acute kidney injury) ([facility] [address] (1/30/2023)    Past Medical History:   Diagnosis Date    Chronic anxiety     Depression     controlled on Paxil    Diabetes (HCC)     DJD (degenerative joint disease)     GERD (gastroesophageal reflux disease)     High cholesterol HTN (hypertension)     Hypertension     Low back pain     post-laminectomy syndrome and multilevel degenerative disease    Osteoporosis       Past Surgical History:   Procedure Laterality Date    HX ADENOIDECTOMY      HX APPENDECTOMY      HX BACK SURGERY      PHLD X3    HX BREAST BIOPSY Left 8/28/2015    WIDE LOCAL EXCISION LEFT BREAST LESION performed by Micheline Ribera MD at [facility] MAIN OR    HX HERNIA REPAIR      hiatal    HX KNEE REPLACEMENT      HX ORTHOPAEDIC      bilateral shoulder surgery    HX ELVIRA AND BSO  1982    HX TONSILLECTOMY      IA TOTAL HIP ARTHROPLASTY  4/12     total right hip replacement, Dr. Eneida Shoemaker      Family History   Problem Relation Age of Onset    Cancer Mother         melanoma    Cancer Father         lung    Heart Disease Maternal Grandmother     Diabetes Other     Hypertension Other     Headache Other     Seizures Other     Coronary Art Dis Other     Heart Attack Other      History reviewed, no pertinent family history. Social History     Tobacco Use    Smoking status: Former    Smokeless tobacco: Never   Substance Use Topics    Alcohol use: No     Allergies   Allergen Reactions    Aspirin Other (comments)     \"messes up my kidneys\"    Feldene [Piroxicam] Other (comments)     Kidney damage      Morphine Other (comments)     unconsciousness    Nsaids (Non-Steroidal Anti-Inflammatory Drug) Other (comments)     \"messes up my kidneys\"    Other Medication Not Reported This Time     Mycins      Penicillins Hives    Sulfa (Sulfonamide Antibiotics) Rash    Vancomycin Itching     Possible allergic reaction to Vancomycin.  Complained of itching/reddness around forehead/back of neck      Current Facility-Administered Medications   Medication Dose Route Frequency    potassium chloride (K-DUR, KLOR-CON M20) SR tablet 40 mEq  40 mEq Oral BID    senna-docusate (PERICOLACE) 8.6-50 mg per tablet 1 Tablet  1 Tablet Oral DAILY    atorvastatin (LIPITOR) tablet 40 mg  40 mg Oral QHS    sodium chloride (NS) flush 5-40 mL  5-40 mL IntraVENous Q8H    sodium chloride (NS) flush 5-40 mL  5-40 mL IntraVENous PRN    acetaminophen (TYLENOL) tablet 650 mg  650 mg Oral Q6H PRN    Or    acetaminophen (TYLENOL) suppository 650 mg  650 mg Rectal Q6H PRN    polyethylene glycol (MIRALAX) packet 17 g  17 g Oral DAILY PRN    promethazine (PHENERGAN) tablet 12.5 mg  12.5 mg Oral Q6H PRN    Or    ondansetron (ZOFRAN) injection 4 mg  4 mg IntraVENous Q6H PRN    melatonin tablet 5 mg  5 mg Oral QHS    LORazepam (ATIVAN) injection 1 mg  1 mg IntraVENous Q2H PRN    levETIRAcetam (KEPPRA) tablet 500 mg  500 mg Oral BID    haloperidol lactate (HALDOL) injection 2.5 mg  2.5 mg IntraVENous Q4H PRN    aspirin tablet 325 mg  325 mg Oral DAILY    OLANZapine (ZyPREXA) tablet 5 mg  5 mg Oral QHS    oxybutynin chloride XL (DITROPAN XL) tablet 10 mg  10 mg Oral DAILY    PARoxetine (PAXIL) tablet 20 mg  20 mg Oral DAILY        LAB AND IMAGING FINDINGS:     Lab Results   Component Value Date/Time    WBC 9.7 02/01/2023 03:25 AM    HGB 11.0 (L) 02/01/2023 03:25 AM    PLATELET 709 91/03/1955 03:25 AM     Lab Results   Component Value Date/Time    Sodium 142 02/01/2023 03:25 AM    Potassium 3.0 (L) 02/01/2023 03:25 AM    Chloride 109 02/01/2023 03:25 AM    CO2 27 02/01/2023 03:25 AM    BUN 10 02/01/2023 03:25 AM    Creatinine 0.88 02/01/2023 03:25 AM    Calcium 8.6 02/01/2023 03:25 AM    Magnesium 2.0 02/01/2023 03:25 AM    Phosphorus 4.0 01/24/2018 11:41 AM      Lab Results   Component Value Date/Time    Alk.  phosphatase 68 01/28/2023 04:29 AM    Protein, total 7.6 01/28/2023 04:29 AM    Albumin 3.8 01/28/2023 04:29 AM    Globulin 3.8 01/28/2023 04:29 AM     Lab Results   Component Value Date/Time    INR 2.0 (H) 05/11/2012 02:52 AM    Prothrombin time 21.9 (H) 05/11/2012 02:52 AM    aPTT 105.9 (H) 01/30/2023 05:00 AM      Lab Results   Component Value Date/Time    Iron 30 (L) 01/31/2023 02:55 AM    TIBC 176 (L) 01/31/2023 02:55 AM    Iron % saturation 17 (L) 01/31/2023 02:55 AM    Ferritin 296 01/31/2023 02:55 AM      No results found for: PH, PCO2, PO2  No components found for: [address] [facility]   Lab Results   Component Value Date/Time    CK 42 01/28/2023 04:29 AM              Total time: 55 minutes   Counseling / coordination time, spent as noted above:   > 50% counseling / coordination: yes with son     Prolonged service was provided for  []30 min   []75 min in face to face time in the presence of the patient, spent as noted above.   Time Start:   Time End:

## 2023-02-01 NOTE — PROGRESS NOTES
Problem: Falls - Risk of  Goal: *Absence of Falls  Description: Document Drums Fall Risk and appropriate interventions in the flowsheet.   Outcome: Progressing Towards Goal  Note: Fall Risk Interventions:

## 2023-02-01 NOTE — PROGRESS NOTES
Problem: Mobility Impaired (Adult and Pediatric)  Goal: *Acute Goals and Plan of Care (Insert Text)  Description: Physical Therapy Goals  Initiated 2/1/2023 and to be accomplished within 7 day(s)  1. Patient will move from supine to sit and sit to supine , scoot up and down, and roll side to side in bed with supervision/set-up. 2.  Patient will transfer from bed to chair and chair to bed with supervision/set-up using the least restrictive device. 3.  Patient will perform sit to stand with supervision/set-up. 4.  Patient will ambulate with supervision/set-up for 75 feet with the least restrictive device. 5.  Pt will participate in LE exercise to tolerance. PLOF: Pt confused, oriented to self only. Unsure of PLOF. Outcome: Progressing Towards Goal   PHYSICAL THERAPY EVALUATION    Patient: Citlaly Moraes (76 y.o. female)  Date: 2/1/2023  Primary Diagnosis: NSTEMI (non-ST elevated myocardial infarction) [facility]) [I21.4]       Precautions:  Seizure  ASSESSMENT :  Pt cleared to participate in PT session, pt received semi-reclined in bed and agreeable to therapy session. Based on the objective data described below, the patient presents with decreased endurance, decreased strength, decreased balance reactions, gait deviations, confusion, and decreased independence in functional mobility. Pt completing supine to sit with CGA, sitting on EOB with Jake. Standing with Jake and pt reporting need for BM. BSC brought to bedside. Jake for standing to RW, Jake for walking SPT to [facility]. Pt standing with forward head and trunk flexion. Pt then agreeable to ambulating in posey x50 feet with slow gait speed. Pt with B eversion and L ER. Pt returned to supine with Jake. Rolling to each side with Jake and chux changed. Pt positioned for comfort and educated to call for assist before getting up, pt verbalized understanding. Pt left with all needs met and call bell in reach. RN notified of position and participation.   Bed alarm activated. Patient will benefit from skilled intervention to address the above impairments. Patient's rehabilitation potential is considered to be Fair  Factors which may influence rehabilitation potential include:   []         None noted  [x]         Mental ability/status  []         Medical condition  [x]         Home/family situation and support systems  []         Safety awareness  []         Pain tolerance/management  []         Other:      PLAN :  Recommendations and Planned Interventions:   [x]           Bed Mobility Training             []    Neuromuscular Re-Education  [x]           Transfer Training                   []    Orthotic/Prosthetic Training  [x]           Gait Training                          []    Modalities  [x]           Therapeutic Exercises           []    Edema Management/Control  [x]           Therapeutic Activities            [x]    Family Training/Education  [x]           Patient Education  []           Other (comment):    Frequency/Duration: Patient will be followed by physical therapy 1-2 times per day/4-7 days per week to address goals. Further Equipment Recommendations for Discharge: rolling walker, wheelchair for distance    AMPAC: Current research shows that an AM-PAC score of 17 or less is not associated with a discharge to the patient's home setting. Based on an AM-PAC score of 17/24 and their current functional mobility deficits, it is recommended that the patient have 3-5 sessions per week of Physical Therapy at d/c to increase the patient's independence. This AMPAC score should be considered in conjunction with interdisciplinary team recommendations to determine the most appropriate discharge setting. Patient's social support, diagnosis, medical stability, and prior level of function should also be taken into consideration. SUBJECTIVE:   Patient stated I  need my clothes.     OBJECTIVE DATA SUMMARY:     Past Medical History:   Diagnosis Date    Chronic anxiety     Depression     controlled on Paxil    Diabetes (HCC)     DJD (degenerative joint disease)     GERD (gastroesophageal reflux disease)     High cholesterol     HTN (hypertension)     Hypertension     Low back pain     post-laminectomy syndrome and multilevel degenerative disease    Osteoporosis      Past Surgical History:   Procedure Laterality Date    HX ADENOIDECTOMY      HX APPENDECTOMY      HX BACK SURGERY      PHLD X3    HX BREAST BIOPSY Left 8/28/2015    WIDE LOCAL EXCISION LEFT BREAST LESION performed by Delores Steward MD at [facility] MAIN OR    HX HERNIA REPAIR      hiatal    HX KNEE REPLACEMENT      HX ORTHOPAEDIC      bilateral shoulder surgery    HX ELVIRA AND BSO  1982    HX TONSILLECTOMY      NM TOTAL HIP ARTHROPLASTY  4/12     total right hip replacement, Dr. Joss Weiss     Barriers to Learning/Limitations: AMS  Compensate with: tactile cues  Home Situation:     Critical Behavior:  Neurologic State: Alert;Confused  Orientation Level: Oriented to person  Cognition: Impaired decision making;Decreased command following  Safety/Judgement: Fall prevention  Psychosocial  Patient Behaviors: Calm;Confused    Strength:    Strength: Generally decreased, functional    Tone & Sensation:   Tone: Normal    Sensation: Intact    Range Of Motion:  AROM: Within functional limits    PROM: Within functional limits    Posture:  Posture (WDL): Exceptions to WDL  Posture Assessment: Forward head;Trunk flexion  Functional Mobility:  Bed Mobility:     Supine to Sit: Contact guard assistance  Sit to Supine: Contact guard assistance  Scooting: Contact guard assistance  Transfers:  Sit to Stand: Minimum assistance  Stand to Sit: Minimum assistance    Balance:   Sitting: Impaired; With support  Sitting - Static: Fair (occasional)  Sitting - Dynamic: Fair (occasional)  Standing: Impaired; With support  Standing - Static: Fair  Standing - Dynamic : Fair    Ambulation/Gait Training:  Distance (ft): 40 Feet (ft)  Assistive Device: Denise Hancock rolling  Ambulation - Level of Assistance: Minimal assistance     Gait Description (WDL): Exceptions to WDL  Gait Abnormalities: Decreased step clearance    Base of Support: Narrowed     Speed/Essie: Slow;Shuffled  Step Length: Right shortened;Left shortened    Pain:  Pain level pre-treatment: 0/10   Pain level post-treatment: 0/10   FLACC     Activity Tolerance:   Fair tolerance     Please refer to the flowsheet for vital signs taken during this treatment. After treatment:   []         Patient left in no apparent distress sitting up in chair  [x]         Patient left in no apparent distress in bed  [x]         Call bell left within reach  [x]         Nursing notified  []         Caregiver present  [x]         Bed alarm activated  []         SCDs applied    COMMUNICATION/EDUCATION:   [x]         Role of Physical Therapy in the acute care setting. []         Fall prevention education was provided and the patient/caregiver indicated understanding. []         Patient/family have participated as able in goal setting and plan of care. []         Patient/family agree to work toward stated goals and plan of care. []         Patient understands intent and goals of therapy, but is neutral about his/her participation. [x]         Patient is unable to participate in goal setting/plan of care: ongoing with therapy staff.  []         Other:     Thank you for this referral.  Mando Womack, PT   Time Calculation: 25 mins      Eval Complexity: History: MEDIUM  Complexity : 1-2 comorbidities / personal factors will impact the outcome/ POC Exam:LOW Complexity : 1-2 Standardized tests and measures addressing body structure, function, activity limitation and / or participation in recreation  Presentation: LOW Complexity : Stable, uncomplicated  Clinical Decision Making:Low Complexity low  Overall Complexity:LOW     Zaida Chan AM-PAC® Basic Mobility Inpatient Short Form (6-Clicks) Version 2    How much HELP from another person does the patient currently need    (If the patient hasn't done an activity recently, how much help from another person do you think he/she would need if he/she tried?)   Total (Total A or Dep)   A Lot  (Mod to Max A)   A Little (Sup or Min A)   None (Mod I to I)   Turning from your back to your side while in a flat bed without using bedrails? [] 1 [] 2 [x] 3 [] 4   2. Moving from lying on your back to sitting on the side of a flat bed without using bedrails? [] 1 [] 2 [x] 3 [] 4   3. Moving to and from a bed to a chair (including a wheelchair)? [] 1 [] 2 [x] 3 [] 4   4. Standing up from a chair using your arms (e.g., wheelchair, or bedside chair)? [] 1 [] 2 [x] 3 [] 4   5. Walking in hospital room? [] 1 [] 2 [x] 3 [] 4   6. Climbing 3-5 steps with a railing?+   [] 1 [x] 2 [] 3 [] 4   +If stair climbing cannot be assessed, skip item #6. Sum responses from items 1-5.

## 2023-02-01 NOTE — ACP (ADVANCE CARE PLANNING)
Advance Care Planning     General Advance Care Planning (ACP) Conversation      Date of Conversation: 2/1/2023    Conducted with: Patient, patient's son Cathi Franco), Albin Camara NP (Palliative), Vivi Chan RN (Palliative) and this writer. Healthcare Decision Maker:     Patient does not have a formal healthcare decision maker document, on file. Patient's son Cathi Franco, WR#[phone]) is patient's legal next-of-kin and her default healthcare decision maker. Content/Action Overview: This writer, along with Albin Camara NP and Vivi Chan RN, with the Palliative Care team; visited with patient today to offer support. Patient was sitting up in bed and eating her lunch. Patient was alert but very confused and unable to fully participate in any complex medical discussions or make any medical decisions. There were no family members at the bedside. The Palliative Care team then phoned patient's son Kennedy Shaw) to offer support and discuss healthcare decision maker and goals of care moving forward. Patient's son answered the phone and engaged in the conversations. Patient's son resides with patient. He moved in with patient about 8 years ago. Patient stated that her decline started about a month or two ago and 2 weeks ago she got sick; with flu like symptoms. Son reported that patient, in the past, was somewhat independent with her ADLs and IADLs; however, now she needs assistance with ADLs and IADLs. Son reported that he does the food preparation for patient. He went on to explain how patient, in the past, would wash up in the sink and now she has not bathed in a while or gotten her teeth cleaned in a while. Son voiced concern and wanted some assistance with getting help for his mother, regarding her ADLs. Son mentioned his mom needs some rehab for strengthening. She was using a rolling walker for mobility purposes, however, she is not walking as well anymore; even with the rolling walker. Patient's son reported that patient has mild cognitive impairment; which was told to him after a neurology appointment that she had due to memory loss. Son explained that he has been managing her medications and that she is currently on some supplements that have been helping with her memory issues. Patient's son is very overwhelmed; being patient's main caregiver. Patient does not have a formal healthcare decision maker document, on file. Patient's son is her legal next-of-kin and her default healthcare decision maker. Son was then asked about goals of care moving forward. He was educated on the risks and burdens of CPR. Son stated that he and his mother have never discussed her wishes; as it relates to CPR vs DNR. Patient's son would like the opportunity to allow patient's cognition to clear enough to have a goals of care discussion with his mother. He did not want to make any goals of care decision, at this time. He understands that patient will remain a full code; until different decisions are made. The Palliative Care team scheduled a family meeting for tomorrow, Thursday, February 2, 2023 at 2:00PM. At this time, patient will remain a FULL CODE WITH FULL INTERVENTIONS. Length of Voluntary ACP Conversation in minutes:  30 minutes        Casandra Lombardo, [facility]  Palliative Care   JX#[phone]

## 2023-02-01 NOTE — PROGRESS NOTES
Discharge/Transition Planning     [facility]. Referral accepted .  Placed in work que and updated AVS

## 2023-02-01 NOTE — PROGRESS NOTES
Palliative Medicine     Palliative Medicine team members, Laurie Goodson, NP, Devorah Baez, MSW and this writer. Jo Riley is a 78year old year old with a past history of HTN, depression dementia, diabetes, low back pain. She was admitted from home via the ED from [facility] with diagnosis of possible seizure. Patient was seen at bedside. Patient was alert but confused. She was aware of self and was able to name her son. Otherwise not aware of place, time or situation. Place was placed to her son, Janes Cordova, who she resides with. He was able to provided a complete history of her medical care and assistance he provides to her. He expressed to team he was \"doing the best he can\" and became emotional when team discussed goals of care. Shared that he has not discussed this with his mother and would like to see if she clears up to have this talk. He expressed understanding that CPR efforts would not cure some of her chronic illness like dementia. He was not able to make a decision today. We plan on meeting Janes Cordova at bedside tomorrow 2/2/2023 at 1400 to further discuss goals of care. Current goals of care full code with full interventions.      Charity MELON, RN, [facility]  Palliative Medicine Inpatient RN  Palliative [facility] Line: [phone]

## 2023-02-01 NOTE — PROGRESS NOTES
Attempted to conduct 6 minute walk test with pt. Pt saturation on RA lying in bed 98%.   Attempted to get pt OOB, pt unsteady with confusion, unable to follow directions for test.

## 2023-02-01 NOTE — PROGRESS NOTES
Bedside shift change report given to June Mata (oncoming nurse) by Radha Ross (offgoing nurse). Report included the following information SBAR, Kardex, Intake/Output, and MAR.

## 2023-02-01 NOTE — PROGRESS NOTES
[facility] Hospitalist Group  Progress Note    Patient: Donette Galeazzi Age: 78 y.o. : 1945 MR#: [id number] SSN: [id number]  Date: 2023     Subjective:   Alert and oriented to self. Confused with history of dementia. Need redirection. NAD. Assessment/Plan:   1. Seizure, new onset. History of childhood seizures  2. Elevated troponin  3. Electrolyte derangement  4. HTN  5. HLD   6. History of dementia   7. DMT2  8. Breast asymmetry with for confluent/dense appearing parenchyma on the left with enlarged left axillary node measuring 1.8 x 2.4 cm     Plan  Neurology consult and appreciate assistance. Recommendations continue keppra 500 mg BID, EEG, MRI, seizure precautions. MRI negative for stroke. EEG ordered and pending  Replete potassium and repeat potassium level today  HHC ordered at discharge. Updated son Juan Zacarias today. Will need to further update him on the incidental findings of the breast abnormality seen on the CT. She will need a follow up mammogram at discharge. 25 minutes in total spent today in preparation for visit, review of external notes and test results, review of test results, order placement, obtaining history, physical exam of the patient, and/or counseling the patient concerning diagnosis, test results, treatment plan and speaking with physicians and nurses involved in patient's care. Additional time spent in review and independent interpretation of external notes, imaging, labs via hospital EMR and/or Care Everywhere, as well as pre-charting activity all of which occur on the day of service.     5 minutes were spent in Preparation to see Patient and Obtaining and Reviewing Separate History  5 minutes were spent in Examination, Counseling, & Educating Patient  5 minutes were spent (if any) in [facility] with Nursing Staff and Physicians  10 minutes were spent Documenting in EHR and Interpreting Tests Independently    A Total of 25 minutes were spent seeing this Patient. Case discussed with:  [x]Patient  [x]Family  []Nursing  []Case Management  DVT Prophylaxis:  []Lovenox  []Hep SQ  [x]SCDs  []Coumadin   []On Heparin gtt    Objective:   VS: Visit Vitals  /62   Pulse 85   Temp 97.1 °F (36.2 °C)   Resp 14   Ht 5' 6\" (1.676 m)   Wt 52.2 kg (115 lb 1.3 oz)   SpO2 98%   BMI 18.57 kg/m²      Tmax/24hrs: Temp (24hrs), Av.9 °F (36.6 °C), Min:97.1 °F (36.2 °C), Max:99 °F (37.2 °C)    Intake/Output Summary (Last 24 hours) at 2023 1604  Last data filed at 2023 2231  Gross per 24 hour   Intake 240 ml   Output --   Net 240 ml     General:         Alert, cooperative, no acute distress    HEENT:           NC, Atraumatic. PERRLA, anicteric sclerae. Lungs:            CTA bilaterally. No Wheezing/Rhonchi/Rales  Heart:              RRR, No murmur, No Rubs, No Gallops  Abdomen:      Soft, Non distended, Non tender. +Bowel sounds, no HSM  Extremities:   No c/c/e  Psych:              Good insight. Not anxious or agitated. Neurologic:     Alert and oriented X 1-2.   No acute neurological deficits     Labs:    Recent Results (from the past 24 hour(s))   EKG, 12 LEAD, SUBSEQUENT    Collection Time: 23  4:46 PM   Result Value Ref Range    Ventricular Rate 73 BPM    Atrial Rate 73 BPM    P-R Interval 174 ms    QRS Duration 80 ms    Q-T Interval 420 ms    QTC Calculation (Bezet) 462 ms    Calculated P Axis 30 degrees    Calculated R Axis 24 degrees    Calculated T Axis 19 degrees    Diagnosis       Normal sinus rhythm  Normal ECG  When compared with ECG of 2023 08:19,  premature atrial complexes are no longer present  Nonspecific T wave abnormality now evident in Lateral leads  Confirmed by Pilar Flowers MD, Daniela Perry ([id number]) on 2023 8:32:57 AM     POTASSIUM    Collection Time: 23  9:31 PM   Result Value Ref Range    Potassium 3.6 3.5 - 5.5 mmol/L   METABOLIC PANEL, BASIC    Collection Time: 23  3:25 AM   Result Value Ref Range Sodium 142 136 - 145 mmol/L    Potassium 3.0 (L) 3.5 - 5.5 mmol/L    Chloride 109 100 - 111 mmol/L    CO2 27 21 - 32 mmol/L    Anion gap 6 3.0 - 18 mmol/L    Glucose 94 74 - 99 mg/dL    BUN 10 7.0 - 18 MG/DL    Creatinine 0.88 0.6 - 1.3 MG/DL    BUN/Creatinine ratio 11 (L) 12 - 20      eGFR >60 >60 ml/min/1.73m2    Calcium 8.6 8.5 - 10.1 MG/DL   MAGNESIUM    Collection Time: 02/01/23  3:25 AM   Result Value Ref Range    Magnesium 2.0 1.6 - 2.6 mg/dL   CBC W/O DIFF    Collection Time: 02/01/23  3:25 AM   Result Value Ref Range    WBC 9.7 4.6 - 13.2 K/uL    RBC 3.81 (L) 4.20 - 5.30 M/uL    HGB 11.0 (L) 12.0 - 16.0 g/dL    HCT 33.4 (L) 35.0 - 45.0 %    MCV 87.7 78.0 - 100.0 FL    MCH 28.9 24.0 - 34.0 PG    MCHC 32.9 31.0 - 37.0 g/dL    RDW 14.7 (H) 11.6 - 14.5 %    PLATELET 750 328 - 607 K/uL    MPV 9.2 9.2 - 11.8 FL    NRBC 0.0 0  WBC    ABSOLUTE NRBC 0.00 0.00 - 0.01 K/uL   GLUCOSE, POC    Collection Time: 02/01/23  8:12 AM   Result Value Ref Range    Glucose (POC) 106 70 - 110 mg/dL   GLUCOSE, POC    Collection Time: 02/01/23 11:23 AM   Result Value Ref Range    Glucose (POC) 115 (H) 70 - 110 mg/dL       Signed By: Ariadna Wheat NP     February 1, 2023

## 2023-02-01 NOTE — PROGRESS NOTES
Bedside shift change report given to [address] [place] [address] (oncoming nurse) by Vel Gunter (offgoing nurse). Report included the following information SBAR, Kardex, Intake/Output, MAR, and Recent Results.    Wound Prevention Checklist    Patient: Carmine Taylor (18 y.o. female)  Date: 2/1/2023  Diagnosis: NSTEMI (non-ST elevated myocardial infarction) ([facility] [I21.4] <principal problem not specified>    Precautions:         []  Heel prevention boots placed on patient    []  Patient turned q2h during shift    []  Lift team ordered    []  Patient on [facility] bed/Specialty bed    [x]  Each Wound is documented during shift (Stage, Color, drainage, odor, measurements, and dressings)    [x]  Dual skin check done with Mikayla Aranda RN

## 2023-02-01 NOTE — HOME CARE
Received home health referral for [facility] for SN,PT,OT. spoke with patients' son/caregiver (Kenneth),Verified demographics,explained [facility] services and answered all questions and left pt's son with MANUELA [facility] contact card ; Patient's son states pt has [employer] ; [facility] referral updated , [facility] will continue to follow for any additional [facility] orders . YOSHI PRINCE LPN.

## 2023-02-01 NOTE — PROGRESS NOTES
Re:  Lucian Mijares Tiffany,Follow up visit     2/1/2023 6:28 PM    SSN: [id number]    Subjective:   Priscila Soni     Medications:    Current Facility-Administered Medications   Medication Dose Route Frequency Provider Last Rate Last Admin    potassium chloride (K-DUR, KLOR-CON M20) SR tablet 40 mEq  40 mEq Oral BID Roseline Garcia NP   40 mEq at 02/01/23 1706    senna-docusate (PERICOLACE) 8.6-50 mg per tablet 1 Tablet  1 Tablet Oral DAILY Roseline Garcia NP   1 Tablet at 02/01/23 1018    atorvastatin (LIPITOR) tablet 40 mg  40 mg Oral QHS Agata Arzola MD   40 mg at 01/31/23 2231    sodium chloride (NS) flush 5-40 mL  5-40 mL IntraVENous Q8H Agata Arzola MD   10 mL at 02/01/23 1707    sodium chloride (NS) flush 5-40 mL  5-40 mL IntraVENous PRN Agata Arzola MD        acetaminophen (TYLENOL) tablet 650 mg  650 mg Oral Q6H PRN Agata Arzola MD        Or    acetaminophen (TYLENOL) suppository 650 mg  650 mg Rectal Q6H PRN Agata Arzola MD        polyethylene glycol (MIRALAX) packet 17 g  17 g Oral DAILY PRN Agata Arzola MD        promethazine (PHENERGAN) tablet 12.5 mg  12.5 mg Oral Q6H PRN Agata Arzola MD        Or    ondansetron [facility]) injection 4 mg  4 mg IntraVENous Q6H PRN Agata Arzola MD        melatonin tablet 5 mg  5 mg Oral QHS Sendy De Jesus MD   5 mg at 01/31/23 2231    LORazepam (ATIVAN) injection 1 mg  1 mg IntraVENous Q2H PRN Agata Arzola MD        levETIRAcetam (KEPPRA) tablet 500 mg  500 mg Oral BID Agata Arzola MD   500 mg at 02/01/23 1706    haloperidol lactate (HALDOL) injection 2.5 mg  2.5 mg IntraVENous Q4H PRN Harshad HOLT MD   2.5 mg at 01/29/23 2059    aspirin tablet 325 mg  325 mg Oral DAILY Dionne Chairez MD   325 mg at 02/01/23 1018    OLANZapine (ZyPREXA) tablet 5 mg  5 mg Oral QHS Dionne Chairez MD   5 mg at 01/31/23 2231    oxybutynin chloride XL (DITROPAN XL) tablet 10 mg  10 mg Oral DAILY Dionne Chairez MD   10 mg at 02/01/23 1018    PARoxetine (PAXIL) tablet 20 mg  20 mg Oral DAILY Dionne Chairez MD   20 mg at 02/01/23 1018       Vital signs:  Visit Vitals  /62   Pulse 85   Temp 97.1 °F (36.2 °C)   Resp 14   Ht 5' 6\" (1.676 m)   Wt 52.2 kg (115 lb 1.3 oz)   SpO2 98%   BMI 18.57 kg/m²       Review of Systems:   As above       Patient Active Problem List   Diagnosis Code    Postlaminectomy syndrome, lumbar region M96.1    Lumbosacral spondylosis without myelopathy M47.817    Lumbosacral radiculopathy M54.17    Spasm of muscle M62.838    Encounter for long-term (current) use of high-risk medication Z79.899    DJD (degenerative joint disease) M19.90    Urgency incontinence N39.41    Major depression F32.9    Nausea R11.0    Chronic pain syndrome G89.4    Prediabetes R73.03    Insomnia G47.00    Rheumatoid arthritis involving multiple joints ([facility]) M06.9    Back pain M54.9    Chest pain R07.9    Overactive bladder N32.81    Hyperlipidemia LDL goal <130 E78.5    Chronic renal failure, stage 3 (moderate) ([facility]) N18.30    Hyperlipidemia LDL goal <100 E78.5    Weight loss R63.4    Diabetes ([facility]) E11.9    GERD (gastroesophageal reflux disease) K21.9    High cholesterol E78.00    Hypertension I10    Low back pain M54.50    Chronic anxiety F41.9    Depression F32. A    NSTEMI (non-ST elevated myocardial infarction) ([facility]) I21.4    Seizure ([facility]) R56.9    Breast mass, left N63.20    JEIMY (acute kidney injury) ([facility] N17.9     NEUROLOGIC EXAMINATION    Mental status: Patient is awake following commands knows her name and place, patient not oriented to month, year and date, but this is the baseline as per son.   CN: visual field seem intact but difficult to assess, slight dysconjugate gaze,  PERRLA, face sensation intact , no facial asymmetry noted,  Motor: raise upper extremity against gravity but not fully poor effort, lower extremity 2-3/5 right>left  Sensory:Grossly intact difficult to assess  Coordination: Unable to assess  DTR: depressed  Gait: not tested     Impression: 78 y.o. female with medical co-morbidities including hypertension, hyperlipidemia, depression, osteoporosis, dementia, history of childhood seizure, presented from home after her son witnessed her having tonic-clonic seizure. flu like symptoms in the preceding 2 weeks where she has not been eating and drinking regularly. she had history of seizure disorder as a child, however, she did not have further seizure episode since high school not on any AEDS. EMS found her to be noncooperative and confused with the evaluation. Patient yesterday very confused started keppra, today doing well no events reported. EEG showed no clear epileptiform discharges. FOLLOW UP-  No new events , patient at baseline       SEIZURE-Breakthrough seizure was not on AEDS  Loaded with keppra and on 500 mg BID  Seizure precautions, b12 and TSH normal  Maximizing medical management and risk factor control. DVT prophylaxis. Start Aricept- 5 mg POQD if no contraindication per cardiology  Follow up neurology in 3-4 weeks out patient. Sincerely,      Patric Gonzalez M.D. I spent 15 minutes with the patient in face-to-face consultation, of which greater than 50% was spent in counseling and coordination of care as described above. PLEASE NOTE:   This document has been produced using voice recognition software. Unrecognized errors in transcription may be present.

## 2023-02-01 NOTE — PROGRESS NOTES
Cardiology Progress Note    Admit Date: 1/28/2023  Attending Cardiologist: Dr. Joao Melchor    Assessment:     -Altered mental status, seizure  MRI brain 1/31/2023 with no acute intracranial hemorrhage or territorial infarct, no mass effect.  -Elevated troponin, uncertain significance, ? Demand  -Echo 1/31/2023 with normal/hyperdynamic LVEF 70% with normal wall motion, grade I diastolic dysfunction, technically difficult study  -Hypokalemia, K 2.5 with Mg 1.5 on 1/31/2023  -HTN  -DM  -Hyperlipidemia     No Primary cardiologist; consult by Dr. Deejay Arenas:       I saw, evaluated, interviewed and examined the patient personally. Patient with altered mental status. She is pleasantly confused. She is not aware of where she is. She denies any chest pain or chest tightness  Echo this admission with normal EF. Suspect elevated troponin is likely secondary to ? Encephalopathy, physiologic stress and seizure disorder  Already on aspirin, atorvastatin  Continue supportive care    Joao Melchor MD       -Echo yesterday with normal/hyperdynamic LVEF without RWMA. -Neurology consulted, appreciate assistance. -Further evaluation of presenting symptoms as per primary team.  -No further cardiac evaluation planned at this time. Subjective:     Denies pain or shortness of breath.     Objective:      Patient Vitals for the past 8 hrs:   Temp Pulse Resp BP SpO2   02/01/23 0517 99 °F (37.2 °C) 60 18 117/60 92 %         Patient Vitals for the past 96 hrs:   Weight   02/01/23 0429 52.2 kg (115 lb 1.3 oz)   01/31/23 0925 54.4 kg (120 lb)   01/31/23 0703 54.7 kg (120 lb 9.5 oz)                Current Facility-Administered Medications   Medication Dose Route Frequency Last Admin    senna-docusate (PERICOLACE) 8.6-50 mg per tablet 1 Tablet  1 Tablet Oral DAILY      atorvastatin (LIPITOR) tablet 40 mg  40 mg Oral QHS 40 mg at 01/31/23 2231    sodium chloride (NS) flush 5-40 mL  5-40 mL IntraVENous Q8H 10 mL at 02/01/23 5413 sodium chloride (NS) flush 5-40 mL  5-40 mL IntraVENous PRN      acetaminophen (TYLENOL) tablet 650 mg  650 mg Oral Q6H PRN      Or    acetaminophen (TYLENOL) suppository 650 mg  650 mg Rectal Q6H PRN      polyethylene glycol (MIRALAX) packet 17 g  17 g Oral DAILY PRN      promethazine (PHENERGAN) tablet 12.5 mg  12.5 mg Oral Q6H PRN      Or    ondansetron (ZOFRAN) injection 4 mg  4 mg IntraVENous Q6H PRN      melatonin tablet 5 mg  5 mg Oral QHS 5 mg at 01/31/23 2231    LORazepam (ATIVAN) injection 1 mg  1 mg IntraVENous Q2H PRN      levETIRAcetam (KEPPRA) tablet 500 mg  500 mg Oral  mg at 01/31/23 1729    haloperidol lactate (HALDOL) injection 2.5 mg  2.5 mg IntraVENous Q4H PRN 2.5 mg at 01/29/23 2059    aspirin tablet 325 mg  325 mg Oral DAILY 325 mg at 01/31/23 1048    OLANZapine (ZyPREXA) tablet 5 mg  5 mg Oral QHS 5 mg at 01/31/23 2231    oxybutynin chloride XL (DITROPAN XL) tablet 10 mg  10 mg Oral DAILY 10 mg at 01/31/23 1048    PARoxetine (PAXIL) tablet 20 mg  20 mg Oral DAILY 20 mg at 01/31/23 1048         Intake/Output Summary (Last 24 hours) at 2/1/2023 0902  Last data filed at 1/31/2023 2231  Gross per 24 hour   Intake 1961 ml   Output --   Net 1961 ml       Physical Exam:  General:  resting comfortably, easily awakened by voice, cooperative, no distress, appears stated age  Neck:  supple  Lungs:  clear to auscultation bilaterally  Heart:  regular rate and rhythm  Abdomen:  abdomen is soft without significant tenderness, masses, organomegaly or guarding  Extremities:  atraumatic, no edema    Visit Vitals  /60   Pulse 60   Temp 99 °F (37.2 °C)   Resp 18   Ht 5' 6\" (1.676 m)   Wt 52.2 kg (115 lb 1.3 oz)   SpO2 92%   BMI 18.57 kg/m²       Data Review:     Labs: Results:       Chemistry Recent Labs     02/01/23  0325 01/31/23  2131 01/31/23  0255   GLU 94  --  101*     --  143   K 3.0* 3.6 2.5*     --  108   CO2 27  --  29   BUN 10  --  11   CREA 0.88  --  0.95   CA 8.6  --  8.7 MG 2.0  --  1.5*   AGAP 6  --  6   BUCR 11*  --  12      CBC w/Diff Recent Labs     02/01/23  0325 01/31/23  0255 01/30/23  0500   WBC 9.7 6.2 7.1   RBC 3.81* 3.39* 3.39*   HGB 11.0* 9.8* 9.8*   HCT 33.4* 29.6* 29.7*    223 211   GRANS  --   --  61   LYMPH  --   --  28   EOS  --   --  1      Coagulation Recent Labs     01/30/23  0500 01/29/23  1852   APTT 105.9* 103.5*       Lipid Panel Lab Results   Component Value Date/Time    Cholesterol, total 187 06/21/2022 03:12 PM    HDL Cholesterol 72 (H) 06/21/2022 03:12 PM    LDL, calculated 97.8 06/21/2022 03:12 PM    VLDL, calculated 17.2 06/21/2022 03:12 PM    Triglyceride 86 06/21/2022 03:12 PM    CHOL/HDL Ratio 2.6 06/21/2022 03:12 PM      Thyroid Studies Lab Results   Component Value Date/Time    TSH 1.26 01/31/2023 02:55 AM          Signed By: Jad Josue PA-C     February 1, 2023

## 2023-02-01 NOTE — PROGRESS NOTES
Problem: Self Care Deficits Care Plan (Adult)  Goal: *Acute Goals and Plan of Care (Insert Text)  Description: Occupational Therapy Goals  Initiated 2/1/2023 within 7 day(s). 1.  Patient will perform grooming with modified independence. 2.  Patient will perform bathing with supervision/set-up using adaptive equipment. 3.  Patient will perform upper body dressing and lower body dressing with supervision/set-up using adaptive equipment. 4.  Patient will perform toilet transfers with supervision/set-up using RW with good balance. 5.  Patient will perform all aspects of toileting with supervision/set-up. 6.  Patient will participate in upper extremity therapeutic exercise/activities with supervision/set-up for 8 minutes. 7.  Patient will utilize energy conservation techniques during functional activities with min verbal cues. Prior Level of Function: Mod I with ADLs and functional mobility using RW      Outcome: Progressing Towards Goal   OCCUPATIONAL THERAPY EVALUATION    Patient: Priscila Soni (45 y.o. female)  Date: 2/1/2023  Primary Diagnosis: NSTEMI (non-ST elevated myocardial infarction) [facility]) [I21.4]       Precautions:   Fall, Seizure    ASSESSMENT :  Nursing/RN cleared for pt to participate in OT evaluation and tx session. Patient presents lying semi-reclined in bed. Bed mobility: CGA supine <-> sit edge of bed. LB dress: Max A doff and don slipper socks. STS with Min A, functional mobility with RW and CGA taking steps forward/backward, sidestepping for simulated bedside commode transfer. Patient lying semi reclined in bed, call bell within reach & pt verbalized understanding and provided return demonstration to utilize for assist e.g. functional transfers in order to prevent falls. Patient will benefit from skilled intervention to address the above impairments.   Patient's rehabilitation potential is considered to be Good  Factors which may influence rehabilitation potential include:   [] None noted  []             Mental ability/status  [x]             Medical condition  []             Home/family situation and support systems  []             Safety awareness  []             Pain tolerance/management  []             Other:      PLAN :  Recommendations and Planned Interventions:   [x]               Self Care Training                  [x]      Therapeutic Activities  [x]               Functional Mobility Training   [x]      Cognitive Retraining  [x]               Therapeutic Exercises           [x]      Endurance Activities  [x]               Balance Training                    [x]      Neuromuscular Re-Education  []               Visual/Perceptual Training     [x]      Home Safety Training  [x]               Patient Education                   [x]      Family Training/Education  []               Other (comment):    Frequency/Duration: Patient will be followed by occupational therapy 3-5 times a week to address goals. Further Equipment Recommendations for Discharge: bedside commode, shower chair, and rolling walke    AMPAC: Current research shows that an AM-PAC score of 17 or less is not associated with a discharge to the patient's home setting. Based on an AM-PAC score of 15/24 and their current ADL deficits; it is recommended that the patient have 3-5 sessions per week of Occupational Therapy at d/c to increase the patient's independence. This AMPAC score should be considered in conjunction with interdisciplinary team recommendations to determine the most appropriate discharge setting. Patient's social support, diagnosis, medical stability, and prior level of function should also be taken into consideration. SUBJECTIVE:   Patient stated My son lives with me.     OBJECTIVE DATA SUMMARY:     Past Medical History:   Diagnosis Date    Chronic anxiety     Depression     controlled on Paxil    Diabetes (HCC)     DJD (degenerative joint disease)     GERD (gastroesophageal reflux disease)     High cholesterol     HTN (hypertension)     Hypertension     Low back pain     post-laminectomy syndrome and multilevel degenerative disease    Osteoporosis      Past Surgical History:   Procedure Laterality Date    HX ADENOIDECTOMY      HX APPENDECTOMY      HX BACK SURGERY      PHLD X3    HX BREAST BIOPSY Left 8/28/2015    WIDE LOCAL EXCISION LEFT BREAST LESION performed by Moises Trevino MD at [facility] MAIN OR    HX HERNIA REPAIR      hiatal    HX KNEE REPLACEMENT      HX ORTHOPAEDIC      bilateral shoulder surgery    HX ELVIRA AND BSO  1982    HX TONSILLECTOMY      CT TOTAL HIP ARTHROPLASTY  4/12     total right hip replacement, Dr. Wilmer Gaston     Barriers to Learning/Limitations: yes;  cognitive and altered mental status (i.e.Sedation, Confusion)  Compensate with: visual, verbal, tactile, kinesthetic cues/model    Home Situation:   Home Situation  Home Environment: Private residence  One/Two Story Residence: One story  Living Alone: No  Support Systems: Child(joanne)  Current DME Used/Available at Home: Walker, rolling, [address] [employer] chair  Tub or Shower Type: Tub/Shower combination  []  Right hand dominant   []  Left hand dominant    Cognitive/Behavioral Status:  Neurologic State: Alert;Confused  Orientation Level: Oriented to person  Cognition: Follows commands;Decreased attention/concentration  Safety/Judgement: Fall prevention    Skin: appears intact  Edema: none noted    Vision/Perceptual:  appears intact, able to tell correct time on wall clock  Corrective Lenses: Glasses    Coordination: BUE  Coordination: Generally decreased, functional  Fine Motor Skills-Upper: Left Intact; Right Intact    Gross Motor Skills-Upper: Left Intact; Right Intact    Balance:  Sitting: Impaired; With support  Sitting - Static: Good (unsupported)  Sitting - Dynamic: Fair (occasional)  Standing: Impaired; With support  Standing - Static: Fair  Standing - Dynamic : Fair    Strength: BUE  Strength: Generally decreased, functional       Tone & Sensation: BUE  Tone: Normal  Sensation: Intact  Range of Motion: BUE  AROM: Generally decreased, functional  PROM: Within functional limits     Functional Mobility and Transfers for ADLs:  Bed Mobility:     Supine to Sit: Contact guard assistance  Sit to Supine: Contact guard assistance  Scooting: Contact guard assistance  Transfers:  Sit to Stand: Minimum assistance  Stand to Sit: Minimum assistance    ADL Assessment:   Feeding: Setup    Oral Facial Hygiene/Grooming: Stand-by assistance    Bathing: Moderate assistance    Upper Body Dressing: Contact guard assistance    Lower Body Dressing: Maximum assistance    Toileting: Moderate assistance     ADL Intervention:  Lower Body Dressing Assistance  Socks: Maximum assistance  Position Performed: Seated edge of bed  Cognitive Retraining  Safety/Judgement: Fall prevention    Pain:  Pain level pre-treatment: 0/10   Pain level post-treatment: 0/10     Activity Tolerance:   fair  Please refer to the flowsheet for vital signs taken during this treatment. After treatment:   [] Patient left in no apparent distress sitting up in chair  [x] Patient left in no apparent distress in bed  [x] Call bell left within reach  [x] Nursing notified  [] Caregiver present  [x] Bed alarm activated    COMMUNICATION/EDUCATION:   [x] Role of Occupational Therapy in the acute care setting  [x] Home safety education was provided and the patient/caregiver indicated understanding. [x] Patient/family have participated as able in goal setting and plan of care. [x] Patient/family agree to work toward stated goals and plan of care. [] Patient understands intent and goals of therapy, but is neutral about his/her participation. [] Patient is unable to participate in goal setting and plan of care.     Thank you for this referral.  Tesha Larry  Time Calculation: 16 mins    Eval Complexity: History: MEDIUM Complexity : Expanded review of history including physical, cognitive and psychosocial history ; Examination: MEDIUM Complexity : 3-5 performance deficits relating to physical, cognitive , or psychosocial skils that result in activity limitations and / or participation restrictions; Decision Making:MEDIUM Complexity : Patient may present with comorbidities that affect occupational performnce. Miniml to moderate modification of tasks or assistance (eg, physical or verbal ) with assesment(s) is necessary to enable patient to complete evaluation     [employer] AM-PAC® Daily Activity Inpatient Short Form (6-Clicks)*    How much HELP from another person does the patient currently need    (If the patient hasn't done an activity recently, how much help from another person do you think he/she would need if he/she tried?)   Total (Total A or Dep)   A Lot  (Mod to Max A)   A Little (Sup or Min A)   None (Mod I to I)   Putting on and taking off regular lower body clothing? [] 1 [x] 2 [] 3 [] 4   2. Bathing (including washing, rinsing,      drying)? [] 1 [x] 2 [] 3 [] 4   3. Toileting, which includes using toilet, bedpan or urinal?   [] 1 [x] 2 [] 3 [] 4   4. Putting on and taking off regular upper body clothing? [] 1 [] 2 [x] 3 [] 4   5. Taking care of personal grooming such as brushing teeth? [] 1 [] 2 [x] 3 [] 4   6. Eating meals?    [] 1 [] 2 [x] 3 [] 4

## 2023-02-02 LAB
ANION GAP SERPL CALC-SCNC: 4 MMOL/L (ref 3–18)
BUN SERPL-MCNC: 9 MG/DL (ref 7–18)
BUN/CREAT SERPL: 11 (ref 12–20)
CALCIUM SERPL-MCNC: 9 MG/DL (ref 8.5–10.1)
CHLORIDE SERPL-SCNC: 111 MMOL/L (ref 100–111)
CO2 SERPL-SCNC: 24 MMOL/L (ref 21–32)
CREAT SERPL-MCNC: 0.81 MG/DL (ref 0.6–1.3)
ERYTHROCYTE [DISTWIDTH] IN BLOOD BY AUTOMATED COUNT: 14.9 % (ref 11.6–14.5)
GLUCOSE SERPL-MCNC: 89 MG/DL (ref 74–99)
HCT VFR BLD AUTO: 30.4 % (ref 35–45)
HGB BLD-MCNC: 10 G/DL (ref 12–16)
MAGNESIUM SERPL-MCNC: 1.9 MG/DL (ref 1.6–2.6)
MCH RBC QN AUTO: 29.4 PG (ref 24–34)
MCHC RBC AUTO-ENTMCNC: 32.9 G/DL (ref 31–37)
MCV RBC AUTO: 89.4 FL (ref 78–100)
NRBC # BLD: 0 K/UL (ref 0–0.01)
NRBC BLD-RTO: 0 PER 100 WBC
PLATELET # BLD AUTO: 230 K/UL (ref 135–420)
PMV BLD AUTO: 9.5 FL (ref 9.2–11.8)
POTASSIUM SERPL-SCNC: 3.9 MMOL/L (ref 3.5–5.5)
RBC # BLD AUTO: 3.4 M/UL (ref 4.2–5.3)
SODIUM SERPL-SCNC: 139 MMOL/L (ref 136–145)
WBC # BLD AUTO: 7.2 K/UL (ref 4.6–13.2)

## 2023-02-02 PROCEDURE — 99233 SBSQ HOSP IP/OBS HIGH 50: CPT | Performed by: NURSE PRACTITIONER

## 2023-02-02 PROCEDURE — 74011250637 HC RX REV CODE- 250/637: Performed by: NURSE PRACTITIONER

## 2023-02-02 PROCEDURE — 36415 COLL VENOUS BLD VENIPUNCTURE: CPT

## 2023-02-02 PROCEDURE — 99232 SBSQ HOSP IP/OBS MODERATE 35: CPT | Performed by: HOSPITALIST

## 2023-02-02 PROCEDURE — 83735 ASSAY OF MAGNESIUM: CPT

## 2023-02-02 PROCEDURE — 85027 COMPLETE CBC AUTOMATED: CPT

## 2023-02-02 PROCEDURE — 74011000250 HC RX REV CODE- 250: Performed by: INTERNAL MEDICINE

## 2023-02-02 PROCEDURE — 74011250637 HC RX REV CODE- 250/637: Performed by: INTERNAL MEDICINE

## 2023-02-02 PROCEDURE — 74011250637 HC RX REV CODE- 250/637: Performed by: STUDENT IN AN ORGANIZED HEALTH CARE EDUCATION/TRAINING PROGRAM

## 2023-02-02 PROCEDURE — 80048 BASIC METABOLIC PNL TOTAL CA: CPT

## 2023-02-02 PROCEDURE — 65270000029 HC RM PRIVATE

## 2023-02-02 RX ADMIN — ASPIRIN 325 MG ORAL TABLET 325 MG: 325 PILL ORAL at 09:49

## 2023-02-02 RX ADMIN — LEVETIRACETAM 500 MG: 500 TABLET, FILM COATED ORAL at 18:00

## 2023-02-02 RX ADMIN — POTASSIUM CHLORIDE 40 MEQ: 1500 TABLET, EXTENDED RELEASE ORAL at 18:00

## 2023-02-02 RX ADMIN — SODIUM CHLORIDE, PRESERVATIVE FREE 10 ML: 5 INJECTION INTRAVENOUS at 22:00

## 2023-02-02 RX ADMIN — POTASSIUM CHLORIDE 40 MEQ: 1500 TABLET, EXTENDED RELEASE ORAL at 09:49

## 2023-02-02 RX ADMIN — OXYBUTYNIN CHLORIDE 10 MG: 5 TABLET, EXTENDED RELEASE ORAL at 09:49

## 2023-02-02 RX ADMIN — OLANZAPINE 5 MG: 5 TABLET, FILM COATED ORAL at 22:00

## 2023-02-02 RX ADMIN — PAROXETINE HYDROCHLORIDE 20 MG: 20 TABLET, FILM COATED ORAL at 09:49

## 2023-02-02 RX ADMIN — ATORVASTATIN CALCIUM 40 MG: 40 TABLET, FILM COATED ORAL at 21:59

## 2023-02-02 RX ADMIN — LEVETIRACETAM 500 MG: 500 TABLET, FILM COATED ORAL at 09:49

## 2023-02-02 RX ADMIN — SODIUM CHLORIDE, PRESERVATIVE FREE 10 ML: 5 INJECTION INTRAVENOUS at 18:03

## 2023-02-02 RX ADMIN — SENNOSIDES AND DOCUSATE SODIUM 1 TABLET: 50; 8.6 TABLET ORAL at 09:49

## 2023-02-02 NOTE — PROGRESS NOTES
[facility] Hospitalist Group  Progress Note    Patient: Mennie Councilman Age: 78 y.o. : 1945 MR#: [id number] SSN: [id number]  Date: 2023     Subjective:   Alert and oriented to self. Confused with history of dementia. Need redirection. NAD. Assessment/Plan:   1. Seizure, new onset. History of childhood seizures  2. Elevated troponin  3. Electrolyte derangement  4. HTN  5. HLD   6. History of dementia   7. DMT2  8. Breast asymmetry with for confluent/dense appearing parenchyma on the left with enlarged left axillary node measuring 1.8 x 2.4 cm     Plan  Neurology consult and appreciate assistance. Recommendations continue keppra 500 mg BID, EEG, MRI, seizure precautions. MRI negative for stroke. EEG ordered and pending  Replete potassium and repeat potassium level today  HHC ordered at discharge. Check ultrasound for further evaluation of abnormal breast parenchyma and enlarged left axillary node    Time spent 35 minutes    Case discussed with:  [x]Patient  [x]Family  []Nursing  []Case Management  DVT Prophylaxis:  []Lovenox  []Hep SQ  [x]SCDs  []Coumadin   []On Heparin gtt    Objective:   VS: Visit Vitals  /60   Pulse 75   Temp 97.9 °F (36.6 °C)   Resp 18   Ht 5' 6\" (1.676 m)   Wt 52.2 kg (115 lb 1.3 oz)   SpO2 98%   BMI 18.57 kg/m²        Tmax/24hrs: Temp (24hrs), Av.9 °F (36.6 °C), Min:97.3 °F (36.3 °C), Max:98.6 °F (37 °C)  No intake or output data in the 24 hours ending 23 1615    General:         Alert, cooperative, no acute distress    HEENT:           NC, Atraumatic. PERRLA, anicteric sclerae. Lungs:            CTA bilaterally. No Wheezing/Rhonchi/Rales  Heart:              RRR, No murmur, No Rubs, No Gallops  Abdomen:      Soft, Non distended, Non tender. +Bowel sounds, no HSM  Extremities:   No c/c/e  Psych:              Good insight. Not anxious or agitated. Neurologic:     Alert and oriented X 1-2.   No acute neurological deficits     Labs: Recent Results (from the past 24 hour(s))   POTASSIUM    Collection Time: 02/01/23  4:36 PM   Result Value Ref Range    Potassium 3.9 3.5 - 5.5 mmol/L   MAGNESIUM    Collection Time: 02/02/23  3:33 AM   Result Value Ref Range    Magnesium 1.9 1.6 - 2.6 mg/dL   CBC W/O DIFF    Collection Time: 02/02/23  3:33 AM   Result Value Ref Range    WBC 7.2 4.6 - 13.2 K/uL    RBC 3.40 (L) 4.20 - 5.30 M/uL    HGB 10.0 (L) 12.0 - 16.0 g/dL    HCT 30.4 (L) 35.0 - 45.0 %    MCV 89.4 78.0 - 100.0 FL    MCH 29.4 24.0 - 34.0 PG    MCHC 32.9 31.0 - 37.0 g/dL    RDW 14.9 (H) 11.6 - 14.5 %    PLATELET 779 743 - 462 K/uL    MPV 9.5 9.2 - 11.8 FL    NRBC 0.0 0  WBC    ABSOLUTE NRBC 0.00 0.00 - 9.78 K/uL   METABOLIC PANEL, BASIC    Collection Time: 02/02/23  3:33 AM   Result Value Ref Range    Sodium 139 136 - 145 mmol/L    Potassium 3.9 3.5 - 5.5 mmol/L    Chloride 111 100 - 111 mmol/L    CO2 24 21 - 32 mmol/L    Anion gap 4 3.0 - 18 mmol/L    Glucose 89 74 - 99 mg/dL    BUN 9 7.0 - 18 MG/DL    Creatinine 0.81 0.6 - 1.3 MG/DL    BUN/Creatinine ratio 11 (L) 12 - 20      eGFR >60 >60 ml/min/1.73m2    Calcium 9.0 8.5 - 10.1 MG/DL       Signed By: Mali Hutson MD     February 2, 2023

## 2023-02-02 NOTE — ROUTINE PROCESS
0986: Received bedside shift change report from Jack Pearce RN.  0900: Bedside shift change report given to [facility] (oncoming nurse) by Aleah Petty RN (offgoing nurse). Report included the following information SBAR, Kardex, Intake/Output, and MAR.

## 2023-02-02 NOTE — PALLIATIVE CARE
[facility] [phone]   [facility] [phone]    Harper Saunders, ELSI and this LMSW attended to pt at bedside to assess. Pt awake and alert. Pt able to identify her son, as \"that's my little boy. \"  Pt's brother also at bedside visiting. Pt's son asked to speak outside of pt's room. NP and this LMSW met with pt's son to discuss pt and plans for care moving forward. Pt's son reports he was freaked out by the phone conversation with palliative team yesterday, and cried for some time. He reports he has a \"spiritual connection\" with his mother. He reports he gives her vitamins and nutritional supplements to maintain her health and feels they make a difference and that she is much more confused now (in the hospital) having not taken them for several days. He reports he is agreeable to pt going for rehab prior to returning home. He reports pt was able to bath self and attend to toileting needs and dress self prior to current medical decline. He reports he doesn't think she's been brushing her teeth, and doesn't understand why. NP explained how people with dementia frequently forget to/how to do things like brushing their teeth, bathing, etc.  She informed him, this will continue to other activities of daily living. Pt's son asked about treatment plan. NP explained pt will likely go home with medications to prevent seizures. Pt's son agreeable to this. He feels he can continue to manage pt's care in the home, but hopes his cousin can start coming and assisting with bathing as pt is not getting all the needed places clean enough. He asked if anyone could be hired to assist with this. LMSW provided information. NP addressed goals of care, and informed pt's son, we ask every patient who comes in this question, regardless of age and health status. Burdens and benefits of CPR/Intubation in older adults with dementia and/or other chronic illnesses discussed.  Pt's son reports he would not want pt to undergo CPR/Intubation in event of Cardiopulmonary Arrest.  He reports he does want to continue treatment at this time, in efforts for pt to recover and return home. Benefits and burdens of feeding tubes for persons with dementia discussed. Pt's son reports he would not want his mother to have a feeding tube and would prefer she be able to eat what she wants. LEODAN reviewed and completed POST for DNR / DNI (LIMITED INTERVENTIONS) / NO FEEDING TUBES. Pt's son, Mracell Mitchell signed on pt's behalf, as legal Next of kin. Shania Atkins NP signed as provider. Pt's son reports no further needs or concerns at this time. Thank you for this referral to Palliative Care. The palliative care team remains available to provide support for patient and her family. Goals of care have been addressed. CODE STATUS:  DNR / DNI     [facility] Steps [facility] (Physician Orders for Scope of Treatment)       Date of conversation:  02/02/2023 Location:  [facility]   Length (minutes): 50    Participants:  [x] Other Surrogate Decision Maker / Next of Kin    Name: Marcell Mitchell    Relationship to Patient:Son    Phone Number: [phone]     Advanced Steps® ACP Facilitator: Airam Dias LMSW      Conversation Topics   (If Patient does not have decision making capacity, Agent/Surrogate responds based on understanding of how patient would respond if capable)    Understanding of Medical Condition/s AND Potential Complications:    Patient response:Unable to participate due to cognitive status. Healthcare Agent/Other Surrogate: Pt's son verbalized understanding of pt's medical condition and potential complications thereof.      Cultural, Druze, spiritual, or personal beliefs described as:None     Needs to discuss with spiritual/Druze advisor: [] Yes  [x] No    Needs more information about illness and complications:  [] Yes  [x] No      Cardiopulmonary Resuscitation      \"What do you understand about CPR? \" Response:Pt would not benefit from CPR/Intubation and would actually suffer.      Order Elected for CPR:  []  Attempt Resuscitation [x]  Do Not Attempt Resuscitation      When NOT in Cardiopulmonary Arrest, Order Elected:      [] Comfort Measures  [x] Limited Additional Interventions  [] Full Interventions    Artificially Administered Nutrition, Order Elected:    [x] No Feeding Tube   [] Feeding Tube for a defined trial period  [] Feeding Tube long-term if indicated    Meeting Outcomes:   [x] ACP discussion completed   [x] Tabitha form completed  [x] Tabitha prepared for Provider review and signature   [x] Original placed on Chart, if in facility (form to be sent with patient at discharge)  [x] Copy given to healthcare agent    [x] Copy scanned to electronic medical record    Js Moser, [facility]   Loni Montiel 78: [phone] ([phone])

## 2023-02-02 NOTE — PROGRESS NOTES
Problem: Patient Education: Go to Patient Education Activity  Goal: Patient/Family Education  Outcome: Progressing Towards Goal     Problem: Unstable angina/NSTEMI: Day of Admission/Day 1  Goal: Off Pathway (Use only if patient is Off Pathway)  Outcome: Not Progressing Towards Goal  Goal: Activity/Safety  Outcome: Not Progressing Towards Goal  Goal: Consults, if ordered  Outcome: Not Progressing Towards Goal  Goal: Diagnostic Test/Procedures  Outcome: Not Progressing Towards Goal  Goal: Nutrition/Diet  Outcome: Not Progressing Towards Goal  Goal: Discharge Planning  Outcome: Not Progressing Towards Goal  Goal: Medications  Outcome: Not Progressing Towards Goal  Goal: Respiratory  Outcome: Not Progressing Towards Goal  Goal: Treatments/Interventions/Procedures  Outcome: Not Progressing Towards Goal  Goal: Psychosocial  Outcome: Not Progressing Towards Goal  Goal: *Hemodynamically stable  Outcome: Not Progressing Towards Goal  Goal: *Optimal pain control at patient's stated goal  Outcome: Not Progressing Towards Goal  Goal: *Lungs clear or at baseline  Outcome: Not Progressing Towards Goal     Problem: Unstable angina/NSTEMI: Day 2  Goal: Off Pathway (Use only if patient is Off Pathway)  Outcome: Not Progressing Towards Goal  Goal: Activity/Safety  Outcome: Not Progressing Towards Goal  Goal: Consults, if ordered  Outcome: Not Progressing Towards Goal  Goal: Diagnostic Test/Procedures  Outcome: Not Progressing Towards Goal  Goal: Nutrition/Diet  Outcome: Not Progressing Towards Goal  Goal: Discharge Planning  Outcome: Not Progressing Towards Goal  Goal: Medications  Outcome: Not Progressing Towards Goal  Goal: Respiratory  Outcome: Not Progressing Towards Goal  Goal: Treatments/Interventions/Procedures  Outcome: Not Progressing Towards Goal  Goal: Psychosocial  Outcome: Not Progressing Towards Goal  Goal: *Hemodynamically stable  Outcome: Not Progressing Towards Goal  Goal: *Optimal pain control at patient's stated goal  Outcome: Not Progressing Towards Goal  Goal: *Lungs clear or at baseline  Outcome: Not Progressing Towards Goal     Problem: Unstable Angina/NSTEMI: Discharge Outcomes  Goal: *Hemodynamically stable  Outcome: Progressing Towards Goal  Goal: *Stable cardiac rhythm  Outcome: Progressing Towards Goal  Goal: *Lungs clear or at baseline  Outcome: Progressing Towards Goal  Goal: *Optimal pain control at patient's stated goal  Outcome: Progressing Towards Goal  Goal: *Identifies cardiac risk factors  Outcome: Progressing Towards Goal  Goal: *Verbalizes home exercise program, activity guidelines, cardiac precautions  Outcome: Progressing Towards Goal  Goal: *Verbalizes understanding and describes prescribed diet  Outcome: Progressing Towards Goal  Goal: *Verbalizes name, dosage, time, side effects, and number of days to continue medications  Outcome: Progressing Towards Goal  Goal: *Anxiety reduced or absent  Outcome: Progressing Towards Goal  Goal: *Understands and describes signs and symptoms to report to providers(Stroke Metric)  Outcome: Progressing Towards Goal  Goal: *Describes follow-up/return visits to physicians  Outcome: Progressing Towards Goal  Goal: *Describes available resources and support systems  Outcome: Progressing Towards Goal  Goal: *Influenza immunization  Outcome: Progressing Towards Goal  Goal: *Pneumococcal immunization  Outcome: Progressing Towards Goal  Goal: *Describes smoking cessation resources  Outcome: Progressing Towards Goal     Problem: Falls - Risk of  Goal: *Absence of Falls  Description: Document Rio Fall Risk and appropriate interventions in the flowsheet.   Outcome: Progressing Towards Goal  Note: Fall Risk Interventions:                                Problem: Patient Education: Go to Patient Education Activity  Goal: Patient/Family Education  Outcome: Progressing Towards Goal     Problem: Pressure Injury - Risk of  Goal: *Prevention of pressure injury  Description: Document Godfrey Scale and appropriate interventions in the flowsheet.   Outcome: Progressing Towards Goal     Problem: Patient Education: Go to Patient Education Activity  Goal: Patient/Family Education  Outcome: Progressing Towards Goal

## 2023-02-02 NOTE — PROGRESS NOTES
Problem: Patient Education: Go to Patient Education Activity  Goal: Patient/Family Education  2/2/2023 0908 by Blake Mcelroy RN  Outcome: Progressing Towards Goal  2/2/2023 0908 by Blake Mcelroy RN  Outcome: Not Progressing Towards Goal     Problem: Unstable angina/NSTEMI: Day of Admission/Day 1  Goal: Off Pathway (Use only if patient is Off Pathway)  2/2/2023 0908 by Blake Mcelroy RN  Outcome: Progressing Towards Goal  2/2/2023 0908 by Blake Mcelroy RN  Outcome: Not Progressing Towards Goal  Goal: Activity/Safety  2/2/2023 0908 by Blake Mcelroy RN  Outcome: Progressing Towards Goal  2/2/2023 0908 by Blake Mcelroy RN  Outcome: Not Progressing Towards Goal  Goal: Consults, if ordered  2/2/2023 0908 by Blake Mcelroy RN  Outcome: Progressing Towards Goal  2/2/2023 0908 by Blake Mcelroy RN  Outcome: Not Progressing Towards Goal  Goal: Diagnostic Test/Procedures  2/2/2023 0908 by Blake Mcelroy RN  Outcome: Progressing Towards Goal  2/2/2023 0908 by Blake Mcelroy RN  Outcome: Not Progressing Towards Goal  Goal: Nutrition/Diet  2/2/2023 0908 by Blake Mcelroy RN  Outcome: Progressing Towards Goal  2/2/2023 0908 by Blake Mcelroy RN  Outcome: Not Progressing Towards Goal  Goal: Discharge Planning  2/2/2023 0908 by Blake Mcelroy RN  Outcome: Progressing Towards Goal  2/2/2023 0908 by Blake Mcelroy RN  Outcome: Not Progressing Towards Goal  Goal: Medications  2/2/2023 0908 by Blake Mcelroy RN  Outcome: Progressing Towards Goal  2/2/2023 0908 by Blake Mcelroy RN  Outcome: Not Progressing Towards Goal  Goal: Respiratory  2/2/2023 0908 by Blake Mcelroy RN  Outcome: Progressing Towards Goal  2/2/2023 0908 by Blake Mcelroy RN  Outcome: Not Progressing Towards Goal  Goal: Treatments/Interventions/Procedures  2/2/2023 0908 by Blake Mcelroy RN  Outcome: Progressing Towards Goal  2/2/2023 0908 by Blake Mcelroy RN  Outcome: Not Progressing Towards Goal  Goal: Psychosocial  2/2/2023 0908 by Dorthea Pain, RN  Outcome: Progressing Towards Goal  2/2/2023 0908 by Dorthea Pain, RN  Outcome: Not Progressing Towards Goal  Goal: *Hemodynamically stable  2/2/2023 0908 by Dorthea Pain, RN  Outcome: Progressing Towards Goal  2/2/2023 0908 by Dorthea Pain, RN  Outcome: Not Progressing Towards Goal  Goal: *Optimal pain control at patient's stated goal  2/2/2023 0908 by Dorthea Pain, RN  Outcome: Progressing Towards Goal  2/2/2023 0908 by Dorthea Pain, RN  Outcome: Not Progressing Towards Goal  Goal: *Lungs clear or at baseline  2/2/2023 0908 by Dorthea Pain, RN  Outcome: Progressing Towards Goal  2/2/2023 0908 by Dorthea Pain, RN  Outcome: Not Progressing Towards Goal     Problem: Unstable angina/NSTEMI: Day 2  Goal: Off Pathway (Use only if patient is Off Pathway)  2/2/2023 0908 by Dorthea Pain, RN  Outcome: Progressing Towards Goal  2/2/2023 0908 by Dorthea Pain, RN  Outcome: Not Progressing Towards Goal  Goal: Activity/Safety  2/2/2023 0908 by Dorthea Pain, RN  Outcome: Progressing Towards Goal  2/2/2023 0908 by Dorthea Pain, RN  Outcome: Not Progressing Towards Goal  Goal: Consults, if ordered  2/2/2023 0908 by Dorthea Pain, RN  Outcome: Progressing Towards Goal  2/2/2023 0908 by Dorthea Pain, RN  Outcome: Not Progressing Towards Goal  Goal: Diagnostic Test/Procedures  2/2/2023 0908 by Dorthea Pain, RN  Outcome: Progressing Towards Goal  2/2/2023 0908 by Dorthea Pain, RN  Outcome: Not Progressing Towards Goal  Goal: Nutrition/Diet  2/2/2023 0908 by Dorthea Pain, RN  Outcome: Progressing Towards Goal  2/2/2023 0908 by Dorthea Pain, RN  Outcome: Not Progressing Towards Goal  Goal: Discharge Planning  2/2/2023 0908 by Dorthea Pain, RN  Outcome: Progressing Towards Goal  2/2/2023 0908 by Dorthea Pain, RN  Outcome: Not Progressing Towards Goal  Goal: Medications  2/2/2023 0908 by Dorthea Pain, RN  Outcome: Progressing Towards Goal  2/2/2023 0908 by Luisa Treviño RN  Outcome: Not Progressing Towards Goal  Goal: Respiratory  2/2/2023 0908 by Luisa Treviño RN  Outcome: Progressing Towards Goal  2/2/2023 0908 by Luisa Treviño RN  Outcome: Not Progressing Towards Goal  Goal: Treatments/Interventions/Procedures  2/2/2023 0908 by Luisa Treviño RN  Outcome: Progressing Towards Goal  2/2/2023 0908 by Luisa Treviño RN  Outcome: Not Progressing Towards Goal  Goal: Psychosocial  2/2/2023 0908 by Luisa Treviño RN  Outcome: Progressing Towards Goal  2/2/2023 0908 by Luisa Treviño RN  Outcome: Not Progressing Towards Goal  Goal: *Hemodynamically stable  2/2/2023 0908 by Luisa Treviño RN  Outcome: Progressing Towards Goal  2/2/2023 0908 by Luisa Treviño RN  Outcome: Not Progressing Towards Goal  Goal: *Optimal pain control at patient's stated goal  2/2/2023 0908 by Luisa Treviño RN  Outcome: Progressing Towards Goal  2/2/2023 0908 by Luisa Treviño RN  Outcome: Not Progressing Towards Goal  Goal: *Lungs clear or at baseline  2/2/2023 0908 by Luisa Treviño RN  Outcome: Progressing Towards Goal  2/2/2023 0908 by Luisa Treviño RN  Outcome: Not Progressing Towards Goal     Problem: Unstable Angina/NSTEMI: Discharge Outcomes  Goal: *Hemodynamically stable  2/2/2023 0908 by Luisa Treviño RN  Outcome: Progressing Towards Goal  2/2/2023 0908 by Luisa Treviño RN  Outcome: Not Progressing Towards Goal  Goal: *Stable cardiac rhythm  2/2/2023 0908 by Luisa Treviño RN  Outcome: Progressing Towards Goal  2/2/2023 0908 by Luisa Treviño RN  Outcome: Not Progressing Towards Goal  Goal: *Lungs clear or at baseline  2/2/2023 0908 by Luisa Treviño RN  Outcome: Progressing Towards Goal  2/2/2023 0908 by Luisa Treviño RN  Outcome: Not Progressing Towards Goal  Goal: *Optimal pain control at patient's stated goal  2/2/2023 0908 by Luisa Treviño RN  Outcome: Progressing Towards Goal  2/2/2023 0908 by Sharma Bumpers, Chu Pearson RN  Outcome: Not Progressing Towards Goal  Goal: *Identifies cardiac risk factors  2/2/2023 0908 by Marlis Dance, RN  Outcome: Progressing Towards Goal  2/2/2023 0908 by Marlis Dance, RN  Outcome: Not Progressing Towards Goal  Goal: *Verbalizes home exercise program, activity guidelines, cardiac precautions  2/2/2023 0908 by Marlis Dance, RN  Outcome: Progressing Towards Goal  2/2/2023 0908 by Marlis Dance, RN  Outcome: Not Progressing Towards Goal  Goal: *Verbalizes understanding and describes prescribed diet  2/2/2023 0908 by Marlis Dance, RN  Outcome: Progressing Towards Goal  2/2/2023 0908 by Marlis Dance, RN  Outcome: Not Progressing Towards Goal  Goal: *Verbalizes name, dosage, time, side effects, and number of days to continue medications  2/2/2023 0908 by Marlis Dance, RN  Outcome: Progressing Towards Goal  2/2/2023 0908 by Marlis Dance, RN  Outcome: Not Progressing Towards Goal  Goal: *Anxiety reduced or absent  2/2/2023 0908 by Marlis Dance, RN  Outcome: Progressing Towards Goal  2/2/2023 0908 by Marlis Dance, RN  Outcome: Not Progressing Towards Goal  Goal: *Understands and describes signs and symptoms to report to providers(Stroke Metric)  2/2/2023 0908 by Marlis Dance, RN  Outcome: Progressing Towards Goal  2/2/2023 0908 by Marlis Dance, RN  Outcome: Not Progressing Towards Goal  Goal: *Describes follow-up/return visits to physicians  2/2/2023 0908 by Marlis Dance, RN  Outcome: Progressing Towards Goal  2/2/2023 0908 by Marlis Dance, RN  Outcome: Not Progressing Towards Goal  Goal: *Describes available resources and support systems  2/2/2023 0908 by Marlis Dance, RN  Outcome: Progressing Towards Goal  2/2/2023 0908 by Marlis Dance, RN  Outcome: Not Progressing Towards Goal  Goal: *Influenza immunization  2/2/2023 0908 by Marlis Dance, DENIS  Outcome: Progressing Towards Goal  2/2/2023 0908 by Marlis Dance, RN  Outcome: Not Progressing Towards Goal  Goal: *Pneumococcal immunization  2/2/2023 0908 by Tosha Fontaine RN  Outcome: Progressing Towards Goal  2/2/2023 0908 by Tosha Fontaine RN  Outcome: Not Progressing Towards Goal  Goal: *Describes smoking cessation resources  2/2/2023 0908 by Tosha Fontaine RN  Outcome: Progressing Towards Goal  2/2/2023 0908 by Tosha Fontaine RN  Outcome: Not Progressing Towards Goal     Problem: Falls - Risk of  Goal: *Absence of Falls  Description: Document Elner Lili Fall Risk and appropriate interventions in the flowsheet. 2/2/2023 0908 by Tosha Fontaine RN  Outcome: Progressing Towards Goal  Note: Fall Risk Interventions:                             2/2/2023 0908 by Tosha Fontaine RN  Outcome: Not Progressing Towards Goal  Note: Fall Risk Interventions:                                Problem: Patient Education: Go to Patient Education Activity  Goal: Patient/Family Education  2/2/2023 0908 by Tosha Fontaine RN  Outcome: Progressing Towards Goal  2/2/2023 0908 by Tosha Fontaine RN  Outcome: Not Progressing Towards Goal     Problem: Patient Education: Go to Patient Education Activity  Goal: Patient/Family Education  2/2/2023 0908 by Tosha Fontaine RN  Outcome: Progressing Towards Goal  2/2/2023 0908 by Tosha Fontaine RN  Outcome: Not Progressing Towards Goal     Problem: Pressure Injury - Risk of  Goal: *Prevention of pressure injury  Description: Document Godfrey Scale and appropriate interventions in the flowsheet.   2/2/2023 0908 by Tosha Fontaine RN  Outcome: Progressing Towards Goal  Note: Pressure Injury Interventions:       Moisture Interventions: Absorbent underpads    Activity Interventions: Pressure redistribution bed/mattress(bed type)    Mobility Interventions: Pressure redistribution bed/mattress (bed type)    Nutrition Interventions: Document food/fluid/supplement intake    Friction and Shear Interventions: Lift team/patient mobility team             2/2/2023 0908 by Tosha Fontaine RN  Outcome: Not Progressing Towards Goal  Note: Pressure Injury Interventions:       Moisture Interventions: Absorbent underpads    Activity Interventions: Pressure redistribution bed/mattress(bed type)    Mobility Interventions: Pressure redistribution bed/mattress (bed type)    Nutrition Interventions: Document food/fluid/supplement intake    Friction and Shear Interventions: Lift team/patient mobility team                Problem: Patient Education: Go to Patient Education Activity  Goal: Patient/Family Education  2/2/2023 0908 by Heather Agrawal RN  Outcome: Progressing Towards Goal  2/2/2023 0908 by Heather Agrawal RN  Outcome: Not Progressing Towards Goal     Problem: Patient Education: Go to Patient Education Activity  Goal: Patient/Family Education  2/2/2023 0908 by Heather Agrawal RN  Outcome: Progressing Towards Goal  2/2/2023 0908 by Heather Agrawal RN  Outcome: Not Progressing Towards Goal

## 2023-02-02 NOTE — PROGRESS NOTES
Cardiology Progress Note    Admit Date: 1/28/2023  Attending Cardiologist: Dr. Shmuel Randolph    Assessment:     -Altered mental status, seizure  MRI brain 1/31/2023 with no acute intracranial hemorrhage or territorial infarct, no mass effect.  -Elevated troponin, suspect demand, no symptoms to suggest ACS  -Echo 1/31/2023 with normal/hyperdynamic LVEF 70% with normal wall motion, grade I diastolic dysfunction, technically difficult study  -Hypokalemia, K 2.5 with Mg 1.5 on 1/31/2023  -HTN  -DM  -Hyperlipidemia     No Primary cardiologist; consult by Dr. Marcelo Bence:       I saw, evaluated, interviewed and examined the patient personally. Patient is somewhat sleepy. Does not complain of any chest pain or chest tightness  No arrhythmia noted. Hemodynamically stable. Plan for palliative care evaluation noted  Continue aspirin and atorvastatin  Supportive care. No further cardiac work-up is planned at this time. Call with question. Thank you    Shmuel Randolph MD       -Echo this admission with normal/hyperdynamic LVEF. -Tele reviewed, no significant arrhythmias.  -Palliative consulted, appreciate assistance, planning for meeting with son this afternoon.  -Pt remains stable from cardiac standpoint. Will sign off and be available as needed if additional concerns arise. Subjective:     Resting comfortably, denies pain or shortness of breath.     Objective:      Patient Vitals for the past 8 hrs:   Temp Pulse Resp BP SpO2   02/02/23 0819 97.9 °F (36.6 °C) 69 20 (!) 116/50 95 %   02/02/23 0800 -- 69 -- -- --   02/02/23 0539 97.7 °F (36.5 °C) 73 18 116/61 99 %         Patient Vitals for the past 96 hrs:   Weight   02/01/23 0429 52.2 kg (115 lb 1.3 oz)   01/31/23 0925 54.4 kg (120 lb)   01/31/23 0703 54.7 kg (120 lb 9.5 oz)       TELE: normal sinus rhythm               Current Facility-Administered Medications   Medication Dose Route Frequency Last Admin    potassium chloride (K-DUR, KLOR-CON M20) SR tablet 40 mEq 40 mEq Oral BID 40 mEq at 02/02/23 0949    senna-docusate (PERICOLACE) 8.6-50 mg per tablet 1 Tablet  1 Tablet Oral DAILY 1 Tablet at 02/02/23 0949    atorvastatin (LIPITOR) tablet 40 mg  40 mg Oral QHS 40 mg at 02/01/23 2242    sodium chloride (NS) flush 5-40 mL  5-40 mL IntraVENous Q8H 10 mL at 02/01/23 2242    sodium chloride (NS) flush 5-40 mL  5-40 mL IntraVENous PRN      acetaminophen (TYLENOL) tablet 650 mg  650 mg Oral Q6H PRN      Or    acetaminophen (TYLENOL) suppository 650 mg  650 mg Rectal Q6H PRN      polyethylene glycol (MIRALAX) packet 17 g  17 g Oral DAILY PRN      promethazine (PHENERGAN) tablet 12.5 mg  12.5 mg Oral Q6H PRN      Or    ondansetron (ZOFRAN) injection 4 mg  4 mg IntraVENous Q6H PRN      LORazepam (ATIVAN) injection 1 mg  1 mg IntraVENous Q2H PRN      levETIRAcetam (KEPPRA) tablet 500 mg  500 mg Oral  mg at 02/02/23 0949    haloperidol lactate (HALDOL) injection 2.5 mg  2.5 mg IntraVENous Q4H PRN 2.5 mg at 01/29/23 2059    aspirin tablet 325 mg  325 mg Oral DAILY 325 mg at 02/02/23 0949    OLANZapine (ZyPREXA) tablet 5 mg  5 mg Oral QHS 5 mg at 02/01/23 2242    oxybutynin chloride XL (DITROPAN XL) tablet 10 mg  10 mg Oral DAILY 10 mg at 02/02/23 0949    PARoxetine (PAXIL) tablet 20 mg  20 mg Oral DAILY 20 mg at 02/02/23 0949       No intake or output data in the 24 hours ending 02/02/23 1118    Physical Exam:  General:  resting comfortably, no distress, appears stated age  Neck:  supple  Lungs:  clear to auscultation bilaterally  Heart:  regular rate and rhythm  Abdomen:  abdomen is soft without significant tenderness, masses, organomegaly or guarding  Extremities:  atraumatic, no edema    Visit Vitals  BP (!) 116/50   Pulse 69   Temp 97.9 °F (36.6 °C)   Resp 20   Ht 5' 6\" (1.676 m)   Wt 52.2 kg (115 lb 1.3 oz)   SpO2 95%   BMI 18.57 kg/m²       Data Review:     Labs: Results:       Chemistry Recent Labs     02/02/23  1567 02/01/23  1636 02/01/23  0325 01/31/23  0664 01/31/23  0255   GLU 89  --  94  --  101*     --  142  --  143   K 3.9 3.9 3.0*   < > 2.5*     --  109  --  108   CO2 24  --  27  --  29   BUN 9  --  10  --  11   CREA 0.81  --  0.88  --  0.95   CA 9.0  --  8.6  --  8.7   MG 1.9  --  2.0  --  1.5*   AGAP 4  --  6  --  6   BUCR 11*  --  11*  --  12    < > = values in this interval not displayed.       CBC w/Diff Recent Labs     02/02/23  0333 02/01/23  0325 01/31/23 0255   WBC 7.2 9.7 6.2   RBC 3.40* 3.81* 3.39*   HGB 10.0* 11.0* 9.8*   HCT 30.4* 33.4* 29.6*    230 223      Lipid Panel Lab Results   Component Value Date/Time    Cholesterol, total 187 06/21/2022 03:12 PM    HDL Cholesterol 72 (H) 06/21/2022 03:12 PM    LDL, calculated 97.8 06/21/2022 03:12 PM    VLDL, calculated 17.2 06/21/2022 03:12 PM    Triglyceride 86 06/21/2022 03:12 PM    CHOL/HDL Ratio 2.6 06/21/2022 03:12 PM      Thyroid Studies Lab Results   Component Value Date/Time    TSH 1.26 01/31/2023 02:55 AM          Signed By: Jada Maher PA-C     February 2, 2023

## 2023-02-02 NOTE — PROGRESS NOTES
[id number] [facility] 54: [phone])  [facility]: [address] [place] [address] Way: [phone]    Patient Name: Sahyan Garrett  YOB: 1945    Date of Initial Consult: 2/1/2023   Follow up 2/2/2023   Reason for Consult: goals of care   Requesting Provider: Dr Idalia Simpson    Primary Care Physician: Shmuel Polanco MD      SUMMARY:   Shayan Garrett is a 78y.o. year old with a past history of hypertension, depression dementia, diabetes , who was admitted on 1/28/2023 from home with a diagnosis of possible seizure . Current medical issues leading to Palliative Medicine involvement include: 78year old female who has a history of dementia which is a life limiting chronic illness. Palliative medicine is consulted for goals of care. 2/2/2023 alert knows her son. Comfortable appearing. . Son and patient's brother at bedside. PALLIATIVE DIAGNOSES:   Encounter for palliative care / goals of care   Advanced care plan discussions   Dementia   Seizure   Debility        PLAN:   2/2/2023 follow up on Ms Rocio Thompson along with Ms Lisa Willard, LEODAN. Patient is alert confused not able to participate in her medical decisions. We later met the son Doug Oneil at bedside. Brief medical update provided . Goals of care re visited including benefits and burdens in light of cognitive dysfunction. Son told he does not want his mother to be in pain or suffer and would not wish to subject her to CPR or intubation for any reason. POST form introduced, completed for DNR/DNI limited interventions no feeding tubes. Signed by son. Copy to chart and son. BSRN and attending aware of goals of care changes. ( Please see below for previous notes per palliative team)     Encounter for palliative care / goals of care Ms Rocio Thompson seen along with Ms Molina Ward RN and MARVA Prak. She is alert very pleasantly confused. She is not able to participate in her goals of care discussions.  We called her son Eve Macias. He is very over whelmed with her care. She lives with her son Eve Macias. Our team offered support as he navigates his mother's health journey. Goals of care broached including benefits and burdens. Difficult question for son. He became a bit tearful over the phone. He told us he has not been asked this question before and he has not previously spoken to his mother about it. He voiced his understanding these efforts will not cure, treat her underlying dementia ( cognitive dysfunction) and could leave her more debilitated. He was not able to make a decision today. We plan on meeting Eve Macias at bedside tomorrow 2/2/2023 at 1400 to further discuss goals of care. Current goals of care full code with full interventions. Advanced care plan discussions patient is not currently able to complete an AMD. Eve Macias tells us he is only child. He is legal next of kin. Dementia per son patient as o/p dx'd with mild cognitive dysfunction. She is confused at home, however it seems to improve with vitamins per son. Son tells us she does not seem to brush her teeth and not bathing as frequently as before. Debility lives her son ambulates at home. Confusion, short term memory loss. Having difficulty with ADL's. She is able to feed herself  Initial consult note routed to primary continuity provider  Communicated plan of care with: Palliative IDT, son Terry Bath Proxy Stated Goals: Prolong life    Thank you for consulting palliative medicine goals of care are defined. POST completed. Palliative medicine will sign off at this time. Please re consult in the event of health decline.      TREATMENT PREFERENCES:   Code Status: DNR    Advance Care Planning:  [] [facility] Interdisciplinary Team has updated the ACP Navigator with [address] and Patient Capacity    Primary Decision [facility] ([address]):   Primary Decision Maker: Evi Waite - [phone]  No AMD on file   Medical Interventions: Limited additional interventions Son Princess Oscar is legal next of kin   Artificially Administered Nutrition: No feeding tube     Other:  As far as possible, the palliative care team has discussed with patient / health care proxy about goals of care / treatment preferences for patient. HISTORY:     History obtained from: chart and son     CHIEF COMPLAINT: dementia     HPI/SUBJECTIVE:    The patient is:   [] Verbal and participatory  [x] Non-participatory due to: confusion   Please see summary     Clinical Pain Assessment (nonverbal scale for nonverbal patients): Clinical Pain Assessment  Severity: 0     Activity (Movement): Lying quietly, normal position    Duration: for how long has pt been experiencing pain (e.g., 2 days, 1 month, years)  Frequency: how often pain is an issue (e.g., several times per day, once every few days, constant)     FUNCTIONAL ASSESSMENT:     Palliative Performance Scale (PPS):  PPS: 50    ECOG  ECOG Status : Limited self-care     PSYCHOSOCIAL/SPIRITUAL SCREENING:      Any spiritual / Evangelical concerns:unable to assess due to confusion   [] Yes /  [] No    Caregiver Burnout:  [x] Yes /  [] No /  [] No Caregiver Present      Anticipatory grief assessment: unable to assess due to confusion   [] Normal  / [] Maladaptive        REVIEW OF SYSTEMS:     Positive and pertinent negative findings in ROS are noted above in HPI. The following systems were [] reviewed / [x] unable to be reviewed as noted in HPI  Other findings are noted below. Systems: constitutional, ears/nose/mouth/throat, respiratory, gastrointestinal, genitourinary, musculoskeletal, integumentary, neurologic, psychiatric, endocrine. Positive findings noted below.   Modified ESAS Completed by: provider           Pain: 0   Anxiety: 0 Nausea: 0     Dyspnea: 0           Stool Occurrence(s): 1        PHYSICAL EXAM:     Wt Readings from Last 3 Encounters:   02/01/23 52.2 kg (115 lb 1.3 oz)   10/24/22 57 kg (125 lb 9.6 oz)   09/22/22 63.5 kg (140 lb)     Blood pressure (!) 110/57, pulse 72, temperature 97.8 °F (36.6 °C), resp. rate 20, height 5' 6\" (1.676 m), weight 52.2 kg (115 lb 1.3 oz), SpO2 99 %.   Pain:  Pain Scale 1: Numeric (0 - 10)  Pain Intensity 1: 0                 Last bowel movement: x 3 2/1/2023, x 1 2/2/2023     Constitutional: sitting up in bed alert but confused in nad   Eyes: pupils equal  ENMT: moist MM   Cardiovascular: RRR  Respiratory: respirations not labored   Gastrointestinal: soft non-tender  Skin: warm, dry  Neurologic: alert oriented x 1, knows her son but could not name him  Psychiatric: full affect, no hallucinations         HISTORY:     Active Problems:    NSTEMI (non-ST elevated myocardial infarction) ([facility] (1/28/2023)      Seizure ([facility] (1/30/2023)      Breast mass, left (1/30/2023)      JEIMY (acute kidney injury) ([facility] (1/30/2023)    Past Medical History:   Diagnosis Date    Chronic anxiety     Depression     controlled on Paxil    Diabetes (HCC)     DJD (degenerative joint disease)     GERD (gastroesophageal reflux disease)     High cholesterol     HTN (hypertension)     Hypertension     Low back pain     post-laminectomy syndrome and multilevel degenerative disease    Osteoporosis       Past Surgical History:   Procedure Laterality Date    HX ADENOIDECTOMY      HX APPENDECTOMY      HX BACK SURGERY      PHLD X3    HX BREAST BIOPSY Left 8/28/2015    WIDE LOCAL EXCISION LEFT BREAST LESION performed by Delmy Humphrey MD at [facility] MAIN OR    HX HERNIA REPAIR      hiatal    HX KNEE REPLACEMENT      HX ORTHOPAEDIC      bilateral shoulder surgery    HX ELVIRA AND BSO  1982    HX TONSILLECTOMY      MS TOTAL HIP ARTHROPLASTY  4/12     total right hip replacement, Dr. Catherine Patiño      Family History   Problem Relation Age of Onset    Cancer Mother         melanoma    Cancer Father         lung    Heart Disease Maternal Grandmother     Diabetes Other     Hypertension Other     Headache Other     Seizures Other     Coronary Art Dis Other Heart Attack Other      History reviewed, no pertinent family history. Social History     Tobacco Use    Smoking status: Former    Smokeless tobacco: Never   Substance Use Topics    Alcohol use: No     Allergies   Allergen Reactions    Aspirin Other (comments)     \"messes up my kidneys\"    Feldene [Piroxicam] Other (comments)     Kidney damage      Morphine Other (comments)     unconsciousness    Nsaids (Non-Steroidal Anti-Inflammatory Drug) Other (comments)     \"messes up my kidneys\"    Other Medication Not Reported This Time     Mycins      Penicillins Hives    Sulfa (Sulfonamide Antibiotics) Rash    Vancomycin Itching     Possible allergic reaction to Vancomycin.  Complained of itching/reddness around forehead/back of neck      Current Facility-Administered Medications   Medication Dose Route Frequency    potassium chloride (K-DUR, KLOR-CON M20) SR tablet 40 mEq  40 mEq Oral BID    senna-docusate (PERICOLACE) 8.6-50 mg per tablet 1 Tablet  1 Tablet Oral DAILY    atorvastatin (LIPITOR) tablet 40 mg  40 mg Oral QHS    sodium chloride (NS) flush 5-40 mL  5-40 mL IntraVENous Q8H    sodium chloride (NS) flush 5-40 mL  5-40 mL IntraVENous PRN    acetaminophen (TYLENOL) tablet 650 mg  650 mg Oral Q6H PRN    Or    acetaminophen (TYLENOL) suppository 650 mg  650 mg Rectal Q6H PRN    polyethylene glycol (MIRALAX) packet 17 g  17 g Oral DAILY PRN    promethazine (PHENERGAN) tablet 12.5 mg  12.5 mg Oral Q6H PRN    Or    ondansetron (ZOFRAN) injection 4 mg  4 mg IntraVENous Q6H PRN    LORazepam (ATIVAN) injection 1 mg  1 mg IntraVENous Q2H PRN    levETIRAcetam (KEPPRA) tablet 500 mg  500 mg Oral BID    haloperidol lactate (HALDOL) injection 2.5 mg  2.5 mg IntraVENous Q4H PRN    aspirin tablet 325 mg  325 mg Oral DAILY    OLANZapine (ZyPREXA) tablet 5 mg  5 mg Oral QHS    oxybutynin chloride XL (DITROPAN XL) tablet 10 mg  10 mg Oral DAILY    PARoxetine (PAXIL) tablet 20 mg  20 mg Oral DAILY        LAB AND IMAGING FINDINGS: Lab Results   Component Value Date/Time    WBC 7.2 02/02/2023 03:33 AM    HGB 10.0 (L) 02/02/2023 03:33 AM    PLATELET 567 42/78/8366 03:33 AM     Lab Results   Component Value Date/Time    Sodium 139 02/02/2023 03:33 AM    Potassium 3.9 02/02/2023 03:33 AM    Chloride 111 02/02/2023 03:33 AM    CO2 24 02/02/2023 03:33 AM    BUN 9 02/02/2023 03:33 AM    Creatinine 0.81 02/02/2023 03:33 AM    Calcium 9.0 02/02/2023 03:33 AM    Magnesium 1.9 02/02/2023 03:33 AM    Phosphorus 4.0 01/24/2018 11:41 AM      Lab Results   Component Value Date/Time    Alk. phosphatase 68 01/28/2023 04:29 AM    Protein, total 7.6 01/28/2023 04:29 AM    Albumin 3.8 01/28/2023 04:29 AM    Globulin 3.8 01/28/2023 04:29 AM     Lab Results   Component Value Date/Time    INR 2.0 (H) 05/11/2012 02:52 AM    Prothrombin time 21.9 (H) 05/11/2012 02:52 AM    aPTT 105.9 (H) 01/30/2023 05:00 AM      Lab Results   Component Value Date/Time    Iron 30 (L) 01/31/2023 02:55 AM    TIBC 176 (L) 01/31/2023 02:55 AM    Iron % saturation 17 (L) 01/31/2023 02:55 AM    Ferritin 296 01/31/2023 02:55 AM      No results found for: PH, PCO2, PO2  No components found for: [place]   Lab Results   Component Value Date/Time    CK 42 01/28/2023 04:29 AM              Total time: 50 minutes   Counseling / coordination time, spent as noted above:   > 50% counseling / coordination: yes with son     Prolonged service was provided for  []30 min   []75 min in face to face time in the presence of the patient, spent as noted above.   Time Start:   Time End:

## 2023-02-03 PROBLEM — E43 SEVERE PROTEIN-CALORIE MALNUTRITION (HCC): Status: ACTIVE | Noted: 2023-02-03

## 2023-02-03 LAB
ANION GAP SERPL CALC-SCNC: 5 MMOL/L (ref 3–18)
BUN SERPL-MCNC: 7 MG/DL (ref 7–18)
BUN/CREAT SERPL: 9 (ref 12–20)
CALCIUM SERPL-MCNC: 9.2 MG/DL (ref 8.5–10.1)
CHLORIDE SERPL-SCNC: 113 MMOL/L (ref 100–111)
CO2 SERPL-SCNC: 24 MMOL/L (ref 21–32)
CREAT SERPL-MCNC: 0.81 MG/DL (ref 0.6–1.3)
ERYTHROCYTE [DISTWIDTH] IN BLOOD BY AUTOMATED COUNT: 15.2 % (ref 11.6–14.5)
GLUCOSE SERPL-MCNC: 82 MG/DL (ref 74–99)
HCT VFR BLD AUTO: 31.2 % (ref 35–45)
HGB BLD-MCNC: 10.2 G/DL (ref 12–16)
MCH RBC QN AUTO: 29.6 PG (ref 24–34)
MCHC RBC AUTO-ENTMCNC: 32.7 G/DL (ref 31–37)
MCV RBC AUTO: 90.4 FL (ref 78–100)
NRBC # BLD: 0 K/UL (ref 0–0.01)
NRBC BLD-RTO: 0 PER 100 WBC
PLATELET # BLD AUTO: 295 K/UL (ref 135–420)
PMV BLD AUTO: 9.3 FL (ref 9.2–11.8)
POTASSIUM SERPL-SCNC: 4 MMOL/L (ref 3.5–5.5)
RBC # BLD AUTO: 3.45 M/UL (ref 4.2–5.3)
SODIUM SERPL-SCNC: 142 MMOL/L (ref 136–145)
WBC # BLD AUTO: 5.9 K/UL (ref 4.6–13.2)

## 2023-02-03 PROCEDURE — 97530 THERAPEUTIC ACTIVITIES: CPT

## 2023-02-03 PROCEDURE — 97116 GAIT TRAINING THERAPY: CPT

## 2023-02-03 PROCEDURE — 85027 COMPLETE CBC AUTOMATED: CPT

## 2023-02-03 PROCEDURE — 74011250637 HC RX REV CODE- 250/637: Performed by: NURSE PRACTITIONER

## 2023-02-03 PROCEDURE — 74011000250 HC RX REV CODE- 250: Performed by: INTERNAL MEDICINE

## 2023-02-03 PROCEDURE — 2709999900 HC NON-CHARGEABLE SUPPLY

## 2023-02-03 PROCEDURE — 36415 COLL VENOUS BLD VENIPUNCTURE: CPT

## 2023-02-03 PROCEDURE — 74011250637 HC RX REV CODE- 250/637: Performed by: STUDENT IN AN ORGANIZED HEALTH CARE EDUCATION/TRAINING PROGRAM

## 2023-02-03 PROCEDURE — 99232 SBSQ HOSP IP/OBS MODERATE 35: CPT | Performed by: HOSPITALIST

## 2023-02-03 PROCEDURE — 74011250637 HC RX REV CODE- 250/637: Performed by: INTERNAL MEDICINE

## 2023-02-03 PROCEDURE — 80048 BASIC METABOLIC PNL TOTAL CA: CPT

## 2023-02-03 PROCEDURE — 65270000029 HC RM PRIVATE

## 2023-02-03 PROCEDURE — 97535 SELF CARE MNGMENT TRAINING: CPT

## 2023-02-03 RX ADMIN — PAROXETINE HYDROCHLORIDE 20 MG: 20 TABLET, FILM COATED ORAL at 09:34

## 2023-02-03 RX ADMIN — OXYBUTYNIN CHLORIDE 10 MG: 5 TABLET, EXTENDED RELEASE ORAL at 09:34

## 2023-02-03 RX ADMIN — LEVETIRACETAM 500 MG: 500 TABLET, FILM COATED ORAL at 09:34

## 2023-02-03 RX ADMIN — POTASSIUM CHLORIDE 40 MEQ: 1500 TABLET, EXTENDED RELEASE ORAL at 17:30

## 2023-02-03 RX ADMIN — SENNOSIDES AND DOCUSATE SODIUM 1 TABLET: 50; 8.6 TABLET ORAL at 09:35

## 2023-02-03 RX ADMIN — LEVETIRACETAM 500 MG: 500 TABLET, FILM COATED ORAL at 17:30

## 2023-02-03 RX ADMIN — ATORVASTATIN CALCIUM 40 MG: 40 TABLET, FILM COATED ORAL at 21:48

## 2023-02-03 RX ADMIN — OLANZAPINE 5 MG: 5 TABLET, FILM COATED ORAL at 21:48

## 2023-02-03 RX ADMIN — ASPIRIN 325 MG ORAL TABLET 325 MG: 325 PILL ORAL at 09:34

## 2023-02-03 RX ADMIN — SODIUM CHLORIDE, PRESERVATIVE FREE 10 ML: 5 INJECTION INTRAVENOUS at 15:40

## 2023-02-03 RX ADMIN — POTASSIUM CHLORIDE 40 MEQ: 1500 TABLET, EXTENDED RELEASE ORAL at 09:34

## 2023-02-03 RX ADMIN — SODIUM CHLORIDE, PRESERVATIVE FREE 10 ML: 5 INJECTION INTRAVENOUS at 05:57

## 2023-02-03 NOTE — PROGRESS NOTES
Discharge/Transition Planning     Asked that PT/OT see again today. Patient with dementia and notes stating patient son commenting on worsening confusion in hospital.   Patient lives with son . Moving patient to another new location for SNF would not likely benefit patient cognition and could make worse. Patient ambulating last therapy eval min assist .   Have matched out anyways .     Will need to see if patient wanders with dementia

## 2023-02-03 NOTE — PROGRESS NOTES
[facility]  Progress Note    Patient: Donette Galeazzi Age: 78 y.o. : 1945 MR#: [id number] SSN: [id number]  Date: 2/3/2023     Subjective:   Alert and oriented to self. Confused with history of dementia. Need redirection. NAD. Assessment/Plan:   1. Seizure, new onset. History of childhood seizures  2. Elevated troponin  3. Electrolyte derangement  4. HTN  5. HLD   6. History of dementia   7. DMT2  8. Breast asymmetry with for confluent/dense appearing parenchyma on the left with enlarged left axillary node measuring 1.8 x 2.4 cm     Plan  Neurology consult and appreciate assistance. Recommendations continue keppra 500 mg BID, EEG, MRI, seizure precautions. MRI negative for stroke. EEG ordered and shows presence of left frontotemporal focal neuronal dysfunction and phase reversing activity. This suggest epileptiform activity. Check ultrasound for further evaluation of abnormal breast parenchyma and enlarged left axillary node. Dementia-patient will need to be discharged to a skilled nursing facility versus long-term care. She is unable to take care of herself at this time. Time spent 35 minutes    Case discussed with:  [x]Patient  [x]Family  []Nursing  []Case Management  DVT Prophylaxis:  []Lovenox  []Hep SQ  [x]SCDs  []Coumadin   []On Heparin gtt    Objective:   VS: Visit Vitals  /60 (BP 1 Location: Left upper arm, BP Patient Position: At rest)   Pulse (!) 104   Temp 97.5 °F (36.4 °C)   Resp 18   Ht 5' 6\" (1.676 m)   Wt 52.2 kg (115 lb 1.3 oz)   SpO2 98%   BMI 18.57 kg/m²        Tmax/24hrs: Temp (24hrs), Av.1 °F (36.7 °C), Min:97.5 °F (36.4 °C), Max:98.7 °F (37.1 °C)    Intake/Output Summary (Last 24 hours) at 2/3/2023 1624  Last data filed at 2/3/2023 1404  Gross per 24 hour   Intake 470 ml   Output --   Net 470 ml       General:         Alert, cooperative, no acute distress    HEENT:           NC, Atraumatic. PERRLA, anicteric sclerae.   Lungs: CTA bilaterally. No Wheezing/Rhonchi/Rales  Heart:              RRR, No murmur, No Rubs, No Gallops  Abdomen:      Soft, Non distended, Non tender. +Bowel sounds, no HSM  Extremities:   No c/c/e  Psych:              Good insight. Not anxious or agitated. Neurologic:     Alert and oriented X 1-2.   No acute neurological deficits     Labs:    Recent Results (from the past 24 hour(s))   CBC W/O DIFF    Collection Time: 02/03/23  7:42 AM   Result Value Ref Range    WBC 5.9 4.6 - 13.2 K/uL    RBC 3.45 (L) 4.20 - 5.30 M/uL    HGB 10.2 (L) 12.0 - 16.0 g/dL    HCT 31.2 (L) 35.0 - 45.0 %    MCV 90.4 78.0 - 100.0 FL    MCH 29.6 24.0 - 34.0 PG    MCHC 32.7 31.0 - 37.0 g/dL    RDW 15.2 (H) 11.6 - 14.5 %    PLATELET 946 086 - 123 K/uL    MPV 9.3 9.2 - 11.8 FL    NRBC 0.0 0  WBC    ABSOLUTE NRBC 0.00 0.00 - 9.56 K/uL   METABOLIC PANEL, BASIC    Collection Time: 02/03/23  7:42 AM   Result Value Ref Range    Sodium 142 136 - 145 mmol/L    Potassium 4.0 3.5 - 5.5 mmol/L    Chloride 113 (H) 100 - 111 mmol/L    CO2 24 21 - 32 mmol/L    Anion gap 5 3.0 - 18 mmol/L    Glucose 82 74 - 99 mg/dL    BUN 7 7.0 - 18 MG/DL    Creatinine 0.81 0.6 - 1.3 MG/DL    BUN/Creatinine ratio 9 (L) 12 - 20      eGFR >60 >60 ml/min/1.73m2    Calcium 9.2 8.5 - 10.1 MG/DL       Signed By: Nereida Rae MD     February 3, 2023

## 2023-02-03 NOTE — PROGRESS NOTES
Problem: Self Care Deficits Care Plan (Adult)  Goal: *Acute Goals and Plan of Care (Insert Text)  Description: Occupational Therapy Goals  Initiated 2/1/2023 within 7 day(s). 1.  Patient will perform grooming with modified independence. 2.  Patient will perform bathing with supervision/set-up using adaptive equipment. 3.  Patient will perform upper body dressing and lower body dressing with supervision/set-up using adaptive equipment. 4.  Patient will perform toilet transfers with supervision/set-up using RW with good balance. 5.  Patient will perform all aspects of toileting with supervision/set-up. 6.  Patient will participate in upper extremity therapeutic exercise/activities with supervision/set-up for 8 minutes. 7.  Patient will utilize energy conservation techniques during functional activities with min verbal cues. Prior Level of Function: Mod I with ADLs and functional mobility using RW      Outcome: Progressing Towards Goal   OCCUPATIONAL THERAPY TREATMENT    Patient: Janna Osborn (75 y.o. female)  Date: 2/3/2023  Diagnosis: NSTEMI (non-ST elevated [facility] infarction) [facility] [I21.4] <principal problem not specified>      Precautions: Fall, Seizure    Chart, occupational therapy assessment, plan of care, and goals were reviewed. ASSESSMENT:  Pt is pleasantly confused. Follows commands for toileting ADL. Poor safety awareness requires Min Assist w/functional mobility to bathroom and w/RW mgt. Decrease dynamic standing balance requires assist w/clothing mgt and assist for thoroughness w/bowel hygiene. Pt does not tolerate standing for hand hygiene. Pt performs simple ADL grooming tasks w/increase time and SBA at chair level. No c/o pain or SOB w/activity. Pt left in chair for lunch and alerted NSG.    Progression toward goals:  []          Improving appropriately and progressing toward goals  [x]          Improving slowly and progressing toward goals  []          Not making progress toward goals and plan of care will be adjusted     PLAN:  Patient continues to benefit from skilled intervention to address the above impairments. Continue treatment per established plan of care. Further Equipment Recommendations for Discharge:  shower chair    AMPAC:   Current research shows that an AM-PAC score of 17 or less is not associated with a discharge to the patient's home setting. Based on an AM-PAC score of 15/24 and their current ADL deficits; it is recommended that the patient have 3-5 sessions per week of Occupational Therapy at d/c to increase the patient's independence. ** Pt will benefit from return home w/HH and 24 hr assist/supervision for familiarity of environment and family 2/2 cognitive deficits **     This AMPAC score should be considered in conjunction with interdisciplinary team recommendations to determine the most appropriate discharge setting. Patient's social support, diagnosis, medical stability, and prior level of function should also be taken into consideration. SUBJECTIVE:   Patient stated No, I have four sons.     OBJECTIVE DATA SUMMARY:   Cognitive/Behavioral Status:  Neurologic State: Alert, Confused  Orientation Level: Oriented to person  Cognition: Follows commands  Safety/Judgement: Fall prevention    Functional Mobility and Transfers for ADLs:   Bed Mobility:  Supine to Sit: Additional time;Contact guard assistance  Scooting: Contact guard assistance     Transfers:  Sit to Stand: Minimum assistance  Bed to Chair: Minimum assistance (w/RW)   Toilet Transfer : Contact guard assistance (w/grab bar)   Bathroom Mobility: Minimum assistance (w/RW)     Balance:  Sitting: Impaired; Without support  Standing: Impaired; With support    ADL Intervention:  Grooming  Position Performed: Seated in chair  Washing Hands: Stand-by assistance  Brushing/Combing Hair: Stand-by assistance    Toileting  Toileting Assistance: Maximum assistance  Bladder Hygiene: Minimum assistance  Bowel Hygiene: Moderate assistance  Clothing Management: Maximum assistance    Pain:  Pain level pre-treatment: 0/10   Pain level post-treatment: 0/10    Activity Tolerance:    Good    Please refer to the flowsheet for vital signs taken during this treatment. After treatment:   [x]  Patient left in no apparent distress sitting up in chair  []  Patient left in no apparent distress in bed  [x]  Call bell left within reach  [x]  Nursing notified  []  Caregiver present  [x]  chair alarm activated    COMMUNICATION/EDUCATION:   [] Role of Occupational Therapy in the acute care setting  [] Home safety education was provided and the patient/caregiver indicated understanding. [] Patient/family have participated as able in working towards goals and plan of care. [] Patient/family agree to work toward stated goals and plan of care. [] Patient understands intent and goals of therapy, but is neutral about his/her participation. [x] Patient is unable to participate in goal setting and plan of care 2/2 confusion. Thank you for this referral.  Merle Cogan, COTA  Time Calculation: 23 mins    Thomas Torres AM-PAC® Daily Activity Inpatient Short Form (6-Clicks)*    How much HELP from another person does the patient currently need    (If the patient hasn't done an activity recently, how much help from another person do you think he/she would need if he/she tried?)   Total (Total A or Dep)   A Lot  (Mod to Max A)   A Little (Sup or Min A)   None (Mod I to I)   Putting on and taking off regular lower body clothing? [] 1 [x] 2 [] 3 [] 4   2. Bathing (including washing, rinsing,      drying)? [] 1 [x] 2 [] 3 [] 4   3. Toileting, which includes using toilet, bedpan or urinal?   [] 1 [x] 2 [] 3 [] 4   4. Putting on and taking off regular upper body clothing? [] 1 [] 2 [x] 3 [] 4   5. Taking care of personal grooming such as brushing teeth? [] 1 [] 2 [x] 3 [] 4   6. Eating meals?    [] 1 [] 2 [x] 3 [] 4

## 2023-02-03 NOTE — PROGRESS NOTES
Bedside shift change report given to [employer] Florentino, RN (oncoming nurse) by Laura Matute RN (offgoing nurse). Report included the following information SBAR, Kardex, Intake/Output, MAR, and Quality Measures.

## 2023-02-03 NOTE — PROCEDURES
[facility]  EEG    Name:  Landen Bustos  MR#:   [id number]  :  1945  ACCOUNT #:  [de-identified]  DATE OF SERVICE:  2023    PORTABLE EEG REPORT    REFERRING PROVIDER:  Espiridion Seip, MD    HISTORY/REASON FOR REQUEST:  The patient admitted with seizure. EEG is requested to rule out epileptiform activity. The patient is on Keppra. PROCEDURE:  This is an awake and drowsy EEG. CONDITION OF THE RECORDING:  This is a digital EEG performed using disc electrode placed according to the International 10-20 system of electrode placement. DESCRIPTION OF THE RECORDING:  When the patient is maximally alert, the background activity consists of about 8-9 Hz posterior dominant symmetric and synchronous alpha rhythm, which is reactive to eye opening and eye closure. During the recording, there is the left frontotemporal phase reversing activity and sharp seen, there were central sharp seen as well. There were no clinical seizures reported during this recording. Photic showed some sharp activities. IMPRESSION:  This is an abnormal EEG due to the presence of left frontotemporal  phase reversing activity and sharps suggesting focal neuronal dysfunction consistent with epileptiform activity. Further clinical correlation is suggested.       Angel Hope MD      [id number]  D:  2023 20:35  T:  2023 4:23  JOB #:  [id number]

## 2023-02-03 NOTE — PROGRESS NOTES
Bedside shift change report given to Tim Marr RN (oncoming nurse) by Chandan Cline RN (offgoing nurse). Report included the following information SBAR, Kardex, Intake/Output, MAR, and Cardiac Rhythm NSR .

## 2023-02-03 NOTE — PROGRESS NOTES
Problem: Mobility Impaired (Adult and Pediatric)  Goal: *Acute Goals and Plan of Care (Insert Text)  Description: Physical Therapy Goals  Initiated 2/1/2023 and to be accomplished within 7 day(s)  1. Patient will move from supine to sit and sit to supine , scoot up and down, and roll side to side in bed with supervision/set-up. 2.  Patient will transfer from bed to chair and chair to bed with supervision/set-up using the least restrictive device. 3.  Patient will perform sit to stand with supervision/set-up. 4.  Patient will ambulate with supervision/set-up for 75 feet with the least restrictive device. 5.  Pt will participate in LE exercise to tolerance. PLOF: Pt confused, oriented to self only. Unsure of PLOF. Outcome: Progressing Towards Goal     PHYSICAL THERAPY TREATMENT    Patient: Kailey Cross (68 y.o. female)  Date: 2/3/2023  Diagnosis: NSTEMI (non-ST elevated myocardial infarction) ([facility] [I21.4] <principal problem not specified>      Precautions: Fall, Seizure  PLOF: see above     ASSESSMENT:  Pt received in bed in NAD and agreeable. Pt is able to amb to/from bathroom 20 ft x 2 this date with RW and CGA. Pt amb at decreased speed with decreased step clearance, appears to have knee valgus and foot ER posturing with gait as well as kyphosis of spine making it hard for pt to amb fully upright however pt exhibits no LOB this date and is able to stand at sink for hand hygiene after toileting with good dynamic balance. Pt left in recliner with chair alarm on and all needs met at end of session, nursing notified. Progression toward goals:   [x]      Improving appropriately and progressing toward goals  []      Improving slowly and progressing toward goals  []      Not making progress toward goals and plan of care will be adjusted     PLAN:  Patient continues to benefit from skilled intervention to address the above impairments. Continue treatment per established plan of care.     Further Equipment Recommendations for Discharge:  rolling walker     AMPAC: Current research shows that an AM-PAC score of 18 (14 without stairs) or greater is associated with a discharge to the patient's home setting. Based on an AM-PAC score of 18/24 (or **/20 if omitting stairs) and their current functional mobility deficits, it is recommended that the patient have 2-3 sessions per week of Physical Therapy at d/c to increase the patient's independence. This AMPAC score should be considered in conjunction with interdisciplinary team recommendations to determine the most appropriate discharge setting. Patient's social support, diagnosis, medical stability, and prior level of function should also be taken into consideration. SUBJECTIVE:   Patient stated Guille Dimasheri duenas for helping me.     OBJECTIVE DATA SUMMARY:   Critical Behavior:  Neurologic State: Alert, Confused  Orientation Level: Oriented to person  Cognition: Follows commands  Safety/Judgement: Fall prevention  Functional Mobility Training:  Bed Mobility:     Supine to Sit: Additional time;Contact guard assistance     Scooting: Contact guard assistance         Transfers:  Sit to Stand: Minimum assistance           Bed to Chair: Minimum assistance (w/RW)                    Balance:  Sitting: Impaired; Without support  Standing: Impaired; With support        Ambulation/Gait Training:  Distance (ft): 20 Feet (ft) (x2)  Assistive Device: Walker, rolling  Ambulation - Level of Assistance: Contact guard assistance        Gait Abnormalities: Decreased step clearance        Base of Support: Narrowed     Speed/Essie: Slow;Shuffled  Step Length: Right shortened;Left shortened             Pain:  Pain level pre-treatment: 0/10  Pain level post-treatment: 0/10   Pain Intervention(s): Medication (see MAR);  Rest, Ice, Repositioning   Response to intervention: Nurse notified    Activity Tolerance:   Good    Please refer to the flowsheet for vital signs taken during this treatment. After treatment:   [x] Patient left in no apparent distress sitting up in chair  [] Patient left in no apparent distress in bed  [x] Call bell left within reach  [x] Nursing notified  [] Caregiver present  [x] Bed alarm activated- chair  [] SCDs applied      COMMUNICATION/EDUCATION:   [x]         Role of Physical Therapy in the acute care setting. [x]         Fall prevention education was provided and the patient/caregiver indicated understanding. [x]         Patient/family have participated as able in working toward goals and plan of care. [x]         Patient/family agree to work toward stated goals and plan of care. []         Patient understands intent and goals of therapy, but is neutral about his/her participation. []         Patient is unable to participate in stated goals/plan of care: ongoing with therapy staff.  []         Other:        Lev Second   Time Calculation: 23 mins    Dinora Cid AM-PAC® Basic Mobility Inpatient Short Form (6-Clicks) Version 2    How much HELP from another person does the patient currently need    (If the patient hasn't done an activity recently, how much help from another person do you think he/she would need if he/she tried?)   Total (Total A or Dep)   A Lot  (Mod to Max A)   A Little (Sup or Min A)   None (Mod I to I)   Turning from your back to your side while in a flat bed without using bedrails? [] 1 [] 2 [x] 3 [] 4   2. Moving from lying on your back to sitting on the side of a flat bed without using bedrails? [] 1 [] 2 [x] 3 [] 4   3. Moving to and from a bed to a chair (including a wheelchair)? [] 1 [] 2 [x] 3 [] 4   4. Standing up from a chair using your arms (e.g., wheelchair, or bedside chair)? [] 1 [] 2 [x] 3 [] 4   5. Walking in hospital room? [] 1 [] 2 [x] 3 [] 4   6. Climbing 3-5 steps with a railing?+   [] 1 [] 2 [x] 3 [] 4   +If stair climbing cannot be assessed, skip item #6. Sum responses from items 1-5.      Current research shows that an AM-PAC score of 18 (14 without stairs) or greater is associated with a discharge to the patient's home setting. Based on an AM-PAC score of 18/24 (or **/20 if omitting stairs) and their current functional mobility deficits, it is recommended that the patient have 2-3 sessions per week of Physical Therapy at d/c to increase the patient's independence.

## 2023-02-03 NOTE — PROGRESS NOTES
Comprehensive Nutrition Assessment    Type and Reason for Visit: Initial, RD nutrition re-screen/LOS    Nutrition Recommendations/Plan:   Add supplement:  Ensure Enlive BID (350 kcal, 20 gm protein each)  Continue all other nutrition interventions. Encourage/ monitor po intake of meals and supplements. Malnutrition Assessment:  Malnutrition Status:  Severe malnutrition (02/03/23 1628)    Context:  Acute illness     Findings of the 6 clinical characteristics of malnutrition:   Energy Intake:  No significant decrease in energy intake  Weight Loss:   (weight loss of 8% x 3 months and 1 week PTA)     Body Fat Loss: Moderate body fat loss, Fat overlying ribs, Orbital   Muscle Mass Loss: Moderate muscle mass loss, Clavicles (pectoralis & deltoids), Hand (interosseous), Temples (temporalis)  Fluid Accumulation:  No significant fluid accumulation,     Strength:  Not performed     Nutrition History and Allergies:   Past medical hx:  chronic anxiety, dementia, depression, DM, GERD, high cholesterol, HTN, osteoporosis, appendectomy, hiatal hernia repair. Pt reported good appetite/ meal intake PTA. Noted weight loss PTA per chart hx. Wt trends per chart hx:   125 lb on 10/24/22,   120 lb on 1/31/23,   115 lb on 2/1/23. Wt loss of 10 lb, 8% x 3 month and 1 week PTA. No known food allergies     Nutrition Assessment:    Pt reported fair appetite/ po intake since admission. Had good intake this morning per RN. Pt underweight per BMI for age. Agreeable to nutrition supplement. NFPE conducted. Noted pt hs dementia; little confusion with some of questions asked. Palliative following; pt is DNR/ DNI, limited interventions, No feeding tubes. POST completed. Nutrition Related Findings:    BM 2/2. No edema. Pertinent meds:  KCl 40 mEq BID, bowel regimen.  Wound Type: Pressure injury, Stage I    Current Nutrition Intake & Therapies:  Average Meal Intake:  (fair/ good)  Average Supplement Intake: None ordered  ADULT DIET Regular; Low Fat/Low Chol/High Fiber/ANEUDY    Anthropometric Measures:  Height: 5' 6\" (167.6 cm)  Ideal Body Weight (IBW): 130 lbs (59 kg)  Admission Body Weight: 125 lb (pt stated)  Current Body Wt:  52.2 kg (115 lb 1.3 oz), 88.5 % IBW.  Bed scale  Current BMI (kg/m2): 18.6  Usual Body Weight: 56.7 kg (125 lb)  % Weight Change (Calculated): -7.9  Weight Adjustment: No adjustment  BMI Category: Underweight (BMI less than 22) age over 72    Estimated Daily Nutrient Needs:  Energy Requirements Based On: Kcal/kg (wt x25-30)  Weight Used for Energy Requirements: Current  Energy (kcal/day): 0646-8925  Weight Used for Protein Requirements: Current  Protein (g/day): 52-68 (wt x1-1.3)  Method Used for Fluid Requirements: 1 ml/kcal  Fluid (ml/day): 9472-3705    Nutrition Diagnosis:   Severe malnutrition, In context of acute illness or injury related to inadequate protein-energy intake (resulting in wt loss PTA) as evidenced by moderate loss of subcutaneous fat, moderate muscle loss    Nutrition Interventions:   Food and/or Nutrient Delivery: Continue current diet, Mineral supplement, Start oral nutrition supplement  Nutrition Education/Counseling: Education not indicated  Coordination of Nutrition Care: Continue to monitor while inpatient  Plan of Care discussed with: pt and RN    Goals:     Goals: Meet at least 75% of estimated needs, by next RD assessment       Nutrition Monitoring and Evaluation:   Behavioral-Environmental Outcomes: None identified  Food/Nutrient Intake Outcomes: Food and nutrient intake, Supplement intake, Vitamin/mineral intake  Physical Signs/Symptoms Outcomes: Biochemical data, GI status, Meal time behavior, Nutrition focused physical findings, Weight    Discharge Planning:    Continue current diet, Continue oral nutrition supplement    Ramos Lucero RD  Contact: [phone]

## 2023-02-04 LAB
ANION GAP SERPL CALC-SCNC: 4 MMOL/L (ref 3–18)
BUN SERPL-MCNC: 8 MG/DL (ref 7–18)
BUN/CREAT SERPL: 9 (ref 12–20)
CALCIUM SERPL-MCNC: 9.2 MG/DL (ref 8.5–10.1)
CHLORIDE SERPL-SCNC: 111 MMOL/L (ref 100–111)
CO2 SERPL-SCNC: 24 MMOL/L (ref 21–32)
CREAT SERPL-MCNC: 0.92 MG/DL (ref 0.6–1.3)
ERYTHROCYTE [DISTWIDTH] IN BLOOD BY AUTOMATED COUNT: 15.2 % (ref 11.6–14.5)
GLUCOSE BLD STRIP.AUTO-MCNC: 138 MG/DL (ref 70–110)
GLUCOSE BLD STRIP.AUTO-MCNC: 93 MG/DL (ref 70–110)
GLUCOSE BLD STRIP.AUTO-MCNC: 96 MG/DL (ref 70–110)
GLUCOSE SERPL-MCNC: 109 MG/DL (ref 74–99)
HCT VFR BLD AUTO: 32.4 % (ref 35–45)
HGB BLD-MCNC: 10.4 G/DL (ref 12–16)
MCH RBC QN AUTO: 28.5 PG (ref 24–34)
MCHC RBC AUTO-ENTMCNC: 32.1 G/DL (ref 31–37)
MCV RBC AUTO: 88.8 FL (ref 78–100)
NRBC # BLD: 0 K/UL (ref 0–0.01)
NRBC BLD-RTO: 0 PER 100 WBC
PLATELET # BLD AUTO: 311 K/UL (ref 135–420)
PMV BLD AUTO: 9.7 FL (ref 9.2–11.8)
POTASSIUM SERPL-SCNC: 4.4 MMOL/L (ref 3.5–5.5)
RBC # BLD AUTO: 3.65 M/UL (ref 4.2–5.3)
SODIUM SERPL-SCNC: 139 MMOL/L (ref 136–145)
WBC # BLD AUTO: 6.8 K/UL (ref 4.6–13.2)

## 2023-02-04 PROCEDURE — 74011250637 HC RX REV CODE- 250/637: Performed by: INTERNAL MEDICINE

## 2023-02-04 PROCEDURE — 74011000250 HC RX REV CODE- 250: Performed by: INTERNAL MEDICINE

## 2023-02-04 PROCEDURE — 80048 BASIC METABOLIC PNL TOTAL CA: CPT

## 2023-02-04 PROCEDURE — 74011250637 HC RX REV CODE- 250/637: Performed by: NURSE PRACTITIONER

## 2023-02-04 PROCEDURE — 85027 COMPLETE CBC AUTOMATED: CPT

## 2023-02-04 PROCEDURE — 74011250637 HC RX REV CODE- 250/637: Performed by: STUDENT IN AN ORGANIZED HEALTH CARE EDUCATION/TRAINING PROGRAM

## 2023-02-04 PROCEDURE — 99232 SBSQ HOSP IP/OBS MODERATE 35: CPT | Performed by: HOSPITALIST

## 2023-02-04 PROCEDURE — 36415 COLL VENOUS BLD VENIPUNCTURE: CPT

## 2023-02-04 PROCEDURE — 65270000029 HC RM PRIVATE

## 2023-02-04 PROCEDURE — 82962 GLUCOSE BLOOD TEST: CPT

## 2023-02-04 RX ADMIN — ATORVASTATIN CALCIUM 40 MG: 40 TABLET, FILM COATED ORAL at 22:42

## 2023-02-04 RX ADMIN — SENNOSIDES AND DOCUSATE SODIUM 1 TABLET: 50; 8.6 TABLET ORAL at 09:03

## 2023-02-04 RX ADMIN — PAROXETINE HYDROCHLORIDE 20 MG: 20 TABLET, FILM COATED ORAL at 09:03

## 2023-02-04 RX ADMIN — OXYBUTYNIN CHLORIDE 10 MG: 5 TABLET, EXTENDED RELEASE ORAL at 09:03

## 2023-02-04 RX ADMIN — SODIUM CHLORIDE, PRESERVATIVE FREE 10 ML: 5 INJECTION INTRAVENOUS at 22:42

## 2023-02-04 RX ADMIN — ASPIRIN 325 MG ORAL TABLET 325 MG: 325 PILL ORAL at 09:03

## 2023-02-04 RX ADMIN — LEVETIRACETAM 500 MG: 500 TABLET, FILM COATED ORAL at 09:03

## 2023-02-04 RX ADMIN — OLANZAPINE 5 MG: 5 TABLET, FILM COATED ORAL at 22:42

## 2023-02-04 RX ADMIN — POTASSIUM CHLORIDE 40 MEQ: 1500 TABLET, EXTENDED RELEASE ORAL at 09:03

## 2023-02-04 RX ADMIN — POTASSIUM CHLORIDE 40 MEQ: 1500 TABLET, EXTENDED RELEASE ORAL at 18:34

## 2023-02-04 RX ADMIN — LEVETIRACETAM 500 MG: 500 TABLET, FILM COATED ORAL at 18:34

## 2023-02-04 NOTE — PROGRESS NOTES
[facility] Hospitalist Group  Progress Note    Patient: Carmine Taylor Age: 78 y.o. : 1945 MR#: [id number] SSN: [id number]  Date: 2023     Subjective:   Alert and oriented to self. Confused with history of dementia. Need redirection. NAD. Assessment/Plan:   1. Seizure, new onset. History of childhood seizures  2. Elevated troponin  3. Electrolyte derangement  4. HTN  5. HLD   6. History of dementia   7. DMT2  8. Breast asymmetry with for confluent/dense appearing parenchyma on the left with enlarged left axillary node measuring 1.8 x 2.4 cm     Plan  Neurology consult and appreciate assistance. Recommendations continue keppra 500 mg BID, EEG, MRI, seizure precautions. MRI negative for stroke. EEG ordered and shows presence of left frontotemporal focal neuronal dysfunction and phase reversing activity. This suggest epileptiform activity. Check ultrasound for further evaluation of abnormal breast parenchyma and enlarged left axillary node. Dementia-patient will need to be discharged to a skilled nursing facility versus long-term care. She is unable to take care of herself at this time. Called son to update however reached voicemail. Left a voice message to call back.     Time spent 35 minutes    Case discussed with:  [x]Patient  [x]Family  []Nursing  []Case Management  DVT Prophylaxis:  []Lovenox  []Hep SQ  [x]SCDs  []Coumadin   []On Heparin gtt    Objective:   VS: Visit Vitals  /70 (BP 1 Location: Left upper arm)   Pulse 95   Temp 98.3 °F (36.8 °C)   Resp 18   Ht 5' 6\" (1.676 m)   Wt 52.2 kg (115 lb 1.3 oz)   SpO2 97%   BMI 18.57 kg/m²        Tmax/24hrs: Temp (24hrs), Av.9 °F (36.6 °C), Min:97.4 °F (36.3 °C), Max:98.3 °F (36.8 °C)    Intake/Output Summary (Last 24 hours) at 2023 1641  Last data filed at 2/3/2023 1806  Gross per 24 hour   Intake 220 ml   Output --   Net 220 ml       General:         Alert, cooperative, no acute distress    HEENT: NC, Atraumatic. PERRLA, anicteric sclerae. Lungs:            CTA bilaterally. No Wheezing/Rhonchi/Rales  Heart:              RRR, No murmur, No Rubs, No Gallops  Abdomen:      Soft, Non distended, Non tender. +Bowel sounds, no HSM  Extremities:   No c/c/e  Psych:              Good insight. Not anxious or agitated. Neurologic:     Alert and oriented X 1-2.   No acute neurological deficits     Labs:    Recent Results (from the past 24 hour(s))   CBC W/O DIFF    Collection Time: 02/04/23  1:50 AM   Result Value Ref Range    WBC 6.8 4.6 - 13.2 K/uL    RBC 3.65 (L) 4.20 - 5.30 M/uL    HGB 10.4 (L) 12.0 - 16.0 g/dL    HCT 32.4 (L) 35.0 - 45.0 %    MCV 88.8 78.0 - 100.0 FL    MCH 28.5 24.0 - 34.0 PG    MCHC 32.1 31.0 - 37.0 g/dL    RDW 15.2 (H) 11.6 - 14.5 %    PLATELET 466 805 - 553 K/uL    MPV 9.7 9.2 - 11.8 FL    NRBC 0.0 0  WBC    ABSOLUTE NRBC 0.00 0.00 - 9.53 K/uL   METABOLIC PANEL, BASIC    Collection Time: 02/04/23  1:50 AM   Result Value Ref Range    Sodium 139 136 - 145 mmol/L    Potassium 4.4 3.5 - 5.5 mmol/L    Chloride 111 100 - 111 mmol/L    CO2 24 21 - 32 mmol/L    Anion gap 4 3.0 - 18 mmol/L    Glucose 109 (H) 74 - 99 mg/dL    BUN 8 7.0 - 18 MG/DL    Creatinine 0.92 0.6 - 1.3 MG/DL    BUN/Creatinine ratio 9 (L) 12 - 20      eGFR >60 >60 ml/min/1.73m2    Calcium 9.2 8.5 - 10.1 MG/DL   GLUCOSE, POC    Collection Time: 02/04/23  8:24 AM   Result Value Ref Range    Glucose (POC) 96 70 - 110 mg/dL   GLUCOSE, POC    Collection Time: 02/04/23 11:08 AM   Result Value Ref Range    Glucose (POC) 138 (H) 70 - 110 mg/dL   GLUCOSE, POC    Collection Time: 02/04/23  3:40 PM   Result Value Ref Range    Glucose (POC) 93 70 - 110 mg/dL       Signed By: Pavithra Landrum MD     February 4, 2023

## 2023-02-04 NOTE — PROGRESS NOTES
Bedside and Verbal shift change report given to Darlene Ortega LPN (oncoming nurse) by Lori Nugent RN (offgoing nurse). Report given with SBAR, Kardex, Intake/Output, MAR and Recent Results.

## 2023-02-04 NOTE — PROGRESS NOTES
Spoke with ultrasound tech regarding ordered ultrasound of Left breast. Tech advised ultrasound can only be completed after mammogram which is performed as outpatient at the [facility] location.  Will notify MD.

## 2023-02-04 NOTE — PROGRESS NOTES
Problem: Unstable angina/NSTEMI: Day of Admission/Day 1  Goal: Activity/Safety  Outcome: Progressing Towards Goal     Problem: Unstable angina/NSTEMI: Day 2  Goal: Nutrition/Diet  Outcome: Progressing Towards Goal     Problem: Unstable angina/NSTEMI: Day 2  Goal: Medications  Outcome: Progressing Towards Goal     Problem: Unstable angina/NSTEMI: Day 2  Goal: Respiratory  Outcome: Progressing Towards Goal     Problem: Pressure Injury - Risk of  Goal: *Prevention of pressure injury  Description: Document Godfrey Scale and appropriate interventions in the flowsheet.   Outcome: Progressing Towards Goal  Note: Pressure Injury Interventions:  Sensory Interventions: Assess changes in LOC, Assess need for specialty bed, Avoid rigorous massage over bony prominences, Check visual cues for pain, Float heels, Keep linens dry and wrinkle-free, Maintain/enhance activity level    Moisture Interventions: Absorbent underpads, Apply protective barrier, creams and emollients, Assess need for specialty bed, Check for incontinence Q2 hours and as needed, Internal/External fecal devices, Internal/External urinary devices, Limit adult briefs, Maintain skin hydration (lotion/cream), Minimize layers, Moisture barrier, Offer toileting Q_hr    Activity Interventions: Assess need for specialty bed, Chair cushion, Increase time out of bed, Pressure redistribution bed/mattress(bed type), PT/OT evaluation    Mobility Interventions: Assess need for specialty bed, Chair cushion, Float heels, HOB 30 degrees or less, Pressure redistribution bed/mattress (bed type), PT/OT evaluation, Suspension boots    Nutrition Interventions: Document food/fluid/supplement intake    Friction and Shear Interventions: Apply protective barrier, creams and emollients, Feet elevated on foot rest, Foam dressings/transparent film/skin sealants, HOB 30 degrees or less, Lift sheet, Lift team/patient mobility team, Minimize layers, Sit at 90-degree angle, Transferring/repositioning devices                Problem: Falls - Risk of  Goal: *Absence of Falls  Description: Document Andrew Thompson Fall Risk and appropriate interventions in the flowsheet.   Outcome: Progressing Towards Goal  Note: Fall Risk Interventions:  Mobility Interventions: Bed/chair exit alarm    Mentation Interventions: Bed/chair exit alarm    Medication Interventions: Bed/chair exit alarm, Patient to call before getting OOB, Teach patient to arise slowly    Elimination Interventions: Bed/chair exit alarm, Call light in reach, Elevated toilet seat, Patient to call for help with toileting needs, Toileting schedule/hourly rounds

## 2023-02-05 LAB
GLUCOSE BLD STRIP.AUTO-MCNC: 92 MG/DL (ref 70–110)
GLUCOSE BLD STRIP.AUTO-MCNC: 95 MG/DL (ref 70–110)
GLUCOSE BLD STRIP.AUTO-MCNC: 97 MG/DL (ref 70–110)
POTASSIUM SERPL-SCNC: 5.2 MMOL/L (ref 3.5–5.5)

## 2023-02-05 PROCEDURE — 74011000250 HC RX REV CODE- 250: Performed by: INTERNAL MEDICINE

## 2023-02-05 PROCEDURE — 94761 N-INVAS EAR/PLS OXIMETRY MLT: CPT

## 2023-02-05 PROCEDURE — 2709999900 HC NON-CHARGEABLE SUPPLY

## 2023-02-05 PROCEDURE — 74011250637 HC RX REV CODE- 250/637: Performed by: STUDENT IN AN ORGANIZED HEALTH CARE EDUCATION/TRAINING PROGRAM

## 2023-02-05 PROCEDURE — 65270000029 HC RM PRIVATE

## 2023-02-05 PROCEDURE — 74011250637 HC RX REV CODE- 250/637: Performed by: INTERNAL MEDICINE

## 2023-02-05 PROCEDURE — 36415 COLL VENOUS BLD VENIPUNCTURE: CPT

## 2023-02-05 PROCEDURE — 82962 GLUCOSE BLOOD TEST: CPT

## 2023-02-05 PROCEDURE — 74011250637 HC RX REV CODE- 250/637: Performed by: NURSE PRACTITIONER

## 2023-02-05 PROCEDURE — 84132 ASSAY OF SERUM POTASSIUM: CPT

## 2023-02-05 PROCEDURE — 99232 SBSQ HOSP IP/OBS MODERATE 35: CPT | Performed by: HOSPITALIST

## 2023-02-05 RX ADMIN — SENNOSIDES AND DOCUSATE SODIUM 1 TABLET: 50; 8.6 TABLET ORAL at 08:35

## 2023-02-05 RX ADMIN — SODIUM CHLORIDE, PRESERVATIVE FREE 10 ML: 5 INJECTION INTRAVENOUS at 06:00

## 2023-02-05 RX ADMIN — OXYBUTYNIN CHLORIDE 10 MG: 5 TABLET, EXTENDED RELEASE ORAL at 08:35

## 2023-02-05 RX ADMIN — LEVETIRACETAM 500 MG: 500 TABLET, FILM COATED ORAL at 08:33

## 2023-02-05 RX ADMIN — ASPIRIN 325 MG ORAL TABLET 325 MG: 325 PILL ORAL at 08:35

## 2023-02-05 RX ADMIN — SODIUM CHLORIDE, PRESERVATIVE FREE 10 ML: 5 INJECTION INTRAVENOUS at 21:33

## 2023-02-05 RX ADMIN — LEVETIRACETAM 500 MG: 500 TABLET, FILM COATED ORAL at 18:36

## 2023-02-05 RX ADMIN — POTASSIUM CHLORIDE 40 MEQ: 1500 TABLET, EXTENDED RELEASE ORAL at 08:33

## 2023-02-05 RX ADMIN — OLANZAPINE 5 MG: 5 TABLET, FILM COATED ORAL at 21:33

## 2023-02-05 RX ADMIN — ATORVASTATIN CALCIUM 40 MG: 40 TABLET, FILM COATED ORAL at 21:33

## 2023-02-05 RX ADMIN — PAROXETINE HYDROCHLORIDE 20 MG: 20 TABLET, FILM COATED ORAL at 08:35

## 2023-02-05 NOTE — PROGRESS NOTES
Problem: Patient Education: Go to Patient Education Activity  Goal: Patient/Family Education  Outcome: Progressing Towards Goal     Problem: Pressure Injury - Risk of  Goal: *Prevention of pressure injury  Description: Document Godfrey Scale and appropriate interventions in the flowsheet.   Outcome: Progressing Towards Goal  Note: Pressure Injury Interventions:  Sensory Interventions: Assess changes in LOC    Moisture Interventions: Absorbent underpads    Activity Interventions: Pressure redistribution bed/mattress(bed type)    Mobility Interventions: HOB 30 degrees or less, Pressure redistribution bed/mattress (bed type)    Nutrition Interventions: Document food/fluid/supplement intake    Friction and Shear Interventions: HOB 30 degrees or less, Minimize layers                Problem: Patient Education: Go to Patient Education Activity  Goal: Patient/Family Education  Outcome: Progressing Towards Goal     Problem: Pain  Goal: *Control of Pain  Outcome: Progressing Towards Goal     Problem: Patient Education: Go to Patient Education Activity  Goal: Patient/Family Education  Outcome: Progressing Towards Goal

## 2023-02-05 NOTE — ROUTINE PROCESS
Bedside and Verbal shift change report given to Shant Mello LPN (oncoming nurse) by Harmony Motta RN  (offgoing nurse). Report included the following information SBAR, Kardex, MAR, and Recent Results.

## 2023-02-05 NOTE — PROGRESS NOTES
[facility] Hospitalist Group  Progress Note    Patient: Valerie Jessica Age: 78 y.o. : 1945 MR#: [id number] SSN: [id number]  Date: 2023     Subjective:   Alert and oriented to self. Confused with history of dementia. Need redirection. NAD. Assessment/Plan:   1. Seizure, new onset. History of childhood seizures  2. Elevated troponin  3. Electrolyte derangement  4. HTN  5. HLD   6. History of dementia   7. DMT2  8. Breast asymmetry with for confluent/dense appearing parenchyma on the left with enlarged left axillary node measuring 1.8 x 2.4 cm     Plan  Neurology consult and appreciate assistance. Recommendations continue keppra 500 mg BID, EEG, MRI, seizure precautions. MRI negative for stroke. EEG ordered and shows presence of left frontotemporal focal neuronal dysfunction and phase reversing activity. This suggest epileptiform activity. Check ultrasound for further evaluation of abnormal breast parenchyma and enlarged left axillary node. Ultrasound left breast will not be performed without a prior mammogram.  Will order mammogram and see if patient can be transported to [facility] to get a mammogram and return followed by an ultrasound. Dementia-patient will need to be discharged to a skilled nursing facility versus long-term care. She is unable to take care of herself at this time. Called son and updated him of current treatment plan. He mentions that patient is living with him and is okay to go to a skilled nursing facility for a few days.     Time spent 35 minutes    Case discussed with:  [x]Patient  [x]Family  []Nursing  []Case Management  DVT Prophylaxis:  []Lovenox  []Hep SQ  [x]SCDs  []Coumadin   []On Heparin gtt    Objective:   VS: Visit Vitals  /65   Pulse 96   Temp 97.8 °F (36.6 °C)   Resp 20   Ht 5' 6\" (1.676 m)   Wt 52.2 kg (115 lb 1.3 oz)   SpO2 96%   BMI 18.57 kg/m²        Tmax/24hrs: Temp (24hrs), Av.1 °F (36.7 °C), Min:97.7 °F (36.5 °C), Max:98.9 °F (37.2 °C)    Intake/Output Summary (Last 24 hours) at 2/5/2023 1253  Last data filed at 2/4/2023 1733  Gross per 24 hour   Intake 240 ml   Output --   Net 240 ml       General:         Alert, cooperative, no acute distress    HEENT:           NC, Atraumatic. PERRLA, anicteric sclerae. Lungs:            CTA bilaterally. No Wheezing/Rhonchi/Rales  Heart:              RRR, No murmur, No Rubs, No Gallops  Abdomen:      Soft, Non distended, Non tender. +Bowel sounds, no HSM  Extremities:   No c/c/e  Psych:              Good insight. Not anxious or agitated. Neurologic:     Alert and oriented X 1-2.   No acute neurological deficits     Labs:    Recent Results (from the past 24 hour(s))   GLUCOSE, POC    Collection Time: 02/04/23  3:40 PM   Result Value Ref Range    Glucose (POC) 93 70 - 110 mg/dL   GLUCOSE, POC    Collection Time: 02/05/23  7:50 AM   Result Value Ref Range    Glucose (POC) 95 70 - 110 mg/dL   GLUCOSE, POC    Collection Time: 02/05/23 11:55 AM   Result Value Ref Range    Glucose (POC) 92 70 - 110 mg/dL       Signed By: Lieutenant Byron MD     February 5, 2023

## 2023-02-05 NOTE — ROUTINE PROCESS
Assumed care of patient . Patient awake in bed, no distress. Found patient to have pulled her PIV out. Will replace.

## 2023-02-06 PROCEDURE — 74011250637 HC RX REV CODE- 250/637: Performed by: INTERNAL MEDICINE

## 2023-02-06 PROCEDURE — 99232 SBSQ HOSP IP/OBS MODERATE 35: CPT | Performed by: HOSPITALIST

## 2023-02-06 PROCEDURE — 74011250637 HC RX REV CODE- 250/637: Performed by: STUDENT IN AN ORGANIZED HEALTH CARE EDUCATION/TRAINING PROGRAM

## 2023-02-06 PROCEDURE — 94761 N-INVAS EAR/PLS OXIMETRY MLT: CPT

## 2023-02-06 PROCEDURE — 74011000250 HC RX REV CODE- 250: Performed by: INTERNAL MEDICINE

## 2023-02-06 PROCEDURE — 65270000029 HC RM PRIVATE

## 2023-02-06 PROCEDURE — 74011250637 HC RX REV CODE- 250/637: Performed by: NURSE PRACTITIONER

## 2023-02-06 RX ADMIN — OXYBUTYNIN CHLORIDE 10 MG: 5 TABLET, EXTENDED RELEASE ORAL at 09:07

## 2023-02-06 RX ADMIN — LEVETIRACETAM 500 MG: 500 TABLET, FILM COATED ORAL at 09:07

## 2023-02-06 RX ADMIN — PAROXETINE HYDROCHLORIDE 20 MG: 20 TABLET, FILM COATED ORAL at 09:07

## 2023-02-06 RX ADMIN — SENNOSIDES AND DOCUSATE SODIUM 1 TABLET: 50; 8.6 TABLET ORAL at 09:07

## 2023-02-06 RX ADMIN — ATORVASTATIN CALCIUM 40 MG: 40 TABLET, FILM COATED ORAL at 22:04

## 2023-02-06 RX ADMIN — SODIUM CHLORIDE, PRESERVATIVE FREE 10 ML: 5 INJECTION INTRAVENOUS at 06:20

## 2023-02-06 RX ADMIN — OLANZAPINE 5 MG: 5 TABLET, FILM COATED ORAL at 22:03

## 2023-02-06 RX ADMIN — ASPIRIN 325 MG ORAL TABLET 325 MG: 325 PILL ORAL at 09:07

## 2023-02-06 RX ADMIN — POTASSIUM CHLORIDE 40 MEQ: 1500 TABLET, EXTENDED RELEASE ORAL at 09:07

## 2023-02-06 RX ADMIN — SODIUM CHLORIDE, PRESERVATIVE FREE 10 ML: 5 INJECTION INTRAVENOUS at 22:04

## 2023-02-06 RX ADMIN — LEVETIRACETAM 500 MG: 500 TABLET, FILM COATED ORAL at 17:57

## 2023-02-06 RX ADMIN — POTASSIUM CHLORIDE 40 MEQ: 1500 TABLET, EXTENDED RELEASE ORAL at 17:57

## 2023-02-06 RX ADMIN — SODIUM CHLORIDE, PRESERVATIVE FREE 10 ML: 5 INJECTION INTRAVENOUS at 14:28

## 2023-02-06 NOTE — PROGRESS NOTES
Physician Progress Note      Eloy Simms  CSN #:                  [id number]  :                       1945  ADMIT DATE:       2023 4:10 AM  DISCH DATE:  RESPONDING  PROVIDER #:        Don Hobbs MD [facility] MD          QUERY TEXT:    Patient admitted with seizure disorder. Noted to have Severe Malnutrition per the RD note on 2/3/23. If possible, please document in progress notes and discharge summary if you are evaluating and /or treating any of the following: The medical record reflects the following:  Risk Factors: Seizure disorder, Osteoporosis, HTN, DNR, AGE F77  Clinical Indicators:  Nutrition PN: Malnutrition Status:  Severe malnutrition with BMI 18   Nutrition PN: Weight Loss: (weight loss of 8% x 3 months and 1 week PTA)   Nutrition PN: Body Fat Loss: Moderate body fat loss, Fat overlying ribs, Orbital   Nutrition PN: Muscle Mass Loss: Moderate muscle mass loss, Clavicles (pectoralis & deltoids), Hand (interosseous)  Treatment: Food and nutrient intake, Supplement intake, Vitamin/mineral intake, IVF    ASPEN Criteria:  https://aspenjournals. onlinelibrary. garcia. com/doi/full/10.1177/9392263044621824    Thank you,  Keira Causey  Options provided:  -- Protein calorie malnutrition severe  -- Other - I will add my own diagnosis  -- Disagree - Not applicable / Not valid  -- Disagree - Clinically unable to determine / Unknown  -- Refer to Clinical Documentation Reviewer    PROVIDER RESPONSE TEXT:    This patient has severe protein calorie malnutrition.     Query created by: Alondra Long on 2023 8:22 AM      Electronically signed by:  Don Hobbs MD [facility] MD 2023 11:21 AM

## 2023-02-06 NOTE — PROGRESS NOTES
[facility] Hospitalist Group  Progress Note    Patient: Jo Riley Age: 78 y.o. : 1945 MR#: [id number] SSN: [id number]  Date: 2023     Subjective:   Alert and oriented to self. Confused with history of dementia. Need redirection. NAD. Assessment/Plan:   1. Seizure, new onset. History of childhood seizures  2. Elevated troponin  3. Electrolyte derangement  4. HTN  5. HLD   6. History of dementia   7. DMT2  8. Breast asymmetry with for confluent/dense appearing parenchyma on the left with enlarged left axillary node measuring 1.8 x 2.4 cm     Plan  Neurology consult and appreciate assistance. Recommendations continue keppra 500 mg BID, EEG, MRI, seizure precautions. MRI negative for stroke. EEG ordered and shows presence of left frontotemporal focal neuronal dysfunction and phase reversing activity. This suggest epileptiform activity. Check ultrasound for further evaluation of abnormal breast parenchyma and enlarged left axillary node. Ultrasound left breast will not be performed without a prior mammogram.  Will order mammogram and see if patient can be transported to [address] to get a mammogram and return followed by an ultrasound. Dementia-patient will need to be discharged to a skilled nursing facility versus long-term care. She is unable to take care of herself at this time. Called son and updated him of current treatment plan. He mentions that patient is living with him and is okay to go to a skilled nursing facility for a few days.     Time spent 35 minutes    Case discussed with:  [x]Patient  [x]Family  []Nursing  []Case Management  DVT Prophylaxis:  []Lovenox  []Hep SQ  [x]SCDs  []Coumadin   []On Heparin gtt    Objective:   VS: Visit Vitals  /65 (BP 1 Location: Right upper arm, BP Patient Position: At rest)   Pulse 84   Temp 99 °F (37.2 °C)   Resp 18   Ht 5' 6\" (1.676 m)   Wt 52.2 kg (115 lb 1.3 oz)   SpO2 97%   BMI 18.57 kg/m²        Tmax/24hrs: Temp (24hrs), Av.3 °F (36.8 °C), Min:97.8 °F (36.6 °C), Max:99 °F (37.2 °C)  No intake or output data in the 24 hours ending 23 1338    General:         Alert, cooperative, no acute distress    HEENT:           NC, Atraumatic. PERRLA, anicteric sclerae. Lungs:            CTA bilaterally. No Wheezing/Rhonchi/Rales  Heart:              RRR, No murmur, No Rubs, No Gallops  Abdomen:      Soft, Non distended, Non tender. +Bowel sounds, no HSM  Extremities:   No c/c/e  Psych:              Good insight. Not anxious or agitated. Neurologic:     Alert and oriented X 1-2.   No acute neurological deficits     Labs:    Recent Results (from the past 24 hour(s))   GLUCOSE, POC    Collection Time: 23  3:44 PM   Result Value Ref Range    Glucose (POC) 97 70 - 110 mg/dL   POTASSIUM    Collection Time: 23  7:11 PM   Result Value Ref Range    Potassium 5.2 3.5 - 5.5 mmol/L       Signed By: Indy Gale MD     2023

## 2023-02-06 NOTE — PROGRESS NOTES
Nutrition Note      Pt reported \"appetite is real good\". Eating ~50% of meals per her report; noted variable meal intake per chart documentation. Tolerating diet. Poor intake of ensure supplements; pt wants to continue with nutrition supplement. Pt is DNR/ DNI, limited interventions, No feeding tubes; POST on file; Palliative following. BM 2/4. Progressing towards nutrition goals. Nutrition Recommendations/Plan:   Continue current other nutrition interventions. Encourage/ monitor po intake of meals and supplements.            Electronically signed by Herberth Ortiz RD on 2/6/2023 at 6:05 PM    Contact: [phone]

## 2023-02-06 NOTE — PROGRESS NOTES
Problem: Patient Education: Go to Patient Education Activity  Goal: Patient/Family Education  Outcome: Progressing Towards Goal     Problem: Falls - Risk of  Goal: *Absence of Falls  Description: Document Conception Brawn Fall Risk and appropriate interventions in the flowsheet.   Outcome: Progressing Towards Goal  Note: Fall Risk Interventions:  Mobility Interventions: Bed/chair exit alarm    Mentation Interventions: Bed/chair exit alarm    Medication Interventions: Bed/chair exit alarm, Patient to call before getting OOB, Teach patient to arise slowly    Elimination Interventions: Bed/chair exit alarm, Call light in reach, Elevated toilet seat, Patient to call for help with toileting needs, Toileting schedule/hourly rounds              Problem: Patient Education: Go to Patient Education Activity  Goal: Patient/Family Education  Outcome: Progressing Towards Goal     Problem: Pain  Goal: *Control of Pain  Outcome: Progressing Towards Goal

## 2023-02-06 NOTE — PROGRESS NOTES
Problem: Patient Education: Go to Patient Education Activity  Goal: Patient/Family Education  Outcome: Progressing Towards Goal     Problem: Unstable angina/NSTEMI: Day of Admission/Day 1  Goal: Off Pathway (Use only if patient is Off Pathway)  Outcome: Progressing Towards Goal  Goal: Activity/Safety  Outcome: Progressing Towards Goal  Goal: Consults, if ordered  Outcome: Progressing Towards Goal  Goal: Diagnostic Test/Procedures  Outcome: Progressing Towards Goal  Goal: Nutrition/Diet  Outcome: Progressing Towards Goal  Goal: Discharge Planning  Outcome: Progressing Towards Goal  Goal: Medications  Outcome: Progressing Towards Goal  Goal: Respiratory  Outcome: Progressing Towards Goal  Goal: Treatments/Interventions/Procedures  Outcome: Progressing Towards Goal  Goal: Psychosocial  Outcome: Progressing Towards Goal  Goal: *Hemodynamically stable  Outcome: Progressing Towards Goal  Goal: *Optimal pain control at patient's stated goal  Outcome: Progressing Towards Goal  Goal: *Lungs clear or at baseline  Outcome: Progressing Towards Goal     Problem: Unstable angina/NSTEMI: Day 2  Goal: Off Pathway (Use only if patient is Off Pathway)  Outcome: Progressing Towards Goal  Goal: Activity/Safety  Outcome: Progressing Towards Goal  Goal: Consults, if ordered  Outcome: Progressing Towards Goal  Goal: Diagnostic Test/Procedures  Outcome: Progressing Towards Goal  Goal: Nutrition/Diet  Outcome: Progressing Towards Goal  Goal: Discharge Planning  Outcome: Progressing Towards Goal  Goal: Medications  Outcome: Progressing Towards Goal  Goal: Respiratory  Outcome: Progressing Towards Goal  Goal: Treatments/Interventions/Procedures  Outcome: Progressing Towards Goal  Goal: Psychosocial  Outcome: Progressing Towards Goal  Goal: *Hemodynamically stable  Outcome: Progressing Towards Goal  Goal: *Optimal pain control at patient's stated goal  Outcome: Progressing Towards Goal  Goal: *Lungs clear or at baseline  Outcome: Progressing Towards Goal     Problem: Unstable Angina/NSTEMI: Discharge Outcomes  Goal: *Hemodynamically stable  Outcome: Progressing Towards Goal  Goal: *Stable cardiac rhythm  Outcome: Progressing Towards Goal  Goal: *Lungs clear or at baseline  Outcome: Progressing Towards Goal  Goal: *Optimal pain control at patient's stated goal  Outcome: Progressing Towards Goal  Goal: *Identifies cardiac risk factors  Outcome: Progressing Towards Goal  Goal: *Verbalizes home exercise program, activity guidelines, cardiac precautions  Outcome: Progressing Towards Goal  Goal: *Verbalizes understanding and describes prescribed diet  Outcome: Progressing Towards Goal  Goal: *Verbalizes name, dosage, time, side effects, and number of days to continue medications  Outcome: Progressing Towards Goal  Goal: *Anxiety reduced or absent  Outcome: Progressing Towards Goal  Goal: *Understands and describes signs and symptoms to report to providers(Stroke Metric)  Outcome: Progressing Towards Goal  Goal: *Describes follow-up/return visits to physicians  Outcome: Progressing Towards Goal  Goal: *Describes available resources and support systems  Outcome: Progressing Towards Goal  Goal: *Influenza immunization  Outcome: Progressing Towards Goal  Goal: *Pneumococcal immunization  Outcome: Progressing Towards Goal  Goal: *Describes smoking cessation resources  Outcome: Progressing Towards Goal     Problem: Falls - Risk of  Goal: *Absence of Falls  Description: Document Rio Fall Risk and appropriate interventions in the flowsheet.   Outcome: Progressing Towards Goal  Note: Fall Risk Interventions:  Mobility Interventions: Bed/chair exit alarm    Mentation Interventions: Bed/chair exit alarm    Medication Interventions: Bed/chair exit alarm, Patient to call before getting OOB, Teach patient to arise slowly    Elimination Interventions: Bed/chair exit alarm, Call light in reach, Elevated toilet seat, Patient to call for help with toileting needs, Toileting schedule/hourly rounds              Problem: Patient Education: Go to Patient Education Activity  Goal: Patient/Family Education  Outcome: Progressing Towards Goal     Problem: Patient Education: Go to Patient Education Activity  Goal: Patient/Family Education  Outcome: Progressing Towards Goal     Problem: Pressure Injury - Risk of  Goal: *Prevention of pressure injury  Description: Document Godfrey Scale and appropriate interventions in the flowsheet.   Outcome: Progressing Towards Goal     Problem: Patient Education: Go to Patient Education Activity  Goal: Patient/Family Education  Outcome: Progressing Towards Goal     Problem: Patient Education: Go to Patient Education Activity  Goal: Patient/Family Education  Outcome: Progressing Towards Goal     Problem: Pain  Goal: *Control of Pain  Outcome: Progressing Towards Goal     Problem: Patient Education: Go to Patient Education Activity  Goal: Patient/Family Education  Outcome: Progressing Towards Goal

## 2023-02-07 ENCOUNTER — APPOINTMENT (OUTPATIENT)
Dept: ULTRASOUND IMAGING | Age: 78
DRG: 040 | End: 2023-02-07
Attending: HOSPITALIST
Payer: MEDICARE

## 2023-02-07 PROCEDURE — 74011250637 HC RX REV CODE- 250/637: Performed by: STUDENT IN AN ORGANIZED HEALTH CARE EDUCATION/TRAINING PROGRAM

## 2023-02-07 PROCEDURE — 2709999900 HC NON-CHARGEABLE SUPPLY

## 2023-02-07 PROCEDURE — 99232 SBSQ HOSP IP/OBS MODERATE 35: CPT | Performed by: HOSPITALIST

## 2023-02-07 PROCEDURE — 74011000250 HC RX REV CODE- 250: Performed by: INTERNAL MEDICINE

## 2023-02-07 PROCEDURE — 74011250636 HC RX REV CODE- 250/636: Performed by: EMERGENCY MEDICINE

## 2023-02-07 PROCEDURE — 97535 SELF CARE MNGMENT TRAINING: CPT

## 2023-02-07 PROCEDURE — 76642 ULTRASOUND BREAST LIMITED: CPT

## 2023-02-07 PROCEDURE — 65270000029 HC RM PRIVATE

## 2023-02-07 PROCEDURE — 74011250637 HC RX REV CODE- 250/637: Performed by: INTERNAL MEDICINE

## 2023-02-07 PROCEDURE — 97164 PT RE-EVAL EST PLAN CARE: CPT

## 2023-02-07 PROCEDURE — 74011250637 HC RX REV CODE- 250/637: Performed by: NURSE PRACTITIONER

## 2023-02-07 PROCEDURE — 97530 THERAPEUTIC ACTIVITIES: CPT

## 2023-02-07 PROCEDURE — 99221 1ST HOSP IP/OBS SF/LOW 40: CPT | Performed by: SURGERY

## 2023-02-07 PROCEDURE — 97116 GAIT TRAINING THERAPY: CPT

## 2023-02-07 RX ADMIN — SODIUM CHLORIDE, PRESERVATIVE FREE 10 ML: 5 INJECTION INTRAVENOUS at 05:46

## 2023-02-07 RX ADMIN — ATORVASTATIN CALCIUM 40 MG: 40 TABLET, FILM COATED ORAL at 21:20

## 2023-02-07 RX ADMIN — ASPIRIN 325 MG ORAL TABLET 325 MG: 325 PILL ORAL at 11:31

## 2023-02-07 RX ADMIN — SODIUM CHLORIDE, PRESERVATIVE FREE 10 ML: 5 INJECTION INTRAVENOUS at 14:00

## 2023-02-07 RX ADMIN — HALOPERIDOL LACTATE 2.5 MG: 5 INJECTION, SOLUTION INTRAMUSCULAR at 22:09

## 2023-02-07 RX ADMIN — SODIUM CHLORIDE, PRESERVATIVE FREE 10 ML: 5 INJECTION INTRAVENOUS at 21:22

## 2023-02-07 RX ADMIN — LEVETIRACETAM 500 MG: 500 TABLET, FILM COATED ORAL at 17:44

## 2023-02-07 RX ADMIN — OLANZAPINE 5 MG: 5 TABLET, FILM COATED ORAL at 21:20

## 2023-02-07 RX ADMIN — PAROXETINE HYDROCHLORIDE 20 MG: 20 TABLET, FILM COATED ORAL at 11:31

## 2023-02-07 RX ADMIN — SENNOSIDES AND DOCUSATE SODIUM 1 TABLET: 50; 8.6 TABLET ORAL at 11:31

## 2023-02-07 RX ADMIN — LEVETIRACETAM 500 MG: 500 TABLET, FILM COATED ORAL at 11:31

## 2023-02-07 RX ADMIN — OXYBUTYNIN CHLORIDE 10 MG: 5 TABLET, EXTENDED RELEASE ORAL at 11:31

## 2023-02-07 NOTE — PROGRESS NOTES
Problem: Mobility Impaired (Adult and Pediatric)  Goal: *Acute Goals and Plan of Care (Insert Text)  Description: Physical Therapy Goals  Initiated 2/1/2023, re-evaluated 2/7/2023 and goals adjusted as needed and to be accomplished within 7 day(s)  1. Patient will move from supine to sit and sit to supine , scoot up and down, and roll side to side in bed with supervision/set-up. 2.  Patient will transfer from bed to chair and chair to bed with supervision/set-up using the least restrictive device. 3.  Patient will perform sit to stand with supervision/set-up. 4.  Patient will ambulate with supervision/set-up for 75 feet with the least restrictive device. 5.  Pt will participate in LE exercise to tolerance. PLOF: Pt confused, oriented to self only. Unsure of PLOF. Outcome: Progressing Towards Goal   PHYSICAL THERAPY RE-EVALUATION    Patient: Khari Rm (89 y.o. female)  Date: 2/7/2023  Primary Diagnosis: NSTEMI (non-ST elevated myocardial infarction) [facility]) [I21.4]       Precautions:  Fall, Seizure    ASSESSMENT :  Pt cleared to participate in PT session, pt received semi-reclined in bed and agreeable to therapy session. Completing with OT to maximize safety and mobility. Based on the objective data described below, the patient presents with decreased endurance, decreased strength, decreased balance reactions, gait deviations, confusion, and decreased independence in functional mobility. Pt completing bed mobility with CGA, sitting on EOB with good balance, continues to have B ER in LEs. Standing with Bowen to RW, ambulating with Bowen x15 feet to toilet, able to perform pericare in sitting. Pt standing with Bowen from toilet, repoting fatigue and returned to sitting in recliner with chair alarm activated with Bowen. ABle to complete oral care in seated position. Pt positioned for comfort and educated to call for assist before getting up, pt verbalized understanding.  Pt left with all needs met and call bell in reach. RN notified of position and participation. Patient will benefit from skilled intervention to address the above impairments. Patient's rehabilitation potential is considered to be Fair  Factors which may influence rehabilitation potential include:   []         None noted  [x]         Mental ability/status  []         Medical condition  []         Home/family situation and support systems  [x]         Safety awareness  []         Pain tolerance/management  []         Other:      PLAN :  Recommendations and Planned Interventions:   [x]           Bed Mobility Training             []    Neuromuscular Re-Education  [x]           Transfer Training                   []    Orthotic/Prosthetic Training  [x]           Gait Training                          []    Modalities  [x]           Therapeutic Exercises           []    Edema Management/Control  [x]           Therapeutic Activities            [x]    Family Training/Education  [x]           Patient Education  []           Other (comment):    Frequency/Duration: Patient will be followed by physical therapy 1-2 times per day/4-7 days per week to address goals. Further Equipment Recommendations for Discharge: rolling walker    AMPAC: Current research shows that an AM-PAC score of 17 (13 without stairs) or less is not associated with a discharge to the patient's home setting. Based on an AM-PAC score of 17/24 and their current functional mobility deficits, it is recommended that the patient have 3-5 sessions per week of Physical Therapy at d/c to increase the patient's independence. This AMPAC score should be considered in conjunction with interdisciplinary team recommendations to determine the most appropriate discharge setting. Patient's social support, diagnosis, medical stability, and prior level of function should also be taken into consideration. SUBJECTIVE:   Patient stated Now don't you guys get mad.     OBJECTIVE DATA SUMMARY: Hospital course since last seen and reason for re-evaluation: Pt due for re-evaluation, pt continues to improve in mobility but has decreased endurance. Pt would continue to benefit from skilled PT to continue to progress towards goals.   Past Medical History:   Diagnosis Date    Chronic anxiety     Depression     controlled on Paxil    Diabetes (HCC)     DJD (degenerative joint disease)     GERD (gastroesophageal reflux disease)     High cholesterol     HTN (hypertension)     Hypertension     Low back pain     post-laminectomy syndrome and multilevel degenerative disease    Osteoporosis      Past Surgical History:   Procedure Laterality Date    HX ADENOIDECTOMY      HX APPENDECTOMY      HX BACK SURGERY      PHLD X3    HX BREAST BIOPSY Left 8/28/2015    WIDE LOCAL EXCISION LEFT BREAST LESION performed by Sae Mandujano MD at [facility] MAIN OR    HX HERNIA REPAIR      hiatal    HX KNEE REPLACEMENT      HX ORTHOPAEDIC      bilateral shoulder surgery    HX ELVIRA AND BSO  1982    HX TONSILLECTOMY      VA TOTAL HIP ARTHROPLASTY  4/12     total right hip replacement, Dr. Aureliano Smith     Barriers to Learning/Limitations: yes;  physical and altered mental status (i.e.Sedation, Confusion)  Compensate with: Visual Cues, Verbal Cues, and Tactile Cues  Home Situation:   Home Situation  Home Environment: Private residence  One/Two Story Residence: One story  Living Alone: No  Support Systems: Child(joanne)  Current DME Used/Available at Home: Walker, rolling, [address] [employer] chair  Tub or Shower Type: Tub/Shower combination  Critical Behavior:  Neurologic State: Alert  Orientation Level: Oriented to person  Cognition: Follows commands  Safety/Judgement: Fall prevention  Psychosocial  Patient Behaviors: Cooperative                 Strength:    Strength: Generally decreased, functional    Tone & Sensation:   Tone: Normal     Sensation: Intact    Range Of Motion:  AROM: Generally decreased, functional    PROM: Within functional limits    Posture:  Posture (WDL): Exceptions to WDL  Posture Assessment: Forward head;Trunk flexion  Functional Mobility:  Bed Mobility:     Supine to Sit: Contact guard assistance  Sit to Supine: Contact guard assistance  Scooting: Contact guard assistance  Transfers:  Sit to Stand: Minimum assistance  Stand to Sit: Minimum assistance          Balance:   Sitting: Intact  Sitting - Static: Good (unsupported)  Sitting - Dynamic: Good (unsupported)  Standing: Impaired; With support  Standing - Static: Fair  Standing - Dynamic : Fair    Ambulation/Gait Training:  Distance (ft): 15 Feet (ft) (x2 reps)  Assistive Device: Walker, rolling  Ambulation - Level of Assistance: Contact guard assistance;Minimal assistance    Gait Abnormalities: Decreased step clearance    Base of Support: Narrowed     Speed/Essie: Slow;Shuffled  Step Length: Right shortened;Left shortened    Pain:  Pain level pre-treatment: 0/10   Pain level post-treatment: 0/10     Activity Tolerance:   Fair tolerance     Please refer to the flowsheet for vital signs taken during this treatment. After treatment:   [x]         Patient left in no apparent distress sitting up in chair  []         Patient left in no apparent distress in bed  [x]         Call bell left within reach  [x]         Nursing notified  []         Caregiver present  [x]         Chair alarm activated  []         SCDs applied    COMMUNICATION/EDUCATION:   [x]         Role of Physical Therapy in the acute care setting. [x]         Fall prevention education was provided and the patient/caregiver indicated understanding. [x]         Patient/family have participated as able in goal setting and plan of care. [x]         Patient/family agree to work toward stated goals and plan of care. []         Patient understands intent and goals of therapy, but is neutral about his/her participation.   []         Patient is unable to participate in goal setting/plan of care: ongoing with therapy staff.  []         Other: Thank you for this referral.  Nadira Wallace, PT   Time Calculation: 23 mins    Estiven Ivan AM-PAC® Basic Mobility Inpatient Short Form (6-Clicks) Version 2    How much HELP from another person does the patient currently need    (If the patient hasn't done an activity recently, how much help from another person do you think he/she would need if he/she tried?)   Total (Total A or Dep)   A Lot  (Mod to Max A)   A Little (Sup or Min A)   None (Mod I to I)   Turning from your back to your side while in a flat bed without using bedrails? [] 1 [] 2 [x] 3 [] 4   2. Moving from lying on your back to sitting on the side of a flat bed without using bedrails? [] 1 [] 2 [x] 3 [] 4   3. Moving to and from a bed to a chair (including a wheelchair)? [] 1 [] 2 [x] 3 [] 4   4. Standing up from a chair using your arms (e.g., wheelchair, or bedside chair)? [] 1 [] 2 [x] 3 [] 4   5. Walking in hospital room? [] 1 [] 2 [x] 3 [] 4   6. Climbing 3-5 steps with a railing?+   [] 1 [x] 2 [] 3 [] 4   +If stair climbing cannot be assessed, skip item #6. Sum responses from items 1-5.

## 2023-02-07 NOTE — PROGRESS NOTES
Bedside shift change report given to [facility] (oncoming nurse) by Arline Landry RN (offgoing nurse). Report included the following information SBAR and Kardex.

## 2023-02-07 NOTE — CONSULTS
Interventional Radiology     Consult received from Dr. Marlon Szymanski for evaluation of left breast mass and associated left axillary lymphadenopathy. Case and images reviewed by Dr. Maria E Guzman. Plan:  1. Image guided biopsy of left axillary lymph node with moderate sedation tomorrow as IR schedule allows. - NPO after midnight tonight  - Hold anticoagulation prior to procedure (last dose  mg at 1130 today)  - Labs reviewed -- PT/INR pending    Orders placed. Consent will be obtained from family prior to procedure. Full consult note to follow.       Thank you,  Ya Sinha PA-C   [id number]

## 2023-02-07 NOTE — CONSULTS
History & Physical    Date: 2/7/2023    Referring Physician: Dr. Dion Brito:    DNR    Active Problems:    NSTEMI (non-ST elevated myocardial infarction) ([facility] [address] (1/28/2023)      Seizure ([facility] [address] (1/30/2023)      Breast mass, left (1/30/2023)      JEIMY (acute kidney injury) ([facility] [address] (1/30/2023)      Severe protein-calorie malnutrition ([facility] [address] (2/3/2023)    Enlarged left axillary mass    Plan:   I have discussed the above findings with Ms. Alexandra Barton and reviewed her CTA demonstrating questionable changes in the left breast and large axillary lymph node. Patient needs b/l diagnostic mammogram and ultrasound of the left breast and left axilla. Due to admission and hospital constraints we will have patient undergo left breast ultrasound and left axillary ultrasound today. If there is suspicious mass this could be biopsy by IR while in patient otherwise complete workup will need to follow her discharge from the hospital. She agrees with this plan. History of Present Illness:  Ms. Renee Kingsley is a 78y.o. year old female who presented with seizure that was witnessed by her son. She has no family history of breast cancer. She does not notice any changes to her breast. She states she gets routine mammogram but last available record to me was 2019 and was normal. She did have flu like symptoms 2 weeks prior to admission. Weight has been stable.      History:  Past Medical History:   Diagnosis Date    Chronic anxiety     Depression     controlled on Paxil    Diabetes (HCC)     DJD (degenerative joint disease)     GERD (gastroesophageal reflux disease)     High cholesterol     HTN (hypertension)     Hypertension     Low back pain     post-laminectomy syndrome and multilevel degenerative disease    Osteoporosis      Past Surgical History:   Procedure Laterality Date    HX ADENOIDECTOMY      HX APPENDECTOMY      HX BACK SURGERY      PHLD X3    HX BREAST BIOPSY Left 8/28/2015    WIDE LOCAL EXCISION LEFT BREAST LESION performed by Moises Trevino MD at [facility] MAIN OR    HX HERNIA REPAIR      hiatal    HX KNEE REPLACEMENT      HX ORTHOPAEDIC      bilateral shoulder surgery    HX ELVIRA AND BSO  1982    HX TONSILLECTOMY      HI TOTAL HIP ARTHROPLASTY  4/12     total right hip replacement, Dr. Wilmer Gaston     Family History   Problem Relation Age of Onset    Cancer Mother         melanoma    Cancer Father         lung    Heart Disease Maternal Grandmother     Diabetes Other     Hypertension Other     Headache Other     Seizures Other     Coronary Art Dis Other     Heart Attack Other      Social History     Socioeconomic History    Marital status:      Spouse name: Not on file    Number of children: Not on file    Years of education: Not on file    Highest education level: Not on file   Occupational History    Occupation: office work     Comment: [employer]   Tobacco Use    Smoking status: Former    Smokeless tobacco: Never   Vaping Use    Vaping Use: Never used   Substance and Sexual Activity    Alcohol use: No    Drug use: No    Sexual activity: Not Currently   Other Topics Concern    Not on file   Social History Narrative    Not on file     Social Determinants of Health     Financial Resource Strain: Low Risk     Difficulty of Paying Living Expenses: Not very hard   Food Insecurity: No Food Insecurity    Worried About Running Out of Food in the Last Year: Never true    Ran Out of Food in the Last Year: Never true   Transportation Needs: Not on file   Physical Activity: Not on file   Stress: Not on file   Social Connections: Not on file   Intimate Partner Violence: Not on file   Housing Stability: Not on file     Allergies   Allergen Reactions    Aspirin Other (comments)     \"messes up my kidneys\"    Feldene [Piroxicam] Other (comments)     Kidney damage      Morphine Other (comments)     unconsciousness    Nsaids (Non-Steroidal Anti-Inflammatory Drug) Other (comments)     \"messes up my kidneys\"    Other Medication Not Reported This Time     Mycins Penicillins Hives    Sulfa (Sulfonamide Antibiotics) Rash    Vancomycin Itching     Possible allergic reaction to Vancomycin. Complained of itching/reddness around forehead/back of neck       Medications:  No current facility-administered medications on file prior to encounter. Current Outpatient Medications on File Prior to Encounter   Medication Sig Dispense Refill    Constulose 10 gram/15 mL solution take 30 milliliters by mouth three times a day if needed for constipation      OLANZapine (ZyPREXA) 5 mg tablet Take 5 mg by mouth nightly. oxyCODONE-acetaminophen (PERCOCET 10)  mg per tablet Take 1 Tablet by mouth every six (6) hours as needed for Pain.      potassium chloride (KAON 10%) 20 mEq/15 mL solution MIX 1 AND 1/2 TEASPOONFULS (=7.5ML) IN LIQUID AND     DRINK TWO TIMES A DAY. 480 mL 1    lovastatin (MEVACOR) 40 mg tablet Take 1 Tablet by mouth daily. 90 Tablet 3    PARoxetine (PAXIL) 20 mg tablet TAKE 1 TABLET DAILY 90 Tablet 3    furosemide (LASIX) 40 mg tablet take 1 tablet by mouth once daily 90 Tablet 1    oxybutynin chloride XL (DITROPAN XL) 10 mg CR tablet TAKE 1 TABLET DAILY 90 Tablet 3    therapeutic multivitamin (THERAGRAN) tablet Take 1 Tablet by mouth daily. Indications: treatment to prevent vitamin deficiency      GOTU SILVIA HERB PO Take  by mouth. Review of Systems:  Review of Systems   Constitutional:  Negative for fatigue, fever and unexpected weight change. Respiratory:  Negative for chest tightness and shortness of breath. Skin:  Negative for pallor, rash and wound. Hematological:  Negative for adenopathy. Does not bruise/bleed easily.      Physical Exam:  Patient Vitals for the past 24 hrs:   BP Temp Pulse Resp SpO2 Weight   02/07/23 0801 133/77 97.2 °F (36.2 °C) 75 18 97 % --   02/07/23 0533 -- -- -- -- -- 53.2 kg (117 lb 4.6 oz)   02/07/23 0420 133/80 98.6 °F (37 °C) 78 18 98 % --   02/07/23 0055 133/70 98.3 °F (36.8 °C) 80 18 97 % --   02/06/23 2050 127/68 98.8 °F (37.1 °C) 94 18 97 % --   02/06/23 1758 128/67 97.6 °F (36.4 °C) (!) 105 18 97 % --       Physical Exam  Eyes:      Extraocular Movements: Extraocular movements intact. Conjunctiva/sclera: Conjunctivae normal.      Pupils: Pupils are equal, round, and reactive to light. Cardiovascular:      Rate and Rhythm: Normal rate. Pulmonary:      Effort: Pulmonary effort is normal.   Chest:   Breasts:     Right: Normal. No swelling, bleeding, inverted nipple, mass, nipple discharge, skin change or tenderness. Left: Normal. No swelling, bleeding, inverted nipple, mass, nipple discharge, skin change or tenderness. Lymphadenopathy:      Upper Body:      Right upper body: No supraclavicular, axillary or pectoral adenopathy. Left upper body: No supraclavicular, axillary or pectoral adenopathy. Neurological:      Mental Status: She is alert. Psychiatric:         Mood and Affect: Mood normal.         Behavior: Behavior normal.       Laboratory Data:  Recent Results (from the past 2016 hour(s))   METABOLIC PANEL, COMPREHENSIVE    Collection Time: 01/28/23  4:29 AM   Result Value Ref Range    Sodium 138 136 - 145 mmol/L    Potassium 3.6 3.5 - 5.5 mmol/L    Chloride 98 (L) 100 - 111 mmol/L    CO2 32 21 - 32 mmol/L    Anion gap 8 3.0 - 18 mmol/L    Glucose 150 (H) 74 - 99 mg/dL    BUN 19 (H) 7.0 - 18 MG/DL    Creatinine 1.48 (H) 0.6 - 1.3 MG/DL    BUN/Creatinine ratio 13 12 - 20      eGFR 37 (L) >60 ml/min/1.73m2    Calcium 10.3 (H) 8.5 - 10.1 MG/DL    Bilirubin, total 0.5 0.2 - 1.0 MG/DL    ALT (SGPT) 19 13 - 56 U/L    AST (SGOT) 31 10 - 38 U/L    Alk.  phosphatase 68 45 - 117 U/L    Protein, total 7.6 6.4 - 8.2 g/dL    Albumin 3.8 3.4 - 5.0 g/dL    Globulin 3.8 2.0 - 4.0 g/dL    A-G Ratio 1.0 0.8 - 1.7     MAGNESIUM    Collection Time: 01/28/23  4:29 AM   Result Value Ref Range    Magnesium 2.1 1.6 - 2.6 mg/dL   CBC WITH AUTOMATED DIFF    Collection Time: 01/28/23  4:29 AM   Result Value Ref Range WBC 7.4 4.6 - 13.2 K/uL    RBC 4.66 4.20 - 5.30 M/uL    HGB 13.5 12.0 - 16.0 g/dL    HCT 41.3 35.0 - 45.0 %    MCV 88.6 78.0 - 100.0 FL    MCH 29.0 24.0 - 34.0 PG    MCHC 32.7 31.0 - 37.0 g/dL    RDW 14.2 11.6 - 14.5 %    PLATELET 234 552 - 911 K/uL    MPV 9.1 (L) 9.2 - 11.8 FL    NRBC 0.0 0  WBC    ABSOLUTE NRBC 0.00 0.00 - 0.01 K/uL    NEUTROPHILS 47 40 - 73 %    LYMPHOCYTES 41 21 - 52 %    MONOCYTES 9 3 - 10 %    EOSINOPHILS 2 0 - 5 %    BASOPHILS 1 0 - 2 %    IMMATURE GRANULOCYTES 0 0.0 - 0.5 %    ABS. NEUTROPHILS 3.5 1.8 - 8.0 K/UL    ABS. LYMPHOCYTES 3.0 0.9 - 3.6 K/UL    ABS. MONOCYTES 0.7 0.05 - 1.2 K/UL    ABS. EOSINOPHILS 0.1 0.0 - 0.4 K/UL    ABS. BASOPHILS 0.1 0.0 - 0.1 K/UL    ABS. IMM.  GRANS. 0.0 0.00 - 0.04 K/UL    DF AUTOMATED     ETHYL ALCOHOL    Collection Time: 01/28/23  4:29 AM   Result Value Ref Range    ALCOHOL(ETHYL),SERUM <3 0 - 3 MG/DL   TROPONIN-HIGH SENSITIVITY    Collection Time: 01/28/23  4:29 AM   Result Value Ref Range    Troponin-High Sensitivity 331 (HH) 0 - 54 ng/L   CK    Collection Time: 01/28/23  4:29 AM   Result Value Ref Range    CK 42 26 - 192 U/L   POC LACTIC ACID    Collection Time: 01/28/23  5:28 AM   Result Value Ref Range    Lactic Acid (POC) 1.41 0.40 - 2.00 mmol/L   URINALYSIS W/ RFLX MICROSCOPIC    Collection Time: 01/28/23  7:41 AM   Result Value Ref Range    Color YELLOW      Appearance CLEAR      Specific gravity 1.008 1.005 - 1.030      pH (UA) 7.0 5.0 - 8.0      Protein Negative NEG mg/dL    Glucose Negative NEG mg/dL    Ketone Negative NEG mg/dL    Bilirubin Negative NEG      Blood Negative NEG      Urobilinogen 0.2 0.2 - 1.0 EU/dL    Nitrites Negative NEG      Leukocyte Esterase TRACE (A) NEG     DRUG SCREEN, URINE    Collection Time: 01/28/23  7:41 AM   Result Value Ref Range    BENZODIAZEPINES Negative NEG      BARBITURATES Negative NEG      THC (TH-CANNABINOL) Negative NEG      OPIATES Negative NEG      PCP(PHENCYCLIDINE) Negative NEG      COCAINE Negative NEG      AMPHETAMINES Negative NEG      METHADONE Negative NEG      HDSCOM (NOTE)    TROPONIN-HIGH SENSITIVITY    Collection Time: 01/28/23  7:41 AM   Result Value Ref Range    Troponin-High Sensitivity 5,442 (HH) 0 - 54 ng/L   URINE MICROSCOPIC ONLY    Collection Time: 01/28/23  7:41 AM   Result Value Ref Range    WBC 0 to 3 0 - 4 /hpf    RBC Negative 0 - 5 /hpf    Epithelial cells 1+ 0 - 5 /lpf    Bacteria Negative NEG /hpf   EKG, 12 LEAD, INITIAL    Collection Time: 01/28/23  8:19 AM   Result Value Ref Range    Ventricular Rate 93 BPM    Atrial Rate 93 BPM    P-R Interval 148 ms    QRS Duration 74 ms    Q-T Interval 364 ms    QTC Calculation (Bezet) 452 ms    Calculated P Axis 43 degrees    Calculated R Axis 30 degrees    Calculated T Axis 7 degrees    Diagnosis       Sinus rhythm with premature atrial complexes  Low voltage QRS  Cannot rule out Anterior infarct (cited on or before 28-JAN-2023)  Abnormal ECG  When compared with ECG of 25-AUG-2015 16:09,  premature atrial complexes are now present  Confirmed by Maureen Flores MD, --- ([id number]) on 1/28/2023 9:17:41 AM     TROPONIN-HIGH SENSITIVITY    Collection Time: 01/28/23  9:07 AM   Result Value Ref Range    Troponin-High Sensitivity 5,132 (HH) 0 - 54 ng/L   PTT    Collection Time: 01/28/23  9:15 AM   Result Value Ref Range    aPTT 26.1 23.0 - 36.4 SEC   INFLUENZA A & B AG (RAPID TEST)    Collection Time: 01/28/23  9:50 AM   Result Value Ref Range    Influenza A Antigen Negative NEG      Influenza B Antigen Negative NEG     COVID-19 RAPID TEST    Collection Time: 01/28/23  9:50 AM   Result Value Ref Range    Specimen source Nasopharyngeal      COVID-19 rapid test Not detected     TROPONIN-HIGH SENSITIVITY    Collection Time: 01/28/23 12:30 PM   Result Value Ref Range    Troponin-High Sensitivity 3,910 (HH) 0 - 54 ng/L   PTT    Collection Time: 01/28/23  4:08 PM   Result Value Ref Range    aPTT 72.6 (H) 23.0 - 36.4 SEC   PTT    Collection Time: 01/28/23 10:05 PM   Result Value Ref Range    aPTT 60.6 (H) 23.0 - 36.4 SEC   PTT    Collection Time: 01/29/23  4:24 AM   Result Value Ref Range    aPTT 67.8 (H) 23.0 - 36.4 SEC   CBC WITH AUTOMATED DIFF    Collection Time: 01/29/23  4:58 AM   Result Value Ref Range    WBC 7.7 4.6 - 13.2 K/uL    RBC 3.41 (L) 4.20 - 5.30 M/uL    HGB 10.0 (L) 12.0 - 16.0 g/dL    HCT 30.1 (L) 35.0 - 45.0 %    MCV 88.3 78.0 - 100.0 FL    MCH 29.3 24.0 - 34.0 PG    MCHC 33.2 31.0 - 37.0 g/dL    RDW 14.5 11.6 - 14.5 %    PLATELET 848 581 - 349 K/uL    MPV 9.0 (L) 9.2 - 11.8 FL    NRBC 0.0 0  WBC    ABSOLUTE NRBC 0.00 0.00 - 0.01 K/uL    NEUTROPHILS 68 40 - 73 %    LYMPHOCYTES 21 21 - 52 %    MONOCYTES 9 3 - 10 %    EOSINOPHILS 0 0 - 5 %    BASOPHILS 0 0 - 2 %    IMMATURE GRANULOCYTES 0 0.0 - 0.5 %    ABS. NEUTROPHILS 5.3 1.8 - 8.0 K/UL    ABS. LYMPHOCYTES 1.6 0.9 - 3.6 K/UL    ABS. MONOCYTES 0.7 0.05 - 1.2 K/UL    ABS. EOSINOPHILS 0.0 0.0 - 0.4 K/UL    ABS. BASOPHILS 0.0 0.0 - 0.1 K/UL    ABS. IMM.  GRANS. 0.0 0.00 - 0.04 K/UL    DF AUTOMATED     PTT    Collection Time: 01/29/23 12:00 PM   Result Value Ref Range    aPTT 117.9 (H) 23.0 - 36.4 SEC   PTT    Collection Time: 01/29/23  6:52 PM   Result Value Ref Range    aPTT 103.5 (H) 23.0 - 36.4 SEC   GLUCOSE, POC    Collection Time: 01/29/23 10:53 PM   Result Value Ref Range    Glucose (POC) 100 70 - 110 mg/dL   PTT    Collection Time: 01/30/23  5:00 AM   Result Value Ref Range    aPTT 105.9 (H) 23.0 - 36.4 SEC   CBC WITH AUTOMATED DIFF    Collection Time: 01/30/23  5:00 AM   Result Value Ref Range    WBC 7.1 4.6 - 13.2 K/uL    RBC 3.39 (L) 4.20 - 5.30 M/uL    HGB 9.8 (L) 12.0 - 16.0 g/dL    HCT 29.7 (L) 35.0 - 45.0 %    MCV 87.6 78.0 - 100.0 FL    MCH 28.9 24.0 - 34.0 PG    MCHC 33.0 31.0 - 37.0 g/dL    RDW 14.5 11.6 - 14.5 %    PLATELET 762 745 - 706 K/uL    MPV 9.2 9.2 - 11.8 FL    NRBC 0.0 0  WBC    ABSOLUTE NRBC 0.00 0.00 - 0.01 K/uL    NEUTROPHILS 61 40 - 73 %    LYMPHOCYTES 28 21 - 52 %    MONOCYTES 9 3 - 10 %    EOSINOPHILS 1 0 - 5 %    BASOPHILS 1 0 - 2 %    IMMATURE GRANULOCYTES 0 0.0 - 0.5 %    ABS. NEUTROPHILS 4.4 1.8 - 8.0 K/UL    ABS. LYMPHOCYTES 2.0 0.9 - 3.6 K/UL    ABS. MONOCYTES 0.7 0.05 - 1.2 K/UL    ABS. EOSINOPHILS 0.1 0.0 - 0.4 K/UL    ABS. BASOPHILS 0.0 0.0 - 0.1 K/UL    ABS. IMM.  GRANS. 0.0 0.00 - 0.04 K/UL    DF AUTOMATED     GLUCOSE, POC    Collection Time: 01/30/23 12:40 PM   Result Value Ref Range    Glucose (POC) 92 70 - 679 mg/dL   METABOLIC PANEL, BASIC    Collection Time: 01/31/23  2:55 AM   Result Value Ref Range    Sodium 143 136 - 145 mmol/L    Potassium 2.5 (LL) 3.5 - 5.5 mmol/L    Chloride 108 100 - 111 mmol/L    CO2 29 21 - 32 mmol/L    Anion gap 6 3.0 - 18 mmol/L    Glucose 101 (H) 74 - 99 mg/dL    BUN 11 7.0 - 18 MG/DL    Creatinine 0.95 0.6 - 1.3 MG/DL    BUN/Creatinine ratio 12 12 - 20      eGFR >60 >60 ml/min/1.73m2    Calcium 8.7 8.5 - 10.1 MG/DL   MAGNESIUM    Collection Time: 01/31/23  2:55 AM   Result Value Ref Range    Magnesium 1.5 (L) 1.6 - 2.6 mg/dL   CBC W/O DIFF    Collection Time: 01/31/23  2:55 AM   Result Value Ref Range    WBC 6.2 4.6 - 13.2 K/uL    RBC 3.39 (L) 4.20 - 5.30 M/uL    HGB 9.8 (L) 12.0 - 16.0 g/dL    HCT 29.6 (L) 35.0 - 45.0 %    MCV 87.3 78.0 - 100.0 FL    MCH 28.9 24.0 - 34.0 PG    MCHC 33.1 31.0 - 37.0 g/dL    RDW 14.6 (H) 11.6 - 14.5 %    PLATELET 170 318 - 178 K/uL    MPV 9.3 9.2 - 11.8 FL    NRBC 0.0 0  WBC    ABSOLUTE NRBC 0.00 0.00 - 0.01 K/uL   IRON PROFILE    Collection Time: 01/31/23  2:55 AM   Result Value Ref Range    Iron 30 (L) 50 - 175 ug/dL    TIBC 176 (L) 250 - 450 ug/dL    Iron % saturation 17 (L) 20 - 50 %   FERRITIN    Collection Time: 01/31/23  2:55 AM   Result Value Ref Range    Ferritin 296 8 - 388 NG/ML   VITAMIN B12 & FOLATE    Collection Time: 01/31/23  2:55 AM   Result Value Ref Range    Vitamin B12 >2,000 (H) 211 - 911 pg/mL    Folate >20.0 (H) 3.10 - 17.50 ng/mL   PROCALCITONIN    Collection Time: 01/31/23  2:55 AM   Result Value Ref Range    Procalcitonin <0.05 ng/mL   NT-PRO BNP    Collection Time: 01/31/23  2:55 AM   Result Value Ref Range    NT pro- 0 - 1,800 PG/ML   TSH 3RD GENERATION    Collection Time: 01/31/23  2:55 AM   Result Value Ref Range    TSH 1.26 0.36 - 3.74 uIU/mL   SED RATE (ESR)    Collection Time: 01/31/23  2:55 AM   Result Value Ref Range    Sed rate, automated 29 0 - 30 mm/hr   C REACTIVE PROTEIN, QT    Collection Time: 01/31/23  2:55 AM   Result Value Ref Range    C-Reactive protein 1.7 (H) 0 - 0.3 mg/dL   AMMONIA    Collection Time: 01/31/23  2:55 AM   Result Value Ref Range    Ammonia, plasma 24 11 - 32 UMOL/L   ECHO ADULT COMPLETE    Collection Time: 01/31/23 10:20 AM   Result Value Ref Range    IVSd 0.7 0.6 - 0.9 cm    LVIDd 3.6 (A) 3.9 - 5.3 cm    LVIDs 2.4 cm    LVOT Diameter 1.7 cm    LVPWd 0.6 0.6 - 0.9 cm    EF BP 72 55 - 100 %    LV Ejection Fraction A2C 73 %    LV Ejection Fraction A4C 69 %    LV EDV A2C 57 mL    LV EDV A4C 38 mL    LV EDV BP 49 (A) 56 - 104 mL    LV ESV A2C 15 mL    LV ESV A4C 12 mL    LV ESV BP 14 (A) 19 - 49 mL    LVOT Peak Gradient 4 mmHg    LVOT Mean Gradient 2 mmHg    LVOT SV 44.2 ml    LVOT Peak Velocity 1.0 m/s    LVOT VTI 19.5 cm    RV Free Wall Peak S' 12 cm/s    LA Volume A/L 23 mL    LA Volume 2C 22 22 - 52 mL    LA Volume 4C 20 (A) 22 - 52 mL    LA Volume BP 22 22 - 52 mL    AV Area by Peak Velocity 1.6 cm2    AV Area by VTI 1.9 cm2    AV Peak Gradient 8 mmHg    AV Mean Gradient 4 mmHg    AV Peak Velocity 1.4 m/s    AV Mean Velocity 0.9 m/s    AV VTI 24.5 cm    MV A Velocity 0.93 m/s    MV E Wave Deceleration Time 99.7 ms    MV E Velocity 0.54 m/s    LV E' Lateral Velocity 16 cm/s    LV E' Septal Velocity 9 cm/s    TAPSE 2.0 1.7 cm    Aortic Root 3.2 cm    Fractional Shortening 2D 33 28 - 44 %    LV ESV Index BP 9 mL/m2    LV EDV Index BP 30 mL/m2    LV ESV Index A4C 7 mL/m2    LV EDV Index A4C 24 mL/m2    LV ESV Index A2C 9 mL/m2    LV EDV Index A2C 35 mL/m2    LVIDd Index 2.24 cm/m2    LVIDs Index 1.49 cm/m2    LV RWT Ratio 0.33     LV Mass 2D 59.7 (A) 67 - 162 g    LV Mass 2D Index 37.1 (A) 43 - 95 g/m2    MV E/A 0.58     E/E' Ratio (Averaged) 4.69     E/E' Lateral 3.38     E/E' Septal 6.00     LA Volume Index BP 14 (A) 16 - 34 ml/m2    LA Volume Index A/L 14 16 - 34 mL/m2    LVOT Stroke Volume Index 27.5 mL/m2    LVOT Area 2.3 cm2    LA Volume Index 2C 14 (A) 16 - 34 mL/m2    LA Volume Index 4C 12 (A) 16 - 34 mL/m2    Ao Root Index 1.99 cm/m2    AV Velocity Ratio 0.71     LVOT:AV VTI Index 0.80     HERNANDO/BSA VTI 1.2 cm2/m2    HERNANDO/BSA Peak Velocity 1.0 cm2/m2    RV Basal Dimension 2.4 cm    RA Area 4C 7.7 cm2    Est. RA Pressure 8 mmHg    Aortic Arch 2.5 cm   EKG, 12 LEAD, SUBSEQUENT    Collection Time: 01/31/23  4:46 PM   Result Value Ref Range    Ventricular Rate 73 BPM    Atrial Rate 73 BPM    P-R Interval 174 ms    QRS Duration 80 ms    Q-T Interval 420 ms    QTC Calculation (Bezet) 462 ms    Calculated P Axis 30 degrees    Calculated R Axis 24 degrees    Calculated T Axis 19 degrees    Diagnosis       Normal sinus rhythm  Normal ECG  When compared with ECG of 28-JAN-2023 08:19,  premature atrial complexes are no longer present  Nonspecific T wave abnormality now evident in Lateral leads  Confirmed by Jorje Tiwari MD, Divya Smith ([id number]) on 2/1/2023 8:32:57 AM     POTASSIUM    Collection Time: 01/31/23  9:31 PM   Result Value Ref Range    Potassium 3.6 3.5 - 5.5 mmol/L   METABOLIC PANEL, BASIC    Collection Time: 02/01/23  3:25 AM   Result Value Ref Range    Sodium 142 136 - 145 mmol/L    Potassium 3.0 (L) 3.5 - 5.5 mmol/L    Chloride 109 100 - 111 mmol/L    CO2 27 21 - 32 mmol/L    Anion gap 6 3.0 - 18 mmol/L    Glucose 94 74 - 99 mg/dL    BUN 10 7.0 - 18 MG/DL    Creatinine 0.88 0.6 - 1.3 MG/DL    BUN/Creatinine ratio 11 (L) 12 - 20      eGFR >60 >60 ml/min/1.73m2    Calcium 8.6 8.5 - 10.1 MG/DL   MAGNESIUM    Collection Time: 02/01/23  3:25 AM   Result Value Ref Range    Magnesium 2.0 1.6 - 2.6 mg/dL   CBC W/O DIFF    Collection Time: 02/01/23  3:25 AM   Result Value Ref Range    WBC 9.7 4.6 - 13.2 K/uL    RBC 3.81 (L) 4.20 - 5.30 M/uL    HGB 11.0 (L) 12.0 - 16.0 g/dL    HCT 33.4 (L) 35.0 - 45.0 %    MCV 87.7 78.0 - 100.0 FL    MCH 28.9 24.0 - 34.0 PG    MCHC 32.9 31.0 - 37.0 g/dL    RDW 14.7 (H) 11.6 - 14.5 %    PLATELET 041 573 - 543 K/uL    MPV 9.2 9.2 - 11.8 FL    NRBC 0.0 0  WBC    ABSOLUTE NRBC 0.00 0.00 - 0.01 K/uL   GLUCOSE, POC    Collection Time: 02/01/23  8:12 AM   Result Value Ref Range    Glucose (POC) 106 70 - 110 mg/dL   GLUCOSE, POC    Collection Time: 02/01/23 11:23 AM   Result Value Ref Range    Glucose (POC) 115 (H) 70 - 110 mg/dL   POTASSIUM    Collection Time: 02/01/23  4:36 PM   Result Value Ref Range    Potassium 3.9 3.5 - 5.5 mmol/L   MAGNESIUM    Collection Time: 02/02/23  3:33 AM   Result Value Ref Range    Magnesium 1.9 1.6 - 2.6 mg/dL   CBC W/O DIFF    Collection Time: 02/02/23  3:33 AM   Result Value Ref Range    WBC 7.2 4.6 - 13.2 K/uL    RBC 3.40 (L) 4.20 - 5.30 M/uL    HGB 10.0 (L) 12.0 - 16.0 g/dL    HCT 30.4 (L) 35.0 - 45.0 %    MCV 89.4 78.0 - 100.0 FL    MCH 29.4 24.0 - 34.0 PG    MCHC 32.9 31.0 - 37.0 g/dL    RDW 14.9 (H) 11.6 - 14.5 %    PLATELET 049 511 - 092 K/uL    MPV 9.5 9.2 - 11.8 FL    NRBC 0.0 0  WBC    ABSOLUTE NRBC 0.00 0.00 - 4.32 K/uL   METABOLIC PANEL, BASIC    Collection Time: 02/02/23  3:33 AM   Result Value Ref Range    Sodium 139 136 - 145 mmol/L    Potassium 3.9 3.5 - 5.5 mmol/L    Chloride 111 100 - 111 mmol/L    CO2 24 21 - 32 mmol/L    Anion gap 4 3.0 - 18 mmol/L    Glucose 89 74 - 99 mg/dL    BUN 9 7.0 - 18 MG/DL    Creatinine 0.81 0.6 - 1.3 MG/DL    BUN/Creatinine ratio 11 (L) 12 - 20      eGFR >60 >60 ml/min/1.73m2    Calcium 9.0 8.5 - 10.1 MG/DL   CBC W/O DIFF    Collection Time: 02/03/23  7:42 AM   Result Value Ref Range    WBC 5.9 4.6 - 13.2 K/uL    RBC 3.45 (L) 4.20 - 5.30 M/uL    HGB 10.2 (L) 12.0 - 16.0 g/dL    HCT 31.2 (L) 35.0 - 45.0 %    MCV 90.4 78.0 - 100.0 FL    MCH 29.6 24.0 - 34.0 PG    MCHC 32.7 31.0 - 37.0 g/dL    RDW 15.2 (H) 11.6 - 14.5 %    PLATELET 243 182 - 526 K/uL    MPV 9.3 9.2 - 11.8 FL    NRBC 0.0 0  WBC    ABSOLUTE NRBC 0.00 0.00 - 8.74 K/uL   METABOLIC PANEL, BASIC    Collection Time: 02/03/23  7:42 AM   Result Value Ref Range    Sodium 142 136 - 145 mmol/L    Potassium 4.0 3.5 - 5.5 mmol/L    Chloride 113 (H) 100 - 111 mmol/L    CO2 24 21 - 32 mmol/L    Anion gap 5 3.0 - 18 mmol/L    Glucose 82 74 - 99 mg/dL    BUN 7 7.0 - 18 MG/DL    Creatinine 0.81 0.6 - 1.3 MG/DL    BUN/Creatinine ratio 9 (L) 12 - 20      eGFR >60 >60 ml/min/1.73m2    Calcium 9.2 8.5 - 10.1 MG/DL   CBC W/O DIFF    Collection Time: 02/04/23  1:50 AM   Result Value Ref Range    WBC 6.8 4.6 - 13.2 K/uL    RBC 3.65 (L) 4.20 - 5.30 M/uL    HGB 10.4 (L) 12.0 - 16.0 g/dL    HCT 32.4 (L) 35.0 - 45.0 %    MCV 88.8 78.0 - 100.0 FL    MCH 28.5 24.0 - 34.0 PG    MCHC 32.1 31.0 - 37.0 g/dL    RDW 15.2 (H) 11.6 - 14.5 %    PLATELET 501 985 - 948 K/uL    MPV 9.7 9.2 - 11.8 FL    NRBC 0.0 0  WBC    ABSOLUTE NRBC 0.00 0.00 - 8.56 K/uL   METABOLIC PANEL, BASIC    Collection Time: 02/04/23  1:50 AM   Result Value Ref Range    Sodium 139 136 - 145 mmol/L    Potassium 4.4 3.5 - 5.5 mmol/L    Chloride 111 100 - 111 mmol/L    CO2 24 21 - 32 mmol/L    Anion gap 4 3.0 - 18 mmol/L    Glucose 109 (H) 74 - 99 mg/dL    BUN 8 7.0 - 18 MG/DL    Creatinine 0.92 0.6 - 1.3 MG/DL    BUN/Creatinine ratio 9 (L) 12 - 20      eGFR >60 >60 ml/min/1.73m2    Calcium 9.2 8.5 - 10.1 MG/DL   GLUCOSE, POC    Collection Time: 02/04/23  8:24 AM   Result Value Ref Range    Glucose (POC) 96 70 - 110 mg/dL   GLUCOSE, POC    Collection Time: 02/04/23 11:08 AM   Result Value Ref Range    Glucose (POC) 138 (H) 70 - 110 mg/dL   GLUCOSE, POC    Collection Time: 02/04/23  3:40 PM   Result Value Ref Range    Glucose (POC) 93 70 - 110 mg/dL GLUCOSE, POC    Collection Time: 02/05/23  7:50 AM   Result Value Ref Range    Glucose (POC) 95 70 - 110 mg/dL   GLUCOSE, POC    Collection Time: 02/05/23 11:55 AM   Result Value Ref Range    Glucose (POC) 92 70 - 110 mg/dL   GLUCOSE, POC    Collection Time: 02/05/23  3:44 PM   Result Value Ref Range    Glucose (POC) 97 70 - 110 mg/dL   POTASSIUM    Collection Time: 02/05/23  7:11 PM   Result Value Ref Range    Potassium 5.2 3.5 - 5.5 mmol/L       Diagnostic Studies:  Study Result    Narrative & Impression   CTA CHEST,  ABDOMEN AND PELVIS WITH CONTRAST      COMPARISON: None     INDICATIONS: Back pain     TECHNIQUE:      Noncontrast CT was done initially through the chest, abdomen, and pelvis. Contrast enhanced CT was then performed following the uneventful administration   of 100 cc of Isovue-370. Scanning was done with a multislice scanner. Volumetric data acquisition was  performed through the chest, abdomen, and pelvis. Reconstructions were created  in the axial, coronal, and sagittal planes. The data was also loaded on an  independent imaging workstation. Postprocessing was performed with indication  wheezing creation of MIPS as well as 3D shaded surface virtual reality  angiographic images without and with bone removal.      CT CHEST FINDINGS:     The lungs show a granuloma in the left base. No consolidation is evident. .  There is no pneumothorax or pleural fluid or pleural plaque evident. .  There is no mediastinal adenopathy or mass. .  There is breast asymmetry with for confluent/dense appearing parenchyma on the  left  There is an enlarged left axillary node measuring 1.8 x 2.4 cm  . CT ABDOMEN FINDINGS:     Liver: Liver and biliary tree are unremarkable in appearance. Spleen: Negative. Left Kidney: Small cyst noted. Otherwise unremarkable. Right Kidney: Negative. Pancreas: Negative. Adrenal Glands: Negative. Stomach, Small Bowel And Colon: The stomach and small bowel are unremarkable.   The appendix is not identified. There is a large stool burden. There is mild  rectosigmoid wall thickening. Lymph Nodes: No adenopathy is evident. .   Abdominal wall is unremarkable. Peritoneal Spaces: There is no free fluid or free air. The bladder is incompletely distended but unremarkable. Vascular:     Ascending Aorta: The sinuses of Valsalva measure 2.8 x 2.8 x 3 cm. The sinotubular ridge measures 2.5 x 2.6 cm  The maximal ascending aortic diameter is 3 x 3.2 cm. .  There is no dissection or other acute abnormality apparent. Aortic Arch: The mid arch measures 2.7 x 2.7 cm with minimal atheromatous  disease apparent. Cynthia Hun Vessels: The brachiocephalic vessels are widely patent. .     Descending Thoracic Aorta: Tapers from 2.3 x 2.4 cm to 2 x 2.2 cm with minimal  atheromatous abnormality and no evidence of dissection or acute aortic  syndrome. .     Abdominal Aorta: There is mild atheromatous irregularity. There is no aneurysm. The aorta tapers to a diameter of 1.5 cm at the bifurcation. .     Visceral Branches: Celiac, SMA, renals, and VIDA are patent     Iliofemoral runoff is unremarkable bilaterally     Osseous Structures Of Abdomen And Pelvis: No acute or aggressive abnormalities. Previous right hip replacement and displacement of the lesser trochanter. Previous lumbar surgery. IMPRESSION     No acute or active appearing vascular abnormality is evident. There is a pathologically enlarged left axillary lymph node. There is asymmetric  breast parenchyma, much more prominent and thicker on the left which may  represent a mass. . The most recent mammogram in this system, November 2019, did  not reveal slight asymmetry nor adenopathy. . Please correlate with breast  physical examination. Unless recently performed elsewhere mammographic  correlation is recommended. The appearance is concerning for possible breast  neoplasm with axillary adenopathy.      Rectosigmoid thickening-possible colitis versus pseudothickening. All CT scans at this facility are performed using dose optimization technique as  appropriate to the performed exam, to include automated exposure control,  adjustment of the mA and/or kV according to patient's size (Including  appropriate matching for site-specific examinations), or use of iterative  reconstruction technique.        Israel Turpin, [address]

## 2023-02-07 NOTE — PROGRESS NOTES
Problem: Self Care Deficits Care Plan (Adult)  Goal: *Acute Goals and Plan of Care (Insert Text)  Description: Occupational Therapy Goals  Initiated 2/1/2023 within 7 day(s). 1.  Patient will perform grooming with modified independence. 2.  Patient will perform bathing with supervision/set-up using adaptive equipment. 3.  Patient will perform upper body dressing and lower body dressing with supervision/set-up using adaptive equipment. 4.  Patient will perform toilet transfers with supervision/set-up using RW with good balance. 5.  Patient will perform all aspects of toileting with supervision/set-up. 6.  Patient will participate in upper extremity therapeutic exercise/activities with supervision/set-up for 8 minutes. 7.  Patient will utilize energy conservation techniques during functional activities with min verbal cues. Prior Level of Function: Mod I with ADLs and functional mobility using RW      Outcome: Progressing Towards Goal   OCCUPATIONAL THERAPY TREATMENT    Patient: Tu Amor (89 y.o. female)  Date: 2/7/2023  Diagnosis: [facility]STEM[facility]non[facility] [I21.4] <principal problem not specified>      Precautions: Fall, Seizure  PLOF: Mod I with ADLs and functional mobility using RW     Chart, occupational therapy assessment, plan of care, and goals were reviewed. ASSESSMENT:  Pt presented supine in bed upon entry and agreeable for participation. She came to EOB CGA in prep for functional tasks. Once sitting, she demo G and donned her socks with SBA utilizing leg cross technique requiring increased time. STS transfer MIN A with RW and maneuvered into BR and transferred to toilet. Toileting ADL Supervision while seated. STS transfer from toilet MIN A with RW reporting she was fatigued requesting to brush her teeth at chair level. Pt returned to reclining chair CGA/MIN A utilizing RW. She required SBA for oral care 2/2 confusion.  Pt left in chair at end of session with MICHAEL ARANA's elevated, RN present, all needs within reach, and chair alarm active. Progression toward goals:  []          Improving appropriately and progressing toward goals  [x]          Improving slowly and progressing toward goals  []          Not making progress toward goals and plan of care will be adjusted     PLAN:  Patient continues to benefit from skilled intervention to address the above impairments. Continue treatment per established plan of care. Further Equipment Recommendations for Discharge:  RW and shower chair    AMPAC: Current research shows that an AM-PAC score of 17 or less is not associated with a discharge to the patient's home setting. Based on an AM-PAC score of 16/24 and their current ADL deficits; it is recommended that the patient have 3-5 sessions per week of Occupational Therapy at d/c to increase the patient's independence. This AMPAC score should be considered in conjunction with interdisciplinary team recommendations to determine the most appropriate discharge setting. Patient's social support, diagnosis, medical stability, and prior level of function should also be taken into consideration. SUBJECTIVE:   Patient stated I live with my .     OBJECTIVE DATA SUMMARY:   Cognitive/Behavioral Status:  Neurologic State: Alert  Orientation Level: Oriented to person  Cognition: Follows commands  Safety/Judgement: Fall prevention    Functional Mobility and Transfers for ADLs:   Bed Mobility:     Supine to Sit: Contact guard assistance  Sit to Supine: Contact guard assistance  Scooting: Contact guard assistance   Transfers:  Sit to Stand: Minimum assistance  Stand to Sit: Minimum assistance       Balance:  Sitting: Intact  Sitting - Static: Good (unsupported)  Sitting - Dynamic: Good (unsupported)  Standing: Impaired; With support  Standing - Static: Fair  Standing - Dynamic : Fair    ADL Intervention:       Grooming  Position Performed: Seated in chair  Brushing Teeth: Stand-by assistance  Cues: Verbal cues provided;Visual cues provided         Lower Body Dressing Assistance  Socks: Stand-by assistance  Leg Crossed Method Used: Yes  Position Performed: Seated edge of bed    Toileting  Bladder Hygiene: Supervision (seated)    Cognitive Retraining  Safety/Judgement: Fall prevention        Pain:  Pain level pre-treatment: 0/10   Pain level post-treatment: 0/10      Activity Tolerance:    Fair   Please refer to the flowsheet for vital signs taken during this treatment. After treatment:   [x]  Patient left in no apparent distress sitting up in chair  []  Patient left in no apparent distress in bed  [x]  Call bell left within reach  [x]  Nursing notified  []  Caregiver present  [x]  Chair alarm activated    COMMUNICATION/EDUCATION:   [x] Role of Occupational Therapy in the acute care setting  [] Home safety education was provided and the patient/caregiver indicated understanding. [x] Patient/family have participated as able in working towards goals and plan of care. [x] Patient/family agree to work toward stated goals and plan of care. [] Patient understands intent and goals of therapy, but is neutral about his/her participation. [] Patient is unable to participate in goal setting and plan of care. Thank you for this referral.  SONIA Resendiz  Time Calculation: 27 mins    [address] [facility] [address] [address] AM-PAC® Daily Activity Inpatient Short Form (6-Clicks)    How much HELP from another person does the patient currently need    (If the patient hasn't done an activity recently, how much help from another person do you think he/she would need if he/she tried?)   Total (Total A or Dep)   A Lot  (Mod to Max A)   A Little (Sup or Min A)   None (Mod I to I)   Putting on and taking off regular lower body clothing? [] 1 [] 2 [x] 3 [] 4   2. Bathing (including washing, rinsing,      drying)? [] 1 [x] 2 [] 3 [] 4   3.  Toileting, which includes using toilet, bedpan or urinal?   [] 1 [x] 2 [] 3 [] 4   4. Putting on and taking off regular upper body clothing? [] 1 [] 2 [x] 3 [] 4   5. Taking care of personal grooming such as brushing teeth? [] 1 [] 2 [x] 3 [] 4   6. Eating meals?    [] 1 [] 2 [x] 3 [] 4

## 2023-02-07 NOTE — PROGRESS NOTES
Physician Progress Note      Reny Collins  CSN #:                  [id number]  :                       1945  ADMIT DATE:       2023 4:10 AM  DISCH DATE:  RESPONDING  PR[facility]          QUERY TEXT:    Patient admitted with chest pain. \"Noted to have NSTEMI/ACS per the H&P note on 23\" If possible, please document in the progress notes and discharge summary if you are evaluating and/or treating any of the following: The medical record reflects the following:  Risk Factors: Seizure disorder, HTN, High cholesterol, DM, DNR, Age F69  Clinical Indicators: Per  H&P: ER MD spoke with cardiologist and start this patient on heparin drip for concern of NSTEMI   Cardiology PN: Elevated troponin, suspect demand, no symptoms to suggest ACS   Hospitalist PN: Elevated troponin  Lab results: 3910HH, 5132HH, 5442HH, 331HH  Treatment: Heparin gtt, Aspirin, IVF    Thank You,  Keira Causey  Options provided:  -- NSTEMI  -- Type 2 MI  -- Demand Ischemia with MI  -- Demand Ischemia only, no MI  -- Unstable Angina  -- Other - I will add my own diagnosis  -- Disagree - Not applicable / Not valid  -- Disagree - Clinically unable to determine / Unknown  -- Refer to Clinical Documentation Reviewer    PROVIDER RESPONSE TEXT:    This patient has demand ischemia only, no MI.     Query created by: Rigoberto Jang on 2023 7:08 AM      Electronically signed by:  Elkin DUARTE [facility] MD 2023 8:15 AM

## 2023-02-07 NOTE — PROGRESS NOTES
[facility] Hospitalist Group  Progress Note    Patient: Juan Francisco Lo Age: 78 y.o. : 1945 MR#: [id number] SSN: [id number]  Date: 2023     Subjective:   Patient is more alert and awake, sitting up in bed, no acute distress. Discussed with her about left breast mass and need for IR guided biopsy. Assessment/Plan:   1. Seizure, new onset. History of childhood seizures  2. Elevated troponin  3. Electrolyte derangement  4. HTN  5. HLD   6. History of dementia   7. DMT2  8. Breast asymmetry with for confluent/dense appearing parenchyma on the left with enlarged left axillary node measuring 1.8 x 2.4 cm. Plan  Neurology consult and appreciate assistance. Recommendations continue keppra 500 mg BID, EEG, MRI, seizure precautions. MRI negative for stroke. EEG ordered and shows presence of left frontotemporal focal neuronal dysfunction and phase reversing activity. This suggest epileptiform activity. Ultrasound left breast completed shows 2 suspicious left axillary lymph nodes without definite visualization of the intra breast abnormality. Breast surgery on board, recommending IR guided lymph node biopsy of lymph node. Dementia-patient will need to be discharged to a skilled nursing facility versus long-term care. She is unable to take care of herself at this time. Called son and updated him of current treatment plan. He mentions that patient is living with him and is okay to go to a skilled nursing facility for a few days.     Time spent 35 minutes    Case discussed with:  [x]Patient  [x]Family  []Nursing  []Case Management  DVT Prophylaxis:  []Lovenox  []Hep SQ  [x]SCDs  []Coumadin   []On Heparin gtt    Objective:   VS: Visit Vitals  /82   Pulse 91   Temp 97.8 °F (36.6 °C)   Resp 18   Ht 5' 6\" (1.676 m)   Wt 53.2 kg (117 lb 4.6 oz)   SpO2 96%   BMI 18.93 kg/m²        Tmax/24hrs: Temp (24hrs), Av.1 °F (36.7 °C), Min:97.2 °F (36.2 °C), Max:98.8 °F (37.1 °C)  No intake or output data in the 24 hours ending 02/07/23 1436    General:         Alert, cooperative, no acute distress    HEENT:           NC, Atraumatic. PERRLA, anicteric sclerae. Lungs:            CTA bilaterally. No Wheezing/Rhonchi/Rales  Heart:              RRR, No murmur, No Rubs, No Gallops  Abdomen:      Soft, Non distended, Non tender. +Bowel sounds, no HSM  Extremities:   No c/c/e  Psych:              Good insight. Not anxious or agitated. Neurologic:     Alert and oriented X 1-2. No acute neurological deficits     Labs:    No results found for this or any previous visit (from the past 24 hour(s)).       Signed By: Anusha Page MD     February 7, 2023

## 2023-02-07 NOTE — PROGRESS NOTES
Problem: Patient Education: Go to Patient Education Activity  Goal: Patient/Family Education  Outcome: Progressing Towards Goal     Problem: Unstable angina/NSTEMI: Day of Admission/Day 1  Goal: Off Pathway (Use only if patient is Off Pathway)  Outcome: Progressing Towards Goal  Goal: Activity/Safety  Outcome: Progressing Towards Goal  Goal: Consults, if ordered  Outcome: Progressing Towards Goal  Goal: Diagnostic Test/Procedures  Outcome: Progressing Towards Goal  Goal: Nutrition/Diet  Outcome: Progressing Towards Goal  Goal: Discharge Planning  Outcome: Progressing Towards Goal  Goal: Medications  Outcome: Progressing Towards Goal  Goal: Respiratory  Outcome: Progressing Towards Goal  Goal: Treatments/Interventions/Procedures  Outcome: Progressing Towards Goal  Goal: Psychosocial  Outcome: Progressing Towards Goal  Goal: *Hemodynamically stable  Outcome: Progressing Towards Goal  Goal: *Optimal pain control at patient's stated goal  Outcome: Progressing Towards Goal  Goal: *Lungs clear or at baseline  Outcome: Progressing Towards Goal     Problem: Unstable angina/NSTEMI: Day 2  Goal: Off Pathway (Use only if patient is Off Pathway)  Outcome: Progressing Towards Goal  Goal: Activity/Safety  Outcome: Progressing Towards Goal  Goal: Consults, if ordered  Outcome: Progressing Towards Goal  Goal: Diagnostic Test/Procedures  Outcome: Progressing Towards Goal  Goal: Nutrition/Diet  Outcome: Progressing Towards Goal  Goal: Discharge Planning  Outcome: Progressing Towards Goal  Goal: Medications  Outcome: Progressing Towards Goal  Goal: Respiratory  Outcome: Progressing Towards Goal  Goal: Treatments/Interventions/Procedures  Outcome: Progressing Towards Goal  Goal: Psychosocial  Outcome: Progressing Towards Goal  Goal: *Hemodynamically stable  Outcome: Progressing Towards Goal  Goal: *Optimal pain control at patient's stated goal  Outcome: Progressing Towards Goal  Goal: *Lungs clear or at baseline  Outcome: Progressing Towards Goal     Problem: Unstable Angina/NSTEMI: Discharge Outcomes  Goal: *Hemodynamically stable  Outcome: Progressing Towards Goal  Goal: *Stable cardiac rhythm  Outcome: Progressing Towards Goal  Goal: *Lungs clear or at baseline  Outcome: Progressing Towards Goal  Goal: *Optimal pain control at patient's stated goal  Outcome: Progressing Towards Goal  Goal: *Identifies cardiac risk factors  Outcome: Progressing Towards Goal  Goal: *Verbalizes home exercise program, activity guidelines, cardiac precautions  Outcome: Progressing Towards Goal  Goal: *Verbalizes understanding and describes prescribed diet  Outcome: Progressing Towards Goal  Goal: *Verbalizes name, dosage, time, side effects, and number of days to continue medications  Outcome: Progressing Towards Goal  Goal: *Anxiety reduced or absent  Outcome: Progressing Towards Goal  Goal: *Understands and describes signs and symptoms to report to providers(Stroke Metric)  Outcome: Progressing Towards Goal  Goal: *Describes follow-up/return visits to physicians  Outcome: Progressing Towards Goal  Goal: *Describes available resources and support systems  Outcome: Progressing Towards Goal  Goal: *Influenza immunization  Outcome: Progressing Towards Goal  Goal: *Pneumococcal immunization  Outcome: Progressing Towards Goal  Goal: *Describes smoking cessation resources  Outcome: Progressing Towards Goal     Problem: Falls - Risk of  Goal: *Absence of Falls  Description: Document Rio Fall Risk and appropriate interventions in the flowsheet.   Outcome: Progressing Towards Goal  Note: Fall Risk Interventions:  Mobility Interventions: Bed/chair exit alarm    Mentation Interventions: Bed/chair exit alarm    Medication Interventions: Bed/chair exit alarm, Patient to call before getting OOB, Teach patient to arise slowly    Elimination Interventions: Bed/chair exit alarm, Call light in reach, Elevated toilet seat, Patient to call for help with toileting needs, Toileting schedule/hourly rounds              Problem: Patient Education: Go to Patient Education Activity  Goal: Patient/Family Education  Outcome: Progressing Towards Goal     Problem: Pressure Injury - Risk of  Goal: *Prevention of pressure injury  Description: Document Godfrey Scale and appropriate interventions in the flowsheet.   Outcome: Progressing Towards Goal     Problem: Patient Education: Go to Patient Education Activity  Goal: Patient/Family Education  Outcome: Progressing Towards Goal     Problem: Pain  Goal: *Control of Pain  Outcome: Progressing Towards Goal     Problem: Patient Education: Go to Patient Education Activity  Goal: Patient/Family Education  Outcome: Progressing Towards Goal

## 2023-02-07 NOTE — PROGRESS NOTES
Problem: Patient Education: Go to Patient Education Activity  Goal: Patient/Family Education  Outcome: Progressing Towards Goal     Problem: Falls - Risk of  Goal: *Absence of Falls  Description: Document Allison Hancock Fall Risk and appropriate interventions in the flowsheet. Outcome: Progressing Towards Goal  Note: Fall Risk Interventions:  Mobility Interventions: Bed/chair exit alarm    Mentation Interventions: Bed/chair exit alarm    Medication Interventions: Bed/chair exit alarm, Patient to call before getting OOB, Teach patient to arise slowly    Elimination Interventions: Bed/chair exit alarm, Call light in reach, Elevated toilet seat, Patient to call for help with toileting needs, Toileting schedule/hourly rounds              Problem: Pressure Injury - Risk of  Goal: *Prevention of pressure injury  Description: Document Godfrey Scale and appropriate interventions in the flowsheet.   Outcome: Progressing Towards Goal  Note: Pressure Injury Interventions:  Sensory Interventions: Assess changes in LOC    Moisture Interventions: Absorbent underpads    Activity Interventions: Pressure redistribution bed/mattress(bed type)    Mobility Interventions: HOB 30 degrees or less    Nutrition Interventions: Offer support with meals,snacks and hydration    Friction and Shear Interventions: HOB 30 degrees or less                Problem: Pain  Goal: *Control of Pain  Outcome: Progressing Towards Goal

## 2023-02-08 ENCOUNTER — HOSPITAL ENCOUNTER (INPATIENT)
Dept: INTERVENTIONAL RADIOLOGY/VASCULAR | Age: 78
Discharge: HOME | DRG: 040 | End: 2023-02-08
Attending: STUDENT IN AN ORGANIZED HEALTH CARE EDUCATION/TRAINING PROGRAM
Payer: MEDICARE

## 2023-02-08 VITALS
DIASTOLIC BLOOD PRESSURE: 69 MMHG | OXYGEN SATURATION: 97 % | BODY MASS INDEX: 18.85 KG/M2 | HEIGHT: 66 IN | SYSTOLIC BLOOD PRESSURE: 128 MMHG | WEIGHT: 117.28 LBS | HEART RATE: 90 BPM | RESPIRATION RATE: 14 BRPM | TEMPERATURE: 96.9 F

## 2023-02-08 VITALS
HEART RATE: 94 BPM | DIASTOLIC BLOOD PRESSURE: 71 MMHG | RESPIRATION RATE: 12 BRPM | OXYGEN SATURATION: 97 % | SYSTOLIC BLOOD PRESSURE: 149 MMHG

## 2023-02-08 LAB
INR PPP: 1 (ref 0.8–1.2)
PROTHROMBIN TIME: 13.5 SEC (ref 11.5–15.2)

## 2023-02-08 PROCEDURE — 97535 SELF CARE MNGMENT TRAINING: CPT

## 2023-02-08 PROCEDURE — 74011250637 HC RX REV CODE- 250/637: Performed by: NURSE PRACTITIONER

## 2023-02-08 PROCEDURE — 88333 PATH CONSLTJ SURG CYTO XM 1: CPT

## 2023-02-08 PROCEDURE — 97530 THERAPEUTIC ACTIVITIES: CPT

## 2023-02-08 PROCEDURE — 36415 COLL VENOUS BLD VENIPUNCTURE: CPT

## 2023-02-08 PROCEDURE — 88185 FLOWCYTOMETRY/TC ADD-ON: CPT

## 2023-02-08 PROCEDURE — 2709999900 HC NON-CHARGEABLE SUPPLY

## 2023-02-08 PROCEDURE — 88305 TISSUE EXAM BY PATHOLOGIST: CPT

## 2023-02-08 PROCEDURE — 74011250637 HC RX REV CODE- 250/637: Performed by: INTERNAL MEDICINE

## 2023-02-08 PROCEDURE — 88334 PATH CONSLTJ SURG CYTO XM EA: CPT

## 2023-02-08 PROCEDURE — 76942 ECHO GUIDE FOR BIOPSY: CPT

## 2023-02-08 PROCEDURE — 74011000250 HC RX REV CODE- 250: Performed by: INTERNAL MEDICINE

## 2023-02-08 PROCEDURE — 99239 HOSP IP/OBS DSCHRG MGMT >30: CPT | Performed by: HOSPITALIST

## 2023-02-08 PROCEDURE — 07B63ZX EXCISION OF LEFT AXILLARY LYMPHATIC, PERCUTANEOUS APPROACH, DIAGNOSTIC: ICD-10-PCS | Performed by: RADIOLOGY

## 2023-02-08 PROCEDURE — 85610 PROTHROMBIN TIME: CPT

## 2023-02-08 PROCEDURE — 88184 FLOWCYTOMETRY/ TC 1 MARKER: CPT

## 2023-02-08 PROCEDURE — 74011250637 HC RX REV CODE- 250/637: Performed by: STUDENT IN AN ORGANIZED HEALTH CARE EDUCATION/TRAINING PROGRAM

## 2023-02-08 PROCEDURE — 74011250636 HC RX REV CODE- 250/636: Performed by: STUDENT IN AN ORGANIZED HEALTH CARE EDUCATION/TRAINING PROGRAM

## 2023-02-08 RX ORDER — FLUMAZENIL 0.1 MG/ML
0.2 INJECTION INTRAVENOUS AS NEEDED
Status: DISCONTINUED | OUTPATIENT
Start: 2023-02-08 | End: 2023-02-12 | Stop reason: HOSPADM

## 2023-02-08 RX ORDER — LEVETIRACETAM 500 MG/1
500 TABLET ORAL 2 TIMES DAILY
Qty: 60 TABLET | Refills: 0 | Status: SHIPPED | OUTPATIENT
Start: 2023-02-08

## 2023-02-08 RX ORDER — FENTANYL CITRATE 50 UG/ML
12.5-1 INJECTION, SOLUTION INTRAMUSCULAR; INTRAVENOUS
Status: DISPENSED | OUTPATIENT
Start: 2023-02-08 | End: 2023-02-08

## 2023-02-08 RX ORDER — MIDAZOLAM HYDROCHLORIDE 1 MG/ML
.5-2 INJECTION, SOLUTION INTRAMUSCULAR; INTRAVENOUS
Status: DISPENSED | OUTPATIENT
Start: 2023-02-08 | End: 2023-02-08

## 2023-02-08 RX ORDER — NALOXONE HYDROCHLORIDE 0.4 MG/ML
0.2 INJECTION, SOLUTION INTRAMUSCULAR; INTRAVENOUS; SUBCUTANEOUS AS NEEDED
Status: DISCONTINUED | OUTPATIENT
Start: 2023-02-08 | End: 2023-02-12 | Stop reason: HOSPADM

## 2023-02-08 RX ADMIN — LEVETIRACETAM 500 MG: 500 TABLET, FILM COATED ORAL at 10:38

## 2023-02-08 RX ADMIN — SODIUM CHLORIDE, PRESERVATIVE FREE 10 ML: 5 INJECTION INTRAVENOUS at 14:00

## 2023-02-08 RX ADMIN — MIDAZOLAM 0.5 MG: 1 INJECTION INTRAMUSCULAR; INTRAVENOUS at 14:40

## 2023-02-08 RX ADMIN — FENTANYL CITRATE 25 MCG: 50 INJECTION INTRAMUSCULAR; INTRAVENOUS at 14:40

## 2023-02-08 RX ADMIN — OXYBUTYNIN CHLORIDE 10 MG: 5 TABLET, EXTENDED RELEASE ORAL at 10:38

## 2023-02-08 RX ADMIN — SENNOSIDES AND DOCUSATE SODIUM 1 TABLET: 50; 8.6 TABLET ORAL at 10:38

## 2023-02-08 RX ADMIN — ASPIRIN 325 MG ORAL TABLET 325 MG: 325 PILL ORAL at 10:38

## 2023-02-08 RX ADMIN — PAROXETINE HYDROCHLORIDE 20 MG: 20 TABLET, FILM COATED ORAL at 10:38

## 2023-02-08 NOTE — PROGRESS NOTES
Discharge order noted for today. Pt has been accepted to [facility] agency. Met with patient and talked with pt's son, Flaquito Hyatt, and are agreeable to the transition plan today. Transport has been arranged through pt's son. Patient's discharge summary and home health  orders have been forwarded to [facility] home health  agency via [facility] Updated bedside RN, Maico Verdugo,  to the transition plan.   Discharge information has been documented on the AVS.       MARVA Palma  Care Manager

## 2023-02-08 NOTE — ROUTINE PROCESS
TRANSFER - OUT REPORT:    Verbal report given to Emma Moseley RN (name) on Jaxson Kraus  being transferred to [facility] (unit) for routine post - op       Report consisted of patients Situation, Background, Assessment and   Recommendations(SBAR). Information from the following report(s) SBAR, Procedure Summary, and MAR was reviewed with the receiving nurse. Lines:   Peripheral IV 02/07/23 Left Antecubital (Active)       Peripheral IV 02/08/23 Left (Active)        Opportunity for questions and clarification was provided.       Patient transported with:

## 2023-02-08 NOTE — PROGRESS NOTES
CM talked with Angie Palomo with [facility] health to discuss ps transitional plan of care. CM  completed referral [facility] referral  and pt was accepted for services.            MARVA Jenkins  Care Manager

## 2023-02-08 NOTE — PROGRESS NOTES
Bedside shift change report given to Leanne Brock RN (oncoming nurse) by Ally Costello RN (offgoing nurse). Report included the following information SBAR, Kardex, MAR, and Recent Results.

## 2023-02-08 NOTE — PROGRESS NOTES
CM talked with pt's son, Juan Zacarias, regarding pt's transitional plan of care. Pt's son stated he is in agreement with pt's dc plan of  with [facility] and when pt is ready for dc, he will come and transport pt home.          Asaf Schultz, MSW  Care Manager

## 2023-02-08 NOTE — PROGRESS NOTES
Problem: Self Care Deficits Care Plan (Adult)  Goal: *Acute Goals and Plan of Care (Insert Text)  Description: Occupational Therapy Goals  Initiated 2/1/2023 within 7 day(s). 1.  Patient will perform grooming with modified independence. 2.  Patient will perform bathing with supervision/set-up using adaptive equipment. 3.  Patient will perform upper body dressing and lower body dressing with supervision/set-up using adaptive equipment. 4.  Patient will perform toilet transfers with supervision/set-up using RW with good balance. 5.  Patient will perform all aspects of toileting with supervision/set-up. 6.  Patient will participate in upper extremity therapeutic exercise/activities with supervision/set-up for 8 minutes. 7.  Patient will utilize energy conservation techniques during functional activities with min verbal cues. Prior Level of Function: Mod I with ADLs and functional mobility using RW      Outcome: Progressing Towards Goal   OCCUPATIONAL THERAPY TREATMENT    Patient: Shayan Garrett (8 y.o. female)  Date: 2/8/2023  Diagnosis: NSTEMI (non-ST elevated myocardial infarction) ([facility] [address] [I21.4] <principal problem not specified>      Precautions: Fall, Seizure  PLOF: Mod I with ADLs and functional mobility using RW     Chart, occupational therapy assessment, plan of care, and goals were reviewed. ASSESSMENT:  Pt presented supine in bed upon entry and agreeable to work with OT. She came to EOB CGA in prep for simple ADL tasks. Once sitting, she demo G balance and performed grooming tasks SBA requiring cueing for completion of task. Pt engaged in dyn sitting tasks ~ 10 mins reaching in mult planes to challenge balance and stability for ADL carryover. Pt requesting to return back to supine 2/2 fatigue w/ CGA. She was positioned for comfort and left with HOB elevated, bed alarm active, and all needs left within reach. RN made aware.    Progression toward goals:  []          Improving appropriately and progressing toward goals  [x]          Improving slowly and progressing toward goals  []          Not making progress toward goals and plan of care will be adjusted     PLAN:  Patient continues to benefit from skilled intervention to address the above impairments. Continue treatment per established plan of care. Further Equipment Recommendations for Discharge: [place] [facility]    AMPA: Current research shows that an AM-PAC score of 17 or less is not associated with a discharge to the patient's home setting. Based on an AM-PAC score of 16/24 and their current ADL deficits; it is recommended that the patient have 3-5 sessions per week of Occupational Therapy at d/c to increase the patient's independence. This [facility] score should be considered in conjunction with interdisciplinary team recommendations to determine the most appropriate discharge setting. Patient's social support, diagnosis, medical stability, and prior level of function should also be taken into consideration. SUBJECTIVE:   Patient stated I feel clean now.     OBJECTIVE DATA SUMMARY:   Cognitive/Behavioral Status:  Neurologic State: Confused, Alert  Orientation Level: Oriented to person  Cognition: Impaired decision making  Safety/Judgement: Fall prevention    Functional Mobility and Transfers for ADLs:   Bed Mobility:     Supine to Sit: Contact guard assistance  Sit to Supine: Contact guard assistance  Scooting: Contact guard assistance           Balance:  Sitting: Intact  Sitting - Static: Good (unsupported)  Sitting - Dynamic: Good (unsupported)    ADL Intervention:       Grooming  Position Performed: Seated edge of bed  Brushing Teeth: Stand-by assistance  Brushing/Combing Hair: Stand-by assistance  Cues: Verbal cues provided;Visual cues provided    Pain:  Pain level pre-treatment: 0/10   Pain level post-treatment: 0/10      Activity Tolerance:    Fair   Please refer to the flowsheet for vital signs taken during this treatment.   After treatment:   []  Patient left in no apparent distress sitting up in chair  [x]  Patient left in no apparent distress in bed  [x]  Call bell left within reach  [x]  Nursing notified  []  Caregiver present  [x]  Bed alarm activated    COMMUNICATION/EDUCATION:   [x] Role of Occupational Therapy in the acute care setting  [] Home safety education was provided and the patient/caregiver indicated understanding. [x] Patient/family have participated as able in working towards goals and plan of care. [x] Patient/family agree to work toward stated goals and plan of care. [] Patient understands intent and goals of therapy, but is neutral about his/her participation. [] Patient is unable to participate in goal setting and plan of care. Thank you for this referral.  SONIA Doherty  Time Calculation: 23 mins    Jose G Rubio AM-PAC® Daily Activity Inpatient Short Form (6-Clicks)    How much HELP from another person does the patient currently need    (If the patient hasn't done an activity recently, how much help from another person do you think he/she would need if he/she tried?)   Total (Total A or Dep)   A Lot  (Mod to Max A)   A Little (Sup or Min A)   None (Mod I to I)   Putting on and taking off regular lower body clothing? [] 1 [] 2 [x] 3 [] 4   2. Bathing (including washing, rinsing,      drying)? [] 1 [x] 2 [] 3 [] 4   3. Toileting, which includes using toilet, bedpan or urinal?   [] 1 [x] 2 [] 3 [] 4   4. Putting on and taking off regular upper body clothing? [] 1 [] 2 [x] 3 [] 4   5. Taking care of personal grooming such as brushing teeth? [] 1 [] 2 [x] 3 [] 4   6. Eating meals?    [] 1 [] 2 [x] 3 [] 4

## 2023-02-08 NOTE — DISCHARGE SUMMARY
Hospitalist Discharge Summary    Patient: Trung Nash MRN: [id number]  CSN: [id number]    YOB: 1945  Age: 78 y.o. Sex: female    DOA: 1/28/2023 LOS:  LOS: 11 days   Discharge Date:     Admission Diagnoses: NSTEMI (non-ST elevated myocardial infarction) ([facility] [address] [I21.4]    Discharge Diagnoses:    ***    Discharge Condition: {condition:09656487}    Discharge To: {Discharge Destination:61679}    CODE STATUS: DNR      PHYSICAL EXAM  Visit Vitals  /69 (BP 1 Location: Right upper arm, BP Patient Position: At rest)   Pulse 90   Temp 96.9 °F (36.1 °C)   Resp 14   Ht 5' 6\" (1.676 m)   Wt 53.2 kg (117 lb 4.6 oz)   SpO2 97%   BMI 18.93 kg/m²       General: Alert, cooperative, no acute distress    Lungs:  CTA Bilaterally. No Wheezing/Rhonchi/Rales. Heart:  Regular rate and Rhythm. Abdomen: Soft, Non distended, Non tender. + Bowel sounds. Extremities: No edema/ cyanosis/ clubbing  Neurologic:  AA oriented X 3. Moves all extremities. HPI    Trung Nash is a 78 y.o. female with medical co-morbidities including hypertension, hyperlipidemia, depression, osteoporosis, dementia, history of childhood seizure, presented from home after her son witnessed her having tonic-clonic seizure. Per her son, she was found in the living room screaming at 3 in the morning, when he approached her he noted that she was having her arms and legs in the outstretched positions and shaking rhythmically. He reports that he tried to call her name but she was not responding to his call. He reported that her body was stiff and tight. He reported that she has been in normal state of health with the exception of having flulike symptoms in the preceding 2 weeks where she has not been eating and drinking regularly. He denied that she experienced fever or chill. Per her record, she had history of seizure disorder as a child, however, she did not have further seizure episode since high school. Hence, she has not been on antiseizure medication. EMS found her to be noncooperative and confused with the evaluation. At [facility] ED, her son reported that she was back in normal state with otherwise mild confusion. Per nursing report, she was alert and oriented with ability to answer questions without hesitation. At [facility] ER, initial laboratory work-up showed negative alcohol level, stable LFT, renal function with BUN 19, creatinine 1.48 (0.89 in June/22), CBC was within normal limit, CK 42, troponin 331, negative drug screen, CT of head was negative for acute intracranial pathology, chest x-ray without acute cardiopulmonary process. UA with trace of leukocyte esterase but without WBC. She was treated symptomatically and was plan for home discharge however on repeat high sensitive troponin, it increased to 5442. ER MD spoke with cardiologist and start this patient on heparin drip for concern of NSTEMI. Note EKG was in sinus rhythm without ST changes. Patient has been boarding at [facility] ER in the last 2 days, she had episode of agitation required sedative medications. She has tolerated heparin drip however her son noted her tongue to be edematous and bleeding hence her heparin drip was discontinued. Hospital Course:     ***    Follow up Care:     ***    Consults: {consults:[id number]}    Significant Diagnostic Studies:     Imaging:  XR Results (most recent):  Results from Hospital Encounter encounter on 01/28/23    XR CHEST PORT    Narrative  CHEST PORTABLE 0537 hours    COMPARISON: None. INDICATIONS: Seizure. FINDINGS:    Portable single view chest demonstrates the patient is rotated toward and  leaning towards the left Lungs: Clear. Cardiac Silhouette And Mediastinal Contours: Normal.    Pleural Spaces: No pneumothorax or pleural effusion evident. Bones And Soft Tissues: Unremarkable for age. Impression  No active or acute cardiopulmonary process is evident. CT Results (most recent):  Results from Hospital Encounter encounter on 01/28/23    CTA CHEST ABD PELV W WO CONT    Narrative  CTA CHEST,  ABDOMEN AND PELVIS WITH CONTRAST    COMPARISON: None    INDICATIONS: Back pain    TECHNIQUE:    Noncontrast CT was done initially through the chest, abdomen, and pelvis. Contrast enhanced CT was then performed following the uneventful administration  of 100 cc of Isovue-370. Scanning was done with a multislice scanner. Volumetric data acquisition was  performed through the chest, abdomen, and pelvis. Reconstructions were created  in the axial, coronal, and sagittal planes. The data was also loaded on an  independent imaging workstation. Postprocessing was performed with indication  wheezing creation of MIPS as well as 3D shaded surface virtual reality  angiographic images without and with bone removal.    CT CHEST FINDINGS:    The lungs show a granuloma in the left base. No consolidation is evident. .  There is no pneumothorax or pleural fluid or pleural plaque evident. .  There is no mediastinal adenopathy or mass. .  There is breast asymmetry with for confluent/dense appearing parenchyma on the  left  There is an enlarged left axillary node measuring 1.8 x 2.4 cm  . CT ABDOMEN FINDINGS:    Liver: Liver and biliary tree are unremarkable in appearance. Spleen: Negative. Left Kidney: Small cyst noted. Otherwise unremarkable. Right Kidney: Negative. Pancreas: Negative. Adrenal Glands: Negative. Stomach, Small Bowel And Colon: The stomach and small bowel are unremarkable. The appendix is not identified. There is a large stool burden. There is mild  rectosigmoid wall thickening. Lymph Nodes: No adenopathy is evident. .  Abdominal wall is unremarkable. Peritoneal Spaces: There is no free fluid or free air. The bladder is incompletely distended but unremarkable. Vascular:    Ascending Aorta: The sinuses of Valsalva measure 2.8 x 2.8 x 3 cm.   The sinotubular ridge measures 2.5 x 2.6 cm  The maximal ascending aortic diameter is 3 x 3.2 cm. .  There is no dissection or other acute abnormality apparent. Aortic Arch: The mid arch measures 2.7 x 2.7 cm with minimal atheromatous  disease apparent. Wing Dapper Vessels: The brachiocephalic vessels are widely patent. .    Descending Thoracic Aorta: Tapers from 2.3 x 2.4 cm to 2 x 2.2 cm with minimal  atheromatous abnormality and no evidence of dissection or acute aortic  syndrome. .    Abdominal Aorta: There is mild atheromatous irregularity. There is no aneurysm. The aorta tapers to a diameter of 1.5 cm at the bifurcation. .    Visceral Branches: Celiac, SMA, renals, and VIDA are patent    Iliofemoral runoff is unremarkable bilaterally    Osseous Structures Of Abdomen And Pelvis: No acute or aggressive abnormalities. Previous right hip replacement and displacement of the lesser trochanter. Previous lumbar surgery. Impression  No acute or active appearing vascular abnormality is evident. There is a pathologically enlarged left axillary lymph node. There is asymmetric  breast parenchyma, much more prominent and thicker on the left which may  represent a mass. . The most recent mammogram in this system, November 2019, did  not reveal slight asymmetry nor adenopathy. . Please correlate with breast  physical examination. Unless recently performed elsewhere mammographic  correlation is recommended. The appearance is concerning for possible breast  neoplasm with axillary adenopathy. Rectosigmoid thickening-possible colitis versus pseudothickening. All CT scans at this facility are performed using dose optimization technique as  appropriate to the performed exam, to include automated exposure control,  adjustment of the mA and/or kV according to patient's size (Including  appropriate matching for site-specific examinations), or use of iterative  reconstruction technique.       01/28/23    ECHO ADULT COMPLETE 01/31/2023 1/31/2023    Interpretation Summary    Technical qualifiers: Echo study was technically difficult due to low parasternal window. Left Ventricle: Normal left ventricular systolic function with a visually estimated EF of 70%. Left ventricle is smaller than normal. LVIDd is 3.6 cm. Normal wall thickness. Normal wall motion. Diastolic dysfunction present (grade I) with normal LV EF. Tricuspid Valve: Unable to assess RVSP due to inadequate or insignificant tricuspid regurgitation. Aorta: Normal sized aortic root. Ao Root diameter is 3.2 cm. Signed by: Kimberly Mccormick MD on 1/31/2023  1:14 PM       MRI Results (most recent):  Results from [place] encounter on 01/28/23    MRI BRAIN W WO CONT    Narrative  EXAM: MRI BRAIN W WO CONT    CLINICAL INDICATION/HISTORY: seizure    TECHNIQUE: Multisequence multiplanar MR imaging acquired through the brain with  and without IV contrast. Includes coronal temporal lobe imaging    COMPARISON: Recent head CT    FINDINGS:    Parenchyma: Unremarkable for age. Next temporal lobes Diffusion imaging unremarkable. No acute infarction. No  acute hemorrhage. No mass lesion. No pathologic enhancement. CSF spaces: Ventricles and cisterns remain midline in position    IAC regions: Unremarkable    Parasellar region: Unremarkable    Vasculature: Appropriate flow voids within the major skull base vasculature. Cervicomedullary junction: Patent    Orbits: No acute findings. Previous lens replacements. Paranasal sinuses: Clear    Impression  1. No acute intracranial hemorrhage or territorial infarct. No mass effect.  -No epileptogenic focus evident        Procedures:     ***       Current Discharge Medication List        START taking these medications    Details   levETIRAcetam (KEPPRA) 500 mg tablet Take 1 Tablet by mouth two (2) times a day.  Indications: additional medication to treat partial seizures  Qty: 60 Tablet, Refills: 0  Start date: 2/8/2023 CONTINUE these medications which have NOT CHANGED    Details   Constulose 10 gram/15 mL solution take 30 milliliters by mouth three times a day if needed for constipation      OLANZapine (ZyPREXA) 5 mg tablet Take 5 mg by mouth nightly. lovastatin (MEVACOR) 40 mg tablet Take 1 Tablet by mouth daily. Qty: 90 Tablet, Refills: 3      PARoxetine (PAXIL) 20 mg tablet TAKE 1 TABLET DAILY  Qty: 90 Tablet, Refills: 3      oxybutynin chloride XL (DITROPAN XL) 10 mg CR tablet TAKE 1 TABLET DAILY  Qty: 90 Tablet, Refills: 3      therapeutic multivitamin (THERAGRAN) tablet Take 1 Tablet by mouth daily. Indications: treatment to prevent vitamin deficiency      GOTU SILVIA HERB PO Take  by mouth. STOP taking these medications       oxyCODONE-acetaminophen (PERCOCET 10)  mg per tablet Comments:   Reason for Stopping:         potassium chloride (KAON 10%) 20 mEq/15 mL solution Comments:   Reason for Stopping:         furosemide (LASIX) 40 mg tablet Comments:   Reason for Stopping:                 Activity: {discharge activity:35317}    Diet: {diet:87823}    Wound Care: {wound care:10088}      Josefina Cline MD  2/8/2023, 4:11 PM    Total time spent *** mins  Disclaimer: Sections of this note are dictated using utilizing voice recognition software. Minor typographical errors may be present. If questions arise, please do not hesitate to contact me or call our department. oxybutynin chloride XL (DITROPAN XL) 10 mg CR tablet TAKE 1 TABLET DAILY  Qty: 90 Tablet, Refills: 3      therapeutic multivitamin (THERAGRAN) tablet Take 1 Tablet by mouth daily. Indications: treatment to prevent vitamin deficiency      GOTU SILVIA HERB PO Take  by mouth. STOP taking these medications       oxyCODONE-acetaminophen (PERCOCET 10)  mg per tablet Comments:   Reason for Stopping:         potassium chloride (KAON 10%) 20 mEq/15 mL solution Comments:   Reason for Stopping:         furosemide (LASIX) 40 mg tablet Comments:   Reason for Stopping:                 Activity: Activity as tolerated    Diet: Cardiac Diet    Wound Care: None needed      Brittanie Clay MD  2/8/2023, 4:11 PM    Total time spent 35 mins  Disclaimer: Sections of this note are dictated using utilizing voice recognition software. Minor typographical errors may be present. If questions arise, please do not hesitate to contact me or call our department.

## 2023-02-08 NOTE — PROCEDURES
RADIOLOGY POST PROCEDURE NOTE     February 8, 2023       3:09 PM     Preoperative Diagnosis:   Left breast mass with left axillary adenopathy    Postoperative Diagnosis:  Same. :  Cain Gottlieb    Assistant:  None. Type of Anesthesia: Moderate sedation    Procedure/Description: Ultrasound-guided biopsy enlarged left axillary lymph node    Findings:   Pending. Estimated blood Loss:  Minimal    Specimen Removed:   yes, pathology and flow cytometry. Blood transfusions:  None. Implants:  None.     Complications: None    Condition: Stable    Discharge Plan:  continue present therapy    Zeyad Jose MD

## 2023-02-08 NOTE — DISCHARGE INSTRUCTIONS
DISCHARGE SUMMARY from Nurse    PATIENT INSTRUCTIONS:    After general anesthesia or intravenous sedation, for 24 hours or while taking prescription Narcotics:  Limit your activities  Do not drive and operate hazardous machinery  Do not make important personal or business decisions  Do  not drink alcoholic beverages  If you have not urinated within 8 hours after discharge, please contact your surgeon on call. Report the following to your surgeon:  Excessive pain, swelling, redness or odor of or around the surgical area  Temperature over 100.5  Nausea and vomiting lasting longer than 4 hours or if unable to take medications  Any signs of decreased circulation or nerve impairment to extremity: change in color, persistent  numbness, tingling, coldness or increase pain  Any questions    What to do at Home:  Recommended activity: Activity as tolerated    If you experience any of the following symptoms: chest pain, shortness of breath, or fever above 100.4 F, please follow up with your primary care physician. *  Please give a list of your current medications to your Primary Care Provider. *  Please update this list whenever your medications are discontinued, doses are      changed, or new medications (including over-the-counter products) are added. *  Please carry medication information at all times in case of emergency situations. These are general instructions for a healthy lifestyle:    No smoking/ No tobacco products/ Avoid exposure to second hand smoke  Surgeon General's Warning:  Quitting smoking now greatly reduces serious risk to your health.     Obesity, smoking, and sedentary lifestyle greatly increases your risk for illness    A healthy diet, regular physical exercise & weight monitoring are important for maintaining a healthy lifestyle    You may be retaining fluid if you have a history of heart failure or if you experience any of the following symptoms:  Weight gain of 3 pounds or more overnight or 5 pounds in a week, increased swelling in our hands or feet or shortness of breath while lying flat in bed. Please call your doctor as soon as you notice any of these symptoms; do not wait until your next office visit. The discharge information has been reviewed with the patient and son. The patient and son verbalized understanding. Discharge medications reviewed with the patient and son and appropriate educational materials and side effects teaching were provided. Patient armband removed and shredded.    ___________________________________________________________________________________________________________________________________

## 2023-02-08 NOTE — PROGRESS NOTES
CM talked with pt's son, Micaela Alfredo, to discuss pt's transitional plan of care of [facility]. Pt's son stated pt is to have a procedure completed on this date. Pt's son stated once procedure is complete he is in agreement with pt plan of care of Home with [facility]. Pt's son will transport pt at time of dc.          Stacey Delcid MSW  Care manger

## 2023-02-08 NOTE — HOME CARE
Discharge order noted for today; Patient's demographics verified and  [facility] services have already been explained to patient's son Zi Markham ; [facility] referral updated and processed to [facility] central Intake and scheduling for SN,PT,SACHIN ANGEL LPN.

## 2023-02-08 NOTE — PROGRESS NOTES
CM made contact with pt's son, Marcell Mitchell, to discuss pt's transitional plan of care of SNF. CM presented the pt's current acceptances to [facility] and [address] [place] [address]. Pt's son stated he research both facilities with an update on a decision around 1200 hrs on this date. CM provided contact information for a follow-up call. 36- CM talked with pt's son to discuss pt's transitional plan of care. Pt's son sated that he is in agreement with pt's placement at [address] [place] [address].      Emily Dexter, MSW  Care Manager

## 2023-02-08 NOTE — CONSULTS
Interventional Radiology Consult Note  Patient: Jo Riley               Sex: female          DOA: 1/28/2023       YOB: 1945      Age:  78 y.o.        LOS:  LOS: 11 days              Assessment/Plan   Jo Riley is a 78 y.o. female admitted 1/28/2023 for witnessed seizure and altered mental status with incidental imaging findings of possible left breast mass and pathologically enlarged left axillary lymph node. Image-guided biopsy of left axillary lymph node is indicated for diagnosis and to guide further management. Case and images reviewed by Dr. Sindhu Ornelas. Image guided biopsy of left axillary lymph node with moderate sedation today  Phone consent and DNR waiver obtained from patient's son  Additional specimen orders per referring team    - NPO since midnight   -  given at 1130 -- okay to proceed per Dr. Sri Blandon reviewed     Thank you,  Fernando Turner PA-C  [id number]    HPI:     Consult received from Dr. Kaylee Santamaria for evaluation of possible left breast mass and left axillary lymphadenopathy. Jo Riley is a 78 y.o. female with a PMH of dementia, HTN, HLD, depression, osteoporosis childhood seizure disorder who presented to [facility] ER on 1/28/2023 for witnessed tonic-clonic seizure. Per report, patient had been experiencing flulike symptoms for 2 weeks prior to admission. In ED, patient had required ED laboratory evaluation was significant for elevated troponin (ED troponin 331 > 5542), JEIMY, negative drug screen, negative EtOH. Patient was admitted for further evaluation and management. Imaging was negative for acute neurological process, but CTA chest/abdomen/pelvis revealed asymmetric breast parenchyma on the left (new from 2019) and pathologically enlarged left axillary lymph node. IR was engaged by surgery on 2/7/2023 for assistance with further evaluation.     The anticipated procedure was discussed in detail including risk of injury, infection, and bleeding. All questions were answered and concerns addressed. Informed consents were obtained from patient's son.     Past Medical History:   Diagnosis Date    Chronic anxiety     Depression     controlled on Paxil    Diabetes (HCC)     DJD (degenerative joint disease)     GERD (gastroesophageal reflux disease)     High cholesterol     HTN (hypertension)     Hypertension     Low back pain     post-laminectomy syndrome and multilevel degenerative disease    Osteoporosis      Past Surgical History:   Procedure Laterality Date    HX ADENOIDECTOMY      HX APPENDECTOMY      HX BACK SURGERY      PHLD X3    HX BREAST BIOPSY Left 8/28/2015    WIDE LOCAL EXCISION LEFT BREAST LESION performed by Elza Conrad MD at [facility] MAIN OR    HX HERNIA REPAIR      hiatal    HX KNEE REPLACEMENT      HX ORTHOPAEDIC      bilateral shoulder surgery    HX ELVIRA AND BSO  1982    HX TONSILLECTOMY      OR TOTAL HIP ARTHROPLASTY  4/12     total right hip replacement, Dr. Nabila Hughes     Family History   Problem Relation Age of Onset    Cancer Mother         melanoma    Cancer Father         lung    Heart Disease Maternal Grandmother     Diabetes Other     Hypertension Other     Headache Other     Seizures Other     Coronary Art Dis Other     Heart Attack Other      Social History     Socioeconomic History    Marital status:    Occupational History    Occupation: office work     Comment: [employer]   Tobacco Use    Smoking status: Former    Smokeless tobacco: Never   Vaping Use    Vaping Use: Never used   Substance and Sexual Activity    Alcohol use: No    Drug use: No    Sexual activity: Not Currently     Social Determinants of Health     Financial Resource Strain: Low Risk     Difficulty of Paying Living Expenses: Not very hard   Food Insecurity: No Food Insecurity    Worried About Running Out of Food in the Last Year: Never true    Ran Out of Food in the Last Year: Never true     Prior to Admission medications    Medication Sig Start Date End Date Taking? Authorizing Provider   Constulose 10 gram/15 mL solution take 30 milliliters by mouth three times a day if needed for constipation 1/3/23   Provider, Historical   OLANZapine (ZyPREXA) 5 mg tablet Take 5 mg by mouth nightly. 12/29/22   Provider, Historical   oxyCODONE-acetaminophen (PERCOCET 10)  mg per tablet Take 1 Tablet by mouth every six (6) hours as needed for Pain. 1/5/23   Provider, Historical   potassium chloride (KAON 10%) 20 mEq/15 mL solution MIX 1 AND 1/2 TEASPOONFULS (=7.5ML) IN LIQUID AND     DRINK TWO TIMES A DAY. 1/13/23   Serina Lopez MD   lovastatin (MEVACOR) 40 mg tablet Take 1 Tablet by mouth daily. 12/16/22   Serina Lopez MD   PARoxetine (PAXIL) 20 mg tablet TAKE 1 TABLET DAILY 11/18/22   Serina Lopez MD   furosemide (LASIX) 40 mg tablet take 1 tablet by mouth once daily 10/14/22   Serina Lopez MD   oxybutynin chloride XL (DITROPAN XL) 10 mg CR tablet TAKE 1 TABLET DAILY 6/23/22   Serina Lopez MD   therapeutic multivitamin [facility]) tablet Take 1 Tablet by mouth daily. Indications: treatment to prevent vitamin deficiency    Provider, Historical   GOTU SILVIA HERB PO Take  by mouth. Provider, Historical     Allergies   Allergen Reactions    Aspirin Other (comments)     \"messes up my kidneys\"    Feldene [Piroxicam] Other (comments)     Kidney damage      Morphine Other (comments)     unconsciousness    Nsaids (Non-Steroidal Anti-Inflammatory Drug) Other (comments)     \"messes up my kidneys\"    Other Medication Not Reported This Time     Mycins      Penicillins Hives    Sulfa (Sulfonamide Antibiotics) Rash    Vancomycin Itching     Possible allergic reaction to Vancomycin.  Complained of itching/reddness around forehead/back of neck       Review of Systems  As above    Physical Exam:      Visit Vitals  BP (!) 154/65 (BP 1 Location: Right upper arm, BP Patient Position: At rest)   Pulse (!) 111   Temp 98.1 °F (36.7 °C)   Resp 18   Ht 5' 6\" (1.676 m) Wt 53.2 kg (117 lb 4.6 oz)   SpO2 99%   BMI 18.93 kg/m²       Physical Exam:  Gen: A&Ox2/4, NAD  Respiratory: Normal work of breathing, equal chest rise and fall  Cardiovascular: RRR  Gastrointestinal: Soft, NT/ND  Extremities: Skin warm and dry, moves all four spontaneously  Skin: Warm and dry    Labs Reviewed:  BMP (12/4/2023)  Sodium 139  Potassium 4.4  Chloride 111  CO2 24  AG 4  Glucose 109   BUN 8   0.90 creatinine    CBC (2/4/2023):  WBC 6.8  Hgb 10.4  HCT 32.4  Platelets 065    COAGS:   Lab Results   Component Value Date/Time    PTP 13.5 02/08/2023 03:26 AM    INR 1.0 02/08/2023 03:26 AM     Blood Thinners:   last dose 2/8/2023

## 2023-02-09 ENCOUNTER — PATIENT OUTREACH (OUTPATIENT)
Dept: CASE MANAGEMENT | Age: 78
End: 2023-02-09

## 2023-02-09 NOTE — PROGRESS NOTES
Care Transitions Initial Call    Call within 2 business days of discharge: Yes     Patient: Gio Walsh Patient : 1945 MRN: [id number]    Last Discharge  [address]       Date Complaint Diagnosis Description Type Department Provider    23 Altered mental status Seizure disorder ([facility] [address] . .. ED to Hosp-Admission (Discharged) (ADMIT) Racquel Nicholas MD; Rocky Figueroa. .. Was this an external facility discharge? No Discharge Facility: n/a    Challenges to be reviewed by the provider   Additional needs identified to be addressed with provider: no  none         Method of communication with provider : none    Discussed COVID-19 related testing which was available at this time. Test results were negative. Inpatient Readmission Risk score: Unplanned Readmit Risk Score: 10.7    Was this a readmission? no       Patients top risk factors for readmission:  chronic medical conditions. Interventions to address risk factors:  advised to follow up with PCP. CTN attempt to contact Pt. On phone for hospital follow up. CTN reached Pt. Son Mr. Barrie Page on phone. Pt. Son reported that Pt. Normally wakes up around 11 am.      Pt. Son is listed on PHI but is unable to provide CTN the Password at this time. During this outreach, CTN did not release any HIPAA protected information and solely obtained information from Pt. Son and answered general questions. Pt. Son reports that Pt. Usually wake up around 11am.   Patient son reported that he moved back with Pt. to take care of  her. Pt. Son reports that he works from home. Pt. Son reported that Pt. Got home last night from the hospital.   Pt. Son prefers to call PCP office to schedule Pt. PCP appt. CTN gave general reminder  to take Pt. to the nearest emergency room for chest pain, shortness of breath, returning of symptoms and/or worsening of symptoms. Pt. Son Verbalized and repeated back understanding.      CTN reviewed red flags to ED with  Pt. son  who verbalized understanding. Were discharge instructions available to patient? Pt. Son reported that he was given D/C paper works. . Reviewed appropriate site of care based on symptoms and resources available to patient including: PCP and When to call 911. The  Pt. son  agrees to contact the PCP office for questions related to their healthcare. Medication reconciliation was not performed with  Pt. son , Pt. Son reports that everything is the same except for Keppra 500mg. Plan for follow-up call in 7-10 days based on severity of symptoms and risk factors. Plan for next call: follow up PCP appt. , assess symptoms, assess for any needs. CTN provided contact information for future needs.

## 2023-02-10 ENCOUNTER — HOME CARE VISIT (OUTPATIENT)
Dept: SCHEDULING | Facility: HOME HEALTH | Age: 78
End: 2023-02-10
Payer: MEDICARE

## 2023-02-10 ENCOUNTER — HOME CARE VISIT (OUTPATIENT)
Dept: HOME HEALTH SERVICES | Facility: HOME HEALTH | Age: 78
End: 2023-02-10
Payer: MEDICARE

## 2023-02-10 VITALS
SYSTOLIC BLOOD PRESSURE: 110 MMHG | TEMPERATURE: 98.4 F | RESPIRATION RATE: 16 BRPM | OXYGEN SATURATION: 98 % | HEART RATE: 72 BPM | DIASTOLIC BLOOD PRESSURE: 62 MMHG

## 2023-02-10 PROCEDURE — 400018 HH-NO PAY CLAIM PROCEDURE

## 2023-02-10 PROCEDURE — G0299 HHS/HOSPICE OF RN EA 15 MIN: HCPCS

## 2023-02-10 PROCEDURE — G0151 HHCP-SERV OF PT,EA 15 MIN: HCPCS

## 2023-02-10 NOTE — HOME HEALTH
Skilled services/Home bound verification:     Skilled Reason for admission/summary of clinical condition: PDGM Dx:  Seizure, new onset    Disciplines requested: SN,PT,OT     Services to provide: PT/OT eval and treat, skilled nursing and education  List of Comorbidities:    Chronic anxiety      Depression        controlled on Paxil    Diabetes (HC C)      DJD (degenerative joint disease)      GERD (gastroesophageal reflux disease)      High cholesterol      HTN (hypertension)      Hypertension      Low back pain        post-laminectomy syndrome and multilevel degenerative disease    Osteoporosis   This patient is homebound for the following reasons Requires considerable and taxing effort to leave the home . Caregiver: relative. Caregiver assists with iadls, adls, meal prep, med management, taking to md appointments. Medications reconciled and all medications are available in the home this visit. The following education was provided regarding medications: patient/cg  to continue to take medications as prescribed. patient aware to monitor for effectiveness and to notify staff of any adverse reactions to medications/any changes to medication regimen. reviewed side effects, purposes, dosage, frequencies    Medications  are EFFECTIVE at this time. High risk medication teaching regarding anticoagulants, hyperglycemic agents or opiod narcotics performed (specify)   oxyCODONE-acetaminophen (PERCOCET 10)  mg per tablet [[id number]]    Order Details  Dose: 1 Tablet Route: Oral Frequency: EVERY 4 HOURS AS NEEDED for Pain   Controlled substance OXYCODONE  High risk for addiction and dependence. Can cause respiratory distress and death when taken in high doses or when combined with other substances, especially alcohol or other illicit drugs such as heroin or cocaine.     notified of any discrepancies/look a like medications/medication interactions N/A    Home health supplies by type and quantity ordered/delivered this visit include: N/A    Patient education provided this visit to include:   SEE CARE PLAN    Patient/CG level of understanding of education provided: PATIENT/CG (LUZ ELENA) WAS ABLE TO REPEAT BACK AND VOICED UNDERSTANDING OF ALL EDUCATION PROVIDED. [facility] Education Provided: N/A  Patient response to procedure performed:  N/A    Home exercise program/Homework provided: HYDRATE, AMBULATE AS TOLERATED, TAKE MEDS AS DIRECTED, REST WHEN NEEDED    Pt/Caregiver instructed on plan of care and are agreeable to plan of care at this time. Physician Branden Oates MD notified of patient admission to home health and plan of care including anticipated frequency of 1WK4, 2PRN and treatments/interventions/modalities of DISEASE MED MANAGEMENT, PDGM Dx:  Seizure, new onset      Discharge planning discussed with patient and caregiver. Discharge planning as follows: Patient will be discharged once education has completed, patient is medically stable and pt/cg are able to independently manage medications and disease process. Venu Haas Pt/Caregiver did verbalize understanding of discharge planning. Next MD appointment TBD (date) with Robert Troy MD.  Patient/caregiver encouraged/instructed to keep appointment as lack of follow through with physician appointment could result in discontinuation of home care services for non-compliance.

## 2023-02-10 NOTE — CASE COMMUNICATION
Joanette Schwab has been admitted to [facility] for skilled nursing and pt. Patient is doing well and pain is currently well controlled. Med req was completed with no issues. Nursing will continue to monitor.     Respectfully,   Silvana Vila RN

## 2023-02-11 NOTE — HOME HEALTH
PT eval:  Pt is a 77 y/o female who was admitted to hospital on 1/30/23-2/8/23 with diagnosis of seizure, acute kidney injury. Pt  is now home and now referred to home care PT due to generalized weakness. PMH:  Chronic anxiety; Depression controlled on Paxil;  Diabetes; DJD (degenerative joint disease);  GERD (gastroesophageal reflux disease); High cholesterol; HTN (hypertension); Hypertension; Low back pain post-laminectomy syndrome and multilevel degenerative disease;  Osteoporosis;  according to son, pt also had B TKR and L THR and back surgeries in the past.  PLOF/living environment:  household ambulation with supervision using a rollator walker;  retired; lives with son Carrie Clinton in a one story house. PREC: high fall risk;  forgetful;  B AFO. S:  Pt.'s son denies falls  and he reported pt's change in meds during [facility] this morning   Pt's goal in PT is to get stronger and walk. Pt's son reports that pt had a fall while walking outside doctor's clinic and pt sustained R knee fracture that healed on its own. Pt was prescribed a knee brace to prevent R knee from buckling but has not used brace. O:  posture:  severe B ankle valgus, L worse than R;  thoracic kyphosis;  lump on L posterior superior iliac spine (PSIS). Son will ask pt's PCP about this swelling/lump. B AFO's applied by her son. Pain:  back = see pain assessment;  chronic back pain and has been a patient at pain management clinic. Integumentary:  intact. ROM:  BLE = chronic limitation of motion on B knees and hips due to history of B TkR and L THR. MMT:  BLE = 2+ to 3+/5  Functional strength test:  Bed  mobility:  sit <> supine =  SBA  ;  rolling =  SBA  ;  Transfers:  sit <> stand from recliner chair  =  min A;  Balance: Tinetti  = deferred due to being tired;  TUG = deferred due to need for assistance in sit to stand;  Gait:  20 ft with Min A using rollator walker exhibiting slow elsie, decreased step length gait pattern.   Endurance: 2MWT: unable, indicating poor walking endurance  Physical activity:  walk once every hour with walker and assistance. Patient education:   Fall risk reduction strategies = use non skid shoes, use AD and gait belt, ask for supervision. Maintenance of skin integrity:  change body position every 1-2 hours. Physical activity:  Ambulate once every hour  with supervision, during daytime. Patient level of understanding of education provided: Son verbalized understanding on need to wear AFO's when walking indoors and walk every hour during daytime. Patient response to treatment:  Gets tired easily. Caregiver involvement/assistance needed: violette Ruiz (name of caregiver) assists with self care, ADLs, iADLs, functional mobility, transportation. A:  Pt is diagnosed with seizure and generalized weakness  and presents with the following functional impairments: decreased gait, decreased transfers, poor walking/standing endurance, and severe BLE and spine deformity . Pt will benefit from PT to improve strength , balance and endurance to promote safe and functional mobility in pt's environment  and achieve highest functional level possible. Pt will benefit from a WC for outdoor mobility. P:  PT frequency 1w1, 2w3, 1w1 for therex for BLE strengthening, transfer training, gait training, HEP, order request for [facility] for outdoor mobility.

## 2023-02-14 ENCOUNTER — OFFICE VISIT (OUTPATIENT)
Age: 78
End: 2023-02-14

## 2023-02-14 VITALS
TEMPERATURE: 96.9 F | SYSTOLIC BLOOD PRESSURE: 104 MMHG | RESPIRATION RATE: 14 BRPM | OXYGEN SATURATION: 96 % | WEIGHT: 110.4 LBS | HEART RATE: 97 BPM | HEIGHT: 66 IN | BODY MASS INDEX: 17.74 KG/M2 | DIASTOLIC BLOOD PRESSURE: 66 MMHG

## 2023-02-14 DIAGNOSIS — Z09 HOSPITAL DISCHARGE FOLLOW-UP: ICD-10-CM

## 2023-02-14 DIAGNOSIS — Z76.89 ENCOUNTER FOR SUPPORT AND COORDINATION OF TRANSITION OF CARE: Primary | ICD-10-CM

## 2023-02-14 DIAGNOSIS — R56.9 SEIZURE (HCC): ICD-10-CM

## 2023-02-14 DIAGNOSIS — R22.2 LUMP OF SKIN OF BACK: ICD-10-CM

## 2023-02-14 RX ORDER — LEVETIRACETAM 500 MG/1
TABLET ORAL
COMMUNITY
Start: 2023-02-08

## 2023-02-14 RX ORDER — OXYBUTYNIN CHLORIDE 10 MG/1
TABLET, EXTENDED RELEASE ORAL
COMMUNITY
Start: 2023-01-25

## 2023-02-14 RX ORDER — PAROXETINE HYDROCHLORIDE 20 MG/1
TABLET, FILM COATED ORAL
COMMUNITY
Start: 2023-01-30

## 2023-02-14 RX ORDER — LOVASTATIN 40 MG/1
TABLET ORAL
COMMUNITY
Start: 2022-12-17

## 2023-02-14 RX ORDER — LACTULOSE 10 G/15ML
SOLUTION ORAL
COMMUNITY
Start: 2023-01-03

## 2023-02-14 RX ORDER — OXYCODONE AND ACETAMINOPHEN 10; 325 MG/1; MG/1
TABLET ORAL
COMMUNITY
Start: 2023-02-05

## 2023-02-14 RX ORDER — POTASSIUM CHLORIDE 20MEQ/15ML
LIQUID (ML) ORAL
COMMUNITY
Start: 2023-01-13

## 2023-02-14 RX ORDER — OLANZAPINE 5 MG/1
TABLET ORAL
COMMUNITY
Start: 2022-12-29

## 2023-02-14 RX ORDER — FUROSEMIDE 40 MG/1
TABLET ORAL
COMMUNITY
Start: 2023-01-15

## 2023-02-14 SDOH — ECONOMIC STABILITY: HOUSING INSECURITY
IN THE LAST 12 MONTHS, WAS THERE A TIME WHEN YOU DID NOT HAVE A STEADY PLACE TO SLEEP OR SLEPT IN A SHELTER (INCLUDING NOW)?: NO

## 2023-02-14 SDOH — ECONOMIC STABILITY: FOOD INSECURITY: WITHIN THE PAST 12 MONTHS, YOU WORRIED THAT YOUR FOOD WOULD RUN OUT BEFORE YOU GOT MONEY TO BUY MORE.: NEVER TRUE

## 2023-02-14 SDOH — ECONOMIC STABILITY: FOOD INSECURITY: WITHIN THE PAST 12 MONTHS, THE FOOD YOU BOUGHT JUST DIDN'T LAST AND YOU DIDN'T HAVE MONEY TO GET MORE.: NEVER TRUE

## 2023-02-14 SDOH — ECONOMIC STABILITY: INCOME INSECURITY: HOW HARD IS IT FOR YOU TO PAY FOR THE VERY BASICS LIKE FOOD, HOUSING, MEDICAL CARE, AND HEATING?: NOT HARD AT ALL

## 2023-02-14 ASSESSMENT — PATIENT HEALTH QUESTIONNAIRE - PHQ9
4. FEELING TIRED OR HAVING LITTLE ENERGY: 0
SUM OF ALL RESPONSES TO PHQ9 QUESTIONS 1 & 2: 0
9. THOUGHTS THAT YOU WOULD BE BETTER OFF DEAD, OR OF HURTING YOURSELF: 0
SUM OF ALL RESPONSES TO PHQ QUESTIONS 1-9: 0
3. TROUBLE FALLING OR STAYING ASLEEP: 0
SUM OF ALL RESPONSES TO PHQ QUESTIONS 1-9: 0
SUM OF ALL RESPONSES TO PHQ QUESTIONS 1-9: 0
8. MOVING OR SPEAKING SO SLOWLY THAT OTHER PEOPLE COULD HAVE NOTICED. OR THE OPPOSITE, BEING SO FIGETY OR RESTLESS THAT YOU HAVE BEEN MOVING AROUND A LOT MORE THAN USUAL: 0
1. LITTLE INTEREST OR PLEASURE IN DOING THINGS: 0
7. TROUBLE CONCENTRATING ON THINGS, SUCH AS READING THE NEWSPAPER OR WATCHING TELEVISION: 0
10. IF YOU CHECKED OFF ANY PROBLEMS, HOW DIFFICULT HAVE THESE PROBLEMS MADE IT FOR YOU TO DO YOUR WORK, TAKE CARE OF THINGS AT HOME, OR GET ALONG WITH OTHER PEOPLE: 0
SUM OF ALL RESPONSES TO PHQ QUESTIONS 1-9: 0
5. POOR APPETITE OR OVEREATING: 0
6. FEELING BAD ABOUT YOURSELF - OR THAT YOU ARE A FAILURE OR HAVE LET YOURSELF OR YOUR FAMILY DOWN: 0
2. FEELING DOWN, DEPRESSED OR HOPELESS: 0

## 2023-02-14 NOTE — PROGRESS NOTES
Post-Discharge Transitional Care Management Services      Minerva Barahona   YOB: 1945    Date of Visit:  2/14/2023  30 Day Post-Discharge Date: ***    Allergies   Allergen Reactions    Aspirin Other (See Comments)     \"messes up my kidneys\"    Morphine Other (See Comments)     unconsciousness    Nsaids Other (See Comments)     \"messes up my kidneys\"    Penicillins Hives    Piroxicam Other (See Comments)     Kidney damage    Vancomycin Itching     Possible allergic reaction to Vancomycin. Complained of itching/reddness around forehead/back of neck    Sulfa Antibiotics Rash     Outpatient Medications Marked as Taking for the 2/14/23 encounter (Office Visit) with Humphrey Friday, DO   Medication Sig Dispense Refill    furosemide (LASIX) 40 MG tablet take 1 tablet by mouth once daily      CONSTULOSE 10 GM/15ML solution take 30 milliliters by mouth three times a day if needed for constipation      levETIRAcetam (KEPPRA) 500 MG tablet take 1 tablet by mouth twice a day      lovastatin (MEVACOR) 40 MG tablet       OLANZapine (ZYPREXA) 5 MG tablet take 1 tablet by mouth at bedtime      oxybutynin (DITROPAN-XL) 10 MG extended release tablet       oxyCODONE-acetaminophen (PERCOCET)  MG per tablet take 1 tablet by mouth four times a day for chronic pain      PARoxetine (PAXIL) 20 MG tablet       potassium chloride 20 MEQ/15ML (10%) oral solution            Vitals:    02/14/23 1119   BP: 104/66   Site: Left Upper Arm   Position: Sitting   Cuff Size: Small Adult   Pulse: 97   Resp: 14   Temp: 96.9 °F (36.1 °C)   TempSrc: Temporal   SpO2: 96%   Weight: 110 lb 6.4 oz (50.1 kg)   Height: 5' 6\" (1.676 m)     Body mass index is 17.82 kg/m².      Wt Readings from Last 3 Encounters:   02/14/23 110 lb 6.4 oz (50.1 kg)   10/24/22 125 lb 9.6 oz (57 kg)   09/22/22 140 lb (63.5 kg)     BP Readings from Last 3 Encounters:   02/14/23 104/66   02/10/23 110/62   10/24/22 111/63        Patient was admitted to [facility]:19898} from *** to *** for ***. Inpatient course: Discharge summary reviewed- see chart. Current status: ***    Review of Systems:  {ROS Comprehensive:47369::\"A comprehensive review of systems was negative except for what was noted in the HPI. \"}    Physical Exam:  {GENERAL PHYSICAL EXAM:19990}    Initial post-discharge communication occurred between {TCMPN1:09905} and {TCMPN2:76626} on ***- see documentation in chart: {TCMPN3:15086}. Assessment/Plan:  There are no diagnoses linked to this encounter.       Diagnostic test results reviewed: {TCMPN5:12386}    Patient risk of morbidity and mortality: {Desc; low/moderate/high:920278}    Medical Decision Making: {TCMPN4:94379}

## 2023-02-14 NOTE — PROGRESS NOTES
Post-Discharge Transitional Care Follow Up      Cely Hugo   YOB: 1945    Date of Office Visit:  2/14/2023  Date of Hospital Admission: 1/28/23  Date of Hospital Discharge: 2/8/23  Readmission Risk Score (high >=14%. Medium >=10%):No data recorded    Care management risk score Rising risk (score 2-5) and Complex Care (Scores >=6): No Risk Score On File     Non face to face  following discharge, date last encounter closed (first attempt may have been earlier): *No documented post hospital discharge outreach found in the last 14 days     Call initiated 2 business days of discharge: *No response recorded in the last 14 days     Encounter for support and coordination of transition of care  Seizure (HCC)  Comments:  Patient is currently stable and reports no seizure. She will be referred to neurology for continued management.   Orders:  -     [facility] - [facility] [place]  Lump of skin of back  -     [facility] - Nam Mueller MD, General Surgery, [facility] discharge follow-up  -     IN DISCHARGE MEDS RECONCILED W/ CURRENT OUTPATIENT MED LIST      Medical Decision Making: moderate complexity  Return if symptoms worsen or fail to improve.           Subjective:   HPI  Pleasant 77-year-old female with multiple comorbidities who was recently discharged from [facility] on 2/9/2023 status post treatment for seizure disorder.      Per patient's son, there has been no seizures since discharge.  Patient is tolerating orally without issues.      Patient's son notes a lump/swelling on the upper aspect of patient's left buttock measuring approximately 3.5 x 3.5 cm.  Swelling is nontender.      Patient's son is unable to confirm timeline or progression of the swelling.  Inpatient course: Discharge summary reviewed- see chart.    Interval history/Current status: Patient is currently stable.    Patient Active Problem List   Diagnosis    Hypertension    DJD (degenerative joint disease)  Overactive bladder    Weight loss    Hyperlipidemia LDL goal <100    Prediabetes    Insomnia    Chronic renal failure, stage 3 (moderate) (HCC)    Diabetes (HCC)    High cholesterol    Lumbosacral spondylosis without myelopathy    Rheumatoid arthritis involving multiple joints (HCC)    Nausea    Urgency incontinence    Chest pain    Chronic pain syndrome    Chronic anxiety    Depression    Major depression    Postlaminectomy syndrome, lumbar region    Encounter for long-term (current) use of high-risk medication    Spasm of muscle    Hyperlipidemia LDL goal <130    Lumbosacral radiculopathy    GERD (gastroesophageal reflux disease)    Low back pain    Back pain    NSTEMI (non-ST elevated myocardial infarction) ([address]    Seizure ([address]    Breast mass, left    PINKY (acute kidney injury) [facility]    Severe protein-calorie malnutrition ([facility]       Medications listed as ordered at the time of discharge from hospital     Medication List            Accurate as of February 14, 2023 11:59 PM. If you have any questions, ask your nurse or doctor.                 CONTINUE taking these medications      Constulose 10 GM/15ML solution  Generic drug: lactulose     furosemide 40 MG tablet  Commonly known as: LASIX     levETIRAcetam 500 MG tablet  Commonly known as: KEPPRA     lovastatin 40 MG tablet  Commonly known as: MEVACOR     OLANZapine 5 MG tablet  Commonly known as: ZYPREXA     oxybutynin 10 MG extended release tablet  Commonly known as: DITROPAN-XL     oxyCODONE-acetaminophen  MG per tablet  Commonly known as: PERCOCET     PARoxetine 20 MG tablet  Commonly known as: PAXIL     potassium chloride 20 MEQ/15ML (10%) oral solution               Medications marked \"taking\" at this time  Outpatient Medications Marked as Taking for the 2/14/23 encounter (Office Visit) with Brent Borrero, DO   Medication Sig Dispense Refill    furosemide (LASIX) 40 MG tablet take 1 tablet by mouth once daily      CONSTULOSE 10 GM/15ML solution take 30 milliliters by mouth three times a day if needed for constipation      levETIRAcetam (KEPPRA) 500 MG tablet take 1 tablet by mouth twice a day      lovastatin (MEVACOR) 40 MG tablet       OLANZapine (ZYPREXA) 5 MG tablet take 1 tablet by mouth at bedtime      oxybutynin (DITROPAN-XL) 10 MG extended release tablet       oxyCODONE-acetaminophen (PERCOCET)  MG per tablet take 1 tablet by mouth four times a day for chronic pain      PARoxetine (PAXIL) 20 MG tablet       potassium chloride 20 MEQ/15ML (10%) oral solution           Medications patient taking as of now reconciled against medications ordered at time of hospital discharge: Yes    Review of Systems   Constitutional: Negative. HENT: Negative. Respiratory: Negative. Cardiovascular: Negative. Gastrointestinal: Negative. Genitourinary: Negative. Musculoskeletal:  Positive for gait problem. Objective:    /66 (Site: Left Upper Arm, Position: Sitting, Cuff Size: Small Adult)   Pulse 97   Temp 96.9 °F (36.1 °C) (Temporal)   Resp 14   Ht 5' 6\" (1.676 m)   Wt 110 lb 6.4 oz (50.1 kg)   SpO2 96%   BMI 17.82 kg/m²   Physical Exam  Constitutional:       General: She is not in acute distress. Appearance: Normal appearance. She is not ill-appearing or diaphoretic. HENT:      Head: Normocephalic and atraumatic. Right Ear: External ear normal.      Left Ear: External ear normal.   Cardiovascular:      Rate and Rhythm: Normal rate and regular rhythm. Pulses: Normal pulses. Heart sounds: Normal heart sounds. No murmur heard. No friction rub. No gallop. Pulmonary:      Effort: Pulmonary effort is normal.      Breath sounds: Normal breath sounds. No wheezing, rhonchi or rales. Skin:     General: Skin is warm. Comments: Lump measuring approximately 3.5 x 3.5 cm over the upper one third of the left buttock. Neurological:      Mental Status: She is alert.        An electronic signature was used to authenticate this note. --Jessica Courtney, DO  This note was dictated utilizing voice recognition software which may lead to typographical errors. I apologize in advance if the situation occurs. If questions arise please do not hesitate to contact me or call our department.

## 2023-02-15 ENCOUNTER — TELEPHONE (OUTPATIENT)
Age: 78
End: 2023-02-15

## 2023-02-15 ENCOUNTER — CARE COORDINATION (OUTPATIENT)
Facility: CLINIC | Age: 78
End: 2023-02-15

## 2023-02-15 ENCOUNTER — HOME CARE VISIT (OUTPATIENT)
Dept: HOME HEALTH SERVICES | Facility: HOME HEALTH | Age: 78
End: 2023-02-15
Payer: MEDICARE

## 2023-02-15 ENCOUNTER — HOME CARE VISIT (OUTPATIENT)
Dept: SCHEDULING | Facility: HOME HEALTH | Age: 78
End: 2023-02-15
Payer: MEDICARE

## 2023-02-15 PROCEDURE — G0157 HHC PT ASSISTANT EA 15: HCPCS

## 2023-02-15 ASSESSMENT — ENCOUNTER SYMPTOMS
RESPIRATORY NEGATIVE: 1
GASTROINTESTINAL NEGATIVE: 1

## 2023-02-15 NOTE — CARE COORDINATION
[facility] Care Transitions Follow Up Call    Patient: Akosua Villegas  Patient : 1945   MRN: [id number]  Reason for seizure Admission: 23  Discharge Date: 23 RARS: No data recorded    Needs to be reviewed by the provider   Additional needs identified to be addressed with provider: No  none             Method of communication with provider: none. CTN attempt to contact Pt. On phone for hospital follow up. CTN reached Pt. Son Mr. Sheri Castelan on phone. Pt. Son is listed on [facility] but did not provide CTN the Password at this time. During this outreach, CTN did not release any HIPAA protected information and solely obtained information from Pt. Son. Pt. Son reported that Patient attended another provider  from PCP office yesterday 23. Pt. Appt went okay per Pt. Son. Pt. Son reported that Pt. Was referred to General Surgeon and Neurology dr. Shade Estrada. Son reports that he will call and make an appt. With neurology. Pt. Son states that home PT will see Pt. Today. No other questions, concerns and/or needs verbalized by Pt. Son at this time. CTN gave general reminder to take Pt. to the nearest emergency room for chest pain, shortness of breath, returning of symptoms and/or worsening of symptoms. Pt. Son Verbalized and repeated back understanding. CTN reviewed red flags to ED with Pt. son who verbalized understanding. Were discharge instructions available to patient? Pt. Son reported that he was given D/C paper works. . Reviewed appropriate site of care based on symptoms and resources available to patient including: PCP and When to call 911. The Pt. son agrees to contact the PCP office for questions related to their healthcare. Addressed changes since last contact:   referrals to Neurology and General Surgery. Discussed follow-up appointments. If no appointment was previously scheduled, appointment scheduling offered: Pt. Attended PCP appt. 23.      Is follow up appointment scheduled within 7 days of discharge? Yes. Follow Up  No future appointments. Non-[facility] follow up appointment(s): Pt. Son will schedule General Surgeon and Neurology appt. Offered patient enrollment in the Remote Patient Monitoring (RPM) program for in-home monitoring: NA.     Care Transitions Subsequent and Final Call    Subsequent and Final Calls  Care Transitions Interventions  Other Interventions:    Call PCP or CTN office for any questions, concerns and/or needs. Schedule appt. With Neurology and General Surgery. Care Transition Nurse provided contact information for future needs. Plan for follow-up call in 5-7 days based on severity of symptoms and risk factors. Plan for next call:  follow up Neurology, General Surgeon and Seizure medication refill.      Heather Arellano RN

## 2023-02-15 NOTE — TELEPHONE ENCOUNTER
Called and spoke with dr Noy Tariq office faxed a copy of pathology and they will be sure he sees the results also made them aware dr franco sent him message in [facility]

## 2023-02-16 ENCOUNTER — HOME CARE VISIT (OUTPATIENT)
Dept: SCHEDULING | Facility: HOME HEALTH | Age: 78
End: 2023-02-16
Payer: MEDICARE

## 2023-02-16 VITALS
OXYGEN SATURATION: 97 % | SYSTOLIC BLOOD PRESSURE: 110 MMHG | TEMPERATURE: 97.9 F | HEART RATE: 79 BPM | RESPIRATION RATE: 16 BRPM | DIASTOLIC BLOOD PRESSURE: 60 MMHG

## 2023-02-16 VITALS
HEART RATE: 85 BPM | TEMPERATURE: 98.4 F | SYSTOLIC BLOOD PRESSURE: 105 MMHG | DIASTOLIC BLOOD PRESSURE: 58 MMHG | OXYGEN SATURATION: 97 %

## 2023-02-16 PROCEDURE — G0299 HHS/HOSPICE OF RN EA 15 MIN: HCPCS

## 2023-02-16 RX ORDER — LEVETIRACETAM 500 MG/1
500 TABLET ORAL 2 TIMES DAILY
Qty: 120 TABLET | Refills: 3 | Status: SHIPPED | OUTPATIENT
Start: 2023-02-16

## 2023-02-16 NOTE — TELEPHONE ENCOUNTER
Patient's son called in stating that his mom is still experiencing constipation and is requesting new medication. I did advise him that an appt is required for a new medication request. Patient has been scheduled to come in on Monday 2/20 @ 8:40. Patient's son stated he will send a message through my chart requesting the medication before the appt.

## 2023-02-17 ENCOUNTER — HOME CARE VISIT (OUTPATIENT)
Dept: HOME HEALTH SERVICES | Facility: HOME HEALTH | Age: 78
End: 2023-02-17
Payer: MEDICARE

## 2023-02-17 NOTE — HOME HEALTH
Skilled reason for visit: DISEASE MED MANAGEMENT, PHYSICAL ASSESSMENT,     Caregiver involvement: family, iadls, adls, meal prep, med management, taking to md appointments. Medications reviewed and all medications are available in the home this visit. The following education was provided regarding medications:  patient/cg reminded to continue to take medications as prescribed. patient aware to monitor for effectiveness and to notify staff of any adverse reactions to medications/any changes to medication regimen. .    MD notified of any discrepancies/look a-like medications/medication interactions: NA  Medications are EFFECTIVE at this time. Home health supplies by type and quantity ordered/delivered this visit include: NA    Patient education provided this visit: encouraged patient to get three nutritional meals daily and to stay hydrated, drink plenty of fluids. patient made aware to monitor for s/s of infection [increased swelling, increased redness around site, increased pain, foul smelling drainage, fever] aware who to report to/when. SEE CARE PLAN  Sharps education provided: NA    Patient level of understanding of education provided: PATIENT/CG  WAS ABLE TO REPEAT BACK AND VOICED UNDERSTANDING OF ALL EDUCATION PROVIDED.     Skilled Care Performed this visit: SAME AS REASON FOR VISIT    Patient response to procedure performed:   PATIENT TOLERATED WELL WITH NO C/O OF INCREASED PAIN OR DISCOMFORT    Agency Progress toward goals: PROGRESSING     Patient's Progress towards personal goals:     Home exercise program: PATIENT TO TAKE ALL MEDS AS ORDERED, DO ICS X10/HR WHILE AWAKE, DRINK PLENTY OF FLUIDS,    Continued need for the following skills: Nursing, Physical Therapy, Occupational Therapy     Plan for next visit - same as reason for visit

## 2023-02-20 ENCOUNTER — TELEPHONE (OUTPATIENT)
Age: 78
End: 2023-02-20

## 2023-02-20 ENCOUNTER — OFFICE VISIT (OUTPATIENT)
Age: 78
End: 2023-02-20

## 2023-02-20 VITALS
HEART RATE: 89 BPM | SYSTOLIC BLOOD PRESSURE: 105 MMHG | HEIGHT: 66 IN | RESPIRATION RATE: 14 BRPM | DIASTOLIC BLOOD PRESSURE: 63 MMHG | OXYGEN SATURATION: 97 % | TEMPERATURE: 97.2 F | BODY MASS INDEX: 17.82 KG/M2

## 2023-02-20 DIAGNOSIS — R56.9 SEIZURE (HCC): Primary | ICD-10-CM

## 2023-02-20 DIAGNOSIS — K59.09 OTHER CONSTIPATION: ICD-10-CM

## 2023-02-20 RX ORDER — LEVETIRACETAM 500 MG/1
500 TABLET ORAL 2 TIMES DAILY
Qty: 60 TABLET | Status: CANCELLED | OUTPATIENT
Start: 2023-02-20

## 2023-02-20 RX ORDER — LOVASTATIN 40 MG/1
40 TABLET ORAL DAILY
COMMUNITY
Start: 2022-12-16

## 2023-02-20 RX ORDER — PAROXETINE HYDROCHLORIDE 20 MG/1
TABLET, FILM COATED ORAL
COMMUNITY
Start: 2022-11-18

## 2023-02-20 RX ORDER — LEVETIRACETAM 500 MG/1
500 TABLET ORAL 2 TIMES DAILY
Qty: 60 TABLET | Refills: 6 | Status: SHIPPED | OUTPATIENT
Start: 2023-02-20

## 2023-02-20 RX ORDER — LACTULOSE 10 G/15ML
SOLUTION ORAL
OUTPATIENT
Start: 2023-02-20

## 2023-02-20 RX ORDER — FUROSEMIDE 40 MG/1
40 TABLET ORAL DAILY
COMMUNITY

## 2023-02-20 RX ORDER — LEVETIRACETAM 500 MG/1
500 TABLET ORAL 2 TIMES DAILY
COMMUNITY
Start: 2023-02-08 | End: 2023-02-20 | Stop reason: SDUPTHER

## 2023-02-20 RX ORDER — OLANZAPINE 5 MG/1
5 TABLET ORAL
COMMUNITY
Start: 2022-12-29

## 2023-02-20 RX ORDER — OXYBUTYNIN CHLORIDE 10 MG/1
TABLET, EXTENDED RELEASE ORAL
COMMUNITY
Start: 2022-06-23

## 2023-02-20 RX ORDER — OXYCODONE AND ACETAMINOPHEN 10; 325 MG/1; MG/1
1 TABLET ORAL EVERY 4 HOURS PRN
COMMUNITY

## 2023-02-20 RX ORDER — LACTULOSE 10 G/15ML
SOLUTION ORAL
COMMUNITY
Start: 2023-01-03 | End: 2023-02-20 | Stop reason: ALTCHOICE

## 2023-02-20 SDOH — ECONOMIC STABILITY: FOOD INSECURITY: WITHIN THE PAST 12 MONTHS, YOU WORRIED THAT YOUR FOOD WOULD RUN OUT BEFORE YOU GOT MONEY TO BUY MORE.: NEVER TRUE

## 2023-02-20 SDOH — ECONOMIC STABILITY: INCOME INSECURITY: HOW HARD IS IT FOR YOU TO PAY FOR THE VERY BASICS LIKE FOOD, HOUSING, MEDICAL CARE, AND HEATING?: NOT HARD AT ALL

## 2023-02-20 ASSESSMENT — PATIENT HEALTH QUESTIONNAIRE - PHQ9
SUM OF ALL RESPONSES TO PHQ QUESTIONS 1-9: 0
SUM OF ALL RESPONSES TO PHQ9 QUESTIONS 1 & 2: 0
SUM OF ALL RESPONSES TO PHQ QUESTIONS 1-9: 0
SUM OF ALL RESPONSES TO PHQ QUESTIONS 1-9: 0
2. FEELING DOWN, DEPRESSED OR HOPELESS: 0
1. LITTLE INTEREST OR PLEASURE IN DOING THINGS: 0
SUM OF ALL RESPONSES TO PHQ QUESTIONS 1-9: 0

## 2023-02-20 NOTE — PROGRESS NOTES
Post-Discharge Transitional Care  Follow Up      Tasia Cobian   YOB: 1945    Date of Office Visit:  2/20/2023  Date of Hospital Admission: 1/28/23  Date of Hospital Discharge: 2/8/23  Risk of hospital readmission (high >=14%. Medium >=10%) :No data recorded    Care management risk score Rising risk (score 2-5) and Complex Care (Scores >=6): No Risk Score On File     Non face to face  following discharge, date last encounter closed (first attempt may have been earlier): Yes  Call initiated 2 business days of discharge yes      ASSESSMENT/PLAN:   Seizure [facility])  Other constipation      Medical Decision Making: moderate complexity  Return in about 6 months (around 8/20/2023). As above,   Has had no further seizures   treatment plan as listed below  Orders Placed This Encounter   Procedures    [place] DISCHARGE MEDS RECONCILED W/ CURRENT OUTPATIENT MED LIST   Will try linzess per pt and son's request.  They were advised of the effectiveness of this med and that she may not need this med everyday. Would only use med if pt is severely constipated. Can use dulcolax in between symptoms    Refilled keppra  Pt  to f/u with neurology as scheduled    Return in about 6 months (around 8/20/2023). This has been fully explained to the patient, who indicates understanding. AVS is accessible thru mychart and pt has been advised of same. Subjective:   HPI:  Follow up of Hospital problems/diagnosis(es): seizure        Patient is here with her son who is her primary caregiver. He assist with the history today. Pt had what sounds like a seizure prior to her hospital admission. Was seen at [facility] ED. She eventually was diagnosed with having had a  seizure after transfer to the hospital.  She was treated with keppra. She needs a refill on keppra until she sees neurology in follow up . She is scheduled to see Neurology in June. She has also has  had constipation. She has had a FH constipation.  Dulcolax has usually helped in past.Pt took dulcolax and once it started working pt had a bowl movement almost daily and subsequently had some accidents in her adult diaper. Her son is interested in having the pt try \" linzess\". He wants to try to regulate her  Bms so they are abel predictable. Inpatient course: Discharge summary reviewed- see chart. Interval history/Current status: constipation as noted above,  no further seizure activity. Patient Active Problem List   Diagnosis    Hypertension    DJD (degenerative joint disease)    Overactive bladder    Weight loss    Hyperlipidemia LDL goal <100    Prediabetes    Insomnia    Chronic renal failure, stage 3 (moderate) [facility]    Diabetes ([facility]    High cholesterol    Lumbosacral spondylosis without myelopathy    Rheumatoid arthritis involving multiple joints (HCC)    Nausea    Urgency incontinence    Chest pain    Chronic pain syndrome    Chronic anxiety    Depression    Major depression    Postlaminectomy syndrome, lumbar region    Encounter for long-term (current) use of high-risk medication    Spasm of muscle    Hyperlipidemia LDL goal <130    Lumbosacral radiculopathy    GERD (gastroesophageal reflux disease)    Low back pain    Back pain    NSTEMI (non-ST elevated myocardial infarction) ([address]    Seizure ([address]    Breast mass, left    PINKY (acute kidney injury) ([address]    Severe protein-calorie malnutrition ([address]       Medications listed as ordered at the time of discharge from hospital     Medication List            Accurate as of February 20, 2023 11:59 PM. If you have any questions, ask your nurse or doctor.                 START taking these medications      linaCLOtide 72 MCG Caps capsule  Commonly known as: Linzess  Take 1 capsule by mouth every morning (before breakfast)  Started by: Wes Mishra MD            CHANGE how you take these medications      levETIRAcetam 500 MG tablet  Commonly known as: KEPPRA  Take 1 tablet by mouth 2 times daily  What changed: Another medication with the same name was removed. Continue taking this medication, and follow the directions you see here. Changed by: Wilder Burris MD     lovastatin 40 MG tablet  Commonly known as: MEVACOR  What changed: Another medication with the same name was removed. Continue taking this medication, and follow the directions you see here. Changed by: Wilder Burris MD     PARoxetine 20 MG tablet  Commonly known as: PAXIL  What changed: Another medication with the same name was removed. Continue taking this medication, and follow the directions you see here. Changed by: Wilder Burris MD            CONTINUE taking these medications      furosemide 40 MG tablet  Commonly known as: LASIX     GOTU MODESTA PO     * OLANZapine 5 MG tablet  Commonly known as: ZYPREXA     * OLANZapine 5 MG tablet  Commonly known as: ZYPREXA     * oxybutynin 10 MG extended release tablet  Commonly known as: DITROPAN-XL     * oxybutynin 10 MG extended release tablet  Commonly known as: DITROPAN-XL     * oxyCODONE-acetaminophen  MG per tablet  Commonly known as: PERCOCET     * oxyCODONE-acetaminophen  MG per tablet  Commonly known as: PERCOCET     potassium chloride 20 MEQ/15ML (10%) oral solution     THERAGRAN-M PO           * This list has 6 medication(s) that are the same as other medications prescribed for you. Read the directions carefully, and ask your doctor or other care provider to review them with you.                 STOP taking these medications      Constulose 10 GM/15ML solution  Generic drug: lactulose  Stopped by: Wilder Burris MD     lactulose 10 GM/15ML solution  Commonly known as: Zeus Monson 38 by: Wilder Burris MD               Where to Get Your Medications        These medications were sent to [address] [place] [address], [address] [place] [address] [phone]  [address]  [id number]      Phone: [phone]   levETIRAcetam 500 MG tablet  linaCLOtide 72 MCG Caps capsule           Medications marked \"taking\" at this time  Outpatient Medications Marked as Taking for the 2/20/23 encounter (Office Visit) with Tanya Crenshaw MD   Medication Sig Dispense Refill    Multiple Vitamins-Minerals (THERAGRAN-M PO) Take 1 tablet by mouth daily      lovastatin (MEVACOR) 40 MG tablet Take 40 mg by mouth daily      oxybutynin (DITROPAN-XL) 10 MG extended release tablet TAKE 1 TABLET DAILY      Gotu Miguel, Centella asiatica, (GOTU MODESTA PO) Take by mouth      furosemide (LASIX) 40 MG tablet Take 40 mg by mouth daily      OLANZapine (ZYPREXA) 5 MG tablet Take 5 mg by mouth      oxyCODONE-acetaminophen (PERCOCET)  MG per tablet Take 1 tablet by mouth every 4 hours as needed. PARoxetine (PAXIL) 20 MG tablet [The details of the medication are not available because there are pending changes by a home health clinician.]      levETIRAcetam (KEPPRA) 500 MG tablet Take 1 tablet by mouth 2 times daily 60 tablet 6    linaCLOtide (LINZESS) 72 MCG CAPS capsule Take 1 capsule by mouth every morning (before breakfast) 30 capsule 0    OLANZapine (ZYPREXA) 5 MG tablet take 1 tablet by mouth at bedtime      oxybutynin (DITROPAN-XL) 10 MG extended release tablet       oxyCODONE-acetaminophen (PERCOCET)  MG per tablet take 1 tablet by mouth four times a day for chronic pain          Medications patient taking as of now reconciled against medications ordered at time of hospital discharge: Yes    A comprehensive review of systems was negative except for what was noted in the HPI.     Objective:    /63 (Site: Left Upper Arm, Position: Sitting, Cuff Size: Child)   Pulse 89   Temp 97.2 °F (36.2 °C) (Temporal)   Resp 14   Ht 5' 6\" (1.676 m)   LMP  (LMP Unknown)   SpO2 97%   BMI 17.82 kg/m²   General Appearance: alert and oriented to person, place and time, well-developed and well-nourished, in no acute distress - pt in a wheelchair  Head: normocephalic and atraumatic  Pulmonary/Chest: clear to auscultation bilaterally- no wheezes, rales or rhonchi, normal air movement, no respiratory distress  Cardiovascular: normal rate, normal S1 and S2, and no carotid bruits    Extremities: no notable edema of the ankles. An electronic signature was used to authenticate this note.   --Thu Jose MD

## 2023-02-20 NOTE — PROGRESS NOTES
1. \"Have you been to the ER, urgent care clinic since your last visit? Hospitalized since your last visit? \" No    2. \"Have you seen or consulted any other health care providers outside of the [address] [place] [address] since your last visit? \" No     3. For patients aged 39-70: Has the patient had a colonoscopy / FIT/ Cologuard? No      If the patient is female:    4. For patients aged 41-77: Has the patient had a mammogram within the past 2 years? Yes - Care Gap present. Rooming MA/LPN to request most recent results      5. For patients aged 21-65: Has the patient had a pap smear?  NA - based on age or sex

## 2023-02-21 ENCOUNTER — HOME CARE VISIT (OUTPATIENT)
Dept: SCHEDULING | Facility: HOME HEALTH | Age: 78
End: 2023-02-21
Payer: MEDICARE

## 2023-02-21 PROCEDURE — G0157 HHC PT ASSISTANT EA 15: HCPCS

## 2023-02-21 NOTE — TELEPHONE ENCOUNTER
Prior Authorization for Linzess started on 2/21/2023 via covermymeds. Awaiting reply from pt's insurance company via fax/e-mail. Allow 48-72 hours.

## 2023-02-21 NOTE — HOME HEALTH
SUBJECTIVE: Patient reported no pain at start of session. Caregiver reported pt is still waking up and \"very slow moving, tired right not\"  No fall reported  No changes in medications  CAREGIVER INVOLVEMENT/ASSISTANCE NEEDED FOR: Patient lives with her son who is primary caregiver and assist with all ADL tasks, including bathing, meals, transportation, and medication management. .  OBJECTIVE:  See interventions. PATIENT EDUCATION PROVIDED THIS VISIT: Educated on importance of use of AD along with gait belt and Jake for all transfers and standing activity. Education provided on exercises and gait every hour to establish HEP for strengthening and endurance. PATIENT RESPONSE TO EDUCATION PROVIDED: Caregiver provided verbal understanding of assist with gait and standing activity. Patient/caregiver able to teach back HEP with understanidng of performing gait every hour if able. PATIENT RESPONSE TO TREATMENT: Patient able to tolerate the exercises and gait without c/o pain or discomfort. Patient is difficult to stay on task, but son is able to direct pt and correct focus. ASSESSMENT OF PROGRESS TOWARD GOALS: Patient with slow progress torward goals as noted with establishing HEP and increasing endurance activities Pt will continue to benefit from PT services to improve strength, balance and endurance to promote increased functional mobility and decreased risk for falls. PLAN FOR NEXT VISIT: TUG, increase HEP with standing activity,   THE FOLLOWING DISCHARGE PLANNING WAS DISCUSSED WITH THE PATIENT/CAREGIVER:  Continue PT services 2 x week until all goals have been met or patient has reached max potential, with discharge set for 3/6/23. Pt/caregiver with verbal understanding.

## 2023-02-22 ENCOUNTER — HOME CARE VISIT (OUTPATIENT)
Dept: SCHEDULING | Facility: HOME HEALTH | Age: 78
End: 2023-02-22
Payer: MEDICARE

## 2023-02-22 ENCOUNTER — CARE COORDINATION (OUTPATIENT)
Facility: CLINIC | Age: 78
End: 2023-02-22

## 2023-02-22 VITALS
HEART RATE: 98 BPM | RESPIRATION RATE: 16 BRPM | OXYGEN SATURATION: 98 % | DIASTOLIC BLOOD PRESSURE: 62 MMHG | TEMPERATURE: 98.2 F | SYSTOLIC BLOOD PRESSURE: 118 MMHG

## 2023-02-22 PROCEDURE — G0152 HHCP-SERV OF OT,EA 15 MIN: HCPCS

## 2023-02-22 NOTE — TELEPHONE ENCOUNTER
Prior Authorization for Linzess 72 mcg completed and approved by patient's insurance from 1- through 2-.

## 2023-02-22 NOTE — Clinical Note
. Coleen Saravia presents with fair rehab potential to increase her safety, balance and functional engagement in her daily routine. Pt son has been assisting pt heavily for ~8 years with her baseline cognitive deficit and would benefit from caregiver training for increasing pt safety and independence within her home environment. Pt and her family agreeable to continued home health occupational therapy services. PLAN: D/C 1w4 with plans to discharge when goals are met or maximal potential achieved.

## 2023-02-22 NOTE — HOME HEALTH
Clinical Condition per EPIC: Pt is a 79 y/o female who was admitted to hospital on 1/30/23-2/8/23 with diagnosis of seizure, acute kidney injury. Pt is now home and now referred to home care OT due to deficits with ADLs. PMH: Chronic anxiety; Depression controlled on Paxil; Diabetes; DJD (degenerative joint disease); GERD (gastroesophageal reflux disease); High cholesterol; HTN (hypertension); Hypertension; Low back pain post-laminectomy syndrome and multilevel degenerative disease; Osteoporosis; according to son, pt also had B TKR and L THR and back surgeries in the past.    PLOF: Pt was completing household ambulation with supervision using a rollator walker. Pt ids retired. She lives with son Mauri Domingo in a one story house. Caregiver involvement: Pt son Mauri Domingo assists with ADLs, iADLs, functional mobility, and transportation    PREC: high fall risk; forgetful; B AFO. SUBJECTIVE: Pt resing peacfully up in chair upon OT arrival. Pt expressing no pain. Pt son present throughout. DME ORDERED/RECOMMENEDED: Reccomended SLP orders with pt cognitive deficits and pt son expressing pt slurring speech. Pt son declining SLP at this time. Reccomended MSW orders to aid in providing continued resources available to pt with son declining at this time. Recommended home health care aid (HHA) to aid in pt bathing with son expressing pt has not been consistantly bathing or completing oral care. Pt son educated HHA could allievate caregiver burden with need for assist for self care. Pt son declining at this time. Pt son wishing to limit amount of individuals within his home. OBJECTIVE:  BATHING: Pt has tub shower unit with no assistive device. Pt son educated on tub transfer bench options however tub shower unit with glass door would not fit. Pt son educated on hand held shower head. Pt son reports he does not know the last time she has showered. TOILETING: Pt son reports increased difficulty with consistant BM.  Pt CGA/minimal assistance for toileting. UB DRESSING: Pt moderatly  for upper body dressing to shan/doff shirts  LB DRESSING: Pt has BLE AFOs and right knee brace but rarley wears. Pt son reports she usally wears a robe but it has been missing sice hosptialization. Pt son helps moderatly    GROOMING: Pt son reports pt has not been to dentist in 2-3 years. Pt consistantly forgetful for oral care for grooming for hair care, oral care and washing hands/face. FEEDING: Pt set up for self feeding for finger foods and drinking from cup    BUE MMT: 4/5  BUE ROM: WFL    VISION:Pt vision WFL with glasses donned for reading directions and seeing to navigate environment. COGNTION: Pt has had decreased memory for ~8 years. Pt is A&O x self only. Pt able to recall year, month and birthday after provided with choices. BALANCE:Pt with good sitting balance/tolerance. Pt with poor+ for standing balance/tolerance using rollator    BUE COORDINATION: Pt B UE coordination WFL shown through serial opposition test. Pt is able sheto open containers and manipulate items without difficulty depending on cognitive level. BUE FINE MOTOR STRENGTH:Pt presents with B good hand strength for grasping objects for ADL/IADL tasks and opening containers. OT instructed/demonstrated pt the following with poor understanding: Pt son present throughout and presents with good understanding. IADL: Pt family assists with all IADLs. AMBULATION: Pt minimal assistance for ambulating short house hold distances using rollator  EOB/BED TRANSFER: Pt supervision for bed mobility  COUCH: Pt SBA for sit to stand transfers using rollator   TOILET: Not attempted with pt unable to bring walker into bathroom. Pt son educated on 3:1 commode options. Pt son expressing pt has occasionally been attempting to toilet in kitchen due to increased confusion.  Pt son requesting no 3:1 commode at this time  TUB SHOWER:Not attempted with tub shower unit with sliding glass door. Pt may benifit from shower kiya with tub transfer bench not fitting in shower unit. Pt son educated on shower chair options and expressed he would order. PATIENT RESPONSE TO TREATMENT: Pt responded fairly to skilled home health occupational therapy assessment    PATIENT EDUCATION PROVIDED THIS VISIT: OT role, cognitive training techniques, fall prevention/safety training ADL/IADL tasks, continue diet and medications as instructed per MD, consult MD or urgent care for medical assistance as opposed to ER unless situation emergent. PATIENT LEVEL OF UNDERSTANDING OF EDUCATION PROVIDED: Pt son able to teach back role of OT with good understanding. Pt son able to teach back adaptive cognitive techniques and reports implamenting many of these already within pt daily routine. Pt able to teach back benifit of backward training techniques. Pt able to teach back adaptive equipment options such as 3:1 commode, shower chair and tub transfer bench    [address] presents with fair rehab potenital to increasse her safety, balance and functional engagment in her daily routine. Pt son has been assisting pt heavily for ~8 years with her baseline congnitive deficit and would benifit from caregiver training for increasing pt safety and independence within her home environment. Pt and her family agreeable to continued home health occupational therapy services. HOME EXERCISE PROGRAM:Pt son to implament adaptive cognitive techniques within pt daily routine such as backward chaining, visuals, and routine development.      CONTINUED NEED FOR THE FOLLOWING SKILLS: HH OT is medically necessary to address pain, cogntiive deficits, decreased independence and safety with functional transfers, decreased independence and safety performing ADL/IADL tasks, decreased activity and standing tolerance, decreased functional endurance, and impaired balance in order to improve functional independence, obtain set goals, reduce risk of falls, reduce pain, improve quality of life, and return to PLOF. ASSESSMENT: Pt presents with baseline cognitive deficits signifcantly impacting her memory with pt A&O x1 (self only). Pt son is her primary caregiver and would benifit from continued adaptive cogntiive technique training to increase pt ability to engage in her daily routine safely. Pt with increased difficulty accessing her bathroom with rollator not fitting into bathroom. Pt would benifit from toilet transfer training to increase her safety with functional mobility needed for ADLs. Pt would benifit from trialing tub transfers with adaptive equipment as needed with pt not able to access her tub shower unit. Pt son is agreeable to cotinued home health occupational therapy servies 1w4 due to pt son working from home. Skilled Care Provided: Pt completed full occupational therapy assessment to include balance, coordination, strengthening, ADL education/training, functional mobility and home safety. PLAN: D/C 1w4 with plans to discharge when goals are met or maximal potential achieved.

## 2023-02-22 NOTE — CARE COORDINATION
[address] Care Transitions Follow Up Call    CTN attempt to contact Pt. On phone for hospital follow up. CTN reached Pt. Son Mr. Dora Matthews on phone. Pt. Son is listed on [facility] but did not provide CTN the Password at this time. During this outreach, CTN did not release any HIPAA protected information and solely obtained information from Pt. Son. Pt. Son reported that Patient is doing good. Pt. Son states that they both have mild cold symptoms. Pt. Son denied Pt. C/o CP, SOB at this time. Pt. Appt went okay with PCP this past Monday as per Pt. Son. Pt. Son reported that Pt. PCP will refill Pt. Keppra. Pt. Son reported that he called Surgical office for appt. He states that he is waiting a call back. Pt. Son reported that the other appt is scheduled in June. Pt. Son states that home health PT saw Patient today. No questions, concerns and/or needs verbalized by Pt. Son at this time. Follow Up  Future Appointments   Date Time Provider Edwin Ku   3/14/2023  1:40 PM Marisol Huerta MD [facility] BS AMB   4/3/2023  1:40 PM Venkat Moctezuma MD [facility] BS AMB   6/27/2023 10:00 AM [facility]ais [facility]keon [facility] BS AMB     Care Transition Nurse provided contact information for future needs. Plan for follow-up call in 7-10 days based on severity of symptoms and risk factors. Plan for next call:  assess cold symptoms, assess for any needs.      Brent Carias RN

## 2023-02-23 ENCOUNTER — HOME CARE VISIT (OUTPATIENT)
Dept: SCHEDULING | Facility: HOME HEALTH | Age: 78
End: 2023-02-23
Payer: MEDICARE

## 2023-02-23 VITALS
HEART RATE: 88 BPM | DIASTOLIC BLOOD PRESSURE: 60 MMHG | RESPIRATION RATE: 16 BRPM | TEMPERATURE: 98.2 F | OXYGEN SATURATION: 93 % | SYSTOLIC BLOOD PRESSURE: 108 MMHG

## 2023-02-23 VITALS
DIASTOLIC BLOOD PRESSURE: 61 MMHG | HEART RATE: 89 BPM | OXYGEN SATURATION: 95 % | TEMPERATURE: 98.4 F | SYSTOLIC BLOOD PRESSURE: 95 MMHG

## 2023-02-23 PROCEDURE — G0299 HHS/HOSPICE OF RN EA 15 MIN: HCPCS

## 2023-02-23 NOTE — HOME HEALTH
Skilled reason for visit: DISEASE MED MANAGEMENT, PHYSICAL ASSESSMENT, VITAL SIGNS, WOUND CARE    Caregiver involvement: family, iadls, adls, meal prep, med management, taking to md appointments. Medications reviewed and all medications are available in the home this visit. The following education was provided regarding medications:  patient/cg reminded to continue to take medications as prescribed. patient aware to monitor for effectiveness and to notify staff of any adverse reactions to medications/any changes to medication regimen. .    MD notified of any discrepancies/look a-like medications/medication interactions: NA  Medications are EFFECTIVE at this time. Home health supplies by type and quantity ordered/delivered this visit include: NA    Patient education provided this visit: encouraged patient to get three nutritional meals daily and to stay hydrated, drink plenty of fluids. patient made aware to monitor for s/s of infection [increased swelling, increased redness around site, increased pain, foul smelling drainage, fever] aware who to report to/when. SEE CARE PLAN  Sharps education provided: NA    Patient level of understanding of education provided: PATIENT/CG  WAS ABLE TO REPEAT BACK AND VOICED UNDERSTANDING OF ALL EDUCATION PROVIDED.     Skilled Care Performed this visit: SAME AS REASON FOR VISIT    Patient response to procedure performed:   PATIENT TOLERATED WELL WITH NO C/O OF INCREASED PAIN OR DISCOMFORT    Agency Progress toward goals: PROGRESSING     Patient's Progress towards personal goals: progressing    Home exercise program: PATIENT TO TAKE ALL MEDS AS ORDERED, DO ICS X10/HR WHILE AWAKE, DRINK PLENTY OF FLUIDS,    Continued need for the following skills: Nursing, Physical Therapy, Occupational Therapy and [id number] for next visit - disease med management

## 2023-02-24 ENCOUNTER — HOME CARE VISIT (OUTPATIENT)
Dept: SCHEDULING | Facility: HOME HEALTH | Age: 78
End: 2023-02-24
Payer: MEDICARE

## 2023-02-24 PROCEDURE — G0157 HHC PT ASSISTANT EA 15: HCPCS

## 2023-02-27 ENCOUNTER — HOME CARE VISIT (OUTPATIENT)
Dept: SCHEDULING | Facility: HOME HEALTH | Age: 78
End: 2023-02-27
Payer: MEDICARE

## 2023-02-27 VITALS
HEART RATE: 67 BPM | DIASTOLIC BLOOD PRESSURE: 57 MMHG | OXYGEN SATURATION: 97 % | RESPIRATION RATE: 16 BRPM | TEMPERATURE: 99.1 F | SYSTOLIC BLOOD PRESSURE: 103 MMHG

## 2023-02-27 PROCEDURE — G0152 HHCP-SERV OF OT,EA 15 MIN: HCPCS

## 2023-02-27 NOTE — Clinical Note
OCCUPATIONAL THERAPY DISCHARGE.: Reviewed goals with pt son who expressed feeling not ready to progresss these areas at this time and agreeable to early discharge until he as the primary caregiver is ready to progress pt functional level. Patient son educated on adaptive cognitive strategies such as visual reminders and alarms to increase patient participation in daily routines however patient son reports strategies are unsuccessful with patient. Patient able to complete grooming ADL of brushing teeth at sink with minimal assistance as needed to increase independence in ADL tasks. Patient son declining to practice pt functional mobility in the bathroom with toilet transfers and tub transfer at this time with pt son feeling she is not ready. Patient son requesting early discharge until pt is able to gain strength further with PT, better manage bowels, and he has the support of his cousin for bathing. Occupational therapy to dsicharge at this time with pt son educated to contact MD for when he feels ready to progress his mother with skilled occupatioanl therapy services.

## 2023-02-27 NOTE — HOME HEALTH
SUBJECTIVE: Patient seated in recliner upon arrival. Patient son present during visit. [employer] student present and assisted with treatment session. CAREGIVER INVOLVEMENT/ASSISTANCE NEEDED FOR: Pt son assists with IADLs, ADLs and house hold mobility. HOME HEALTH SUPPLIES BY TYPE AND QUANTITY ORDERED/DELIVERED THIS VISIT INCLUDE: N/A    OBJECTIVE: See interventions    PATIENT RESPONSE TO TREATMENT: Patient showed positive reponse to treatment by participation in oral care task at bathroom sink. PATIENT LEVEL OF UNDERSTANDING OF EDUCATION PROVIDED: Patient son educated on cognitive strategies handout including use of visual reminders and alarms to improve patient's participation in daily routines. Patient's son verbalized understanding of cognitive stratgies however reported these strategies are not successful for the patient. OCCUPATIONAL THERAPY DISCHARGE.: Reviewed goals with pt son who expressed feeling not ready to progresss these areas at this time and agreeable to early discharge until he as the primary caregiver is ready to progress pt functional level. Patient son educated on adaptive cognitive strategies such as visual reminders and alarms to increase patient participation in daily routines however patient son reports strategies are unsuccessful with patient. Patient able to complete grooming ADL of brushing teeth at sink with minimal assistance as needed to increase independence in ADL tasks. Patient son declining to practice pt functional mobility in the bathroom with toilet transfers and tub transfer at this time with pt son feeling she is not ready. Patient son requesting early discharge until pt is able to gain strength further with PT, better manage bowels, and he has the support of his cousin for bathing. Occupational therapy to dsicharge at this time with pt son educated to contact MD for when he feels ready to progress his mother with skilled occupatioanl therapy services.

## 2023-02-28 ENCOUNTER — HOME CARE VISIT (OUTPATIENT)
Dept: SCHEDULING | Facility: HOME HEALTH | Age: 78
End: 2023-02-28
Payer: MEDICARE

## 2023-02-28 PROCEDURE — G0157 HHC PT ASSISTANT EA 15: HCPCS

## 2023-03-02 ENCOUNTER — HOME CARE VISIT (OUTPATIENT)
Dept: SCHEDULING | Facility: HOME HEALTH | Age: 78
End: 2023-03-02
Payer: MEDICARE

## 2023-03-02 ENCOUNTER — HOME CARE VISIT (OUTPATIENT)
Dept: HOME HEALTH SERVICES | Facility: HOME HEALTH | Age: 78
End: 2023-03-02
Payer: MEDICARE

## 2023-03-03 ENCOUNTER — CARE COORDINATION (OUTPATIENT)
Facility: CLINIC | Age: 78
End: 2023-03-03

## 2023-03-03 NOTE — CARE COORDINATION
[facility] Care Transitions Follow Up Call    Patient: Mena Ott  Patient : 1945   MRN: [id number]  Reason for Admission: NSTEMI, New onset seizure  Discharge Date: 23 RARS: No data recorded    Needs to be reviewed by the provider   Additional needs identified to be addressed with provider: No    CTN spoke with Pt. Son for follow up. Pt. Son reported that Pt. is doing well. Pt. Son states that they are still waiting a call  for Pt. Biopsy result. Do you know who can Pt. Or family contact to get the result? Method of communication with provider: chart route     CTN attempt to contact Pt. On phone for hospital follow up. CTN reached Pt. Son Mr. Vicky Kinney on phone. Pt. Son is listed on [employer] but did not provide CTN the Password at this time. During this outreach, CTN did not release any HIPAA protected information and solely obtained information from Pt. Son. Patient son reported that Pt. Is doing well. No worsening of symptoms reported by Pt. Son at this time. Pt. Son reported that Pt. Has a new PCP appt. next month. Pt. Son reported that someone is trying to get a walker for Pt. CTN advised Pt. Son to follow up with Pt. Home health. Pt. Son States that he will do so. Pt. Son is asking about biopsy result. CTN advised Pt. Son contact PCP office or the surgeon that did the procedure. Pt. Son states okay. No other questions, concerns and/or needs at this time as per Pt. Patient son reminded that there is a physician on call 24 hours a day / 7 days a week (M-F 5pm to 8am and from Friday 5pm until Monday 8a for the weekend) should the patient have questions or concerns. Patient's son reminded to call 911 if situation is emergent ( such as chest pain, shortness of breath, unstoppable bleeding, feeling of passing out,  worsening of symptoms)or patient feels the situation is emergent. Pt. Son verbalizes understanding.     Follow Up  Future Appointments   Date Time Provider Edwin [place]   3/14/2023  1:40 PM Tiffanie Mancini MD [facility] BS AMB   4/3/2023  1:40 PM Rosanna Heebrt MD [facility] BS AMB   6/27/2023 10:00 AM Thais Ray MD [facility] AMB     Care Transition Nurse provided contact information for future needs. Plan for follow-up call in 7-10 days based on severity of symptoms and risk factors. Plan for next call:  assess symptoms, follow on walker, assess for any needs.      Maine Friedman RN

## 2023-03-06 ENCOUNTER — HOME CARE VISIT (OUTPATIENT)
Dept: SCHEDULING | Facility: HOME HEALTH | Age: 78
End: 2023-03-06
Payer: MEDICARE

## 2023-03-06 VITALS
DIASTOLIC BLOOD PRESSURE: 51 MMHG | HEART RATE: 76 BPM | DIASTOLIC BLOOD PRESSURE: 60 MMHG | TEMPERATURE: 97.8 F | OXYGEN SATURATION: 98 % | SYSTOLIC BLOOD PRESSURE: 105 MMHG | HEART RATE: 80 BPM | OXYGEN SATURATION: 94 % | TEMPERATURE: 98.4 F | SYSTOLIC BLOOD PRESSURE: 102 MMHG

## 2023-03-06 PROCEDURE — G0151 HHCP-SERV OF PT,EA 15 MIN: HCPCS

## 2023-03-06 NOTE — HOME HEALTH
SUBJECTIVE: Patient reported no pain at start of session. Caregiver reported pt is given pain medication on schedule. No fall reported  No changes in medications reported    CAREGIVER INVOLVEMENT/ASSISTANCE NEEDED FOR: Patient lives with her son who is primary caregiver and assist with all ADL tasks, including bathing, meals, transportation, and medication management. OBJECTIVE:  See interventions. PATIENT EDUCATION PROVIDED THIS VISIT:   -Educated on importance of using AD with SBA for reducing Risk for Falls  -Education provided on exercises and gait every hour to increase HEP for strengthening and endurance. PATIENT RESPONSE TO EDUCATION PROVIDED:  -Caregiver provided verbal understanding of assist with gait and keeping AD closer to pt. -Patient/caregiver able to teach back HEP with verbal instruction, understanidng of performing gait every hour if able. PATIENT RESPONSE TO TREATMENT: Patient able to tolerate the exercises and gait without c/o pain or discomfort. Patient reports fatigue following gait, no SOB noted. ASSESSMENT OF PROGRESS TOWARD GOALS: Patient with slow progress torward goals as noted with adding to HEP and increasing endurance activities Pt will continue to benefit from PT services to improve strength, balance and endurance to promote increased functional mobility and decreased risk for falls. PLAN FOR NEXT VISIT: TUG, Outside gait if able. THE FOLLOWING DISCHARGE PLANNING WAS DISCUSSED WITH THE PATIENT/CAREGIVER:  Continue PT services 2 x week until all goals have been met or patient has reached max potential, with discharge set for 3/6/23. Pt/caregiver with verbal understanding.

## 2023-03-06 NOTE — Clinical Note
Pt is discharged from home care PT because she is at her prior functional level. She is still open to home care agency with nursing.

## 2023-03-06 NOTE — Clinical Note
Pt is discharged from home care PT because she is at her prior functional level. Please do final DC.

## 2023-03-06 NOTE — HOME HEALTH
SUBJECTIVE: Patient reported no pain at start of session. No fall reported  No changes in medications, has presciption for Linzess    CAREGIVER INVOLVEMENT/ASSISTANCE NEEDED FOR: Patient lives with her son who is primary caregiver and assist with all ADL tasks, including bathing, meals, transportation, and medication management. OBJECTIVE:  See interventions. PATIENT EDUCATION PROVIDED THIS VISIT: Educated on outdoor gait with rollator and level of assist from caregiver. Education provided on exercises and gait every hour to establish HEP for strengthening and endurance. Educated on importance of wearing good footwear with AFOs to decrease risk for falls. PATIENT RESPONSE TO EDUCATION PROVIDED: Caregiver provided verbal understanding of assist with gait and standing activity. Patient/caregiver able to teach back HEP with understanidng of correct footwear. PATIENT RESPONSE TO TREATMENT: Patient able to tolerate increased gait outdoors without c/o pain or discomfort. Patient stopped with c/o LE fatigue but no SOB, no LOB. Patient positive about progress made with gait outdoors. ASSESSMENT /SUMMARY OF CARE:  Patient's current functional status before discharge is as follows  Bed Mobility: S/Independent ,  rolling Independent-improved from SBA at eval  Transfers: From recliner chair with S, improved from Jake at eval  Gait: Pt able to perform 2MWT,  outdoors x 120' with rollator and CGA, indoors x 20', rollator with S  Stairs/RAMP: pt able to negotiate up ramp x 10' with rollator, CGA, not able to complete ramp due to LE fatigue. Special Tests: Tinetti 13/28, High Fall Risk    PLAN FOR NEXT VISIT: TUG, Check strength/MMT, increase HEP with standing activity,   THE FOLLOWING DISCHARGE PLANNING WAS DISCUSSED WITH THE PATIENT/CAREGIVER:  Continue PT services 2 x week until all goals have been met or patient has reached max potential, with discharge set for 3/6/23. Pt/caregiver with verbal understanding.

## 2023-03-06 NOTE — HOME HEALTH
SUBJECTIVE: Patient reported no pain at start of session. Caregiver reported pt is having trouble with bowels and not feeling well today. No fall reported  No changes in medications reported  CAREGIVER INVOLVEMENT/ASSISTANCE NEEDED FOR: Patient lives with her son who is primary caregiver and assist with all ADL tasks, including bathing, meals, transportation, and medication management. OBJECTIVE:  See interventions. PATIENT EDUCATION PROVIDED THIS VISIT:   -Educated provided on constipation, loose bowels, and importance of increasing fluid intake when pt has loose bowels to prevent dehydration.  -Education provided on exercises and gait every hour as able to increase endurance. PATIENT RESPONSE TO EDUCATION PROVIDED:  -Caregiver provided verbal understanding of fluid intake, and importance of getting increased nutrition in patient.  -Patient/caregiver able to demonstrate standing for gait, and use of AD for stability. PATIENT RESPONSE TO TREATMENT: Fair, Patient with no c/o pain but reported fatigue and not feeling well today. Patient able to complete tasks for strengthening and balance training with seated rest breaks for overall fatigue today. ASSESSMENT OF PROGRESS TOWARD GOALS: Patient with slow progress torward goals as noted with frequent rest breaks dueing exercises and balance activities. Patient with limited participation due to loose bowels and fatigue, required increased rest breaks and declined gait training. Patient continues to benefit from PT services to improve strength, balance and endurance to promote increased functional mobility and decreased risk for falls. PLAN FOR NEXT VISIT: TUG, Outside gait if able. THE FOLLOWING DISCHARGE PLANNING WAS DISCUSSED WITH THE PATIENT/CAREGIVER:  Continue PT services 2 x week until all goals have been met or patient has reached max potential, with discharge set for 3/6/23. Pt/caregiver with verbal understanding.

## 2023-03-07 ENCOUNTER — HOME CARE VISIT (OUTPATIENT)
Dept: SCHEDULING | Facility: HOME HEALTH | Age: 78
End: 2023-03-07
Payer: MEDICARE

## 2023-03-07 VITALS
HEART RATE: 84 BPM | RESPIRATION RATE: 16 BRPM | TEMPERATURE: 97.8 F | OXYGEN SATURATION: 93 % | SYSTOLIC BLOOD PRESSURE: 108 MMHG | DIASTOLIC BLOOD PRESSURE: 60 MMHG

## 2023-03-07 VITALS
DIASTOLIC BLOOD PRESSURE: 70 MMHG | RESPIRATION RATE: 16 BRPM | TEMPERATURE: 98.3 F | OXYGEN SATURATION: 97 % | SYSTOLIC BLOOD PRESSURE: 120 MMHG | HEART RATE: 78 BPM

## 2023-03-07 PROCEDURE — G0299 HHS/HOSPICE OF RN EA 15 MIN: HCPCS

## 2023-03-07 NOTE — HOME HEALTH
PT DC summary:  Pt is a 77 y/o female  with diagnosis of  seizure, acute kidney injury. S:  Pt. does not say much and violette Beltran answers all questions. He denies pt falls and denies change in medications. O:  PT home care eval date is 2/10/23. Pt  received 2x/wk PT sessions for strengthening, bed mob and transfer trng, gait trng, HEP, balance trng, fall risk reduction strategies. Pt's current status :  Pain:  denies. Goal achieved. Bed mobility (2/28/23):  sit <> supine = SBA. Goal achieved. MMT BLE =  4-/5  Transfers (2/28/23):  sit <> stand  from multiple surfaces = SBA. Goal achieved. Gait (2/28/23):  120 ft with CGA using rollator walker exhibiting foot drop gait pattern, indoor and outdoor surfaces. Goal achieved. Tinetti (2/28/23):  13/28  2 MWT:  120  ft with rollator walker  = indicative of improved walking endurance. Goal achieved. WC ramp  negotiation for ingress/egress to home:  violette Beltran is ind in assisting pt using a rollator walker.    = Goal achieved. HEP:  caregiver is ind in assisting pt  using  HEP handout and pt has been doing exercises daily. Goal achieved. Patient DC instructions:  Continue doing HEP and ambulate once every hour during daytime. Patient level of understanding of education provided: Pt is forgetful but violette Beltran is ind and safe in providing care for pt. Patient response to treatment:  Pt declined to walk outside today because son has appointment right after PT visit. Pt tolerated past PT sessions well. Caregiver involvement/assistance needed: violette Beltran is very much involved and (name of caregiver) assists with self care, ADLs, iADLs, functional mobility, transportation. A:  Patient demonstrated functional gains and is back to her PLOF. Pt is discharged from home care PT because she has received maximum benefits from PT.  P: DC PT and pt agreed.   Pt's son was instructed to continue assisting pt in her HEP and ambulation

## 2023-03-08 ENCOUNTER — PATIENT MESSAGE (OUTPATIENT)
Age: 78
End: 2023-03-08

## 2023-03-08 NOTE — TELEPHONE ENCOUNTER
Last Visit: 02-   Next Appointment: 03-  Previous Refill Encounter: 01-      Requested Prescriptions     Pending Prescriptions Disp Refills    potassium chloride 20 MEQ/15ML (10%) oral solution 480 mL 1     Sig: 15 mLs 2 times daily

## 2023-03-08 NOTE — HOME HEALTH
Skilled reason for visit: DISEASE MED MANAGEMENT, PHYSICAL ASSESSMENT, VITAL SIGNS, FINAL DC VISIT  Caregiver involvement: family, iadls, adls, meal prep, med management, taking to md appointments. Medications reviewed and all medications are available in the home this visit. The following education was provided regarding medications:  patient/cg reminded to continue to take medications as prescribed. patient aware to monitor for effectiveness and to notify staff of any adverse reactions to medications/any changes to medication regimen. .      MD notified of any discrepancies/look a-like medications/medication interactions: NA    Medications are EFFECTIVE at this time. Home health supplies by type and quantity ordered/delivered this visit include: NA    Patient education provided this visit: encouraged patient to get three nutritional meals daily and to stay hydrated, drink plenty of fluids. patient made aware to monitor for s/s of infection [increased swelling, increased redness around site, increased pain, foul smelling drainage, fever] aware who to report to/when. SEE CARE PLAN    Sharps education provided: NA    Patient level of understanding of education provided: PATIENT/CG  WAS ABLE TO REPEAT BACK AND VOICED UNDERSTANDING OF ALL EDUCATION PROVIDED.     Skilled Care Performed this visit: SAME AS REASON FOR VISIT    Patient response to procedure performed:   PATIENT TOLERATED WELL WITH NO C/O OF INCREASED PAIN OR DISCOMFORT    Agency Progress toward goals: GOALS MET    Patient's Progress towards personal goals: GOALS MET    Home exercise program: PATIENT TO TAKE ALL MEDS AS ORDERED, DO ICS X10/HR WHILE AWAKE, DRINK PLENTY OF FLUIDS,    Continued need for the following skills: NA    Plan for next visit - THIEN

## 2023-03-08 NOTE — TELEPHONE ENCOUNTER
From: Gladys Blount  To: Dr. Hillman Else: 3/8/2023 8:35 AM EST  Subject: Refill Request - Potassium Chloride Solution     Hi,     Unfortunately, again I am unable to request a prescription refill through the [employer] macy (please see attached screenshot). Could you please have Dr Bibi Bennett refill my moms Potassium Chloride Solution prescription? Thank you.     Silas Austin   [phone]

## 2023-03-08 NOTE — CASE COMMUNICATION
Patient is meeting all SN goals at this time and is ready for agency discharge to follow up with PCP/ Surgeon. Patient is aware to follow up with PCP/Surgeon as ordered. Opportunity for questions provided- none at this time. Patient aware to refer any questions after DC to MD office.     Respectfully,  Ama Obrien RN

## 2023-03-09 ENCOUNTER — TELEMEDICINE (OUTPATIENT)
Age: 78
End: 2023-03-09
Payer: MEDICARE

## 2023-03-09 DIAGNOSIS — C82.84: Primary | ICD-10-CM

## 2023-03-09 DIAGNOSIS — F03.918 DEMENTIA WITH BEHAVIORAL DISTURBANCE: ICD-10-CM

## 2023-03-09 DIAGNOSIS — E43 SEVERE PROTEIN-CALORIE MALNUTRITION (HCC): ICD-10-CM

## 2023-03-09 DIAGNOSIS — M06.9 RHEUMATOID ARTHRITIS OF OTHER SITE, UNSPECIFIED WHETHER RHEUMATOID FACTOR PRESENT (HCC): ICD-10-CM

## 2023-03-09 PROCEDURE — 1123F ACP DISCUSS/DSCN MKR DOCD: CPT | Performed by: FAMILY MEDICINE

## 2023-03-09 PROCEDURE — G8484 FLU IMMUNIZE NO ADMIN: HCPCS | Performed by: FAMILY MEDICINE

## 2023-03-09 PROCEDURE — 3017F COLORECTAL CA SCREEN DOC REV: CPT | Performed by: FAMILY MEDICINE

## 2023-03-09 PROCEDURE — G8419 CALC BMI OUT NRM PARAM NOF/U: HCPCS | Performed by: FAMILY MEDICINE

## 2023-03-09 PROCEDURE — G8400 PT W/DXA NO RESULTS DOC: HCPCS | Performed by: FAMILY MEDICINE

## 2023-03-09 PROCEDURE — 1090F PRES/ABSN URINE INCON ASSESS: CPT | Performed by: FAMILY MEDICINE

## 2023-03-09 PROCEDURE — 1036F TOBACCO NON-USER: CPT | Performed by: FAMILY MEDICINE

## 2023-03-09 PROCEDURE — 99214 OFFICE O/P EST MOD 30 MIN: CPT | Performed by: FAMILY MEDICINE

## 2023-03-09 PROCEDURE — G8427 DOCREV CUR MEDS BY ELIG CLIN: HCPCS | Performed by: FAMILY MEDICINE

## 2023-03-09 RX ORDER — POTASSIUM CHLORIDE 20MEQ/15ML
20 LIQUID (ML) ORAL 2 TIMES DAILY
Qty: 480 ML | Refills: 1 | Status: SHIPPED | OUTPATIENT
Start: 2023-03-09

## 2023-03-09 SDOH — ECONOMIC STABILITY: FOOD INSECURITY: WITHIN THE PAST 12 MONTHS, THE FOOD YOU BOUGHT JUST DIDN'T LAST AND YOU DIDN'T HAVE MONEY TO GET MORE.: NEVER TRUE

## 2023-03-09 SDOH — ECONOMIC STABILITY: FOOD INSECURITY: WITHIN THE PAST 12 MONTHS, YOU WORRIED THAT YOUR FOOD WOULD RUN OUT BEFORE YOU GOT MONEY TO BUY MORE.: NEVER TRUE

## 2023-03-09 SDOH — ECONOMIC STABILITY: INCOME INSECURITY: HOW HARD IS IT FOR YOU TO PAY FOR THE VERY BASICS LIKE FOOD, HOUSING, MEDICAL CARE, AND HEATING?: NOT HARD AT ALL

## 2023-03-09 ASSESSMENT — PATIENT HEALTH QUESTIONNAIRE - PHQ9
SUM OF ALL RESPONSES TO PHQ QUESTIONS 1-9: 0
SUM OF ALL RESPONSES TO PHQ QUESTIONS 1-9: 0
2. FEELING DOWN, DEPRESSED OR HOPELESS: 0
SUM OF ALL RESPONSES TO PHQ9 QUESTIONS 1 & 2: 0
SUM OF ALL RESPONSES TO PHQ QUESTIONS 1-9: 0
1. LITTLE INTEREST OR PLEASURE IN DOING THINGS: 0
SUM OF ALL RESPONSES TO PHQ QUESTIONS 1-9: 0

## 2023-03-09 NOTE — PROGRESS NOTES
Minerva Barahona (:  1945) is a Established patient, here for evaluation of the following:        Minerva Barahona, was evaluated through a synchronous (real-time) audio-video encounter. The patient (or guardian if applicable) is aware that this is a billable service, which includes applicable co-pays. This Virtual Visit was conducted with patient's (and/or legal guardian's) consent. The visit was conducted pursuant to the emergency declaration under the [address] [place] [address] authority and the [employer] and [employer] General Act. Patient identification was verified, and a caregiver was present when appropriate.    The patient was located at Home: [address] [place] [address]  Provider was located at [facility] (Appt Dept): [address] [facility], [address] [place] [address],  [address] [place] [address],[address]         --Hortencia Dumont MA

## 2023-03-10 NOTE — PROGRESS NOTES
3/9/2023    TELEHEALTH EVALUATION -- Audio/Visual (During COVID-19 public health emergency)    HPI:    Cely Hugo (:  1945) has requested an audio/video evaluation for the following concern(s):    Patient has a history of dementia.  She is on the call with her son who assists with the history.  Patient was recently hospitalized for chest pain.  She was eventually diagnosed with having a seizure.  She also was found to have a axillary lymph node.  This was biopsied and the son is here to review the biopsy reports.  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  Patient's son advised that hematology oncology referral is needed.    Patient also had home health consultation.  Per the son it was recommended that patient obtain a wheelchair.  Apparently there has been some paperwork processed thru  to get a wheelchair for the patient..  We will  just place a new order for DME for wheelchair for the patient.  She has a history of rheumatoid arthritis.    Other health conditions managed by specialty or that are stable include unless otherwise noted : Protein calorie malnutrition,       Review of Systems as per HPI    Prior to Visit Medications    Medication Sig Taking? Authorizing Provider   potassium chloride 20 MEQ/15ML (10%) oral solution Take 15 mLs by mouth 2 times daily Yes Briseyda Andrade MD   Multiple Vitamins-Minerals (THERAGRAN-M PO) Take 1 tablet by mouth daily Yes Historical Provider, MD   lovastatin (MEVACOR) 40 MG tablet Take 40 mg by mouth daily Yes Historical Provider, MD   oxybutynin (DITROPAN-XL) 10 MG extended release tablet TAKE 1 TABLET DAILY Yes Historical Provider, MD Jhonatan Kinney, Centella asiatica, (GOTU MODESTA PO) Take by mouth Yes Historical Provider, MD   furosemide (LASIX) 40 MG tablet Take 40 mg by mouth daily Yes Historical Provider, MD   PARoxetine (PAXIL) 20 MG tablet [The details of the medication are not available because there are pending  changes by a home health clinician.] Yes Historical Provider, MD   levETIRAcetam (KEPPRA) 500 MG tablet Take 1 tablet by mouth 2 times daily Yes Alexia Moraes MD   [facility]) 72 MCG CAPS capsule Take 1 capsule by mouth every morning (before breakfast) Yes Alexia Moraes MD   OLANZapine (ZYPREXA) 5 MG tablet take 1 tablet by mouth at bedtime Yes Historical Provider, MD   oxyCODONE-acetaminophen (PERCOCET)  MG per tablet take 1 tablet by mouth four times a day for chronic pain Yes Historical Provider, MD   oxybutynin (DITROPAN-XL) 10 MG extended release tablet   Historical Provider, MD       Social History     Tobacco Use    Smoking status: Former    Smokeless tobacco: Never   Vaping Use    Vaping Use: Never used   Substance Use Topics    Alcohol use: No    Drug use: No        Allergies   Allergen Reactions    Aspirin Other (See Comments)     \"messes up my kidneys\"    Morphine Other (See Comments)     unconsciousness    Nsaids Other (See Comments)     \"messes up my kidneys\"    Other Other (See Comments)     Mycins    Penicillins Hives    Piroxicam Other (See Comments)     Kidney damage    Vancomycin Itching     Possible allergic reaction to Vancomycin.  Complained of itching/reddness around forehead/back of neck    Sulfa Antibiotics Rash   ,   Past Medical History:   Diagnosis Date    Chronic anxiety     Depression     controlled on Paxil    Diabetes (HCC)     DJD (degenerative joint disease)     GERD (gastroesophageal reflux disease)     High cholesterol     HTN (hypertension)     Hypertension     Low back pain     post-laminectomy syndrome and multilevel degenerative disease    Osteoporosis     Seizures ([address]    ,   Past Surgical History:   Procedure Laterality Date    ADENOIDECTOMY      APPENDECTOMY      BACK SURGERY      PHLD X3    BREAST BIOPSY Left 8/28/2015    WIDE LOCAL EXCISION LEFT BREAST LESION performed by Yanelis Parisi MD at [address]      hiatal    IR BIOPSY LYMPH NODE SUPERVICIAL  2/8/2023    IR BIOPSY LYMPH NODE SUPERVICIAL 2/8/2023 [facility] RAD ANGIO IR    ORTHOPEDIC SURGERY      bilateral shoulder surgery    DANDY AND BSO (CERVIX REMOVED)  Lauren Cooney 24 ARTHROPLASTY  4/12     total right hip replacement, Dr. Barney Isidro     ,   Social History     Tobacco Use    Smoking status: Former    Smokeless tobacco: Never   Vaping Use    Vaping Use: Never used   Substance Use Topics    Alcohol use: No    Drug use: No   ,   Family History   Problem Relation Age of Onset    Cancer Mother         melanoma    Cancer Father         lung    Heart Attack Other     Coronary Art Dis Other     Seizures Other     Headache Other     Hypertension Other     Heart Disease Maternal Grandmother     Diabetes Other        PHYSICAL EXAMINATION:  [ INSTRUCTIONS:  \"[x]\" Indicates a positive item  \"[]\" Indicates a negative item  -- DELETE ALL ITEMS NOT EXAMINED]  Vital Signs: (As obtained by patient/caregiver or practitioner observation)    Blood pressure-  Heart rate-    Respiratory rate-    Temperature-  Pulse oximetry-     Constitutional: [x] Appears well-developed and well-nourished [] No apparent distress      [] Abnormal-   Mental status  [x] Alert and awake  [] Oriented to person/place/time []Able to follow commands      Eyes:  EOM    []  Normal  [] Abnormal-  Sclera  []  Normal  [] Abnormal -         Discharge []  None visible  [] Abnormal -    HENT:   [x] Normocephalic, atraumatic.   [] Abnormal   [] Mouth/Throat: Mucous membranes are moist.     External Ears [x] Normal  [] Abnormal-     Neck: [] No visualized mass     Pulmonary/Chest: [x] Respiratory effort normal.  [] No visualized signs of difficulty breathing or respiratory distress        [] Abnormal-      Musculoskeletal:   [] Normal gait with no signs of ataxia         [] Normal range of motion of neck        [] Abnormal-       Neurological:        [] No Facial Asymmetry (Cranial nerve 7 motor function) (limited exam to video visit)          [] No gaze palsy        [] Abnormal-         Skin:        [] No significant exanthematous lesions or discoloration noted on facial skin         [] Abnormal-            Psychiatric:       [x] Normal Affect [] No Hallucinations        [] Abnormal-     Other pertinent observable physical exam findings-none; patient sitting in a recliner chair; no acute distress noted    ASSESSMENT/PLAN:  Shabana Mak was seen today for results and medication refill. 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 Return if symptoms worsen or fail to improve in the next scheduled visit.      s evaluated through a synchronous (real-time) audio-video encounter. The patient (or guardian if applicable) is aware that this is a billable service, which includes applicable co-pays. This Virtual Visit was conducted with patient's (and/or legal guardian's) consent. The visit was conducted pursuant to the emergency declaration under the [facility], [address] [place] [address] authority and the [employer] General Act. Patient identification was verified, and a caregiver was present when appropriate.    The patient was located at Home: [address] [place] [id number]  Provider was located at [facility] [address] [place] [address]): [address] [facility], [address] [facility],  [facility] [address]        Total time spent on this encounter: Not billed by time    --Asha Tai Laci Austin MD on 3/9/2023 at 11:18 PM    An electronic signature was used to authenticate this note.

## 2023-03-14 ENCOUNTER — OFFICE VISIT (OUTPATIENT)
Age: 78
End: 2023-03-14
Payer: MEDICARE

## 2023-03-14 ENCOUNTER — HOSPITAL ENCOUNTER (OUTPATIENT)
Facility: HOSPITAL | Age: 78
Discharge: HOME | End: 2023-03-17
Payer: MEDICARE

## 2023-03-14 VITALS
BODY MASS INDEX: 19.16 KG/M2 | SYSTOLIC BLOOD PRESSURE: 103 MMHG | WEIGHT: 115 LBS | DIASTOLIC BLOOD PRESSURE: 74 MMHG | RESPIRATION RATE: 16 BRPM | HEIGHT: 65 IN | HEART RATE: 118 BPM | TEMPERATURE: 97.6 F | OXYGEN SATURATION: 97 %

## 2023-03-14 DIAGNOSIS — E44.1 MILD PROTEIN-CALORIE MALNUTRITION (HCC): ICD-10-CM

## 2023-03-14 DIAGNOSIS — N28.9 IMPAIRED RENAL FUNCTION: Primary | ICD-10-CM

## 2023-03-14 DIAGNOSIS — F03.90 DEMENTIA, UNSPECIFIED DEMENTIA SEVERITY, UNSPECIFIED DEMENTIA TYPE, UNSPECIFIED WHETHER BEHAVIORAL, PSYCHOTIC, OR MOOD DISTURBANCE OR ANXIETY (HCC): ICD-10-CM

## 2023-03-14 DIAGNOSIS — Z13.228 SCREENING FOR ENDOCRINE, METABOLIC AND IMMUNITY DISORDER: ICD-10-CM

## 2023-03-14 DIAGNOSIS — Z13.29 SCREENING FOR ENDOCRINE, METABOLIC AND IMMUNITY DISORDER: ICD-10-CM

## 2023-03-14 DIAGNOSIS — M54.50 LOW BACK PAIN, UNSPECIFIED BACK PAIN LATERALITY, UNSPECIFIED CHRONICITY, UNSPECIFIED WHETHER SCIATICA PRESENT: ICD-10-CM

## 2023-03-14 DIAGNOSIS — Z13.0 SCREENING FOR ENDOCRINE, METABOLIC AND IMMUNITY DISORDER: ICD-10-CM

## 2023-03-14 DIAGNOSIS — M81.0 POSTMENOPAUSAL BONE LOSS: ICD-10-CM

## 2023-03-14 DIAGNOSIS — D17.9 LIPOMA, UNSPECIFIED SITE: ICD-10-CM

## 2023-03-14 DIAGNOSIS — G40.909 NONINTRACTABLE EPILEPSY WITHOUT STATUS EPILEPTICUS, UNSPECIFIED EPILEPSY TYPE (HCC): ICD-10-CM

## 2023-03-14 DIAGNOSIS — F41.9 ANXIETY DISORDER, UNSPECIFIED TYPE: ICD-10-CM

## 2023-03-14 DIAGNOSIS — M54.9 DORSALGIA, UNSPECIFIED: Primary | ICD-10-CM

## 2023-03-14 DIAGNOSIS — D47.9 B-CELL LYMPHOPROLIFERATIVE DISORDER (HCC): ICD-10-CM

## 2023-03-14 DIAGNOSIS — E78.5 HYPERLIPIDEMIA, UNSPECIFIED HYPERLIPIDEMIA TYPE: ICD-10-CM

## 2023-03-14 DIAGNOSIS — G89.29 OTHER CHRONIC PAIN: ICD-10-CM

## 2023-03-14 PROBLEM — E46 PROTEIN CALORIE MALNUTRITION (HCC): Status: ACTIVE | Noted: 2023-03-14

## 2023-03-14 PROBLEM — N64.9 BREAST LESION: Status: ACTIVE | Noted: 2023-03-14

## 2023-03-14 LAB
ALBUMIN SERPL-MCNC: 3.6 G/DL (ref 3.4–5)
ALBUMIN/GLOB SERPL: 1 (ref 0.8–1.7)
ALP SERPL-CCNC: 79 U/L (ref 45–117)
ALT SERPL-CCNC: 27 U/L (ref 13–56)
ANION GAP SERPL CALC-SCNC: 4 MMOL/L (ref 3–18)
AST SERPL-CCNC: 35 U/L (ref 10–38)
BASOPHILS # BLD: 0 K/UL (ref 0–0.1)
BASOPHILS NFR BLD: 1 % (ref 0–2)
BILIRUB SERPL-MCNC: 0.3 MG/DL (ref 0.2–1)
BUN SERPL-MCNC: 22 MG/DL (ref 7–18)
BUN/CREAT SERPL: 17 (ref 12–20)
CALCIUM SERPL-MCNC: 10.2 MG/DL (ref 8.5–10.1)
CHLORIDE SERPL-SCNC: 100 MMOL/L (ref 100–111)
CHOLEST SERPL-MCNC: 219 MG/DL
CO2 SERPL-SCNC: 35 MMOL/L (ref 21–32)
CREAT SERPL-MCNC: 1.27 MG/DL (ref 0.6–1.3)
DIFFERENTIAL METHOD BLD: ABNORMAL
EOSINOPHIL # BLD: 0.1 K/UL (ref 0–0.4)
EOSINOPHIL NFR BLD: 1 % (ref 0–5)
ERYTHROCYTE [DISTWIDTH] IN BLOOD BY AUTOMATED COUNT: 15.1 % (ref 11.6–14.5)
EST. AVERAGE GLUCOSE BLD GHB EST-MCNC: 94 MG/DL
GLOBULIN SER CALC-MCNC: 3.6 G/DL (ref 2–4)
GLUCOSE SERPL-MCNC: 147 MG/DL (ref 74–99)
HBA1C MFR BLD: 4.9 % (ref 4.2–5.6)
HCT VFR BLD AUTO: 38.2 % (ref 35–45)
HDLC SERPL-MCNC: 91 MG/DL (ref 40–60)
HDLC SERPL: 2.4 (ref 0–5)
HGB BLD-MCNC: 12.2 G/DL (ref 12–16)
IMM GRANULOCYTES # BLD AUTO: 0 K/UL (ref 0–0.04)
IMM GRANULOCYTES NFR BLD AUTO: 1 % (ref 0–0.5)
LDLC SERPL CALC-MCNC: 105 MG/DL (ref 0–100)
LIPID PANEL: ABNORMAL
LYMPHOCYTES # BLD: 1.3 K/UL (ref 0.9–3.6)
LYMPHOCYTES NFR BLD: 21 % (ref 21–52)
MCH RBC QN AUTO: 29.8 PG (ref 24–34)
MCHC RBC AUTO-ENTMCNC: 31.9 G/DL (ref 31–37)
MCV RBC AUTO: 93.4 FL (ref 78–100)
MONOCYTES # BLD: 0.5 K/UL (ref 0.05–1.2)
MONOCYTES NFR BLD: 8 % (ref 3–10)
NEUTS SEG # BLD: 4.3 K/UL (ref 1.8–8)
NEUTS SEG NFR BLD: 68 % (ref 40–73)
NRBC # BLD: 0 K/UL (ref 0–0.01)
NRBC BLD-RTO: 0 PER 100 WBC
PLATELET # BLD AUTO: 301 K/UL (ref 135–420)
PMV BLD AUTO: 9.3 FL (ref 9.2–11.8)
POTASSIUM SERPL-SCNC: 4.3 MMOL/L (ref 3.5–5.5)
PROT SERPL-MCNC: 7.2 G/DL (ref 6.4–8.2)
RBC # BLD AUTO: 4.09 M/UL (ref 4.2–5.3)
SODIUM SERPL-SCNC: 139 MMOL/L (ref 136–145)
TRIGL SERPL-MCNC: 115 MG/DL
VLDLC SERPL CALC-MCNC: 23 MG/DL
WBC # BLD AUTO: 6.2 K/UL (ref 4.6–13.2)

## 2023-03-14 PROCEDURE — 83036 HEMOGLOBIN GLYCOSYLATED A1C: CPT

## 2023-03-14 PROCEDURE — 80061 LIPID PANEL: CPT

## 2023-03-14 PROCEDURE — 36415 COLL VENOUS BLD VENIPUNCTURE: CPT

## 2023-03-14 PROCEDURE — 80053 COMPREHEN METABOLIC PANEL: CPT

## 2023-03-14 PROCEDURE — 99213 OFFICE O/P EST LOW 20 MIN: CPT | Performed by: INTERNAL MEDICINE

## 2023-03-14 PROCEDURE — 85025 COMPLETE CBC W/AUTO DIFF WBC: CPT

## 2023-03-14 SDOH — ECONOMIC STABILITY: INCOME INSECURITY: HOW HARD IS IT FOR YOU TO PAY FOR THE VERY BASICS LIKE FOOD, HOUSING, MEDICAL CARE, AND HEATING?: NOT HARD AT ALL

## 2023-03-14 SDOH — ECONOMIC STABILITY: FOOD INSECURITY: WITHIN THE PAST 12 MONTHS, YOU WORRIED THAT YOUR FOOD WOULD RUN OUT BEFORE YOU GOT MONEY TO BUY MORE.: NEVER TRUE

## 2023-03-14 SDOH — ECONOMIC STABILITY: FOOD INSECURITY: WITHIN THE PAST 12 MONTHS, THE FOOD YOU BOUGHT JUST DIDN'T LAST AND YOU DIDN'T HAVE MONEY TO GET MORE.: NEVER TRUE

## 2023-03-14 ASSESSMENT — ENCOUNTER SYMPTOMS
ALLERGIC/IMMUNOLOGIC NEGATIVE: 1
GASTROINTESTINAL NEGATIVE: 1
EYES NEGATIVE: 1
RESPIRATORY NEGATIVE: 1

## 2023-03-14 ASSESSMENT — PATIENT HEALTH QUESTIONNAIRE - PHQ9
1. LITTLE INTEREST OR PLEASURE IN DOING THINGS: 0
SUM OF ALL RESPONSES TO PHQ QUESTIONS 1-9: 0
SUM OF ALL RESPONSES TO PHQ QUESTIONS 1-9: 0
2. FEELING DOWN, DEPRESSED OR HOPELESS: 0
SUM OF ALL RESPONSES TO PHQ QUESTIONS 1-9: 0
SUM OF ALL RESPONSES TO PHQ9 QUESTIONS 1 & 2: 0
SUM OF ALL RESPONSES TO PHQ QUESTIONS 1-9: 0

## 2023-03-14 NOTE — PROGRESS NOTES
Subjective:   Scarlett Mendosa was seen today for New Patient    Patient has mild dementia for last 8 years. Patient's son is helping with history and examination. Patient had episode of seizure about a month ago. Patient was hospitalized and was started on Keppra 500 mg twice a day. Patient to follow-up with neurologist.  Patient has a history of seizures in childhood. Patient has lipoma left low back, plan to get it removed, following with surgeon. Patient also has history of mild dementia for last 8 years since patient's  . Patient also has history of spine pains, hip surgery back surgery. Chronic pains. Patient follows with a pain management in [place]. Patient uses Percocet, prescribed by pain management. Patient also has history of anxiety. Patient had been stable on Zyprexa and Paxil. Dose of Zyprexa was reduced from 10 mg to 5 mg slowly. 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 Patient to follow-up with oncologist.    Patient had been on Lasix with potassium supplement for a long time. Patient/son do not know the reason for it. No history of any cardiac illness. Patient has chronic constipation. Uses Linzess. Patient is also on lovastatin for hyperlipidemia and Ditropan XL for overactive bladder.     Past Medical History:   Diagnosis Date    Chronic anxiety     Depression     controlled on Paxil    Diabetes (HCC)     DJD (degenerative joint disease)     GERD (gastroesophageal reflux disease)     High cholesterol     HTN (hypertension)     Hypertension     Low back pain     post-laminectomy syndrome and multilevel degenerative disease    Osteoporosis     Seizures ([address]        Past Surgical History:   Procedure Laterality Date    ADENOIDECTOMY      APPENDECTOMY      BACK SURGERY      PHLD X3    BREAST BIOPSY Left 2015    WIDE LOCAL EXCISION LEFT BREAST LESION performed by Andreina Hinojosa MD at [address]      hiatal    IR BIOPSY LYMPH NODE SUPERVICIAL  2/8/2023    IR BIOPSY LYMPH NODE SUPERVICIAL 2/8/2023 [facility] RAD ANGIO IR    ORTHOPEDIC SURGERY      bilateral shoulder surgery    DANDY AND BSO (CERVIX REMOVED)  Lauren Cooney 24 ARTHROPLASTY  4/12     total right hip replacement, Dr. Kristen Arredondo         Family History   Problem Relation Age of Onset    Cancer Mother         melanoma    Cancer Father         lung    Heart Attack Other     Coronary Art Dis Other     Seizures Other     Headache Other     Hypertension Other     Heart Disease Maternal Grandmother     Diabetes Other        Social History     Socioeconomic History    Marital status:      Spouse name: Not on file    Number of children: Not on file    Years of education: Not on file    Highest education level: Not on file   Occupational History    Not on file   Tobacco Use    Smoking status: Former    Smokeless tobacco: Never   Vaping Use    Vaping Use: Never used   Substance and Sexual Activity    Alcohol use: No    Drug use: No    Sexual activity: Not Currently     Partners: Male   Other Topics Concern    Not on file   Social History Narrative    Not on file     Social Determinants of Health     Financial Resource Strain: Low Risk     Difficulty of Paying Living Expenses: Not hard at all   Food Insecurity: No Food Insecurity    Worried About Running Out of Food in the Last Year: Never true    [address] in the Last Year: Never true   Transportation Needs: Unknown    Lack of Transportation (Medical): Not on file    Lack of Transportation (Non-Medical):  No   Physical Activity: Not on file   Stress: Not on file   Social Connections: Not on file   Intimate Partner Violence: Not on file   Housing Stability: Unknown    Unable to Pay for Housing in the Last Year: Not on file    Number of Places Lived in the Last Year: Not on file    Unstable Housing in the Last Year: No       Allergies   Allergen Reactions    Aspirin Other (See Comments)     \"messes up my kidneys\"    Morphine Other (See Comments)     unconsciousness    Nsaids Other (See Comments)     \"messes up my kidneys\"    Other Other (See Comments)     Mycins    Penicillins Hives    Piroxicam Other (See Comments)     Kidney damage    Vancomycin Itching     Possible allergic reaction to Vancomycin. Complained of itching/reddness around forehead/back of neck    Sulfa Antibiotics Rash       Current Outpatient Medications on File Prior to Visit   Medication Sig Dispense Refill    potassium chloride 20 MEQ/15ML (10%) oral solution Take 15 mLs by mouth 2 times daily 480 mL 1    Multiple Vitamins-Minerals (THERAGRAN-M PO) Take 1 tablet by mouth daily      lovastatin (MEVACOR) 40 MG tablet Take 40 mg by mouth daily      [address], Centella asiatica, (GOTU MODESTA PO) Take by mouth      furosemide (LASIX) 40 MG tablet Take 40 mg by mouth daily      PARoxetine (PAXIL) 20 MG tablet [The details of the medication are not available because there are pending changes by a home health clinician.]      levETIRAcetam (KEPPRA) 500 MG tablet Take 1 tablet by mouth 2 times daily 60 tablet 6    linaCLOtide (LINZESS) 72 MCG CAPS capsule Take 1 capsule by mouth every morning (before breakfast) 30 capsule 0    OLANZapine (ZYPREXA) 5 MG tablet take 1 tablet by mouth at bedtime      oxybutynin (DITROPAN-XL) 10 MG extended release tablet       oxyCODONE-acetaminophen (PERCOCET)  MG per tablet take 1 tablet by mouth four times a day for chronic pain       No current facility-administered medications on file prior to visit. Review of Systems   Constitutional: Negative. Negative for activity change. HENT: Negative. Eyes: Negative. Respiratory: Negative. Cardiovascular: Negative. Gastrointestinal: Negative. Endocrine: Negative. Genitourinary: Negative. Musculoskeletal: Negative. Skin: Negative. Allergic/Immunologic: Negative. Neurological: Negative. Hematological: Negative. Psychiatric/Behavioral: Negative. Objective:     Vitals:    03/14/23 1340   BP: 103/74   Pulse: (!) 118   Resp: 16   Temp: 97.6 °F (36.4 °C)   TempSrc: Temporal   SpO2: 97%   Weight: 115 lb (52.2 kg)   Height: 5' 5\" (1.651 m)      Physical Exam       Labs:     No results found for this visit on 03/14/23. Active Problems:     Patient Active Problem List   Diagnosis    Hypertension    DJD (degenerative joint disease)    Overactive bladder    Weight loss    Hyperlipidemia LDL goal <100    Prediabetes    Insomnia    Chronic renal failure, stage 3 (moderate) ([facility])    Type 2 diabetes mellitus with stage 3a chronic kidney disease, without long-term current use of insulin ([facility])    High cholesterol    Lumbosacral spondylosis without myelopathy    Rheumatoid arthritis involving multiple joints ([facility])    Nausea    Urgency incontinence    Chest pain    Chronic pain syndrome    Chronic anxiety    Depression    Major depression    Postlaminectomy syndrome, lumbar region    Encounter for long-term (current) use of high-risk medication    Spasm of muscle    Hyperlipidemia LDL goal <130    Lumbosacral radiculopathy    GERD (gastroesophageal reflux disease)    Low back pain    Back pain    NSTEMI (non-ST elevated myocardial infarction) ([address]    Seizure (HCC)    Breast mass, left    PINKY (acute kidney injury) ([address]    Severe protein-calorie malnutrition ([address]    Dementia with behavioral disturbance    Dorsalgia, unspecified    Anxiety disorder    Nonintractable epilepsy without status epilepticus ([address]    Screening for endocrine, metabolic and immunity disorder    Postmenopausal bone loss    Hyperlipidemia    Other chronic pain    Breast lesion    Dementia (HCC)    Lipoma    Protein calorie malnutrition         Assessment & Plan:     1. Dorsalgia, unspecified  2.  Anxiety disorder, unspecified type  Assessment & Plan:   Stable on olanzapine, Paxil. Follows with psychiatrist.  3. Nonintractable epilepsy without status epilepticus, unspecified epilepsy type ([facility]  Assessment & Plan:   Stable on Keppra. To follow-up with neurologist.  4. Screening for endocrine, metabolic and immunity disorder  -     Lipid Panel; Future  -     Comprehensive Metabolic Panel; Future  -     CBC with Auto Differential; Future  -     Hemoglobin A1C; Future  5. Postmenopausal bone loss  -     DEXA BONE DENSITY AXIAL SKELETON; Future  6. Hyperlipidemia, unspecified hyperlipidemia type  Assessment & Plan:   Stable on lovastatin  7. Other chronic pain  Assessment & Plan:   Stable on Percocet, prescribed by pain management. 8. Dementia, unspecified dementia severity, unspecified dementia type, unspecified whether behavioral, psychotic, or mood disturbance or anxiety ([facility]  Assessment & Plan: To follow with neurologist.  9. Low back pain, unspecified back pain laterality, unspecified chronicity, unspecified whether sciatica present  10. Lipoma, unspecified site  Assessment & Plan:   Planning to get it removed surgically. 11. Mild protein-calorie malnutrition ([facility]  Assessment & Plan: To get CBC, CMP done. Would consider dietary consult if needed. 12. B-cell lymphoproliferative disorder [facility])  Assessment & Plan:   Patient has referral for oncologist to follow-up with. Follow-up and Dispositions    Return in about 3 months (around 6/14/2023) for [facility], follow up on chronic conditions. Disclaimer: The patient understands our medical plan. Alternatives have been explained and offered. The risks, benefits and significant side effects of all medications have been reviewed. Anticipated time course and progression of condition reviewed. All questions have been addressed. She is encouraged to employ the information provided in the after visit summary, which was reviewed.       Where applicable, she is instructed to call the clinic if she has not been notified either by phone or through [address] with the results of her tests or with an appointment plan for any referrals within 1 week(s). No news is not good news; it's no news. The patient  is to call if her condition worsens or fails to improve or if significant side effects are experienced.            Carmen Saleh MD

## 2023-03-14 NOTE — PROGRESS NOTES
Jazz Sargloria presents today for   Chief Complaint   Patient presents with    New Patient                 1. \"Have you been to the ER, urgent care clinic since your last visit? Hospitalized since your last visit? \" no    2. \"Have you seen or consulted any other health care providers outside of the [address] [place] [address] since your last visit? \" no     3. For patients aged 39-70: Has the patient had a colonoscopy / FIT/ Cologuard? NA - based on age      If the patient is female:    4. For patients aged 41-77: Has the patient had a mammogram within the past 2 years? NA - based on age or sex      11. For patients aged 21-65: Has the patient had a pap smear?  NA - based on age or sex

## 2023-03-15 ENCOUNTER — TELEPHONE (OUTPATIENT)
Age: 78
End: 2023-03-15

## 2023-03-15 ENCOUNTER — CARE COORDINATION (OUTPATIENT)
Facility: CLINIC | Age: 78
End: 2023-03-15

## 2023-03-15 NOTE — TELEPHONE ENCOUNTER
----- Message from Antonella Clemons MD sent at 3/14/2023 11:21 PM EDT -----  Please inform the patient that kidney function shows a GFR of 44, referral placed for nephrology. HbA1c is within acceptable range. CBC shows low RBC count, unchanged from previous reports.   Thanks

## 2023-03-15 NOTE — CARE COORDINATION
[facility] Care Transitions Follow Up Call    Patient Current Location:  [place]    Patient: Andreina Ocampo  Patient : 1945   MRN: [id number]    Discharge Date: 23 RARS: No data recorded    Needs to be reviewed by the provider   Additional needs identified to be addressed with provider: No  none             Method of communication with provider: none      CTN attempt to reach Patient on phone. Ctn reached Pt. Son Mr. Kimberlee Tenorio on phone. Mr. Kimberlee Tenorio informed CTN that Pt.  attend new PCP appt. Yesterday and appt. Went well. Pt. Son reported that Pt. Is doing good. No worsening of symptoms reported by Pt. Son At this time. Pt. Son reported that Pt. Strained her ankle yesterday at PCP office and its a little swollen. Pt. Son reports that he will put ice and give his mom ibuprofen. Pt. Son reported that he is closely watching Pt. Ankle. No Pt.  medical/health information given to Pt. Son at this time. Reminded Pt. Son  to take Pt. To the nearest emergency room for chest pain, shortness of breath, returning of symptoms that brought him to the emergency room and/or worsening of symptoms. Pt. Son Verbalized and repeated back understanding. Follow Up  Future Appointments   Date Time Provider Edwin Ku   3/17/2023  2:00 PM HBV BONE DEXA RM 1 [facility] [place]   4/3/2023  1:40 PM Rico Tejada MD [facility] BS AMB   2023 10:00 AM Thais Saha MD [facility] BS AMB     Non-[facility] follow up appointment(s): none noted at this time. Care Transition Nurse reviewed medical action plan and red flags with patient's son and discussed any barriers to care and/or understanding of plan of care after discharge. Discussed appropriate site of care based on symptoms and resources available to patient including: PCP  When to call 911. The patient's son agrees to contact the PCP office for questions related to their healthcare.      Offered patient enrollment in the Remote Patient Monitoring (RPM) program for in-home monitoring: NA.     Care Transitions Subsequent and Final Call    Subsequent and Final Calls  Care Transitions Interventions  Other Interventions:             Care Transition Nurse provided contact information for future needs. Plan for follow-up call in 7-10 days based on severity of symptoms and risk factors. Plan for next call:  assess ankle, assess for any needs.      Maria Luisa Borjas RN

## 2023-03-17 ENCOUNTER — CARE COORDINATION (OUTPATIENT)
Facility: CLINIC | Age: 78
End: 2023-03-17

## 2023-03-17 ENCOUNTER — HOSPITAL ENCOUNTER (OUTPATIENT)
Facility: HOSPITAL | Age: 78
End: 2023-03-17
Payer: MEDICARE

## 2023-03-17 DIAGNOSIS — M81.0 POSTMENOPAUSAL BONE LOSS: ICD-10-CM

## 2023-03-17 PROCEDURE — 77080 DXA BONE DENSITY AXIAL: CPT

## 2023-03-17 NOTE — CARE COORDINATION
[facility] Care Transitions Follow Up Call      CTN attempt to reach Patient on phone. CTN reached Pt. Son Mr. Ching Mock on phone. Mr. Ching Mock informed CTN that Patient is doing well and sprain ankle swelling and pain are better. Pt. Son reported that he is putting ice and giving patient aleve. CTN noted referral to Nephrology Dr. Kale Willingham (NSAID) use. Will make PCP aware. Patient: Donette Galeazzi  Patient : 1945   MRN: [id number]    Discharge Date: 23 RARS: No data recorded    Needs to be reviewed by the provider   Additional needs identified to be addressed with provider: Yes    CTN called Pt. For post hospital follow up today and reached Patient son on phone. Pt. Son states that Pt. Is doing well . Pt. Son reported that Pt. Sprained her ankle on 3/14/23 while at PCP office. Pt. Son reported that he has been putting ice and giving Pt. Aleve. Pt. Son reported that  the swelling and pain are better. I saw referral to Nephrology . Is  it okay for Pt.  to take aleve? Method of communication with provider: chart routing. CTN received a message back from Dr. Nancie Nieves \" Ideally all NSAID's need to be avoided with deranged kidney function unless when it is absolutely needed. \"    Per chart, The patient has full capacity. CTN called and spoke with Pt. Son. CTN asked if CTN can speak with the Pt. To get verbal permission to speak with her son. Pt. Verified her identity. Patient given CTN permission to speak with her son on phone. CTN informed Pt. Son of the above. Pt. Son states that he will stop giving Patient aleve. Pt. Son kept the conversation short and concluded the call. Unable to review red flags and medical action plans as Pt. Son kept the conversation short and ended the call.      Follow Up  Future Appointments   Date Time Provider Edwin Ku   4/3/2023  1:40 PM Cookie Ferrell MD [facility] BS AMB   2023 10:00 AM Thais Reyna MD [facility] AMB         [facility] Transition Nurse provided contact information for future needs. Plan for follow-up call in 7-10 days based on severity of symptoms and risk factors. Plan for next call:  assess ankle swelling, assess for any needs.      Danuta Schmitt RN

## 2023-03-21 ENCOUNTER — TELEPHONE (OUTPATIENT)
Age: 78
End: 2023-03-21

## 2023-03-21 NOTE — TELEPHONE ENCOUNTER
Please send refill to [facility] on [address]    linaCLOtide [facility]) 72 MCG CAPS capsule 30 capsule 2 2/20/2023     Sig - Route:  Take 1 capsule by mouth every morning (before breakfast) - Oral    Sent to pharmacy as: linaCLOtide 72 MCG Oral Capsule (Linzess)    E-Prescribing Status: Receipt confirmed by pharmacy (2/20/2023  9:42 AM EST)

## 2023-03-29 ENCOUNTER — CARE COORDINATION (OUTPATIENT)
Facility: CLINIC | Age: 78
End: 2023-03-29

## 2023-03-29 NOTE — CARE COORDINATION
[facility] Care Transitions Follow Up Call    Patient: Akosua Villegsa  Patient : 1945   MRN: [id number]  Discharge Date: 23 RARS: No data recorded    Needs to be reviewed by the provider   Additional needs identified to be addressed with provider: No           Method of communication with provider: No    CTN attempt to contact Pt. On phone for hospital follow up. CTN reached Pt. Son Mr. Sheri Castelan on phone. Mr. Elysia Aviles informed CTN That Pt. Doesn't really like talking on phone. Pt. Son is listed on PHI but did not provide CTN the Password at this time. During this outreach, CTN did not release any HIPAA protected information and solely obtained information from Pt. Son. Patient son reported that Pt. Is doing well. No worsening of symptoms reported by Pt. Son at this time. Pt. Son reported that sprain ankle is better without anymore swelling. No kami pain reported by Pt. Son at this time. Pt. Son reported that new PCP appt. Went well. No questions, concerns and/or needs at this time as per Pt. Son. Pt. Son states that they have everything they need at home. Pt. Son is aware of CTN phone number for future questions, concerns and/or needs  This episode is closed and resolved. Follow Up  Future Appointments   Date Time Provider Edwin Ku   4/3/2023 10:00 AM Caryle Keen, MD [facility] BS AMB   4/3/2023  1:40 PM Brea Veliz MD [facility] BS AMB   4/10/2023  1:15 PM Isis Goel MD [facility] BS AMB   2023 10:00 AM Thais Dominique MD [facility] BS AMB     Care Transition Nurse provided contact information for future needs.      Leida Last RN

## 2023-04-03 ENCOUNTER — HOSPITAL ENCOUNTER (OUTPATIENT)
Facility: HOSPITAL | Age: 78
Discharge: HOME | End: 2023-04-06
Payer: MEDICARE

## 2023-04-03 ENCOUNTER — OFFICE VISIT (OUTPATIENT)
Age: 78
End: 2023-04-03
Payer: COMMERCIAL

## 2023-04-03 VITALS
OXYGEN SATURATION: 97 % | SYSTOLIC BLOOD PRESSURE: 107 MMHG | HEART RATE: 99 BPM | BODY MASS INDEX: 18.83 KG/M2 | DIASTOLIC BLOOD PRESSURE: 69 MMHG | WEIGHT: 113 LBS | HEIGHT: 65 IN | RESPIRATION RATE: 16 BRPM

## 2023-04-03 DIAGNOSIS — D64.9 NORMOCYTIC ANEMIA: ICD-10-CM

## 2023-04-03 DIAGNOSIS — R59.0 AXILLARY LYMPHADENOPATHY: ICD-10-CM

## 2023-04-03 DIAGNOSIS — C82.14 GRADE 2 FOLLICULAR LYMPHOMA OF LYMPH NODES OF AXILLA (HCC): Primary | ICD-10-CM

## 2023-04-03 LAB
ALBUMIN SERPL-MCNC: 3.9 G/DL (ref 3.4–5)
ALBUMIN/GLOB SERPL: 1.1 (ref 0.8–1.7)
ALP SERPL-CCNC: 93 U/L (ref 45–117)
ALT SERPL-CCNC: 28 U/L (ref 13–56)
ANION GAP SERPL CALC-SCNC: 6 MMOL/L (ref 3–18)
AST SERPL-CCNC: 29 U/L (ref 10–38)
BASOPHILS # BLD: 0 K/UL (ref 0–0.1)
BASOPHILS NFR BLD: 1 % (ref 0–2)
BILIRUB SERPL-MCNC: 0.2 MG/DL (ref 0.2–1)
BUN SERPL-MCNC: 26 MG/DL (ref 7–18)
BUN/CREAT SERPL: 22 (ref 12–20)
CALCIUM SERPL-MCNC: 10 MG/DL (ref 8.5–10.1)
CHLORIDE SERPL-SCNC: 101 MMOL/L (ref 100–111)
CO2 SERPL-SCNC: 33 MMOL/L (ref 21–32)
CREAT SERPL-MCNC: 1.2 MG/DL (ref 0.6–1.3)
DIFFERENTIAL METHOD BLD: ABNORMAL
EOSINOPHIL # BLD: 0.1 K/UL (ref 0–0.4)
EOSINOPHIL NFR BLD: 1 % (ref 0–5)
ERYTHROCYTE [DISTWIDTH] IN BLOOD BY AUTOMATED COUNT: 13.9 % (ref 11.6–14.5)
ERYTHROCYTE [SEDIMENTATION RATE] IN BLOOD: 13 MM/HR (ref 0–30)
FOLATE SERPL-MCNC: >20 NG/ML (ref 3.1–17.5)
GLOBULIN SER CALC-MCNC: 3.5 G/DL (ref 2–4)
GLUCOSE SERPL-MCNC: 107 MG/DL (ref 74–99)
HAPTOGLOB SERPL-MCNC: 150 MG/DL (ref 30–200)
HBV SURFACE AG SER QL: <0.1 INDEX
HBV SURFACE AG SER QL: NEGATIVE
HCT VFR BLD AUTO: 37.7 % (ref 35–45)
HGB BLD-MCNC: 12 G/DL (ref 12–16)
IMM GRANULOCYTES # BLD AUTO: 0 K/UL (ref 0–0.04)
IMM GRANULOCYTES NFR BLD AUTO: 0 % (ref 0–0.5)
IRON SATN MFR SERPL: 20 % (ref 20–50)
IRON SERPL-MCNC: 54 UG/DL (ref 50–175)
LDH SERPL L TO P-CCNC: 179 U/L (ref 81–234)
LYMPHOCYTES # BLD: 1.4 K/UL (ref 0.9–3.6)
LYMPHOCYTES NFR BLD: 22 % (ref 21–52)
MCH RBC QN AUTO: 30.3 PG (ref 24–34)
MCHC RBC AUTO-ENTMCNC: 31.8 G/DL (ref 31–37)
MCV RBC AUTO: 95.2 FL (ref 78–100)
MONOCYTES # BLD: 0.7 K/UL (ref 0.05–1.2)
MONOCYTES NFR BLD: 10 % (ref 3–10)
NEUTS SEG # BLD: 4.5 K/UL (ref 1.8–8)
NEUTS SEG NFR BLD: 67 % (ref 40–73)
NRBC # BLD: 0 K/UL (ref 0–0.01)
NRBC BLD-RTO: 0 PER 100 WBC
PLATELET # BLD AUTO: 330 K/UL (ref 135–420)
PMV BLD AUTO: 9.3 FL (ref 9.2–11.8)
POTASSIUM SERPL-SCNC: 4.1 MMOL/L (ref 3.5–5.5)
PROT SERPL-MCNC: 7.4 G/DL (ref 6.4–8.2)
RBC # BLD AUTO: 3.96 M/UL (ref 4.2–5.3)
SODIUM SERPL-SCNC: 140 MMOL/L (ref 136–145)
TIBC SERPL-MCNC: 268 UG/DL (ref 250–450)
VIT B12 SERPL-MCNC: 901 PG/ML (ref 211–911)
WBC # BLD AUTO: 6.7 K/UL (ref 4.6–13.2)

## 2023-04-03 PROCEDURE — 80053 COMPREHEN METABOLIC PANEL: CPT

## 2023-04-03 PROCEDURE — G8399 PT W/DXA RESULTS DOCUMENT: HCPCS | Performed by: INTERNAL MEDICINE

## 2023-04-03 PROCEDURE — 85652 RBC SED RATE AUTOMATED: CPT

## 2023-04-03 PROCEDURE — 1090F PRES/ABSN URINE INCON ASSESS: CPT | Performed by: INTERNAL MEDICINE

## 2023-04-03 PROCEDURE — 1036F TOBACCO NON-USER: CPT | Performed by: INTERNAL MEDICINE

## 2023-04-03 PROCEDURE — 86803 HEPATITIS C AB TEST: CPT

## 2023-04-03 PROCEDURE — 87389 HIV-1 AG W/HIV-1&-2 AB AG IA: CPT

## 2023-04-03 PROCEDURE — 83615 LACTATE (LD) (LDH) ENZYME: CPT

## 2023-04-03 PROCEDURE — 83010 ASSAY OF HAPTOGLOBIN QUANT: CPT

## 2023-04-03 PROCEDURE — 3074F SYST BP LT 130 MM HG: CPT | Performed by: INTERNAL MEDICINE

## 2023-04-03 PROCEDURE — 82607 VITAMIN B-12: CPT

## 2023-04-03 PROCEDURE — 83540 ASSAY OF IRON: CPT

## 2023-04-03 PROCEDURE — G8420 CALC BMI NORM PARAMETERS: HCPCS | Performed by: INTERNAL MEDICINE

## 2023-04-03 PROCEDURE — 36415 COLL VENOUS BLD VENIPUNCTURE: CPT

## 2023-04-03 PROCEDURE — 1123F ACP DISCUSS/DSCN MKR DOCD: CPT | Performed by: INTERNAL MEDICINE

## 2023-04-03 PROCEDURE — 99205 OFFICE O/P NEW HI 60 MIN: CPT | Performed by: INTERNAL MEDICINE

## 2023-04-03 PROCEDURE — 82784 ASSAY IGA/IGD/IGG/IGM EACH: CPT

## 2023-04-03 PROCEDURE — 87340 HEPATITIS B SURFACE AG IA: CPT

## 2023-04-03 PROCEDURE — 3078F DIAST BP <80 MM HG: CPT | Performed by: INTERNAL MEDICINE

## 2023-04-03 PROCEDURE — G8427 DOCREV CUR MEDS BY ELIG CLIN: HCPCS | Performed by: INTERNAL MEDICINE

## 2023-04-03 PROCEDURE — 3017F COLORECTAL CA SCREEN DOC REV: CPT | Performed by: INTERNAL MEDICINE

## 2023-04-03 PROCEDURE — 85025 COMPLETE CBC W/AUTO DIFF WBC: CPT

## 2023-04-03 ASSESSMENT — ENCOUNTER SYMPTOMS
BACK PAIN: 0
ABDOMINAL PAIN: 0
SHORTNESS OF BREATH: 0
DIARRHEA: 0
NAUSEA: 0
VOMITING: 0
COUGH: 0

## 2023-04-04 LAB
HCV AB SER IA-ACNC: 0.05 INDEX
HCV AB SERPL QL IA: NEGATIVE
Lab: NORMAL

## 2023-04-05 LAB
HIV 1+2 AB+HIV1 P24 AG SERPL QL IA: NONREACTIVE
HIV 1/2 RESULT COMMENT: NORMAL

## 2023-04-06 LAB
ALBUMIN SERPL ELPH-MCNC: 3.9 G/DL (ref 2.9–4.4)
ALBUMIN/GLOB SERPL: 1.3 (ref 0.7–1.7)
ALPHA1 GLOB SERPL ELPH-MCNC: 0.3 G/DL (ref 0–0.4)
ALPHA2 GLOB SERPL ELPH-MCNC: 0.8 G/DL (ref 0.4–1)
B-GLOBULIN SERPL ELPH-MCNC: 0.9 G/DL (ref 0.7–1.3)
GAMMA GLOB SERPL ELPH-MCNC: 1.2 G/DL (ref 0.4–1.8)
GLOBULIN SER-MCNC: 3.2 G/DL (ref 2.2–3.9)
IGA SERPL-MCNC: 84 MG/DL (ref 64–422)
IGG SERPL-MCNC: 1087 MG/DL (ref 586–1602)
IGM SERPL-MCNC: 133 MG/DL (ref 26–217)
INTERPRETATION SERPL IEP-IMP: ABNORMAL
KAPPA LC FREE SER-MCNC: 40.6 MG/L (ref 3.3–19.4)
KAPPA LC FREE/LAMBDA FREE SER: 1.37 (ref 0.26–1.65)
LAMBDA LC FREE SERPL-MCNC: 29.6 MG/L (ref 5.7–26.3)
M PROTEIN SERPL ELPH-MCNC: ABNORMAL G/DL
PROT SERPL-MCNC: 7.1 G/DL (ref 6–8.5)

## 2023-04-06 RX ORDER — POTASSIUM CHLORIDE 20MEQ/15ML
LIQUID (ML) ORAL
Qty: 480 ML | Refills: 1 | Status: SHIPPED | OUTPATIENT
Start: 2023-04-06

## 2023-04-13 PROBLEM — Z13.0 SCREENING FOR ENDOCRINE, METABOLIC AND IMMUNITY DISORDER: Status: RESOLVED | Noted: 2023-03-14 | Resolved: 2023-04-13

## 2023-04-13 PROBLEM — Z13.228 SCREENING FOR ENDOCRINE, METABOLIC AND IMMUNITY DISORDER: Status: RESOLVED | Noted: 2023-03-14 | Resolved: 2023-04-13

## 2023-04-13 PROBLEM — Z13.29 SCREENING FOR ENDOCRINE, METABOLIC AND IMMUNITY DISORDER: Status: RESOLVED | Noted: 2023-03-14 | Resolved: 2023-04-13

## 2023-04-14 ENCOUNTER — HOSPITAL ENCOUNTER (OUTPATIENT)
Facility: HOSPITAL | Age: 78
Discharge: HOME | End: 2023-04-14
Attending: RADIOLOGY
Payer: MEDICARE

## 2023-04-14 VITALS
HEART RATE: 87 BPM | RESPIRATION RATE: 16 BRPM | SYSTOLIC BLOOD PRESSURE: 118 MMHG | OXYGEN SATURATION: 95 % | DIASTOLIC BLOOD PRESSURE: 42 MMHG

## 2023-04-14 DIAGNOSIS — C82.14 GRADE 2 FOLLICULAR LYMPHOMA OF LYMPH NODES OF AXILLA (HCC): ICD-10-CM

## 2023-04-14 DIAGNOSIS — R59.0 AXILLARY LYMPHADENOPATHY: ICD-10-CM

## 2023-04-14 LAB
ANION GAP SERPL CALC-SCNC: 5 MMOL/L (ref 3–18)
APTT PPP: 26.9 SEC (ref 23–36.4)
BASOPHILS # BLD: 0 K/UL (ref 0–0.1)
BASOPHILS NFR BLD: 1 % (ref 0–2)
BUN SERPL-MCNC: 25 MG/DL (ref 7–18)
BUN/CREAT SERPL: 20 (ref 12–20)
CALCIUM SERPL-MCNC: 10.5 MG/DL (ref 8.5–10.1)
CHLORIDE SERPL-SCNC: 102 MMOL/L (ref 100–111)
CO2 SERPL-SCNC: 35 MMOL/L (ref 21–32)
CREAT SERPL-MCNC: 1.25 MG/DL (ref 0.6–1.3)
DIFFERENTIAL METHOD BLD: ABNORMAL
EOSINOPHIL # BLD: 0.2 K/UL (ref 0–0.4)
EOSINOPHIL NFR BLD: 4 % (ref 0–5)
ERYTHROCYTE [DISTWIDTH] IN BLOOD BY AUTOMATED COUNT: 13.6 % (ref 11.6–14.5)
GLUCOSE SERPL-MCNC: 107 MG/DL (ref 74–99)
HCT VFR BLD AUTO: 35.6 % (ref 35–45)
HGB BLD-MCNC: 11.5 G/DL (ref 12–16)
IMM GRANULOCYTES # BLD AUTO: 0 K/UL (ref 0–0.04)
IMM GRANULOCYTES NFR BLD AUTO: 0 % (ref 0–0.5)
INR PPP: 1 (ref 0.8–1.2)
LYMPHOCYTES # BLD: 1.7 K/UL (ref 0.9–3.6)
LYMPHOCYTES NFR BLD: 36 % (ref 21–52)
MCH RBC QN AUTO: 30.4 PG (ref 24–34)
MCHC RBC AUTO-ENTMCNC: 32.3 G/DL (ref 31–37)
MCV RBC AUTO: 94.2 FL (ref 78–100)
MONOCYTES # BLD: 0.5 K/UL (ref 0.05–1.2)
MONOCYTES NFR BLD: 10 % (ref 3–10)
NEUTS SEG # BLD: 2.4 K/UL (ref 1.8–8)
NEUTS SEG NFR BLD: 49 % (ref 40–73)
NRBC # BLD: 0 K/UL (ref 0–0.01)
NRBC BLD-RTO: 0 PER 100 WBC
PLATELET # BLD AUTO: 250 K/UL (ref 135–420)
PMV BLD AUTO: 9 FL (ref 9.2–11.8)
POTASSIUM SERPL-SCNC: 3.3 MMOL/L (ref 3.5–5.5)
PROTHROMBIN TIME: 13.1 SEC (ref 11.5–15.2)
RBC # BLD AUTO: 3.78 M/UL (ref 4.2–5.3)
SODIUM SERPL-SCNC: 142 MMOL/L (ref 136–145)
WBC # BLD AUTO: 4.8 K/UL (ref 4.6–13.2)

## 2023-04-14 PROCEDURE — 88305 TISSUE EXAM BY PATHOLOGIST: CPT

## 2023-04-14 PROCEDURE — 88313 SPECIAL STAINS GROUP 2: CPT

## 2023-04-14 PROCEDURE — 6360000002 HC RX W HCPCS: Performed by: RADIOLOGY

## 2023-04-14 PROCEDURE — 88184 FLOWCYTOMETRY/ TC 1 MARKER: CPT

## 2023-04-14 PROCEDURE — 88341 IMHCHEM/IMCYTCHM EA ADD ANTB: CPT

## 2023-04-14 PROCEDURE — 88237 TISSUE CULTURE BONE MARROW: CPT

## 2023-04-14 PROCEDURE — 80048 BASIC METABOLIC PNL TOTAL CA: CPT

## 2023-04-14 PROCEDURE — 2500000003 HC RX 250 WO HCPCS: Performed by: RADIOLOGY

## 2023-04-14 PROCEDURE — 88185 FLOWCYTOMETRY/TC ADD-ON: CPT

## 2023-04-14 PROCEDURE — 88311 DECALCIFY TISSUE: CPT

## 2023-04-14 PROCEDURE — 88264 CHROMOSOME ANALYSIS 20-25: CPT

## 2023-04-14 PROCEDURE — 85610 PROTHROMBIN TIME: CPT

## 2023-04-14 PROCEDURE — 85730 THROMBOPLASTIN TIME PARTIAL: CPT

## 2023-04-14 PROCEDURE — 85025 COMPLETE CBC W/AUTO DIFF WBC: CPT

## 2023-04-14 PROCEDURE — 88342 IMHCHEM/IMCYTCHM 1ST ANTB: CPT

## 2023-04-14 PROCEDURE — 99157 MOD SED OTHER PHYS/QHP EA: CPT

## 2023-04-14 RX ORDER — LIDOCAINE HYDROCHLORIDE 10 MG/ML
INJECTION, SOLUTION EPIDURAL; INFILTRATION; INTRACAUDAL; PERINEURAL PRN
Status: COMPLETED | OUTPATIENT
Start: 2023-04-14 | End: 2023-04-14

## 2023-04-14 RX ORDER — MIDAZOLAM HYDROCHLORIDE 2 MG/2ML
INJECTION, SOLUTION INTRAMUSCULAR; INTRAVENOUS PRN
Status: COMPLETED | OUTPATIENT
Start: 2023-04-14 | End: 2023-04-14

## 2023-04-14 RX ORDER — SODIUM CHLORIDE 9 MG/ML
INJECTION, SOLUTION INTRAVENOUS CONTINUOUS
Status: DISCONTINUED | OUTPATIENT
Start: 2023-04-14 | End: 2023-06-07 | Stop reason: HOSPADM

## 2023-04-14 RX ORDER — FENTANYL CITRATE 50 UG/ML
INJECTION, SOLUTION INTRAMUSCULAR; INTRAVENOUS PRN
Status: COMPLETED | OUTPATIENT
Start: 2023-04-14 | End: 2023-04-14

## 2023-04-14 RX ADMIN — FENTANYL CITRATE 50 MCG: 50 INJECTION, SOLUTION INTRAMUSCULAR; INTRAVENOUS at 11:40

## 2023-04-14 RX ADMIN — FENTANYL CITRATE 50 MCG: 50 INJECTION, SOLUTION INTRAMUSCULAR; INTRAVENOUS at 11:35

## 2023-04-14 RX ADMIN — LIDOCAINE HYDROCHLORIDE 10 ML: 10 INJECTION, SOLUTION EPIDURAL; INFILTRATION; INTRACAUDAL; PERINEURAL at 11:35

## 2023-04-14 RX ADMIN — MIDAZOLAM HYDROCHLORIDE 1 MG: 1 INJECTION, SOLUTION INTRAMUSCULAR; INTRAVENOUS at 11:35

## 2023-04-14 RX ADMIN — MIDAZOLAM HYDROCHLORIDE 1 MG: 1 INJECTION, SOLUTION INTRAMUSCULAR; INTRAVENOUS at 11:40

## 2023-04-19 ENCOUNTER — TELEPHONE (OUTPATIENT)
Age: 78
End: 2023-04-19

## 2023-04-19 NOTE — TELEPHONE ENCOUNTER
----- Message from [facility] sent at 4/18/2023  3:50 PM EDT -----  Subject: Referral Request    Reason for referral request? arthritis referral needs bone density   results, lab results, and notes from office visit from 04/03/23  Provider patient wants to be referred to(if known):     Provider Phone Number(if known):     Additional Information for Provider?   ---------------------------------------------------------------------------  --------------  Chari Guy INFO    [phone]; OK to leave message on voicemail  ---------------------------------------------------------------------------  --------------

## 2023-05-05 RX ORDER — POTASSIUM CHLORIDE 20MEQ/15ML
LIQUID (ML) ORAL
Qty: 480 ML | Refills: 1 | Status: SHIPPED | OUTPATIENT
Start: 2023-05-05

## 2023-05-18 ENCOUNTER — HOSPITAL ENCOUNTER (OUTPATIENT)
Facility: HOSPITAL | Age: 78
Discharge: HOME | End: 2023-05-18
Payer: MEDICARE

## 2023-05-18 DIAGNOSIS — C82.14 GRADE 2 FOLLICULAR LYMPHOMA OF LYMPH NODES OF AXILLA (HCC): ICD-10-CM

## 2023-05-18 DIAGNOSIS — R59.0 AXILLARY LYMPHADENOPATHY: ICD-10-CM

## 2023-05-18 PROCEDURE — 78815 PET IMAGE W/CT SKULL-THIGH: CPT

## 2023-05-18 PROCEDURE — 3430000000 HC RX DIAGNOSTIC RADIOPHARMACEUTICAL: Performed by: INTERNAL MEDICINE

## 2023-05-18 PROCEDURE — A9552 F18 FDG: HCPCS | Performed by: INTERNAL MEDICINE

## 2023-05-18 RX ORDER — FLUDEOXYGLUCOSE F-18 500 MCI/ML
8.1 INJECTION INTRAVENOUS
Status: COMPLETED | OUTPATIENT
Start: 2023-05-18 | End: 2023-05-18

## 2023-05-18 RX ADMIN — FLUDEOXYGLUCOSE F-18 8.1 MILLICURIE: 500 INJECTION INTRAVENOUS at 13:04

## 2023-06-01 ENCOUNTER — OFFICE VISIT (OUTPATIENT)
Age: 78
End: 2023-06-01
Attending: RADIOLOGY
Payer: MEDICARE

## 2023-06-01 VITALS
BODY MASS INDEX: 19.16 KG/M2 | DIASTOLIC BLOOD PRESSURE: 65 MMHG | RESPIRATION RATE: 16 BRPM | HEIGHT: 65 IN | SYSTOLIC BLOOD PRESSURE: 107 MMHG | HEART RATE: 86 BPM | WEIGHT: 115 LBS | OXYGEN SATURATION: 97 %

## 2023-06-01 DIAGNOSIS — D64.9 NORMOCYTIC ANEMIA: ICD-10-CM

## 2023-06-01 DIAGNOSIS — R59.0 AXILLARY LYMPHADENOPATHY: ICD-10-CM

## 2023-06-01 DIAGNOSIS — C82.14 GRADE 2 FOLLICULAR LYMPHOMA OF LYMPH NODES OF AXILLA (HCC): Primary | ICD-10-CM

## 2023-06-01 PROCEDURE — 3074F SYST BP LT 130 MM HG: CPT | Performed by: INTERNAL MEDICINE

## 2023-06-01 PROCEDURE — 1090F PRES/ABSN URINE INCON ASSESS: CPT | Performed by: INTERNAL MEDICINE

## 2023-06-01 PROCEDURE — 99214 OFFICE O/P EST MOD 30 MIN: CPT | Performed by: INTERNAL MEDICINE

## 2023-06-01 PROCEDURE — 1036F TOBACCO NON-USER: CPT | Performed by: INTERNAL MEDICINE

## 2023-06-01 PROCEDURE — 3017F COLORECTAL CA SCREEN DOC REV: CPT | Performed by: INTERNAL MEDICINE

## 2023-06-01 PROCEDURE — 1123F ACP DISCUSS/DSCN MKR DOCD: CPT | Performed by: INTERNAL MEDICINE

## 2023-06-01 PROCEDURE — G8399 PT W/DXA RESULTS DOCUMENT: HCPCS | Performed by: INTERNAL MEDICINE

## 2023-06-01 PROCEDURE — G8420 CALC BMI NORM PARAMETERS: HCPCS | Performed by: INTERNAL MEDICINE

## 2023-06-01 PROCEDURE — 99215 OFFICE O/P EST HI 40 MIN: CPT | Performed by: INTERNAL MEDICINE

## 2023-06-01 PROCEDURE — 3078F DIAST BP <80 MM HG: CPT | Performed by: INTERNAL MEDICINE

## 2023-06-01 PROCEDURE — G8427 DOCREV CUR MEDS BY ELIG CLIN: HCPCS | Performed by: INTERNAL MEDICINE

## 2023-06-01 ASSESSMENT — ENCOUNTER SYMPTOMS
DIARRHEA: 0
COUGH: 0
NAUSEA: 0
ABDOMINAL PAIN: 0
SHORTNESS OF BREATH: 0
VOMITING: 0
BACK PAIN: 0

## 2023-06-01 NOTE — PROGRESS NOTES
to contact us if any fever, night sweat, weight loss, skin lumps, or any concerns. Follow-up:   -- All of patient's questions answered. The patient was agreeable with the plan. -- We will see the patient back in about 4 weeks for follow up. Always sooner if required. No orders of the defined types were placed in this encounter. Ms. Angeles Villalobos has a reminder for a \"due or due soon\" health maintenance. I have asked that she contact her primary care provider for follow-up on this health maintenance. All of patient's questions answered to their apparent satisfaction. They verbally show understanding and agreement with aforementioned plan. Adam Nguyễn MD  06/01/2023         Above mentioned total time spent for this encounter with more than 50% of the time spent in face-to-face counseling, discussing on diagnosis and management plan going forward, and co-ordination of care. Parts of this document has been produced using Dragon dictation system. Unrecognized errors in transcription may be present. Please do not hesitate to reach out for any questions or clarifications.       CC: Jesus Richardson MD,

## 2023-06-07 ENCOUNTER — HOSPITAL ENCOUNTER (OUTPATIENT)
Facility: HOSPITAL | Age: 78
Discharge: HOME | End: 2023-06-10
Payer: MEDICARE

## 2023-06-07 ENCOUNTER — HOSPITAL ENCOUNTER (OUTPATIENT)
Facility: HOSPITAL | Age: 78
Setting detail: RECURRING SERIES
Discharge: HOME | End: 2023-06-10
Payer: MEDICARE

## 2023-06-07 LAB
ALBUMIN SERPL-MCNC: 3.8 G/DL (ref 3.4–5)
ANION GAP SERPL CALC-SCNC: 3 MMOL/L (ref 3–18)
APPEARANCE UR: CLEAR
BILIRUB UR QL: NEGATIVE
BUN SERPL-MCNC: 27 MG/DL (ref 7–18)
BUN/CREAT SERPL: 22 (ref 12–20)
CALCIUM SERPL-MCNC: 9.3 MG/DL (ref 8.5–10.1)
CHLORIDE SERPL-SCNC: 101 MMOL/L (ref 100–111)
CO2 SERPL-SCNC: 37 MMOL/L (ref 21–32)
COLOR UR: YELLOW
CREAT SERPL-MCNC: 1.22 MG/DL (ref 0.6–1.3)
CREAT UR-MCNC: 60 MG/DL (ref 30–125)
GLUCOSE SERPL-MCNC: 129 MG/DL (ref 74–99)
GLUCOSE UR STRIP.AUTO-MCNC: NEGATIVE MG/DL
HGB UR QL STRIP: NEGATIVE
KETONES UR QL STRIP.AUTO: NEGATIVE MG/DL
LEUKOCYTE ESTERASE UR QL STRIP.AUTO: NEGATIVE
MAGNESIUM SERPL-MCNC: 2.2 MG/DL (ref 1.6–2.6)
MICROALBUMIN UR-MCNC: 1.68 MG/DL (ref 0–3)
MICROALBUMIN/CREAT UR-RTO: 28 MG/G (ref 0–30)
NITRITE UR QL STRIP.AUTO: NEGATIVE
PH UR STRIP: 6.5 (ref 5–8)
PHOSPHATE SERPL-MCNC: 2.7 MG/DL (ref 2.5–4.9)
POTASSIUM SERPL-SCNC: 3.3 MMOL/L (ref 3.5–5.5)
PROT UR STRIP-MCNC: NEGATIVE MG/DL
SODIUM SERPL-SCNC: 141 MMOL/L (ref 136–145)
SP GR UR REFRACTOMETRY: 1.01 (ref 1–1.03)
UROBILINOGEN UR QL STRIP.AUTO: 0.2 EU/DL (ref 0.2–1)

## 2023-06-07 PROCEDURE — 80069 RENAL FUNCTION PANEL: CPT

## 2023-06-07 PROCEDURE — 82043 UR ALBUMIN QUANTITATIVE: CPT

## 2023-06-07 PROCEDURE — 99205 OFFICE O/P NEW HI 60 MIN: CPT | Performed by: RADIOLOGY

## 2023-06-07 PROCEDURE — 99214 OFFICE O/P EST MOD 30 MIN: CPT

## 2023-06-07 PROCEDURE — 81003 URINALYSIS AUTO W/O SCOPE: CPT

## 2023-06-07 PROCEDURE — 82570 ASSAY OF URINE CREATININE: CPT

## 2023-06-07 PROCEDURE — 83735 ASSAY OF MAGNESIUM: CPT

## 2023-06-07 PROCEDURE — 36415 COLL VENOUS BLD VENIPUNCTURE: CPT

## 2023-06-16 ENCOUNTER — OFFICE VISIT (OUTPATIENT)
Age: 78
End: 2023-06-16
Payer: MEDICARE

## 2023-06-16 VITALS
BODY MASS INDEX: 20.16 KG/M2 | HEIGHT: 65 IN | HEART RATE: 102 BPM | OXYGEN SATURATION: 94 % | WEIGHT: 121 LBS | SYSTOLIC BLOOD PRESSURE: 134 MMHG | RESPIRATION RATE: 16 BRPM | DIASTOLIC BLOOD PRESSURE: 68 MMHG | TEMPERATURE: 98.6 F

## 2023-06-16 DIAGNOSIS — D47.9 B-CELL LYMPHOPROLIFERATIVE DISORDER (HCC): ICD-10-CM

## 2023-06-16 DIAGNOSIS — K59.00 CONSTIPATION, UNSPECIFIED CONSTIPATION TYPE: ICD-10-CM

## 2023-06-16 DIAGNOSIS — E78.5 HYPERLIPIDEMIA, UNSPECIFIED HYPERLIPIDEMIA TYPE: ICD-10-CM

## 2023-06-16 DIAGNOSIS — I10 PRIMARY HYPERTENSION: Primary | ICD-10-CM

## 2023-06-16 DIAGNOSIS — F03.918 DEMENTIA WITH BEHAVIORAL DISTURBANCE (HCC): ICD-10-CM

## 2023-06-16 DIAGNOSIS — F03.90 DEMENTIA, UNSPECIFIED DEMENTIA SEVERITY, UNSPECIFIED DEMENTIA TYPE, UNSPECIFIED WHETHER BEHAVIORAL, PSYCHOTIC, OR MOOD DISTURBANCE OR ANXIETY (HCC): ICD-10-CM

## 2023-06-16 DIAGNOSIS — I50.30 DIASTOLIC HEART FAILURE, UNSPECIFIED HF CHRONICITY (HCC): ICD-10-CM

## 2023-06-16 DIAGNOSIS — G40.909 NONINTRACTABLE EPILEPSY WITHOUT STATUS EPILEPTICUS, UNSPECIFIED EPILEPSY TYPE (HCC): ICD-10-CM

## 2023-06-16 DIAGNOSIS — M06.9 RHEUMATOID ARTHRITIS INVOLVING MULTIPLE JOINTS (HCC): ICD-10-CM

## 2023-06-16 DIAGNOSIS — F41.9 ANXIETY DISORDER, UNSPECIFIED TYPE: ICD-10-CM

## 2023-06-16 DIAGNOSIS — M54.17 LUMBOSACRAL RADICULOPATHY: ICD-10-CM

## 2023-06-16 DIAGNOSIS — M81.0 OSTEOPOROSIS, UNSPECIFIED OSTEOPOROSIS TYPE, UNSPECIFIED PATHOLOGICAL FRACTURE PRESENCE: ICD-10-CM

## 2023-06-16 DIAGNOSIS — N18.31 CHRONIC RENAL FAILURE, STAGE 3A (HCC): ICD-10-CM

## 2023-06-16 PROCEDURE — G8427 DOCREV CUR MEDS BY ELIG CLIN: HCPCS | Performed by: INTERNAL MEDICINE

## 2023-06-16 PROCEDURE — 1036F TOBACCO NON-USER: CPT | Performed by: INTERNAL MEDICINE

## 2023-06-16 PROCEDURE — G8420 CALC BMI NORM PARAMETERS: HCPCS | Performed by: INTERNAL MEDICINE

## 2023-06-16 PROCEDURE — 3078F DIAST BP <80 MM HG: CPT | Performed by: INTERNAL MEDICINE

## 2023-06-16 PROCEDURE — 3074F SYST BP LT 130 MM HG: CPT | Performed by: INTERNAL MEDICINE

## 2023-06-16 PROCEDURE — 99214 OFFICE O/P EST MOD 30 MIN: CPT | Performed by: INTERNAL MEDICINE

## 2023-06-16 PROCEDURE — G8399 PT W/DXA RESULTS DOCUMENT: HCPCS | Performed by: INTERNAL MEDICINE

## 2023-06-16 PROCEDURE — 99211 OFF/OP EST MAY X REQ PHY/QHP: CPT

## 2023-06-16 PROCEDURE — 1090F PRES/ABSN URINE INCON ASSESS: CPT | Performed by: INTERNAL MEDICINE

## 2023-06-16 PROCEDURE — 3017F COLORECTAL CA SCREEN DOC REV: CPT | Performed by: INTERNAL MEDICINE

## 2023-06-16 PROCEDURE — 1123F ACP DISCUSS/DSCN MKR DOCD: CPT | Performed by: INTERNAL MEDICINE

## 2023-06-16 SDOH — ECONOMIC STABILITY: FOOD INSECURITY: WITHIN THE PAST 12 MONTHS, THE FOOD YOU BOUGHT JUST DIDN'T LAST AND YOU DIDN'T HAVE MONEY TO GET MORE.: NEVER TRUE

## 2023-06-16 SDOH — ECONOMIC STABILITY: FOOD INSECURITY: WITHIN THE PAST 12 MONTHS, YOU WORRIED THAT YOUR FOOD WOULD RUN OUT BEFORE YOU GOT MONEY TO BUY MORE.: NEVER TRUE

## 2023-06-16 SDOH — ECONOMIC STABILITY: INCOME INSECURITY: HOW HARD IS IT FOR YOU TO PAY FOR THE VERY BASICS LIKE FOOD, HOUSING, MEDICAL CARE, AND HEATING?: NOT HARD AT ALL

## 2023-06-16 ASSESSMENT — PATIENT HEALTH QUESTIONNAIRE - PHQ9
10. IF YOU CHECKED OFF ANY PROBLEMS, HOW DIFFICULT HAVE THESE PROBLEMS MADE IT FOR YOU TO DO YOUR WORK, TAKE CARE OF THINGS AT HOME, OR GET ALONG WITH OTHER PEOPLE: 0
SUM OF ALL RESPONSES TO PHQ QUESTIONS 1-9: 0
4. FEELING TIRED OR HAVING LITTLE ENERGY: 0
1. LITTLE INTEREST OR PLEASURE IN DOING THINGS: 0
3. TROUBLE FALLING OR STAYING ASLEEP: 0
8. MOVING OR SPEAKING SO SLOWLY THAT OTHER PEOPLE COULD HAVE NOTICED. OR THE OPPOSITE, BEING SO FIGETY OR RESTLESS THAT YOU HAVE BEEN MOVING AROUND A LOT MORE THAN USUAL: 0
SUM OF ALL RESPONSES TO PHQ QUESTIONS 1-9: 0
SUM OF ALL RESPONSES TO PHQ QUESTIONS 1-9: 0
6. FEELING BAD ABOUT YOURSELF - OR THAT YOU ARE A FAILURE OR HAVE LET YOURSELF OR YOUR FAMILY DOWN: 0
SUM OF ALL RESPONSES TO PHQ QUESTIONS 1-9: 0
SUM OF ALL RESPONSES TO PHQ QUESTIONS 1-9: 0
2. FEELING DOWN, DEPRESSED OR HOPELESS: 0
SUM OF ALL RESPONSES TO PHQ9 QUESTIONS 1 & 2: 0
5. POOR APPETITE OR OVEREATING: 0
7. TROUBLE CONCENTRATING ON THINGS, SUCH AS READING THE NEWSPAPER OR WATCHING TELEVISION: 0
1. LITTLE INTEREST OR PLEASURE IN DOING THINGS: 0
SUM OF ALL RESPONSES TO PHQ QUESTIONS 1-9: 0
SUM OF ALL RESPONSES TO PHQ QUESTIONS 1-9: 0
2. FEELING DOWN, DEPRESSED OR HOPELESS: 0
SUM OF ALL RESPONSES TO PHQ QUESTIONS 1-9: 0
SUM OF ALL RESPONSES TO PHQ9 QUESTIONS 1 & 2: 0
9. THOUGHTS THAT YOU WOULD BE BETTER OFF DEAD, OR OF HURTING YOURSELF: 0

## 2023-06-19 PROBLEM — K59.00 CONSTIPATION: Status: ACTIVE | Noted: 2023-06-19

## 2023-06-19 PROBLEM — I50.30 DIASTOLIC HEART FAILURE (HCC): Status: ACTIVE | Noted: 2023-06-19

## 2023-06-19 RX ORDER — FUROSEMIDE 40 MG/1
40 TABLET ORAL DAILY
Qty: 60 TABLET | Refills: 0
Start: 2023-06-19

## 2023-06-19 ASSESSMENT — ENCOUNTER SYMPTOMS
GASTROINTESTINAL NEGATIVE: 1
RESPIRATORY NEGATIVE: 1
EYES NEGATIVE: 1
ALLERGIC/IMMUNOLOGIC NEGATIVE: 1

## 2023-06-19 NOTE — PROGRESS NOTES
Clint Campos (: 1945) is a 78 y.o. female, here for evaluation of the following chief complaint(s):  Follow-up       ASSESSMENT/PLAN:  Below is the assessment and plan developed based on review of pertinent history, physical exam, labs, studies, and medications. 1. Primary hypertension  Assessment & Plan:  Blood pressure under control without medication. 2. Dementia with behavioral disturbance ([address]  3. Hyperlipidemia, unspecified hyperlipidemia type  Assessment & Plan: On lovastatin 40 mg every day  4. B-cell lymphoproliferative disorder [facility])  Assessment & Plan:   Follows with oncology. Plan to get radiation therapy on   5. Osteoporosis, unspecified osteoporosis type, unspecified pathological fracture presence  Assessment & Plan: To follow-up with rheumatology    Orders:  -     External Referral To Rheumatology  -     Vitamin D 25 Hydroxy; Future  6. Diastolic heart failure, unspecified HF chronicity (HCC)  Assessment & Plan:   On Lasix  7. Nonintractable epilepsy without status epilepticus, unspecified epilepsy type ([address]  Assessment & Plan:   Stable on Keppra, follows with neurology  8. Dementia, unspecified dementia severity, unspecified dementia type, unspecified whether behavioral, psychotic, or mood disturbance or anxiety ([address]  Assessment & Plan:   Follows with neurology  9. Lumbosacral radiculopathy  Assessment & Plan:   Follows with pain management    10. Rheumatoid arthritis involving multiple joints ([address]  Assessment & Plan: To follow-up with rheumatology    11. Chronic renal failure, stage 3a (HCC)  Assessment & Plan:   Follows with nephrology  12. Constipation, unspecified constipation type  Assessment & Plan:   On Linzess  13. Anxiety disorder, unspecified type  Assessment & Plan: On olanzapine, Paxil. Follows with psychiatrist.      Return in about 2 months (around 2023) for follow up on chronic conditions.      SUBJECTIVE/OBJECTIVE:  HPI  Patient following with

## 2023-06-20 ENCOUNTER — HOSPITAL ENCOUNTER (OUTPATIENT)
Facility: HOSPITAL | Age: 78
Setting detail: RECURRING SERIES
Discharge: HOME | End: 2023-06-23
Attending: RADIOLOGY
Payer: MEDICARE

## 2023-06-20 PROCEDURE — 77263 THER RADIOLOGY TX PLNG CPLX: CPT | Performed by: RADIOLOGY

## 2023-06-20 PROCEDURE — 77334 RADIATION TREATMENT AID(S): CPT

## 2023-06-23 ENCOUNTER — HOSPITAL ENCOUNTER (OUTPATIENT)
Facility: HOSPITAL | Age: 78
Setting detail: RECURRING SERIES
Discharge: HOME | End: 2023-06-26
Attending: RADIOLOGY
Payer: MEDICARE

## 2023-06-23 PROCEDURE — 77338 DESIGN MLC DEVICE FOR IMRT: CPT

## 2023-06-23 PROCEDURE — 77300 RADIATION THERAPY DOSE PLAN: CPT

## 2023-06-23 PROCEDURE — 77301 RADIOTHERAPY DOSE PLAN IMRT: CPT

## 2023-06-26 ENCOUNTER — HOSPITAL ENCOUNTER (OUTPATIENT)
Facility: HOSPITAL | Age: 78
Setting detail: RECURRING SERIES
Discharge: HOME | End: 2023-06-29
Attending: RADIOLOGY
Payer: MEDICARE

## 2023-06-26 LAB
RAD ONC ARIA COURSE FIRST TREATMENT DATE: NORMAL
RAD ONC ARIA COURSE ID: NORMAL
RAD ONC ARIA COURSE INTENT: NORMAL
RAD ONC ARIA COURSE LAST TREATMENT DATE: NORMAL
RAD ONC ARIA COURSE SESSION NUMBER: 1
RAD ONC ARIA COURSE START DATE: NORMAL
RAD ONC ARIA COURSE TREATMENT ELAPSED DAYS: 0
RAD ONC ARIA PLAN FRACTIONS TREATED TO DATE: 1
RAD ONC ARIA PLAN ID: NORMAL
RAD ONC ARIA PLAN PRESCRIBED DOSE PER FRACTION: 2 GY
RAD ONC ARIA PLAN PRIMARY REFERENCE POINT: NORMAL
RAD ONC ARIA PLAN TOTAL FRACTIONS PRESCRIBED: 15
RAD ONC ARIA PLAN TOTAL PRESCRIBED DOSE: 3000 CGY
RAD ONC ARIA REFERENCE POINT DOSAGE GIVEN TO DATE: 2 GY
RAD ONC ARIA REFERENCE POINT ID: NORMAL
RAD ONC ARIA REFERENCE POINT SESSION DOSAGE GIVEN: 2 GY

## 2023-06-26 PROCEDURE — 77386 HC NTSTY MODUL RAD TX DLVR CPLX: CPT

## 2023-06-26 PROCEDURE — 77427 RADIATION TX MANAGEMENT X5: CPT | Performed by: RADIOLOGY

## 2023-06-27 ENCOUNTER — HOSPITAL ENCOUNTER (OUTPATIENT)
Facility: HOSPITAL | Age: 78
Discharge: HOME | End: 2023-06-30
Payer: MEDICARE

## 2023-06-27 ENCOUNTER — OFFICE VISIT (OUTPATIENT)
Age: 78
End: 2023-06-27
Payer: MEDICARE

## 2023-06-27 ENCOUNTER — HOSPITAL ENCOUNTER (OUTPATIENT)
Facility: HOSPITAL | Age: 78
Setting detail: RECURRING SERIES
Discharge: HOME | End: 2023-06-30
Attending: RADIOLOGY
Payer: MEDICARE

## 2023-06-27 VITALS
RESPIRATION RATE: 16 BRPM | HEIGHT: 65 IN | DIASTOLIC BLOOD PRESSURE: 76 MMHG | TEMPERATURE: 96.8 F | HEART RATE: 72 BPM | SYSTOLIC BLOOD PRESSURE: 138 MMHG | WEIGHT: 121 LBS | BODY MASS INDEX: 20.16 KG/M2 | OXYGEN SATURATION: 96 %

## 2023-06-27 DIAGNOSIS — G40.909 SEIZURE DISORDER (HCC): Primary | ICD-10-CM

## 2023-06-27 DIAGNOSIS — G40.909 SEIZURE DISORDER (HCC): ICD-10-CM

## 2023-06-27 DIAGNOSIS — F03.90 DEMENTIA WITHOUT BEHAVIORAL DISTURBANCE (HCC): ICD-10-CM

## 2023-06-27 LAB
RAD ONC ARIA COURSE FIRST TREATMENT DATE: NORMAL
RAD ONC ARIA COURSE ID: NORMAL
RAD ONC ARIA COURSE INTENT: NORMAL
RAD ONC ARIA COURSE LAST TREATMENT DATE: NORMAL
RAD ONC ARIA COURSE SESSION NUMBER: 2
RAD ONC ARIA COURSE START DATE: NORMAL
RAD ONC ARIA COURSE TREATMENT ELAPSED DAYS: 1
RAD ONC ARIA PLAN FRACTIONS TREATED TO DATE: 2
RAD ONC ARIA PLAN ID: NORMAL
RAD ONC ARIA PLAN PRESCRIBED DOSE PER FRACTION: 2 GY
RAD ONC ARIA PLAN PRIMARY REFERENCE POINT: NORMAL
RAD ONC ARIA PLAN TOTAL FRACTIONS PRESCRIBED: 15
RAD ONC ARIA PLAN TOTAL PRESCRIBED DOSE: 3000 CGY
RAD ONC ARIA REFERENCE POINT DOSAGE GIVEN TO DATE: 4 GY
RAD ONC ARIA REFERENCE POINT ID: NORMAL
RAD ONC ARIA REFERENCE POINT SESSION DOSAGE GIVEN: 2 GY

## 2023-06-27 PROCEDURE — 3078F DIAST BP <80 MM HG: CPT | Performed by: STUDENT IN AN ORGANIZED HEALTH CARE EDUCATION/TRAINING PROGRAM

## 2023-06-27 PROCEDURE — G6002 STEREOSCOPIC X-RAY GUIDANCE: HCPCS | Performed by: RADIOLOGY

## 2023-06-27 PROCEDURE — 36415 COLL VENOUS BLD VENIPUNCTURE: CPT

## 2023-06-27 PROCEDURE — G8427 DOCREV CUR MEDS BY ELIG CLIN: HCPCS | Performed by: STUDENT IN AN ORGANIZED HEALTH CARE EDUCATION/TRAINING PROGRAM

## 2023-06-27 PROCEDURE — 80177 DRUG SCRN QUAN LEVETIRACETAM: CPT

## 2023-06-27 PROCEDURE — 1036F TOBACCO NON-USER: CPT | Performed by: STUDENT IN AN ORGANIZED HEALTH CARE EDUCATION/TRAINING PROGRAM

## 2023-06-27 PROCEDURE — 1123F ACP DISCUSS/DSCN MKR DOCD: CPT | Performed by: STUDENT IN AN ORGANIZED HEALTH CARE EDUCATION/TRAINING PROGRAM

## 2023-06-27 PROCEDURE — 1090F PRES/ABSN URINE INCON ASSESS: CPT | Performed by: STUDENT IN AN ORGANIZED HEALTH CARE EDUCATION/TRAINING PROGRAM

## 2023-06-27 PROCEDURE — 3075F SYST BP GE 130 - 139MM HG: CPT | Performed by: STUDENT IN AN ORGANIZED HEALTH CARE EDUCATION/TRAINING PROGRAM

## 2023-06-27 PROCEDURE — G8399 PT W/DXA RESULTS DOCUMENT: HCPCS | Performed by: STUDENT IN AN ORGANIZED HEALTH CARE EDUCATION/TRAINING PROGRAM

## 2023-06-27 PROCEDURE — 3017F COLORECTAL CA SCREEN DOC REV: CPT | Performed by: STUDENT IN AN ORGANIZED HEALTH CARE EDUCATION/TRAINING PROGRAM

## 2023-06-27 PROCEDURE — 99204 OFFICE O/P NEW MOD 45 MIN: CPT | Performed by: STUDENT IN AN ORGANIZED HEALTH CARE EDUCATION/TRAINING PROGRAM

## 2023-06-27 PROCEDURE — G8420 CALC BMI NORM PARAMETERS: HCPCS | Performed by: STUDENT IN AN ORGANIZED HEALTH CARE EDUCATION/TRAINING PROGRAM

## 2023-06-27 PROCEDURE — 77386 HC NTSTY MODUL RAD TX DLVR CPLX: CPT

## 2023-06-27 PROCEDURE — 99204 OFFICE O/P NEW MOD 45 MIN: CPT

## 2023-06-27 RX ORDER — MEMANTINE HYDROCHLORIDE 5 MG/1
TABLET ORAL
Qty: 60 TABLET | Refills: 6 | Status: SHIPPED | OUTPATIENT
Start: 2023-06-27

## 2023-06-27 ASSESSMENT — ENCOUNTER SYMPTOMS
BACK PAIN: 1
SHORTNESS OF BREATH: 0
COUGH: 0

## 2023-06-28 ENCOUNTER — HOSPITAL ENCOUNTER (OUTPATIENT)
Facility: HOSPITAL | Age: 78
Setting detail: RECURRING SERIES
Discharge: HOME | End: 2023-07-01
Attending: RADIOLOGY
Payer: MEDICARE

## 2023-06-28 LAB
LEVETIRACETAM SERPL-MCNC: 46.7 UG/ML (ref 10–40)
RAD ONC ARIA COURSE FIRST TREATMENT DATE: NORMAL
RAD ONC ARIA COURSE ID: NORMAL
RAD ONC ARIA COURSE INTENT: NORMAL
RAD ONC ARIA COURSE LAST TREATMENT DATE: NORMAL
RAD ONC ARIA COURSE SESSION NUMBER: 3
RAD ONC ARIA COURSE START DATE: NORMAL
RAD ONC ARIA COURSE TREATMENT ELAPSED DAYS: 2
RAD ONC ARIA PLAN FRACTIONS TREATED TO DATE: 3
RAD ONC ARIA PLAN ID: NORMAL
RAD ONC ARIA PLAN PRESCRIBED DOSE PER FRACTION: 2 GY
RAD ONC ARIA PLAN PRIMARY REFERENCE POINT: NORMAL
RAD ONC ARIA PLAN TOTAL FRACTIONS PRESCRIBED: 15
RAD ONC ARIA PLAN TOTAL PRESCRIBED DOSE: 3000 CGY
RAD ONC ARIA REFERENCE POINT DOSAGE GIVEN TO DATE: 6 GY
RAD ONC ARIA REFERENCE POINT ID: NORMAL
RAD ONC ARIA REFERENCE POINT SESSION DOSAGE GIVEN: 2 GY

## 2023-06-28 PROCEDURE — G6002 STEREOSCOPIC X-RAY GUIDANCE: HCPCS | Performed by: RADIOLOGY

## 2023-06-28 PROCEDURE — 77386 HC NTSTY MODUL RAD TX DLVR CPLX: CPT

## 2023-06-29 ENCOUNTER — HOSPITAL ENCOUNTER (OUTPATIENT)
Facility: HOSPITAL | Age: 78
Setting detail: RECURRING SERIES
End: 2023-06-29
Attending: RADIOLOGY
Payer: MEDICARE

## 2023-06-29 LAB
RAD ONC ARIA COURSE FIRST TREATMENT DATE: NORMAL
RAD ONC ARIA COURSE ID: NORMAL
RAD ONC ARIA COURSE INTENT: NORMAL
RAD ONC ARIA COURSE LAST TREATMENT DATE: NORMAL
RAD ONC ARIA COURSE SESSION NUMBER: 4
RAD ONC ARIA COURSE START DATE: NORMAL
RAD ONC ARIA COURSE TREATMENT ELAPSED DAYS: 3
RAD ONC ARIA PLAN FRACTIONS TREATED TO DATE: 4
RAD ONC ARIA PLAN ID: NORMAL
RAD ONC ARIA PLAN PRESCRIBED DOSE PER FRACTION: 2 GY
RAD ONC ARIA PLAN PRIMARY REFERENCE POINT: NORMAL
RAD ONC ARIA PLAN TOTAL FRACTIONS PRESCRIBED: 15
RAD ONC ARIA PLAN TOTAL PRESCRIBED DOSE: 3000 CGY
RAD ONC ARIA REFERENCE POINT DOSAGE GIVEN TO DATE: 8 GY
RAD ONC ARIA REFERENCE POINT ID: NORMAL
RAD ONC ARIA REFERENCE POINT SESSION DOSAGE GIVEN: 2 GY

## 2023-06-29 PROCEDURE — G6002 STEREOSCOPIC X-RAY GUIDANCE: HCPCS | Performed by: RADIOLOGY

## 2023-06-29 PROCEDURE — 77386 HC NTSTY MODUL RAD TX DLVR CPLX: CPT

## 2023-06-30 ENCOUNTER — HOSPITAL ENCOUNTER (OUTPATIENT)
Facility: HOSPITAL | Age: 78
Setting detail: RECURRING SERIES
End: 2023-06-30
Attending: RADIOLOGY
Payer: MEDICARE

## 2023-06-30 LAB
RAD ONC ARIA COURSE FIRST TREATMENT DATE: NORMAL
RAD ONC ARIA COURSE ID: NORMAL
RAD ONC ARIA COURSE INTENT: NORMAL
RAD ONC ARIA COURSE LAST TREATMENT DATE: NORMAL
RAD ONC ARIA COURSE SESSION NUMBER: 5
RAD ONC ARIA COURSE START DATE: NORMAL
RAD ONC ARIA COURSE TREATMENT ELAPSED DAYS: 4
RAD ONC ARIA PLAN FRACTIONS TREATED TO DATE: 5
RAD ONC ARIA PLAN ID: NORMAL
RAD ONC ARIA PLAN PRESCRIBED DOSE PER FRACTION: 2 GY
RAD ONC ARIA PLAN PRIMARY REFERENCE POINT: NORMAL
RAD ONC ARIA PLAN TOTAL FRACTIONS PRESCRIBED: 15
RAD ONC ARIA PLAN TOTAL PRESCRIBED DOSE: 3000 CGY
RAD ONC ARIA REFERENCE POINT DOSAGE GIVEN TO DATE: 10 GY
RAD ONC ARIA REFERENCE POINT ID: NORMAL
RAD ONC ARIA REFERENCE POINT SESSION DOSAGE GIVEN: 2 GY

## 2023-06-30 PROCEDURE — 77336 RADIATION PHYSICS CONSULT: CPT

## 2023-06-30 PROCEDURE — 77386 HC NTSTY MODUL RAD TX DLVR CPLX: CPT

## 2023-06-30 PROCEDURE — G6002 STEREOSCOPIC X-RAY GUIDANCE: HCPCS | Performed by: RADIOLOGY

## 2023-07-03 ENCOUNTER — HOSPITAL ENCOUNTER (OUTPATIENT)
Facility: HOSPITAL | Age: 78
Setting detail: RECURRING SERIES
Discharge: HOME | End: 2023-07-06
Attending: RADIOLOGY
Payer: MEDICARE

## 2023-07-03 LAB
RAD ONC ARIA COURSE FIRST TREATMENT DATE: NORMAL
RAD ONC ARIA COURSE ID: NORMAL
RAD ONC ARIA COURSE INTENT: NORMAL
RAD ONC ARIA COURSE LAST TREATMENT DATE: NORMAL
RAD ONC ARIA COURSE SESSION NUMBER: 6
RAD ONC ARIA COURSE START DATE: NORMAL
RAD ONC ARIA COURSE TREATMENT ELAPSED DAYS: 7
RAD ONC ARIA PLAN FRACTIONS TREATED TO DATE: 6
RAD ONC ARIA PLAN ID: NORMAL
RAD ONC ARIA PLAN PRESCRIBED DOSE PER FRACTION: 2 GY
RAD ONC ARIA PLAN PRIMARY REFERENCE POINT: NORMAL
RAD ONC ARIA PLAN TOTAL FRACTIONS PRESCRIBED: 15
RAD ONC ARIA PLAN TOTAL PRESCRIBED DOSE: 3000 CGY
RAD ONC ARIA REFERENCE POINT DOSAGE GIVEN TO DATE: 12 GY
RAD ONC ARIA REFERENCE POINT ID: NORMAL
RAD ONC ARIA REFERENCE POINT SESSION DOSAGE GIVEN: 2 GY

## 2023-07-03 PROCEDURE — 77014 CHG CT GUIDANCE RADIATION THERAPY FLDS PLACEMENT: CPT | Performed by: RADIOLOGY

## 2023-07-03 PROCEDURE — 77427 RADIATION TX MANAGEMENT X5: CPT | Performed by: RADIOLOGY

## 2023-07-03 PROCEDURE — 77386 HC NTSTY MODUL RAD TX DLVR CPLX: CPT

## 2023-07-05 ENCOUNTER — HOSPITAL ENCOUNTER (OUTPATIENT)
Facility: HOSPITAL | Age: 78
Setting detail: RECURRING SERIES
Discharge: HOME | End: 2023-07-08
Attending: RADIOLOGY
Payer: MEDICARE

## 2023-07-05 LAB
RAD ONC ARIA COURSE FIRST TREATMENT DATE: NORMAL
RAD ONC ARIA COURSE ID: NORMAL
RAD ONC ARIA COURSE INTENT: NORMAL
RAD ONC ARIA COURSE LAST TREATMENT DATE: NORMAL
RAD ONC ARIA COURSE SESSION NUMBER: 7
RAD ONC ARIA COURSE START DATE: NORMAL
RAD ONC ARIA COURSE TREATMENT ELAPSED DAYS: 9
RAD ONC ARIA PLAN FRACTIONS TREATED TO DATE: 7
RAD ONC ARIA PLAN ID: NORMAL
RAD ONC ARIA PLAN PRESCRIBED DOSE PER FRACTION: 2 GY
RAD ONC ARIA PLAN PRIMARY REFERENCE POINT: NORMAL
RAD ONC ARIA PLAN TOTAL FRACTIONS PRESCRIBED: 15
RAD ONC ARIA PLAN TOTAL PRESCRIBED DOSE: 3000 CGY
RAD ONC ARIA REFERENCE POINT DOSAGE GIVEN TO DATE: 14 GY
RAD ONC ARIA REFERENCE POINT ID: NORMAL
RAD ONC ARIA REFERENCE POINT SESSION DOSAGE GIVEN: 2 GY

## 2023-07-05 PROCEDURE — 77386 HC NTSTY MODUL RAD TX DLVR CPLX: CPT

## 2023-07-05 PROCEDURE — 77014 CHG CT GUIDANCE RADIATION THERAPY FLDS PLACEMENT: CPT | Performed by: RADIOLOGY

## 2023-07-06 ENCOUNTER — HOSPITAL ENCOUNTER (OUTPATIENT)
Facility: HOSPITAL | Age: 78
Setting detail: RECURRING SERIES
Discharge: HOME | End: 2023-07-09
Attending: RADIOLOGY
Payer: MEDICARE

## 2023-07-06 LAB
RAD ONC ARIA COURSE FIRST TREATMENT DATE: NORMAL
RAD ONC ARIA COURSE ID: NORMAL
RAD ONC ARIA COURSE INTENT: NORMAL
RAD ONC ARIA COURSE LAST TREATMENT DATE: NORMAL
RAD ONC ARIA COURSE SESSION NUMBER: 8
RAD ONC ARIA COURSE START DATE: NORMAL
RAD ONC ARIA COURSE TREATMENT ELAPSED DAYS: 10
RAD ONC ARIA PLAN FRACTIONS TREATED TO DATE: 8
RAD ONC ARIA PLAN ID: NORMAL
RAD ONC ARIA PLAN PRESCRIBED DOSE PER FRACTION: 2 GY
RAD ONC ARIA PLAN PRIMARY REFERENCE POINT: NORMAL
RAD ONC ARIA PLAN TOTAL FRACTIONS PRESCRIBED: 15
RAD ONC ARIA PLAN TOTAL PRESCRIBED DOSE: 3000 CGY
RAD ONC ARIA REFERENCE POINT DOSAGE GIVEN TO DATE: 16 GY
RAD ONC ARIA REFERENCE POINT ID: NORMAL
RAD ONC ARIA REFERENCE POINT SESSION DOSAGE GIVEN: 2 GY

## 2023-07-06 PROCEDURE — 77386 HC NTSTY MODUL RAD TX DLVR CPLX: CPT

## 2023-07-06 PROCEDURE — 77014 CHG CT GUIDANCE RADIATION THERAPY FLDS PLACEMENT: CPT | Performed by: RADIOLOGY

## 2023-07-07 ENCOUNTER — HOSPITAL ENCOUNTER (OUTPATIENT)
Facility: HOSPITAL | Age: 78
Setting detail: RECURRING SERIES
Discharge: HOME | End: 2023-07-10
Attending: RADIOLOGY
Payer: MEDICARE

## 2023-07-07 LAB
RAD ONC ARIA COURSE FIRST TREATMENT DATE: NORMAL
RAD ONC ARIA COURSE ID: NORMAL
RAD ONC ARIA COURSE INTENT: NORMAL
RAD ONC ARIA COURSE LAST TREATMENT DATE: NORMAL
RAD ONC ARIA COURSE SESSION NUMBER: 9
RAD ONC ARIA COURSE START DATE: NORMAL
RAD ONC ARIA COURSE TREATMENT ELAPSED DAYS: 11
RAD ONC ARIA PLAN FRACTIONS TREATED TO DATE: 9
RAD ONC ARIA PLAN ID: NORMAL
RAD ONC ARIA PLAN PRESCRIBED DOSE PER FRACTION: 2 GY
RAD ONC ARIA PLAN PRIMARY REFERENCE POINT: NORMAL
RAD ONC ARIA PLAN TOTAL FRACTIONS PRESCRIBED: 15
RAD ONC ARIA PLAN TOTAL PRESCRIBED DOSE: 3000 CGY
RAD ONC ARIA REFERENCE POINT DOSAGE GIVEN TO DATE: 18 GY
RAD ONC ARIA REFERENCE POINT ID: NORMAL
RAD ONC ARIA REFERENCE POINT SESSION DOSAGE GIVEN: 2 GY

## 2023-07-07 PROCEDURE — 77386 HC NTSTY MODUL RAD TX DLVR CPLX: CPT

## 2023-07-07 PROCEDURE — 77014 CHG CT GUIDANCE RADIATION THERAPY FLDS PLACEMENT: CPT | Performed by: RADIOLOGY

## 2023-07-10 ENCOUNTER — HOSPITAL ENCOUNTER (OUTPATIENT)
Facility: HOSPITAL | Age: 78
Setting detail: RECURRING SERIES
Discharge: HOME | End: 2023-07-13
Attending: RADIOLOGY
Payer: MEDICARE

## 2023-07-10 LAB
RAD ONC ARIA COURSE FIRST TREATMENT DATE: NORMAL
RAD ONC ARIA COURSE ID: NORMAL
RAD ONC ARIA COURSE INTENT: NORMAL
RAD ONC ARIA COURSE LAST TREATMENT DATE: NORMAL
RAD ONC ARIA COURSE SESSION NUMBER: 10
RAD ONC ARIA COURSE START DATE: NORMAL
RAD ONC ARIA COURSE TREATMENT ELAPSED DAYS: 14
RAD ONC ARIA PLAN FRACTIONS TREATED TO DATE: 10
RAD ONC ARIA PLAN ID: NORMAL
RAD ONC ARIA PLAN PRESCRIBED DOSE PER FRACTION: 2 GY
RAD ONC ARIA PLAN PRIMARY REFERENCE POINT: NORMAL
RAD ONC ARIA PLAN TOTAL FRACTIONS PRESCRIBED: 15
RAD ONC ARIA PLAN TOTAL PRESCRIBED DOSE: 3000 CGY
RAD ONC ARIA REFERENCE POINT DOSAGE GIVEN TO DATE: 20 GY
RAD ONC ARIA REFERENCE POINT ID: NORMAL
RAD ONC ARIA REFERENCE POINT SESSION DOSAGE GIVEN: 2 GY

## 2023-07-10 PROCEDURE — 77014 CHG CT GUIDANCE RADIATION THERAPY FLDS PLACEMENT: CPT | Performed by: RADIOLOGY

## 2023-07-10 PROCEDURE — 77336 RADIATION PHYSICS CONSULT: CPT

## 2023-07-10 PROCEDURE — 77386 HC NTSTY MODUL RAD TX DLVR CPLX: CPT

## 2023-07-10 RX ORDER — FUROSEMIDE 40 MG/1
40 TABLET ORAL DAILY
Qty: 60 TABLET | Refills: 0 | Status: SHIPPED | OUTPATIENT
Start: 2023-07-10

## 2023-07-10 NOTE — TELEPHONE ENCOUNTER
Please refill if appropriate or refuse medication if not appropriate.     PCP: Reza Escobar MD     Last appt: 6/16/23    Last refill: 6/19/23      Future Appointments   Date Time Provider [address]   7/10/2023  2:15 PM Latonya Lebron   7/11/2023  2:15 PM Latonya Lebron   7/12/2023  2:15 PM Latonya Lebron   7/13/2023  2:15 PM [id number] HBVRADTH [place]   7/14/2023  2:15 PM [id number] [facility] [place]   7/17/2023  1:45 PM Melodee Homans Do, MD [facility] AMB   7/17/2023  2:15 PM [id number] [facility]RADTH [place]   8/18/2023  3:00 PM Reza Escobar MD [facility] BS AMB   1/2/2024  2:00 PM Thais Holbrook MD [facility] BS AMB         Requested Prescriptions     Pending Prescriptions Disp Refills    furosemide (LASIX) 40 MG tablet 60 tablet 0     Sig: Take 1 tablet by mouth daily

## 2023-07-11 ENCOUNTER — HOSPITAL ENCOUNTER (OUTPATIENT)
Facility: HOSPITAL | Age: 78
Setting detail: RECURRING SERIES
Discharge: HOME | End: 2023-07-14
Attending: RADIOLOGY
Payer: MEDICARE

## 2023-07-11 LAB
RAD ONC ARIA COURSE FIRST TREATMENT DATE: NORMAL
RAD ONC ARIA COURSE ID: NORMAL
RAD ONC ARIA COURSE INTENT: NORMAL
RAD ONC ARIA COURSE LAST TREATMENT DATE: NORMAL
RAD ONC ARIA COURSE SESSION NUMBER: 11
RAD ONC ARIA COURSE START DATE: NORMAL
RAD ONC ARIA COURSE TREATMENT ELAPSED DAYS: 15
RAD ONC ARIA PLAN FRACTIONS TREATED TO DATE: 11
RAD ONC ARIA PLAN ID: NORMAL
RAD ONC ARIA PLAN PRESCRIBED DOSE PER FRACTION: 2 GY
RAD ONC ARIA PLAN PRIMARY REFERENCE POINT: NORMAL
RAD ONC ARIA PLAN TOTAL FRACTIONS PRESCRIBED: 15
RAD ONC ARIA PLAN TOTAL PRESCRIBED DOSE: 3000 CGY
RAD ONC ARIA REFERENCE POINT DOSAGE GIVEN TO DATE: 22 GY
RAD ONC ARIA REFERENCE POINT ID: NORMAL
RAD ONC ARIA REFERENCE POINT SESSION DOSAGE GIVEN: 2 GY

## 2023-07-11 PROCEDURE — 77014 CHG CT GUIDANCE RADIATION THERAPY FLDS PLACEMENT: CPT | Performed by: RADIOLOGY

## 2023-07-11 PROCEDURE — 77427 RADIATION TX MANAGEMENT X5: CPT | Performed by: RADIOLOGY

## 2023-07-11 PROCEDURE — 77386 HC NTSTY MODUL RAD TX DLVR CPLX: CPT

## 2023-07-12 ENCOUNTER — HOSPITAL ENCOUNTER (OUTPATIENT)
Facility: HOSPITAL | Age: 78
Setting detail: RECURRING SERIES
Discharge: HOME | End: 2023-07-15
Attending: RADIOLOGY
Payer: MEDICARE

## 2023-07-12 LAB
RAD ONC ARIA COURSE FIRST TREATMENT DATE: NORMAL
RAD ONC ARIA COURSE ID: NORMAL
RAD ONC ARIA COURSE INTENT: NORMAL
RAD ONC ARIA COURSE LAST TREATMENT DATE: NORMAL
RAD ONC ARIA COURSE SESSION NUMBER: 12
RAD ONC ARIA COURSE START DATE: NORMAL
RAD ONC ARIA COURSE TREATMENT ELAPSED DAYS: 16
RAD ONC ARIA PLAN FRACTIONS TREATED TO DATE: 12
RAD ONC ARIA PLAN ID: NORMAL
RAD ONC ARIA PLAN PRESCRIBED DOSE PER FRACTION: 2 GY
RAD ONC ARIA PLAN PRIMARY REFERENCE POINT: NORMAL
RAD ONC ARIA PLAN TOTAL FRACTIONS PRESCRIBED: 15
RAD ONC ARIA PLAN TOTAL PRESCRIBED DOSE: 3000 CGY
RAD ONC ARIA REFERENCE POINT DOSAGE GIVEN TO DATE: 24 GY
RAD ONC ARIA REFERENCE POINT ID: NORMAL
RAD ONC ARIA REFERENCE POINT SESSION DOSAGE GIVEN: 2 GY

## 2023-07-12 PROCEDURE — 77386 HC NTSTY MODUL RAD TX DLVR CPLX: CPT

## 2023-07-12 PROCEDURE — 77014 CHG CT GUIDANCE RADIATION THERAPY FLDS PLACEMENT: CPT | Performed by: RADIOLOGY

## 2023-07-13 ENCOUNTER — HOSPITAL ENCOUNTER (OUTPATIENT)
Facility: HOSPITAL | Age: 78
Setting detail: RECURRING SERIES
Discharge: HOME | End: 2023-07-16
Attending: RADIOLOGY
Payer: MEDICARE

## 2023-07-13 LAB
RAD ONC ARIA COURSE FIRST TREATMENT DATE: NORMAL
RAD ONC ARIA COURSE ID: NORMAL
RAD ONC ARIA COURSE INTENT: NORMAL
RAD ONC ARIA COURSE LAST TREATMENT DATE: NORMAL
RAD ONC ARIA COURSE SESSION NUMBER: 13
RAD ONC ARIA COURSE START DATE: NORMAL
RAD ONC ARIA COURSE TREATMENT ELAPSED DAYS: 17
RAD ONC ARIA PLAN FRACTIONS TREATED TO DATE: 13
RAD ONC ARIA PLAN ID: NORMAL
RAD ONC ARIA PLAN PRESCRIBED DOSE PER FRACTION: 2 GY
RAD ONC ARIA PLAN PRIMARY REFERENCE POINT: NORMAL
RAD ONC ARIA PLAN TOTAL FRACTIONS PRESCRIBED: 15
RAD ONC ARIA PLAN TOTAL PRESCRIBED DOSE: 3000 CGY
RAD ONC ARIA REFERENCE POINT DOSAGE GIVEN TO DATE: 26 GY
RAD ONC ARIA REFERENCE POINT ID: NORMAL
RAD ONC ARIA REFERENCE POINT SESSION DOSAGE GIVEN: 2 GY

## 2023-07-13 PROCEDURE — 77014 CHG CT GUIDANCE RADIATION THERAPY FLDS PLACEMENT: CPT | Performed by: RADIOLOGY

## 2023-07-13 PROCEDURE — 77386 HC NTSTY MODUL RAD TX DLVR CPLX: CPT

## 2023-07-14 ENCOUNTER — HOSPITAL ENCOUNTER (OUTPATIENT)
Facility: HOSPITAL | Age: 78
Setting detail: RECURRING SERIES
Discharge: HOME | End: 2023-07-17
Attending: RADIOLOGY
Payer: MEDICARE

## 2023-07-14 LAB
RAD ONC ARIA COURSE FIRST TREATMENT DATE: NORMAL
RAD ONC ARIA COURSE ID: NORMAL
RAD ONC ARIA COURSE INTENT: NORMAL
RAD ONC ARIA COURSE LAST TREATMENT DATE: NORMAL
RAD ONC ARIA COURSE SESSION NUMBER: 14
RAD ONC ARIA COURSE START DATE: NORMAL
RAD ONC ARIA COURSE TREATMENT ELAPSED DAYS: 18
RAD ONC ARIA PLAN FRACTIONS TREATED TO DATE: 14
RAD ONC ARIA PLAN ID: NORMAL
RAD ONC ARIA PLAN PRESCRIBED DOSE PER FRACTION: 2 GY
RAD ONC ARIA PLAN PRIMARY REFERENCE POINT: NORMAL
RAD ONC ARIA PLAN TOTAL FRACTIONS PRESCRIBED: 15
RAD ONC ARIA PLAN TOTAL PRESCRIBED DOSE: 3000 CGY
RAD ONC ARIA REFERENCE POINT DOSAGE GIVEN TO DATE: 28 GY
RAD ONC ARIA REFERENCE POINT ID: NORMAL
RAD ONC ARIA REFERENCE POINT SESSION DOSAGE GIVEN: 2 GY

## 2023-07-14 PROCEDURE — 77386 HC NTSTY MODUL RAD TX DLVR CPLX: CPT

## 2023-07-14 PROCEDURE — 77014 CHG CT GUIDANCE RADIATION THERAPY FLDS PLACEMENT: CPT | Performed by: RADIOLOGY

## 2023-07-17 ENCOUNTER — HOSPITAL ENCOUNTER (OUTPATIENT)
Facility: HOSPITAL | Age: 78
Setting detail: RECURRING SERIES
Discharge: HOME | End: 2023-07-20
Attending: RADIOLOGY
Payer: MEDICARE

## 2023-07-17 ENCOUNTER — OFFICE VISIT (OUTPATIENT)
Age: 78
End: 2023-07-17
Payer: MEDICARE

## 2023-07-17 VITALS
BODY MASS INDEX: 19.33 KG/M2 | HEIGHT: 65 IN | WEIGHT: 116 LBS | DIASTOLIC BLOOD PRESSURE: 60 MMHG | OXYGEN SATURATION: 95 % | HEART RATE: 100 BPM | SYSTOLIC BLOOD PRESSURE: 100 MMHG

## 2023-07-17 DIAGNOSIS — C82.14 GRADE 2 FOLLICULAR LYMPHOMA OF LYMPH NODES OF AXILLA (HCC): Primary | ICD-10-CM

## 2023-07-17 DIAGNOSIS — D64.9 NORMOCYTIC ANEMIA: ICD-10-CM

## 2023-07-17 DIAGNOSIS — R59.0 AXILLARY LYMPHADENOPATHY: ICD-10-CM

## 2023-07-17 LAB
RAD ONC ARIA COURSE FIRST TREATMENT DATE: NORMAL
RAD ONC ARIA COURSE ID: NORMAL
RAD ONC ARIA COURSE INTENT: NORMAL
RAD ONC ARIA COURSE LAST TREATMENT DATE: NORMAL
RAD ONC ARIA COURSE SESSION NUMBER: 15
RAD ONC ARIA COURSE START DATE: NORMAL
RAD ONC ARIA COURSE TREATMENT ELAPSED DAYS: 21
RAD ONC ARIA PLAN FRACTIONS TREATED TO DATE: 15
RAD ONC ARIA PLAN ID: NORMAL
RAD ONC ARIA PLAN PRESCRIBED DOSE PER FRACTION: 2 GY
RAD ONC ARIA PLAN PRIMARY REFERENCE POINT: NORMAL
RAD ONC ARIA PLAN TOTAL FRACTIONS PRESCRIBED: 15
RAD ONC ARIA PLAN TOTAL PRESCRIBED DOSE: 3000 CGY
RAD ONC ARIA REFERENCE POINT DOSAGE GIVEN TO DATE: 30 GY
RAD ONC ARIA REFERENCE POINT ID: NORMAL
RAD ONC ARIA REFERENCE POINT SESSION DOSAGE GIVEN: 2 GY

## 2023-07-17 PROCEDURE — G8420 CALC BMI NORM PARAMETERS: HCPCS | Performed by: INTERNAL MEDICINE

## 2023-07-17 PROCEDURE — 1090F PRES/ABSN URINE INCON ASSESS: CPT | Performed by: INTERNAL MEDICINE

## 2023-07-17 PROCEDURE — 77386 HC NTSTY MODUL RAD TX DLVR CPLX: CPT

## 2023-07-17 PROCEDURE — 77336 RADIATION PHYSICS CONSULT: CPT

## 2023-07-17 PROCEDURE — G8428 CUR MEDS NOT DOCUMENT: HCPCS | Performed by: INTERNAL MEDICINE

## 2023-07-17 PROCEDURE — 99213 OFFICE O/P EST LOW 20 MIN: CPT | Performed by: INTERNAL MEDICINE

## 2023-07-17 PROCEDURE — G8399 PT W/DXA RESULTS DOCUMENT: HCPCS | Performed by: INTERNAL MEDICINE

## 2023-07-17 PROCEDURE — 3074F SYST BP LT 130 MM HG: CPT | Performed by: INTERNAL MEDICINE

## 2023-07-17 PROCEDURE — 3017F COLORECTAL CA SCREEN DOC REV: CPT | Performed by: INTERNAL MEDICINE

## 2023-07-17 PROCEDURE — 3078F DIAST BP <80 MM HG: CPT | Performed by: INTERNAL MEDICINE

## 2023-07-17 PROCEDURE — 77014 CHG CT GUIDANCE RADIATION THERAPY FLDS PLACEMENT: CPT | Performed by: RADIOLOGY

## 2023-07-17 PROCEDURE — 1123F ACP DISCUSS/DSCN MKR DOCD: CPT | Performed by: INTERNAL MEDICINE

## 2023-07-17 PROCEDURE — 1036F TOBACCO NON-USER: CPT | Performed by: INTERNAL MEDICINE

## 2023-07-17 ASSESSMENT — ENCOUNTER SYMPTOMS
COUGH: 0
SHORTNESS OF BREATH: 0
BACK PAIN: 0
DIARRHEA: 0
ABDOMINAL PAIN: 0
NAUSEA: 0
VOMITING: 0

## 2023-07-24 ENCOUNTER — TELEPHONE (OUTPATIENT)
Age: 78
End: 2023-07-24

## 2023-07-24 RX ORDER — FUROSEMIDE 40 MG/1
40 TABLET ORAL DAILY
Qty: 90 TABLET | Refills: 0 | Status: SHIPPED | OUTPATIENT
Start: 2023-07-24

## 2023-07-24 NOTE — TELEPHONE ENCOUNTER
Patient son called in stating that [facility] never received his mom prescription and he would like it sent to [facility] because his mom is now all out of her medications.  Please Advise    furosemide (LASIX) 40 MG tablet       [id number] [place], [address]

## 2023-07-24 NOTE — TELEPHONE ENCOUNTER
9434 This RN called and spoke with Mr. Hugo/son (c) [phone] of patient to inform him that Dr. Kang Phelps sent the Lasix prescription to patient's pharmacy.

## 2023-07-31 ENCOUNTER — TELEPHONE (OUTPATIENT)
Age: 78
End: 2023-07-31

## 2023-07-31 DIAGNOSIS — M81.0 OSTEOPOROSIS, UNSPECIFIED OSTEOPOROSIS TYPE, UNSPECIFIED PATHOLOGICAL FRACTURE PRESENCE: Primary | ICD-10-CM

## 2023-07-31 NOTE — TELEPHONE ENCOUNTER
Patient had surgery on 01/04/2020 for his appendix and hernia surgery.   Patient would like to speak with Dr. Guzman in regards to pain he has been experiencing in his belly button and is afraid he re injured the hernia that was fixed. Please call patient to talk in regards to this matter.      Pt son called stating pt was referred to Rheumatologist  for prolia  injection    [facility]   Dr Merlin Gamboa   She is needs a referral , recent office notes, labs and demographics faxed to them   Fax [phone]

## 2023-08-03 NOTE — TELEPHONE ENCOUNTER
1620 This RN called and spoke with Rj Pyle to ask him if we needed to send referrals and progress notes to both rheumatology and endocrinology. Mr. Venkatesh Fontana stated we only needed to send a referral and progress notes to endocrinology/Dr. Perez's office. 1634 This RN called and confirmed the fax for Dr. Reyes Filter office and progress notes and lab results sent at this time.

## 2023-08-04 ENCOUNTER — CLINICAL DOCUMENTATION (OUTPATIENT)
Age: 78
End: 2023-08-04

## 2023-08-04 NOTE — TELEPHONE ENCOUNTER
Late Entry: 08/04/02023 for 08/03/2023 Referral, progress notes and lab results sent to Dr. Llamas Siemens office so that an appointment can be made for patient to get a Prolia injection via his office. Patient's son was sent a My Chart message and also called on 08/04/2023 to inform him that the information was sent and he could proceed with scheduling an appointment.

## 2023-08-04 NOTE — PROGRESS NOTES
This RN called and spoke with  staff/Dr. Perez's office to confirm that they received the fax with referral, lab results and progress notes from our office today. They asked that patient call to schedule her appointment. This RN informed staff that patient son would be calling as patient has dementia and would not be able to call.

## 2023-08-18 ENCOUNTER — HOSPITAL ENCOUNTER (OUTPATIENT)
Facility: HOSPITAL | Age: 78
End: 2023-08-18
Payer: MEDICARE

## 2023-08-18 ENCOUNTER — OFFICE VISIT (OUTPATIENT)
Age: 78
End: 2023-08-18

## 2023-08-18 VITALS
BODY MASS INDEX: 19.33 KG/M2 | HEIGHT: 65 IN | WEIGHT: 116 LBS | HEART RATE: 95 BPM | DIASTOLIC BLOOD PRESSURE: 60 MMHG | RESPIRATION RATE: 16 BRPM | TEMPERATURE: 98.6 F | OXYGEN SATURATION: 95 % | SYSTOLIC BLOOD PRESSURE: 103 MMHG

## 2023-08-18 DIAGNOSIS — D47.9 B-CELL LYMPHOPROLIFERATIVE DISORDER (HCC): ICD-10-CM

## 2023-08-18 DIAGNOSIS — F41.9 ANXIETY DISORDER, UNSPECIFIED TYPE: ICD-10-CM

## 2023-08-18 DIAGNOSIS — E87.6 HYPOKALEMIA: ICD-10-CM

## 2023-08-18 DIAGNOSIS — Z00.00 HEALTHCARE MAINTENANCE: ICD-10-CM

## 2023-08-18 DIAGNOSIS — N18.31 CHRONIC RENAL FAILURE, STAGE 3A (HCC): ICD-10-CM

## 2023-08-18 DIAGNOSIS — I50.30 DIASTOLIC HEART FAILURE, UNSPECIFIED HF CHRONICITY (HCC): ICD-10-CM

## 2023-08-18 DIAGNOSIS — E83.42 HYPOMAGNESEMIA: ICD-10-CM

## 2023-08-18 DIAGNOSIS — I10 PRIMARY HYPERTENSION: ICD-10-CM

## 2023-08-18 DIAGNOSIS — F03.918 DEMENTIA WITH BEHAVIORAL DISTURBANCE (HCC): ICD-10-CM

## 2023-08-18 DIAGNOSIS — M81.0 OSTEOPOROSIS, UNSPECIFIED OSTEOPOROSIS TYPE, UNSPECIFIED PATHOLOGICAL FRACTURE PRESENCE: ICD-10-CM

## 2023-08-18 DIAGNOSIS — Z00.00 MEDICARE ANNUAL WELLNESS VISIT, SUBSEQUENT: Primary | ICD-10-CM

## 2023-08-18 DIAGNOSIS — R56.9 SEIZURE (HCC): ICD-10-CM

## 2023-08-18 DIAGNOSIS — Z71.89 ACP (ADVANCE CARE PLANNING): ICD-10-CM

## 2023-08-18 LAB
ALBUMIN SERPL-MCNC: 4 G/DL (ref 3.4–5)
ALBUMIN/GLOB SERPL: 1.3 (ref 0.8–1.7)
ALP SERPL-CCNC: 76 U/L (ref 45–117)
ALT SERPL-CCNC: 25 U/L (ref 13–56)
ANION GAP SERPL CALC-SCNC: 4 MMOL/L (ref 3–18)
AST SERPL-CCNC: 29 U/L (ref 10–38)
BASOPHILS # BLD: 0 K/UL (ref 0–0.1)
BASOPHILS NFR BLD: 0 % (ref 0–2)
BILIRUB SERPL-MCNC: 0.4 MG/DL (ref 0.2–1)
BUN SERPL-MCNC: 32 MG/DL (ref 7–18)
BUN/CREAT SERPL: 23 (ref 12–20)
CALCIUM SERPL-MCNC: 10.5 MG/DL (ref 8.5–10.1)
CHLORIDE SERPL-SCNC: 102 MMOL/L (ref 100–111)
CO2 SERPL-SCNC: 33 MMOL/L (ref 21–32)
CREAT SERPL-MCNC: 1.4 MG/DL (ref 0.6–1.3)
DIFFERENTIAL METHOD BLD: ABNORMAL
EOSINOPHIL # BLD: 0 K/UL (ref 0–0.4)
EOSINOPHIL NFR BLD: 1 % (ref 0–5)
ERYTHROCYTE [DISTWIDTH] IN BLOOD BY AUTOMATED COUNT: 13.6 % (ref 11.6–14.5)
GLOBULIN SER CALC-MCNC: 3.2 G/DL (ref 2–4)
GLUCOSE SERPL-MCNC: 95 MG/DL (ref 74–99)
HCT VFR BLD AUTO: 38.4 % (ref 35–45)
HGB BLD-MCNC: 12.3 G/DL (ref 12–16)
IMM GRANULOCYTES # BLD AUTO: 0 K/UL (ref 0–0.04)
IMM GRANULOCYTES NFR BLD AUTO: 0 % (ref 0–0.5)
LYMPHOCYTES # BLD: 0.9 K/UL (ref 0.9–3.6)
LYMPHOCYTES NFR BLD: 17 % (ref 21–52)
MAGNESIUM SERPL-MCNC: 2.6 MG/DL (ref 1.6–2.6)
MCH RBC QN AUTO: 29.4 PG (ref 24–34)
MCHC RBC AUTO-ENTMCNC: 32 G/DL (ref 31–37)
MCV RBC AUTO: 91.9 FL (ref 78–100)
MONOCYTES # BLD: 0.5 K/UL (ref 0.05–1.2)
MONOCYTES NFR BLD: 11 % (ref 3–10)
NEUTS SEG # BLD: 3.6 K/UL (ref 1.8–8)
NEUTS SEG NFR BLD: 71 % (ref 40–73)
NRBC # BLD: 0 K/UL (ref 0–0.01)
NRBC BLD-RTO: 0 PER 100 WBC
PLATELET # BLD AUTO: 220 K/UL (ref 135–420)
PMV BLD AUTO: 9.4 FL (ref 9.2–11.8)
POTASSIUM SERPL-SCNC: 4.4 MMOL/L (ref 3.5–5.5)
PROT SERPL-MCNC: 7.2 G/DL (ref 6.4–8.2)
RBC # BLD AUTO: 4.18 M/UL (ref 4.2–5.3)
SODIUM SERPL-SCNC: 139 MMOL/L (ref 136–145)
WBC # BLD AUTO: 5.1 K/UL (ref 4.6–13.2)

## 2023-08-18 PROCEDURE — 85025 COMPLETE CBC W/AUTO DIFF WBC: CPT

## 2023-08-18 PROCEDURE — 83735 ASSAY OF MAGNESIUM: CPT

## 2023-08-18 PROCEDURE — 36415 COLL VENOUS BLD VENIPUNCTURE: CPT

## 2023-08-18 PROCEDURE — 80053 COMPREHEN METABOLIC PANEL: CPT

## 2023-08-18 RX ORDER — LEVETIRACETAM 500 MG/1
TABLET ORAL
Qty: 60 TABLET | Refills: 6
Start: 2023-08-18

## 2023-08-18 SDOH — ECONOMIC STABILITY: FOOD INSECURITY: WITHIN THE PAST 12 MONTHS, YOU WORRIED THAT YOUR FOOD WOULD RUN OUT BEFORE YOU GOT MONEY TO BUY MORE.: NEVER TRUE

## 2023-08-18 SDOH — ECONOMIC STABILITY: FOOD INSECURITY: WITHIN THE PAST 12 MONTHS, THE FOOD YOU BOUGHT JUST DIDN'T LAST AND YOU DIDN'T HAVE MONEY TO GET MORE.: NEVER TRUE

## 2023-08-18 SDOH — ECONOMIC STABILITY: INCOME INSECURITY: HOW HARD IS IT FOR YOU TO PAY FOR THE VERY BASICS LIKE FOOD, HOUSING, MEDICAL CARE, AND HEATING?: NOT HARD AT ALL

## 2023-08-18 ASSESSMENT — PATIENT HEALTH QUESTIONNAIRE - PHQ9
SUM OF ALL RESPONSES TO PHQ QUESTIONS 1-9: 0
1. LITTLE INTEREST OR PLEASURE IN DOING THINGS: 0
SUM OF ALL RESPONSES TO PHQ QUESTIONS 1-9: 0
2. FEELING DOWN, DEPRESSED OR HOPELESS: 0
10. IF YOU CHECKED OFF ANY PROBLEMS, HOW DIFFICULT HAVE THESE PROBLEMS MADE IT FOR YOU TO DO YOUR WORK, TAKE CARE OF THINGS AT HOME, OR GET ALONG WITH OTHER PEOPLE: 0
7. TROUBLE CONCENTRATING ON THINGS, SUCH AS READING THE NEWSPAPER OR WATCHING TELEVISION: 0
3. TROUBLE FALLING OR STAYING ASLEEP: 0
9. THOUGHTS THAT YOU WOULD BE BETTER OFF DEAD, OR OF HURTING YOURSELF: 0
6. FEELING BAD ABOUT YOURSELF - OR THAT YOU ARE A FAILURE OR HAVE LET YOURSELF OR YOUR FAMILY DOWN: 0
4. FEELING TIRED OR HAVING LITTLE ENERGY: 0
SUM OF ALL RESPONSES TO PHQ9 QUESTIONS 1 & 2: 0
8. MOVING OR SPEAKING SO SLOWLY THAT OTHER PEOPLE COULD HAVE NOTICED. OR THE OPPOSITE, BEING SO FIGETY OR RESTLESS THAT YOU HAVE BEEN MOVING AROUND A LOT MORE THAN USUAL: 0
5. POOR APPETITE OR OVEREATING: 0

## 2023-08-18 NOTE — PROGRESS NOTES
Jim Tejada (: 1945) is a 70 y.o. female, here for evaluation of the following chief complaint(s):  Medicare AWV       ASSESSMENT/PLAN:  Below is the assessment and plan developed based on review of pertinent history, physical exam, labs, studies, and medications. 1. Medicare annual wellness visit, subsequent  2. ACP (advance care planning)  -     DO NOT RESUSCITATE (DNR)  3. Hypokalemia  Assessment & Plan: On potassium supplement. Repeat electrolytes  Orders:  -     Comprehensive Metabolic Panel; Future  4. B-cell lymphoproliferative disorder [facility])  Assessment & Plan:   Follows with radiation oncology. To follow-up with [facility]  Orders:  -     CBC with Auto Differential; Future  5. Hypomagnesemia  -     Magnesium; Future  6. Diastolic heart failure, unspecified HF chronicity (HCC)  Assessment & Plan:   Stable on Lasix, potassium supplement  7. Seizure [facility])  Assessment & Plan:   Follows with neurology. Dose of Keppra reduced to, 250 in a.m. and 500 in p.m.  8. Osteoporosis, unspecified osteoporosis type, unspecified pathological fracture presence  Assessment & Plan:   Waiting to follow-up with endocrine. For Prolia  9. Primary hypertension  10. Dementia with behavioral disturbance [facility])  Assessment & Plan:   Follows with neurology, added memantine. 11. Anxiety disorder, unspecified type  Assessment & Plan:   Stable on olanzapine, Paxil. Follows with psych, Dr. Jessie Live  12. Chronic renal failure, stage 3a ([address])  Assessment & Plan:   Follows with nephrology  13. Healthcare maintenance  Assessment & Plan:   Patient/son would be confirming with oncology regarding shingles and pneumococcal vaccine      Return in about 3 months (around 2023) for LABS TODAY, follow up on chronic conditions. SUBJECTIVE/OBJECTIVE:  HPI  Patient follows with oncology/radiation oncology for B-cell lymphoma. Patient had seen neurology recently, reduced Keppra to 250 mg in morning and 5 mg in evening.

## 2023-08-18 NOTE — PROGRESS NOTES
Will Phillip presents today for   Chief Complaint   Patient presents with    Medicare AWV         1. \"Have you been to the ER, urgent care clinic since your last visit? Hospitalized since your last visit? \" no    2. \"Have you seen or consulted any other health care providers outside of the [address] [place] [address] since your last visit? \" Yes pain management    3. For patients aged 43-73: Has the patient had a colonoscopy / FIT/ Cologuard? Yes - no Care Gap present      If the patient is female:    4. For patients aged 43-66: Has the patient had a mammogram within the past 2 years? NA - based on age or sex      11. For patients aged 21-65: Has the patient had a pap smear?  NA - based on age or sex

## 2023-08-18 NOTE — ACP (ADVANCE CARE PLANNING)
Advance Care Planning     General Advance Care Planning (ACP) Conversation    Date of Conversation: 8/18/2023  Conducted with: Patient with Decision Making Capacity    Healthcare Decision Maker:    Primary Decision Maker: Cady Olea - [place] - [phone]  Click here to complete [id number] including selection of the Healthcare Decision Maker Relationship (ie \"Primary\"). Content/Action Overview:   Has ACP document(s) NOT on file - requested patient to provide  Reviewed DNR/DNI and patient elects DNR order - completed portable DNR form & placed order        Length of Voluntary ACP Conversation in minutes:  16 minutes    Noemy Jefferson MD

## 2023-08-18 NOTE — PROGRESS NOTES
6 Medicare Annual Wellness Visit    Will Phillip is here for Medicare AWV    Assessment & Plan   Medicare annual wellness visit, subsequent  ACP (advance care planning)  -     DO NOT RESUSCITATE (DNR)  Hypokalemia  Assessment & Plan: On potassium supplement. Repeat electrolytes  Orders:  -     Comprehensive Metabolic Panel; Future  B-cell lymphoproliferative disorder [facility])  Assessment & Plan:   Follows with radiation oncology. To follow-up with [facility]  Orders:  -     CBC with Auto Differential; Future  Hypomagnesemia  -     Magnesium; Future  Diastolic heart failure, unspecified HF chronicity (HCC)  Assessment & Plan:   Stable on Lasix, potassium supplement  Seizure [facility])  Assessment & Plan:   Follows with neurology. Dose of Keppra reduced to, 250 in a.m. and 500 in p.m. Osteoporosis, unspecified osteoporosis type, unspecified pathological fracture presence  Assessment & Plan:   Waiting to follow-up with endocrine. For Prolia  Primary hypertension  Dementia with behavioral disturbance [facility])  Assessment & Plan:   Follows with neurology, added memantine. Anxiety disorder, unspecified type  Assessment & Plan:   Stable on olanzapine, Paxil. Follows with psych, Dr. Artis Morales  Chronic renal failure, stage 3a [facility])  Assessment & Plan:   Follows with nephrology  Healthcare maintenance  Assessment & Plan:   Patient/son would be confirming with oncology regarding shingles and pneumococcal vaccine  Recommendations for Preventive Services Due: see orders and patient instructions/AVS.  Recommended screening schedule for the next 5-10 years is provided to the patient in written form: see Patient Instructions/AVS.     No follow-ups on file. Subjective       Patient's complete Health Risk Assessment and screening values have been reviewed and are found in Flowsheets. The following problems were reviewed today and where indicated follow up appointments were made and/or referrals ordered.     Positive Risk Factor

## 2023-08-21 ENCOUNTER — TELEPHONE (OUTPATIENT)
Age: 78
End: 2023-08-21

## 2023-08-21 NOTE — TELEPHONE ENCOUNTER
----- Message from Muna Andrea MD sent at 8/18/2023  7:07 PM EDT -----  Please inform the patient that serum electrolytes are stable. There is mild increase in serum creatinine 1.44  To follow-up with nephrology.   Thanks

## 2023-08-29 ENCOUNTER — HOSPITAL ENCOUNTER (OUTPATIENT)
Facility: HOSPITAL | Age: 78
Setting detail: RECURRING SERIES
Discharge: HOME | End: 2023-09-01
Attending: RADIOLOGY
Payer: MEDICARE

## 2023-08-29 PROCEDURE — 99024 POSTOP FOLLOW-UP VISIT: CPT | Performed by: RADIOLOGY

## 2023-08-29 PROCEDURE — 99214 OFFICE O/P EST MOD 30 MIN: CPT

## 2023-09-11 RX ORDER — LEVETIRACETAM 500 MG/1
TABLET ORAL
Qty: 420 TABLET | Refills: 0 | Status: SHIPPED | OUTPATIENT
Start: 2023-09-11

## 2023-09-13 RX ORDER — PAROXETINE HYDROCHLORIDE 20 MG/1
20 TABLET, FILM COATED ORAL DAILY
Qty: 90 TABLET | Refills: 3 | OUTPATIENT
Start: 2023-09-13

## 2023-09-17 PROBLEM — Z00.00 HEALTHCARE MAINTENANCE: Status: RESOLVED | Noted: 2023-08-18 | Resolved: 2023-09-17

## 2023-09-18 RX ORDER — POTASSIUM CHLORIDE 20MEQ/15ML
LIQUID (ML) ORAL
Qty: 480 ML | Refills: 1 | OUTPATIENT
Start: 2023-09-18

## 2023-09-19 NOTE — TELEPHONE ENCOUNTER
PCP: Vladimir Son MD    Last refill per chart:   LINZESS 72 MCG CAPS capsule  EditCancel Reorder      Summary: take 1 capsule by mouth every morning before breakfast, Disp-30 capsule, R-2  Normal   Start: 6/16/2023  Ord/Sold: 6/16/2023 (O)  Report  Taking:   Long-term:   Pharmacy: [address] [place] [address] #[id number] - [place] [address] [phone] - F [phone]  Med Dose History       Patient Sig: take 1 capsule by mouth every morning before breakfast       Ordered on: 6/16/2023       Authorized by: Catarino Medina: 30 capsule       Refills: 2 ordered          Future Appointments   Date Time Provider [address] [place] [address]   11/17/2023  2:00 PM Vladimir Son MD [facility] BS AMB   1/2/2024  2:00 PM Keith Garrett MD [facility] BS AMB   3/8/2024  2:00 PM MD Evelio Matos

## 2023-10-02 RX ORDER — LOVASTATIN 40 MG/1
40 TABLET ORAL DAILY
Qty: 90 TABLET | Refills: 3 | OUTPATIENT
Start: 2023-10-02

## 2023-10-13 RX ORDER — FUROSEMIDE 40 MG/1
40 TABLET ORAL DAILY
Qty: 90 TABLET | Refills: 0 | Status: SHIPPED | OUTPATIENT
Start: 2023-10-13

## 2023-10-16 RX ORDER — POTASSIUM CHLORIDE 20MEQ/15ML
LIQUID (ML) ORAL
Qty: 480 ML | Refills: 1 | Status: SHIPPED | OUTPATIENT
Start: 2023-10-16

## 2023-11-13 ENCOUNTER — TELEPHONE (OUTPATIENT)
Age: 78
End: 2023-11-13

## 2023-11-13 ENCOUNTER — OFFICE VISIT (OUTPATIENT)
Age: 78
End: 2023-11-13

## 2023-11-13 VITALS
DIASTOLIC BLOOD PRESSURE: 68 MMHG | SYSTOLIC BLOOD PRESSURE: 123 MMHG | RESPIRATION RATE: 16 BRPM | OXYGEN SATURATION: 96 % | TEMPERATURE: 97.1 F | HEART RATE: 103 BPM | BODY MASS INDEX: 19.33 KG/M2 | WEIGHT: 116 LBS | HEIGHT: 65 IN

## 2023-11-13 DIAGNOSIS — M54.9 BACK PAIN, UNSPECIFIED BACK LOCATION, UNSPECIFIED BACK PAIN LATERALITY, UNSPECIFIED CHRONICITY: ICD-10-CM

## 2023-11-13 DIAGNOSIS — J30.2 SEASONAL ALLERGIES: ICD-10-CM

## 2023-11-13 DIAGNOSIS — G40.909 NONINTRACTABLE EPILEPSY WITHOUT STATUS EPILEPTICUS, UNSPECIFIED EPILEPSY TYPE (HCC): ICD-10-CM

## 2023-11-13 DIAGNOSIS — Z00.00 HEALTHCARE MAINTENANCE: ICD-10-CM

## 2023-11-13 DIAGNOSIS — M81.0 OSTEOPOROSIS, UNSPECIFIED OSTEOPOROSIS TYPE, UNSPECIFIED PATHOLOGICAL FRACTURE PRESENCE: ICD-10-CM

## 2023-11-13 DIAGNOSIS — F03.90 DEMENTIA, UNSPECIFIED DEMENTIA SEVERITY, UNSPECIFIED DEMENTIA TYPE, UNSPECIFIED WHETHER BEHAVIORAL, PSYCHOTIC, OR MOOD DISTURBANCE OR ANXIETY (HCC): ICD-10-CM

## 2023-11-13 DIAGNOSIS — Z78.9 IMPAIRED MOBILITY AND ADLS: Primary | ICD-10-CM

## 2023-11-13 DIAGNOSIS — E87.6 HYPOKALEMIA: ICD-10-CM

## 2023-11-13 DIAGNOSIS — F03.918 DEMENTIA WITH BEHAVIORAL DISTURBANCE (HCC): ICD-10-CM

## 2023-11-13 DIAGNOSIS — N18.31 CHRONIC RENAL FAILURE, STAGE 3A (HCC): ICD-10-CM

## 2023-11-13 DIAGNOSIS — Z74.09 IMPAIRED MOBILITY AND ADLS: Primary | ICD-10-CM

## 2023-11-13 DIAGNOSIS — E55.9 VITAMIN D DEFICIENCY: ICD-10-CM

## 2023-11-13 DIAGNOSIS — D47.9 B-CELL LYMPHOPROLIFERATIVE DISORDER (HCC): ICD-10-CM

## 2023-11-13 RX ORDER — CETIRIZINE HYDROCHLORIDE 5 MG/1
5 TABLET ORAL DAILY
Qty: 30 TABLET | Refills: 1 | Status: SHIPPED | OUTPATIENT
Start: 2023-11-13 | End: 2023-11-15

## 2023-11-13 SDOH — ECONOMIC STABILITY: FOOD INSECURITY: WITHIN THE PAST 12 MONTHS, YOU WORRIED THAT YOUR FOOD WOULD RUN OUT BEFORE YOU GOT MONEY TO BUY MORE.: NEVER TRUE

## 2023-11-13 SDOH — ECONOMIC STABILITY: FOOD INSECURITY: WITHIN THE PAST 12 MONTHS, THE FOOD YOU BOUGHT JUST DIDN'T LAST AND YOU DIDN'T HAVE MONEY TO GET MORE.: NEVER TRUE

## 2023-11-13 SDOH — ECONOMIC STABILITY: INCOME INSECURITY: HOW HARD IS IT FOR YOU TO PAY FOR THE VERY BASICS LIKE FOOD, HOUSING, MEDICAL CARE, AND HEATING?: NOT HARD AT ALL

## 2023-11-13 ASSESSMENT — ENCOUNTER SYMPTOMS
EYE ITCHING: 1
ALLERGIC/IMMUNOLOGIC NEGATIVE: 1
GASTROINTESTINAL NEGATIVE: 1
RESPIRATORY NEGATIVE: 1

## 2023-11-13 ASSESSMENT — PATIENT HEALTH QUESTIONNAIRE - PHQ9
SUM OF ALL RESPONSES TO PHQ QUESTIONS 1-9: 0
SUM OF ALL RESPONSES TO PHQ QUESTIONS 1-9: 0
2. FEELING DOWN, DEPRESSED OR HOPELESS: 0
SUM OF ALL RESPONSES TO PHQ QUESTIONS 1-9: 0
SUM OF ALL RESPONSES TO PHQ QUESTIONS 1-9: 0
1. LITTLE INTEREST OR PLEASURE IN DOING THINGS: 0
SUM OF ALL RESPONSES TO PHQ9 QUESTIONS 1 & 2: 0

## 2023-11-13 NOTE — ASSESSMENT & PLAN NOTE
Patient will be discussing with oncologist regarding reduced vaccines she should take.   Seeing oncology on Friday

## 2023-11-13 NOTE — PROGRESS NOTES
Kraig Garciabhupendra presents today for   Chief Complaint   Patient presents with    Follow-up                 1. \"Have you been to the ER, urgent care clinic since your last visit? Hospitalized since your last visit? \" no    2. \"Have you seen or consulted any other health care providers outside of the [address] [place] [address] since your last visit? \" no     3. For patients aged 43-73: Has the patient had a colonoscopy / FIT/ Cologuard? NA - based on age      If the patient is female:    4. For patients aged 43-66: Has the patient had a mammogram within the past 2 years? Yes - Care Gap present. Rooming MA/LPN to request most recent results      5. For patients aged 21-65: Has the patient had a pap smear?  No

## 2023-11-13 NOTE — TELEPHONE ENCOUNTER
Dr. Barbara Cardenas can make the  visit a virtual one since the elevators are down. I tried to call the patient  and have left  a voicemail.

## 2023-11-14 DIAGNOSIS — J30.2 SEASONAL ALLERGIES: ICD-10-CM

## 2023-11-14 RX ORDER — CETIRIZINE HYDROCHLORIDE 5 MG/1
5 TABLET ORAL DAILY
Qty: 30 TABLET | Refills: 1 | OUTPATIENT
Start: 2023-11-14

## 2023-11-14 NOTE — TELEPHONE ENCOUNTER
----- Message from Alma Phil sent at 11/14/2023  9:14 AM EST -----  Subject: Medication Problem    Medication: cetirizine (ZYRTEC) 5 MG tablet  Dosage: 5mg 1 time a day  Ordering Provider: Aiden Colon    Question/Problem: Pt needs it changed to a 10mg because pharmacy cant get   the 5mg. Pt will cut in half.       Pharmacy: [facility] #[id number] [place], [address] [place] [address] [phone]    ---------------------------------------------------------------------------  --------------  Jessie WHALEN  [phone]; OK to leave message on voicemail  ---------------------------------------------------------------------------  --------------    SCRIPT ANSWERS  Relationship to Patient: Other/Third Party  Representative Name: Anahi Abbott  Is the representative on the Communication Release of Information (CODY)   form in Epic: Yes

## 2023-11-14 NOTE — TELEPHONE ENCOUNTER
Please inform patient's son that after reviewing the records, I would prefer labs done now instead of waiting for 3 months. Lab orders are in system.   Thanks

## 2023-11-14 NOTE — PROGRESS NOTES
Juan Antonio Burt (: 1945) is a 70 y.o. female, here for evaluation of the following chief complaint(s):  Follow-up       ASSESSMENT/PLAN:  Below is the assessment and plan developed based on review of pertinent history, physical exam, labs, studies, and medications. 1. Impaired mobility and ADLs  -     Wheelchair  2. B-cell lymphoproliferative disorder [facility])  Assessment & Plan:   Follows with oncology on Friday  Orders:  -     External Referral To Nutrition Services  -     CBC with Auto Differential; Future  3. Chronic renal failure, stage 3a (HCC)  Assessment & Plan:   Mild deterioration in renal function. Patient to follow-up with nephrology. Orders:  -     External Referral To Nutrition Services  -     Comprehensive Metabolic Panel; Future  4. Seasonal allergies  Assessment & Plan: To try with low-dose Zyrtec. Orders:  -     cetirizine (ZYRTEC) 5 MG tablet; Take 1 tablet by mouth daily, Disp-30 tablet, R-1Normal  5. Vitamin D deficiency  -     Vitamin D 25 Hydroxy; Future  6. Osteoporosis, unspecified osteoporosis type, unspecified pathological fracture presence  Assessment & Plan:   Patient follows with endocrine. Orders:  -     Vitamin D 25 Hydroxy; Future  7. Healthcare maintenance  Assessment & Plan:   Patient will be discussing with oncologist regarding reduced vaccines she should take. Seeing oncology on Friday  8. Dementia, unspecified dementia severity, unspecified dementia type, unspecified whether behavioral, psychotic, or mood disturbance or anxiety ([address])  Assessment & Plan:     9. Nonintractable epilepsy without status epilepticus, unspecified epilepsy type ([address])  Assessment & Plan:   Dose of Keppra reduced by neurologist to to 50 mg and morning and 5 mg at bedtime  10. Dementia with behavioral disturbance [facility])  Assessment & Plan:   Memantine was stopped because of confusion. Patient follows with psych. 11. Hypokalemia  Assessment & Plan: On potassium supplement.   To recheck

## 2023-11-15 ENCOUNTER — TELEPHONE (OUTPATIENT)
Age: 78
End: 2023-11-15

## 2023-11-15 DIAGNOSIS — J30.2 SEASONAL ALLERGIES: Primary | ICD-10-CM

## 2023-11-15 RX ORDER — CETIRIZINE HYDROCHLORIDE 10 MG/1
5 TABLET ORAL DAILY
Qty: 30 TABLET | Refills: 2 | Status: SHIPPED | OUTPATIENT
Start: 2023-11-15

## 2023-11-15 NOTE — TELEPHONE ENCOUNTER
Patient's son is asking for the RX for Zyrtec 5 mg  to be changed to 10 mg tabs with the patient taking 1/2 tab Q D  due to availability at the pharmacy as previously requested. Patient's son states he has been to the pharmacy twice and the pharmacist advised they have still not received the adjusted RX.

## 2023-11-22 RX ORDER — POTASSIUM CHLORIDE 20MEQ/15ML
LIQUID (ML) ORAL
Qty: 480 ML | Refills: 1 | Status: SHIPPED | OUTPATIENT
Start: 2023-11-22

## 2023-12-12 ENCOUNTER — PATIENT MESSAGE (OUTPATIENT)
Age: 78
End: 2023-12-12

## 2023-12-13 PROBLEM — Z00.00 HEALTHCARE MAINTENANCE: Status: RESOLVED | Noted: 2023-08-18 | Resolved: 2023-12-13

## 2023-12-14 ENCOUNTER — OFFICE VISIT (OUTPATIENT)
Age: 78
End: 2023-12-14
Payer: MEDICARE

## 2023-12-14 VITALS — BODY MASS INDEX: 19.33 KG/M2 | WEIGHT: 116 LBS | HEIGHT: 65 IN

## 2023-12-14 DIAGNOSIS — M81.0 OSTEOPOROSIS, UNSPECIFIED OSTEOPOROSIS TYPE, UNSPECIFIED PATHOLOGICAL FRACTURE PRESENCE: ICD-10-CM

## 2023-12-14 DIAGNOSIS — Z98.1 HISTORY OF LUMBAR FUSION: ICD-10-CM

## 2023-12-14 DIAGNOSIS — F03.918 DEMENTIA WITH BEHAVIORAL DISTURBANCE (HCC): ICD-10-CM

## 2023-12-14 DIAGNOSIS — Z96.653 PRESENCE OF ARTIFICIAL KNEE JOINT, BILATERAL: Primary | ICD-10-CM

## 2023-12-14 DIAGNOSIS — Z96.641 PRESENCE OF RIGHT ARTIFICIAL HIP JOINT: ICD-10-CM

## 2023-12-14 PROCEDURE — 1090F PRES/ABSN URINE INCON ASSESS: CPT | Performed by: PHYSICIAN ASSISTANT

## 2023-12-14 PROCEDURE — G8420 CALC BMI NORM PARAMETERS: HCPCS | Performed by: PHYSICIAN ASSISTANT

## 2023-12-14 PROCEDURE — G8484 FLU IMMUNIZE NO ADMIN: HCPCS | Performed by: PHYSICIAN ASSISTANT

## 2023-12-14 PROCEDURE — 1123F ACP DISCUSS/DSCN MKR DOCD: CPT | Performed by: PHYSICIAN ASSISTANT

## 2023-12-14 PROCEDURE — G8427 DOCREV CUR MEDS BY ELIG CLIN: HCPCS | Performed by: PHYSICIAN ASSISTANT

## 2023-12-14 PROCEDURE — 72170 X-RAY EXAM OF PELVIS: CPT | Performed by: PHYSICIAN ASSISTANT

## 2023-12-14 PROCEDURE — 1036F TOBACCO NON-USER: CPT | Performed by: PHYSICIAN ASSISTANT

## 2023-12-14 PROCEDURE — G8399 PT W/DXA RESULTS DOCUMENT: HCPCS | Performed by: PHYSICIAN ASSISTANT

## 2023-12-14 PROCEDURE — 99214 OFFICE O/P EST MOD 30 MIN: CPT | Performed by: PHYSICIAN ASSISTANT

## 2023-12-26 ENCOUNTER — TELEPHONE (OUTPATIENT)
Age: 78
End: 2023-12-26

## 2023-12-26 NOTE — TELEPHONE ENCOUNTER
Please inform the patient that our records indicate that patient has not received COVID booster, RSV vaccine, flu vaccine this season Tdap and shingles vaccine. Patient should be taking all these vaccines if patient has not received them.     Thanks

## 2023-12-26 NOTE — TELEPHONE ENCOUNTER
----- Message from Clemente Montague, [address] [facility] sent at 12/26/2023 12:45 PM EST -----  Regarding: FW: Office Visit - Follow Up Questions   Contact: [phone]  Patient is asking if she needs these vaccines. Please advise  ----- Message -----  From: Sathish Memos  Sent: 12/26/2023   8:31 AM EST  To: [address] Clinical Staff  Subject: Office Visit - Follow Up Questions               Kalen Mallory,    Thank you for sending over the office notes and labs. Would you mind asking Dr Richar Madera about the vaccines in the second part of my original message? Please let me know.     Thanks    Joce Newton

## 2023-12-27 RX ORDER — POTASSIUM CHLORIDE 20MEQ/15ML
LIQUID (ML) ORAL
Qty: 473 ML | Refills: 2 | Status: SHIPPED | OUTPATIENT
Start: 2023-12-27

## 2024-01-03 ENCOUNTER — TELEPHONE (OUTPATIENT)
Age: 79
End: 2024-01-03

## 2024-01-03 NOTE — TELEPHONE ENCOUNTER
Pt son dropped off a letter from pt [employer] Requesting a prior auth  I put letter in dr box up front

## 2024-01-05 ENCOUNTER — TELEPHONE (OUTPATIENT)
Age: 79
End: 2024-01-05

## 2024-01-11 RX ORDER — EPINEPHRINE 1 MG/ML
0.3 INJECTION, SOLUTION, CONCENTRATE INTRAVENOUS PRN
Status: CANCELLED | OUTPATIENT
Start: 2024-01-19

## 2024-01-11 RX ORDER — DIPHENHYDRAMINE HYDROCHLORIDE 50 MG/ML
50 INJECTION INTRAMUSCULAR; INTRAVENOUS
Status: CANCELLED | OUTPATIENT
Start: 2024-01-19

## 2024-01-11 RX ORDER — ACETAMINOPHEN 325 MG/1
650 TABLET ORAL
Status: CANCELLED | OUTPATIENT
Start: 2024-01-19

## 2024-01-11 RX ORDER — ONDANSETRON 2 MG/ML
8 INJECTION INTRAMUSCULAR; INTRAVENOUS
Status: CANCELLED | OUTPATIENT
Start: 2024-01-19

## 2024-01-11 RX ORDER — SODIUM CHLORIDE 9 MG/ML
INJECTION, SOLUTION INTRAVENOUS CONTINUOUS
Status: CANCELLED | OUTPATIENT
Start: 2024-01-19

## 2024-01-11 RX ORDER — ALBUTEROL SULFATE 90 UG/1
4 AEROSOL, METERED RESPIRATORY (INHALATION) PRN
Status: CANCELLED | OUTPATIENT
Start: 2024-01-19

## 2024-01-17 ENCOUNTER — HOSPITAL ENCOUNTER (OUTPATIENT)
Facility: HOSPITAL | Age: 79
Discharge: HOME | End: 2024-01-20
Payer: MEDICARE

## 2024-01-17 DIAGNOSIS — M81.0 OSTEOPOROSIS, UNSPECIFIED OSTEOPOROSIS TYPE, UNSPECIFIED PATHOLOGICAL FRACTURE PRESENCE: ICD-10-CM

## 2024-01-17 LAB
ALBUMIN SERPL-MCNC: 3.8 G/DL (ref 3.4–5)
ALBUMIN SERPL-MCNC: 3.8 G/DL (ref 3.4–5)
ALBUMIN/GLOB SERPL: 1.2 (ref 0.8–1.7)
ALP SERPL-CCNC: 97 U/L (ref 45–117)
ALT SERPL-CCNC: 34 U/L (ref 13–56)
ANION GAP SERPL CALC-SCNC: 4 MMOL/L (ref 3–18)
ANION GAP SERPL CALC-SCNC: 6 MMOL/L (ref 3–18)
AST SERPL-CCNC: 43 U/L (ref 10–38)
BILIRUB SERPL-MCNC: 0.3 MG/DL (ref 0.2–1)
BUN SERPL-MCNC: 34 MG/DL (ref 7–18)
BUN SERPL-MCNC: 35 MG/DL (ref 7–18)
BUN/CREAT SERPL: 32 (ref 12–20)
BUN/CREAT SERPL: 32 (ref 12–20)
CALCIUM SERPL-MCNC: 10 MG/DL (ref 8.5–10.1)
CALCIUM SERPL-MCNC: 10 MG/DL (ref 8.5–10.1)
CHLORIDE SERPL-SCNC: 102 MMOL/L (ref 100–111)
CHLORIDE SERPL-SCNC: 103 MMOL/L (ref 100–111)
CO2 SERPL-SCNC: 30 MMOL/L (ref 21–32)
CO2 SERPL-SCNC: 31 MMOL/L (ref 21–32)
CREAT SERPL-MCNC: 1.05 MG/DL (ref 0.6–1.3)
CREAT SERPL-MCNC: 1.08 MG/DL (ref 0.6–1.3)
GLOBULIN SER CALC-MCNC: 3.1 G/DL (ref 2–4)
GLUCOSE SERPL-MCNC: 109 MG/DL (ref 74–99)
GLUCOSE SERPL-MCNC: 110 MG/DL (ref 74–99)
MAGNESIUM SERPL-MCNC: 2.3 MG/DL (ref 1.6–2.6)
PHOSPHATE SERPL-MCNC: 3.3 MG/DL (ref 2.5–4.9)
PHOSPHATE SERPL-MCNC: 3.3 MG/DL (ref 2.5–4.9)
POTASSIUM SERPL-SCNC: 4.2 MMOL/L (ref 3.5–5.5)
POTASSIUM SERPL-SCNC: 4.2 MMOL/L (ref 3.5–5.5)
PROT SERPL-MCNC: 6.9 G/DL (ref 6.4–8.2)
SODIUM SERPL-SCNC: 138 MMOL/L (ref 136–145)
SODIUM SERPL-SCNC: 138 MMOL/L (ref 136–145)

## 2024-01-17 PROCEDURE — 36415 COLL VENOUS BLD VENIPUNCTURE: CPT

## 2024-01-17 PROCEDURE — 84100 ASSAY OF PHOSPHORUS: CPT

## 2024-01-17 PROCEDURE — 80069 RENAL FUNCTION PANEL: CPT

## 2024-01-17 PROCEDURE — 83735 ASSAY OF MAGNESIUM: CPT

## 2024-01-17 PROCEDURE — 80053 COMPREHEN METABOLIC PANEL: CPT

## 2024-01-19 ENCOUNTER — HOSPITAL ENCOUNTER (OUTPATIENT)
Facility: HOSPITAL | Age: 79
Setting detail: INFUSION SERIES
End: 2024-01-19
Payer: MEDICARE

## 2024-01-19 VITALS
DIASTOLIC BLOOD PRESSURE: 65 MMHG | OXYGEN SATURATION: 95 % | RESPIRATION RATE: 16 BRPM | TEMPERATURE: 97.9 F | HEART RATE: 98 BPM | SYSTOLIC BLOOD PRESSURE: 107 MMHG

## 2024-01-19 DIAGNOSIS — M81.0 OSTEOPOROSIS, UNSPECIFIED OSTEOPOROSIS TYPE, UNSPECIFIED PATHOLOGICAL FRACTURE PRESENCE: Primary | ICD-10-CM

## 2024-01-19 PROCEDURE — 6360000002 HC RX W HCPCS: Performed by: INTERNAL MEDICINE

## 2024-01-19 PROCEDURE — 96372 THER/PROPH/DIAG INJ SC/IM: CPT

## 2024-01-19 RX ORDER — EPINEPHRINE 1 MG/ML
0.3 INJECTION, SOLUTION, CONCENTRATE INTRAVENOUS PRN
OUTPATIENT
Start: 2024-07-17

## 2024-01-19 RX ORDER — ALBUTEROL SULFATE 90 UG/1
4 AEROSOL, METERED RESPIRATORY (INHALATION) PRN
OUTPATIENT
Start: 2024-07-17

## 2024-01-19 RX ORDER — ACETAMINOPHEN 325 MG/1
650 TABLET ORAL
OUTPATIENT
Start: 2024-07-17

## 2024-01-19 RX ORDER — DIPHENHYDRAMINE HYDROCHLORIDE 50 MG/ML
50 INJECTION INTRAMUSCULAR; INTRAVENOUS
OUTPATIENT
Start: 2024-07-17

## 2024-01-19 RX ORDER — SODIUM CHLORIDE 9 MG/ML
INJECTION, SOLUTION INTRAVENOUS CONTINUOUS
OUTPATIENT
Start: 2024-07-17

## 2024-01-19 RX ORDER — ONDANSETRON 2 MG/ML
8 INJECTION INTRAMUSCULAR; INTRAVENOUS
OUTPATIENT
Start: 2024-07-17

## 2024-01-19 RX ADMIN — DENOSUMAB 60 MG: 60 INJECTION SUBCUTANEOUS at 14:16

## 2024-01-19 NOTE — PROGRESS NOTES
[facility] Progress Note    Date: 2024    Name: Cely Hugo    MRN: [id number]         : 1945    PROLIA 60MG Q 6 MONTHS (INITIAL DOSE)      Ms. Hugo arrived to [facility] at 1355. Patient is here for her PROLIA 60MG injection, Patient accompanied by her son, Manuel due to patient's dementia/memory issues.     Ms. Hugo was assessed and education was provided. Written/verbal education for PROLIA was provided and patient/caregiver verbalized understanding.     Ms. Hugo's vitals were reviewed.  Vitals:    24 1355   BP: 107/65   Pulse: 98   Resp: 16   Temp: 97.9 °F (36.6 °C)   SpO2: 95%       Lab results were obtained and reviewed.   Latest Reference Range & Units 24 14:34 24 14:35   Sodium 136 - 145 mmol/L 138 138   Potassium 3.5 - 5.5 mmol/L 4.2 4.2   Chloride 100 - 111 mmol/L 102 103   CO2 21 - 32 mmol/L 30 31   BUN,BUNPL 7.0 - 18 MG/DL 35 (H) 34 (H)   Creatinine 0.6 - 1.3 MG/DL 1.08 1.05   Bun/Cre Ratio 12 - 20   32 (H) 32 (H)   Anion Gap 3.0 - 18 mmol/L 6 4   Est, Glom Filt Rate >60 ml/min/1.73m2 53 (L) 55 (L)   Magnesium 1.6 - 2.6 mg/dL 2.3    Glucose, Random 74 - 99 mg/dL 109 (H) 110 (H)   CALCIUM, SERUM, 595304 8.5 - 10.1 MG/DL 10.0 10.0   ALBUMIN/GLOBULIN RATIO 0.8 - 1.7   1.2    Phosphorus 2.5 - 4.9 MG/DL 3.3 3.3   Total Protein 6.4 - 8.2 g/dL 6.9    Albumin 3.4 - 5.0 g/dL 3.8 3.8   Globulin 2.0 - 4.0 g/dL 3.1    Alk Phos 45 - 117 U/L 97    ALT 13 - 56 U/L 34    AST 10 - 38 U/L 43 (H)    BILIRUBIN TOTAL 0.2 - 1.0 MG/DL 0.3    (H): Data is abnormally high  (L): Data is abnormally low        PROLIA  was administered as ordered SQ in patient's right upper arm.    Ms. Hugo tolerated well without complaints.    Patient remained in [facility] for 30 minutes for observation. No signs of allergic reaction noted.    Discharge/ follow-up instructions discussed w/ pt. Pt verbalized understanding.    Pt armband removed & shredded.    [facility]

## 2024-01-25 ENCOUNTER — TELEPHONE (OUTPATIENT)
Age: 79
End: 2024-01-25

## 2024-01-25 DIAGNOSIS — E87.6 HYPOKALEMIA: Primary | ICD-10-CM

## 2024-01-25 DIAGNOSIS — K59.00 CONSTIPATION, UNSPECIFIED CONSTIPATION TYPE: Primary | ICD-10-CM

## 2024-01-25 RX ORDER — POTASSIUM CHLORIDE 20MEQ/15ML
LIQUID (ML) ORAL
Qty: 473 ML | Refills: 2 | Status: SHIPPED | OUTPATIENT
Start: 2024-01-25

## 2024-02-05 ENCOUNTER — HOSPITAL ENCOUNTER (OUTPATIENT)
Facility: HOSPITAL | Age: 79
Discharge: HOME | End: 2024-02-08
Payer: MEDICARE

## 2024-02-05 DIAGNOSIS — M81.0 OSTEOPOROSIS, UNSPECIFIED OSTEOPOROSIS TYPE, UNSPECIFIED PATHOLOGICAL FRACTURE PRESENCE: ICD-10-CM

## 2024-02-05 DIAGNOSIS — N18.31 CHRONIC RENAL FAILURE, STAGE 3A (HCC): ICD-10-CM

## 2024-02-05 DIAGNOSIS — E55.9 VITAMIN D DEFICIENCY: ICD-10-CM

## 2024-02-05 DIAGNOSIS — D47.9 B-CELL LYMPHOPROLIFERATIVE DISORDER (HCC): ICD-10-CM

## 2024-02-05 LAB
25(OH)D3 SERPL-MCNC: 66 NG/ML (ref 30–100)
ALBUMIN SERPL-MCNC: 4.1 G/DL (ref 3.4–5)
ALBUMIN/GLOB SERPL: 1.4 (ref 0.8–1.7)
ALP SERPL-CCNC: 83 U/L (ref 45–117)
ALT SERPL-CCNC: 30 U/L (ref 13–56)
ANION GAP SERPL CALC-SCNC: 6 MMOL/L (ref 3–18)
AST SERPL-CCNC: 32 U/L (ref 10–38)
BASOPHILS # BLD: 0 K/UL (ref 0–0.1)
BASOPHILS NFR BLD: 1 % (ref 0–2)
BILIRUB SERPL-MCNC: 0.4 MG/DL (ref 0.2–1)
BUN SERPL-MCNC: 29 MG/DL (ref 7–18)
BUN/CREAT SERPL: 28 (ref 12–20)
CALCIUM SERPL-MCNC: 9.6 MG/DL (ref 8.5–10.1)
CHLORIDE SERPL-SCNC: 105 MMOL/L (ref 100–111)
CO2 SERPL-SCNC: 27 MMOL/L (ref 21–32)
CREAT SERPL-MCNC: 1.02 MG/DL (ref 0.6–1.3)
DIFFERENTIAL METHOD BLD: ABNORMAL
EOSINOPHIL # BLD: 0.1 K/UL (ref 0–0.4)
EOSINOPHIL NFR BLD: 1 % (ref 0–5)
ERYTHROCYTE [DISTWIDTH] IN BLOOD BY AUTOMATED COUNT: 13.6 % (ref 11.6–14.5)
GLOBULIN SER CALC-MCNC: 2.9 G/DL (ref 2–4)
GLUCOSE SERPL-MCNC: 130 MG/DL (ref 74–99)
HCT VFR BLD AUTO: 40.4 % (ref 35–45)
HGB BLD-MCNC: 13 G/DL (ref 12–16)
IMM GRANULOCYTES # BLD AUTO: 0 K/UL (ref 0–0.04)
IMM GRANULOCYTES NFR BLD AUTO: 0 % (ref 0–0.5)
LYMPHOCYTES # BLD: 0.7 K/UL (ref 0.9–3.6)
LYMPHOCYTES NFR BLD: 17 % (ref 21–52)
MCH RBC QN AUTO: 29.5 PG (ref 24–34)
MCHC RBC AUTO-ENTMCNC: 32.2 G/DL (ref 31–37)
MCV RBC AUTO: 91.8 FL (ref 78–100)
MONOCYTES # BLD: 0.4 K/UL (ref 0.05–1.2)
MONOCYTES NFR BLD: 10 % (ref 3–10)
NEUTS SEG # BLD: 2.9 K/UL (ref 1.8–8)
NEUTS SEG NFR BLD: 71 % (ref 40–73)
NRBC # BLD: 0 K/UL (ref 0–0.01)
NRBC BLD-RTO: 0 PER 100 WBC
PLATELET # BLD AUTO: 253 K/UL (ref 135–420)
PMV BLD AUTO: 9 FL (ref 9.2–11.8)
POTASSIUM SERPL-SCNC: 4.4 MMOL/L (ref 3.5–5.5)
PROT SERPL-MCNC: 7 G/DL (ref 6.4–8.2)
RBC # BLD AUTO: 4.4 M/UL (ref 4.2–5.3)
SODIUM SERPL-SCNC: 138 MMOL/L (ref 136–145)
WBC # BLD AUTO: 4 K/UL (ref 4.6–13.2)

## 2024-02-05 PROCEDURE — 36415 COLL VENOUS BLD VENIPUNCTURE: CPT

## 2024-02-05 PROCEDURE — 80053 COMPREHEN METABOLIC PANEL: CPT

## 2024-02-05 PROCEDURE — 82306 VITAMIN D 25 HYDROXY: CPT

## 2024-02-05 PROCEDURE — 85025 COMPLETE CBC W/AUTO DIFF WBC: CPT

## 2024-02-13 ENCOUNTER — TELEPHONE (OUTPATIENT)
Age: 79
End: 2024-02-13

## 2024-02-13 ENCOUNTER — OFFICE VISIT (OUTPATIENT)
Age: 79
End: 2024-02-13
Payer: MEDICARE

## 2024-02-13 VITALS
HEART RATE: 97 BPM | TEMPERATURE: 98.2 F | OXYGEN SATURATION: 95 % | DIASTOLIC BLOOD PRESSURE: 58 MMHG | HEIGHT: 65 IN | WEIGHT: 120 LBS | SYSTOLIC BLOOD PRESSURE: 113 MMHG | BODY MASS INDEX: 19.99 KG/M2

## 2024-02-13 DIAGNOSIS — E55.9 VITAMIN D DEFICIENCY: ICD-10-CM

## 2024-02-13 DIAGNOSIS — N18.31 CHRONIC RENAL FAILURE, STAGE 3A (HCC): ICD-10-CM

## 2024-02-13 DIAGNOSIS — D47.9 B-CELL LYMPHOPROLIFERATIVE DISORDER (HCC): ICD-10-CM

## 2024-02-13 DIAGNOSIS — F03.918 DEMENTIA WITH BEHAVIORAL DISTURBANCE (HCC): ICD-10-CM

## 2024-02-13 DIAGNOSIS — E44.1 MILD PROTEIN-CALORIE MALNUTRITION (HCC): ICD-10-CM

## 2024-02-13 DIAGNOSIS — I50.30 DIASTOLIC HEART FAILURE, UNSPECIFIED HF CHRONICITY (HCC): Primary | ICD-10-CM

## 2024-02-13 DIAGNOSIS — M06.9 RHEUMATOID ARTHRITIS INVOLVING MULTIPLE JOINTS (HCC): ICD-10-CM

## 2024-02-13 DIAGNOSIS — M81.0 OSTEOPOROSIS, UNSPECIFIED OSTEOPOROSIS TYPE, UNSPECIFIED PATHOLOGICAL FRACTURE PRESENCE: ICD-10-CM

## 2024-02-13 DIAGNOSIS — N18.32 CHRONIC KIDNEY DISEASE, STAGE 3B (HCC): ICD-10-CM

## 2024-02-13 DIAGNOSIS — G40.909 NONINTRACTABLE EPILEPSY WITHOUT STATUS EPILEPTICUS, UNSPECIFIED EPILEPSY TYPE (HCC): ICD-10-CM

## 2024-02-13 DIAGNOSIS — E78.5 HYPERLIPIDEMIA, UNSPECIFIED HYPERLIPIDEMIA TYPE: ICD-10-CM

## 2024-02-13 DIAGNOSIS — C82.14 GRADE 2 FOLLICULAR LYMPHOMA OF LYMPH NODES OF AXILLA (HCC): ICD-10-CM

## 2024-02-13 PROBLEM — E46 PROTEIN CALORIE MALNUTRITION (HCC): Status: RESOLVED | Noted: 2023-03-14 | Resolved: 2024-02-13

## 2024-02-13 PROCEDURE — 1036F TOBACCO NON-USER: CPT | Performed by: INTERNAL MEDICINE

## 2024-02-13 PROCEDURE — 3074F SYST BP LT 130 MM HG: CPT | Performed by: INTERNAL MEDICINE

## 2024-02-13 PROCEDURE — G8427 DOCREV CUR MEDS BY ELIG CLIN: HCPCS | Performed by: INTERNAL MEDICINE

## 2024-02-13 PROCEDURE — 3078F DIAST BP <80 MM HG: CPT | Performed by: INTERNAL MEDICINE

## 2024-02-13 PROCEDURE — G8484 FLU IMMUNIZE NO ADMIN: HCPCS | Performed by: INTERNAL MEDICINE

## 2024-02-13 PROCEDURE — G8399 PT W/DXA RESULTS DOCUMENT: HCPCS | Performed by: INTERNAL MEDICINE

## 2024-02-13 PROCEDURE — 1090F PRES/ABSN URINE INCON ASSESS: CPT | Performed by: INTERNAL MEDICINE

## 2024-02-13 PROCEDURE — G8420 CALC BMI NORM PARAMETERS: HCPCS | Performed by: INTERNAL MEDICINE

## 2024-02-13 PROCEDURE — 1123F ACP DISCUSS/DSCN MKR DOCD: CPT | Performed by: INTERNAL MEDICINE

## 2024-02-13 PROCEDURE — 99214 OFFICE O/P EST MOD 30 MIN: CPT | Performed by: INTERNAL MEDICINE

## 2024-02-13 RX ORDER — FUROSEMIDE 40 MG/1
20 TABLET ORAL DAILY
Qty: 90 TABLET | Refills: 0
Start: 2024-02-13

## 2024-02-13 NOTE — PROGRESS NOTES
Cely Hugo presents today for   Chief Complaint   Patient presents with    Follow-up                 1. \"Have you been to the ER, urgent care clinic since your last visit?  Hospitalized since your last visit?\" no    2. \"Have you seen or consulted any other health care providers outside of the [facility] since your last visit?\" no     3. For patients aged 45-75: Has the patient had a colonoscopy / FIT/ Cologuard? No      If the patient is female:    4. For patients aged 40-74: Has the patient had a mammogram within the past 2 years? No      5. For patients aged 21-65: Has the patient had a pap smear? No

## 2024-02-13 NOTE — PROGRESS NOTES
Cely Hugo (: 1945) is a 78 y.o. female, here for evaluation of the following chief complaint(s):  Follow-up       ASSESSMENT/PLAN:  Below is the assessment and plan developed based on review of pertinent history, physical exam, labs, studies, and medications.    1. Diastolic heart failure, unspecified HF chronicity (HCC)  Assessment & Plan:  Stable on Lasix 20 mg everyday.  with potassium supplement  Orders:  -     furosemide (LASIX) 40 MG tablet; Take 0.5 tablets by mouth daily, Disp-90 tablet, R-0NO PRINT  2. 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  Follows with hematology  3. Chronic kidney disease, stage 3b (HCC)  Assessment & Plan:   Stable.  Follows with nephrology  Orders:  -     CBC with Auto Differential; Future  -     Comprehensive Metabolic Panel; Future  -     Hemoglobin A1C; Future  4. Mild protein-calorie malnutrition (HCC)  5. Rheumatoid arthritis involving multiple joints (HCC)  Assessment & Plan:   Stable.  Follows with rheumatology  6. Dementia with behavioral disturbance (HCC)  Assessment & Plan:   Stable on current medications.  Follows with behavioral health  7. Nonintractable epilepsy without status epilepticus, unspecified epilepsy type (HCC)  Assessment & Plan:   Stable on current doses of Keppra  8. B-cell lymphoproliferative disorder (HCC)  Assessment & Plan:   Stable.  Follows with hematology  9. Chronic renal failure, stage 3a (HCC)  Assessment & Plan:   Stable renal function.  Follows with nephrology.  10. Hyperlipidemia, unspecified hyperlipidemia type  Assessment & Plan:   Stable on lovastatin 40 mg every day  Orders:  -     Lipid Panel; Future  11. Vitamin D deficiency  Assessment & Plan:   Vitamin D level stable.  Patient on over-the-counter vitamin D supplements  Orders:  -     Vitamin D 25 Hydroxy; Future  12. Osteoporosis, unspecified osteoporosis type, unspecified pathological fracture presence  Assessment & Plan:

## 2024-02-14 ENCOUNTER — PATIENT MESSAGE (OUTPATIENT)
Age: 79
End: 2024-02-14

## 2024-02-14 NOTE — TELEPHONE ENCOUNTER
From: Cely Hugo  To: Dr. Cullen Jimenez  Sent: 2/14/2024 2:21 PM EST  Subject: High Blood Pressure Medication     Hi Dr Jimenez,    This is for me Manuel Bethel ([birth year]) and not my mom.    Dr Morillo had me try a high blood pressure medication years ago but it left me dizzy and lightheaded so I stopped it.    I know you wanted to me take one and I was trying to get it down on my own but that isn’t working to well.    Can you call in a blood pressure medication different than the one Dr MacKinnon called in and send it to [facility] on [facility] for me to try?    Please let me know.    Thanks    Manuel

## 2024-02-23 ENCOUNTER — TELEPHONE (OUTPATIENT)
Age: 79
End: 2024-02-23

## 2024-02-23 NOTE — TELEPHONE ENCOUNTER
----- Message from Cely Hugo sent at 2/23/2024 10:01 AM EST -----  Regarding: High Blood Pressure Medication   Contact: [phone]  Hi,    I submitted this request on 2/14 - over a week ago.    Are there any updates for this request?    Please let me know.    Thanks    Manuel

## 2024-03-08 ENCOUNTER — HOSPITAL ENCOUNTER (OUTPATIENT)
Facility: HOSPITAL | Age: 79
Setting detail: RECURRING SERIES
Discharge: HOME | End: 2024-03-11
Attending: RADIOLOGY
Payer: MEDICARE

## 2024-03-08 PROCEDURE — 99214 OFFICE O/P EST MOD 30 MIN: CPT

## 2024-03-08 PROCEDURE — 99212 OFFICE O/P EST SF 10 MIN: CPT | Performed by: RADIOLOGY

## 2024-05-06 ENCOUNTER — HOSPITAL ENCOUNTER (OUTPATIENT)
Facility: HOSPITAL | Age: 79
Setting detail: SPECIMEN
Discharge: HOME | End: 2024-05-09
Payer: MEDICARE

## 2024-05-06 DIAGNOSIS — E55.9 VITAMIN D DEFICIENCY: ICD-10-CM

## 2024-05-06 DIAGNOSIS — N18.32 CHRONIC KIDNEY DISEASE, STAGE 3B (HCC): ICD-10-CM

## 2024-05-06 DIAGNOSIS — E78.5 HYPERLIPIDEMIA, UNSPECIFIED HYPERLIPIDEMIA TYPE: ICD-10-CM

## 2024-05-06 LAB
25(OH)D3 SERPL-MCNC: 50.9 NG/ML (ref 30–100)
ALBUMIN SERPL-MCNC: 4 G/DL (ref 3.4–5)
ALBUMIN/GLOB SERPL: 1.2 (ref 0.8–1.7)
ALP SERPL-CCNC: 69 U/L (ref 45–117)
ALT SERPL-CCNC: 30 U/L (ref 13–56)
ANION GAP SERPL CALC-SCNC: 5 MMOL/L (ref 3–18)
AST SERPL-CCNC: 27 U/L (ref 10–38)
BASOPHILS # BLD: 0 K/UL (ref 0–0.1)
BASOPHILS NFR BLD: 1 % (ref 0–2)
BILIRUB SERPL-MCNC: 0.7 MG/DL (ref 0.2–1)
BUN SERPL-MCNC: 32 MG/DL (ref 7–18)
BUN/CREAT SERPL: 29 (ref 12–20)
CALCIUM SERPL-MCNC: 9.8 MG/DL (ref 8.5–10.1)
CHLORIDE SERPL-SCNC: 101 MMOL/L (ref 100–111)
CHOLEST SERPL-MCNC: 210 MG/DL
CO2 SERPL-SCNC: 30 MMOL/L (ref 21–32)
CREAT SERPL-MCNC: 1.09 MG/DL (ref 0.6–1.3)
DIFFERENTIAL METHOD BLD: ABNORMAL
EOSINOPHIL # BLD: 0.1 K/UL (ref 0–0.4)
EOSINOPHIL NFR BLD: 1 % (ref 0–5)
ERYTHROCYTE [DISTWIDTH] IN BLOOD BY AUTOMATED COUNT: 13.2 % (ref 11.6–14.5)
EST. AVERAGE GLUCOSE BLD GHB EST-MCNC: 108 MG/DL
GLOBULIN SER CALC-MCNC: 3.3 G/DL (ref 2–4)
GLUCOSE SERPL-MCNC: 118 MG/DL (ref 74–99)
HBA1C MFR BLD: 5.4 % (ref 4.2–5.6)
HCT VFR BLD AUTO: 45.3 % (ref 35–45)
HDLC SERPL-MCNC: 96 MG/DL (ref 40–60)
HDLC SERPL: 2.2 (ref 0–5)
HGB BLD-MCNC: 14.2 G/DL (ref 12–16)
IMM GRANULOCYTES # BLD AUTO: 0 K/UL (ref 0–0.04)
IMM GRANULOCYTES NFR BLD AUTO: 0 % (ref 0–0.5)
LDLC SERPL CALC-MCNC: 99.4 MG/DL (ref 0–100)
LIPID PANEL: ABNORMAL
LYMPHOCYTES # BLD: 0.9 K/UL (ref 0.9–3.6)
LYMPHOCYTES NFR BLD: 14 % (ref 21–52)
MCH RBC QN AUTO: 29.8 PG (ref 24–34)
MCHC RBC AUTO-ENTMCNC: 31.3 G/DL (ref 31–37)
MCV RBC AUTO: 95 FL (ref 78–100)
MONOCYTES # BLD: 0.6 K/UL (ref 0.05–1.2)
MONOCYTES NFR BLD: 9 % (ref 3–10)
NEUTS SEG # BLD: 4.6 K/UL (ref 1.8–8)
NEUTS SEG NFR BLD: 75 % (ref 40–73)
NRBC # BLD: 0 K/UL (ref 0–0.01)
NRBC BLD-RTO: 0 PER 100 WBC
PLATELET # BLD AUTO: 254 K/UL (ref 135–420)
PMV BLD AUTO: 9.3 FL (ref 9.2–11.8)
POTASSIUM SERPL-SCNC: 4.4 MMOL/L (ref 3.5–5.5)
PROT SERPL-MCNC: 7.3 G/DL (ref 6.4–8.2)
RBC # BLD AUTO: 4.77 M/UL (ref 4.2–5.3)
SODIUM SERPL-SCNC: 136 MMOL/L (ref 136–145)
TRIGL SERPL-MCNC: 73 MG/DL
VLDLC SERPL CALC-MCNC: 14.6 MG/DL
WBC # BLD AUTO: 6.1 K/UL (ref 4.6–13.2)

## 2024-05-06 PROCEDURE — 83036 HEMOGLOBIN GLYCOSYLATED A1C: CPT

## 2024-05-06 PROCEDURE — 85025 COMPLETE CBC W/AUTO DIFF WBC: CPT

## 2024-05-06 PROCEDURE — 80061 LIPID PANEL: CPT

## 2024-05-06 PROCEDURE — 36415 COLL VENOUS BLD VENIPUNCTURE: CPT

## 2024-05-06 PROCEDURE — 82306 VITAMIN D 25 HYDROXY: CPT

## 2024-05-06 PROCEDURE — 80053 COMPREHEN METABOLIC PANEL: CPT

## 2024-05-07 RX ORDER — LEVETIRACETAM 500 MG/1
TABLET ORAL
Qty: 180 TABLET | Refills: 0 | Status: SHIPPED | OUTPATIENT
Start: 2024-05-07

## 2024-05-15 ENCOUNTER — OFFICE VISIT (OUTPATIENT)
Age: 79
End: 2024-05-15
Payer: MEDICARE

## 2024-05-15 ENCOUNTER — TELEPHONE (OUTPATIENT)
Age: 79
End: 2024-05-15

## 2024-05-15 VITALS
WEIGHT: 112 LBS | RESPIRATION RATE: 18 BRPM | HEART RATE: 97 BPM | TEMPERATURE: 98.4 F | DIASTOLIC BLOOD PRESSURE: 68 MMHG | HEIGHT: 65 IN | SYSTOLIC BLOOD PRESSURE: 119 MMHG | BODY MASS INDEX: 18.66 KG/M2 | OXYGEN SATURATION: 94 %

## 2024-05-15 DIAGNOSIS — M81.0 OSTEOPOROSIS, UNSPECIFIED OSTEOPOROSIS TYPE, UNSPECIFIED PATHOLOGICAL FRACTURE PRESENCE: ICD-10-CM

## 2024-05-15 DIAGNOSIS — Z74.09 IMPAIRED MOBILITY AND ADLS: Primary | ICD-10-CM

## 2024-05-15 DIAGNOSIS — I10 PRIMARY HYPERTENSION: Primary | ICD-10-CM

## 2024-05-15 DIAGNOSIS — F03.918 DEMENTIA WITH BEHAVIORAL DISTURBANCE (HCC): ICD-10-CM

## 2024-05-15 DIAGNOSIS — N18.32 CHRONIC KIDNEY DISEASE, STAGE 3B (HCC): ICD-10-CM

## 2024-05-15 DIAGNOSIS — E78.5 HYPERLIPIDEMIA, UNSPECIFIED HYPERLIPIDEMIA TYPE: ICD-10-CM

## 2024-05-15 DIAGNOSIS — Z78.9 IMPAIRED MOBILITY AND ADLS: Primary | ICD-10-CM

## 2024-05-15 DIAGNOSIS — K59.00 CONSTIPATION, UNSPECIFIED CONSTIPATION TYPE: ICD-10-CM

## 2024-05-15 DIAGNOSIS — M54.9 BACK PAIN, UNSPECIFIED BACK LOCATION, UNSPECIFIED BACK PAIN LATERALITY, UNSPECIFIED CHRONICITY: ICD-10-CM

## 2024-05-15 PROCEDURE — 3074F SYST BP LT 130 MM HG: CPT | Performed by: INTERNAL MEDICINE

## 2024-05-15 PROCEDURE — G8399 PT W/DXA RESULTS DOCUMENT: HCPCS | Performed by: INTERNAL MEDICINE

## 2024-05-15 PROCEDURE — 1036F TOBACCO NON-USER: CPT | Performed by: INTERNAL MEDICINE

## 2024-05-15 PROCEDURE — 1123F ACP DISCUSS/DSCN MKR DOCD: CPT | Performed by: INTERNAL MEDICINE

## 2024-05-15 PROCEDURE — 99214 OFFICE O/P EST MOD 30 MIN: CPT | Performed by: INTERNAL MEDICINE

## 2024-05-15 PROCEDURE — 3078F DIAST BP <80 MM HG: CPT | Performed by: INTERNAL MEDICINE

## 2024-05-15 PROCEDURE — G8420 CALC BMI NORM PARAMETERS: HCPCS | Performed by: INTERNAL MEDICINE

## 2024-05-15 PROCEDURE — G8427 DOCREV CUR MEDS BY ELIG CLIN: HCPCS | Performed by: INTERNAL MEDICINE

## 2024-05-15 PROCEDURE — 1090F PRES/ABSN URINE INCON ASSESS: CPT | Performed by: INTERNAL MEDICINE

## 2024-05-15 SDOH — ECONOMIC STABILITY: FOOD INSECURITY: WITHIN THE PAST 12 MONTHS, YOU WORRIED THAT YOUR FOOD WOULD RUN OUT BEFORE YOU GOT MONEY TO BUY MORE.: NEVER TRUE

## 2024-05-15 SDOH — ECONOMIC STABILITY: INCOME INSECURITY: HOW HARD IS IT FOR YOU TO PAY FOR THE VERY BASICS LIKE FOOD, HOUSING, MEDICAL CARE, AND HEATING?: NOT HARD AT ALL

## 2024-05-15 SDOH — ECONOMIC STABILITY: FOOD INSECURITY: WITHIN THE PAST 12 MONTHS, THE FOOD YOU BOUGHT JUST DIDN'T LAST AND YOU DIDN'T HAVE MONEY TO GET MORE.: NEVER TRUE

## 2024-05-15 ASSESSMENT — PATIENT HEALTH QUESTIONNAIRE - PHQ9
1. LITTLE INTEREST OR PLEASURE IN DOING THINGS: NOT AT ALL
SUM OF ALL RESPONSES TO PHQ QUESTIONS 1-9: 0
SUM OF ALL RESPONSES TO PHQ QUESTIONS 1-9: 0
2. FEELING DOWN, DEPRESSED OR HOPELESS: NOT AT ALL
SUM OF ALL RESPONSES TO PHQ QUESTIONS 1-9: 0
SUM OF ALL RESPONSES TO PHQ9 QUESTIONS 1 & 2: 0
SUM OF ALL RESPONSES TO PHQ QUESTIONS 1-9: 0

## 2024-05-15 NOTE — TELEPHONE ENCOUNTER
Patient is requesting an order for a wheelchair ans a transfer shower chair. Please print the orders for me to send.

## 2024-05-16 ENCOUNTER — TELEPHONE (OUTPATIENT)
Age: 79
End: 2024-05-16

## 2024-05-16 NOTE — TELEPHONE ENCOUNTER
I spoke with the patient's son and advised [facility] has wheelchair and shower chairs. Patient has been advised that all bathroom items are an out of pocket expense but the wheelchair should be covered. [facility].

## 2024-05-16 NOTE — TELEPHONE ENCOUNTER
August appointment needs to be a medicare wellness exam.  This needs to be scheduled after 58/19/2024 please call and change the appointment.

## 2024-05-23 NOTE — PROGRESS NOTES
Cely Hugo (: 1945) is a 78 y.o. female, here for evaluation of the following chief complaint(s):  Follow-up and Hypertension       ASSESSMENT/PLAN:  Below is the assessment and plan developed based on review of pertinent history, physical exam, labs, studies, and medications.    1. Primary hypertension  Assessment & Plan:   Blood pressure is stable on current medications  Orders:  -     CBC with Auto Differential; Future  -     Comprehensive Metabolic Panel; Future  2. Dementia with behavioral disturbance (HCC)  Assessment & Plan:   Stable on current medications.  Follows with behavioral medicine  3. Osteoporosis, unspecified osteoporosis type, unspecified pathological fracture presence  Assessment & Plan:   Stable on Prolia.  Follows with endocrine.  4. Chronic kidney disease, stage 3b (HCC)  -     Comprehensive Metabolic Panel; Future  5. Back pain, unspecified back location, unspecified back pain laterality, unspecified chronicity  Assessment & Plan:   Stable on Percocet.  Follows with pain management.  6. Constipation, unspecified constipation type  Assessment & Plan:   Stable disease.  7. Hyperlipidemia, unspecified hyperlipidemia type  Assessment & Plan:   Stable on lovastatin.      Return in about 3 months (around 8/15/2024) for follow up on chronic conditions, labs 1 week before next OV.     SUBJECTIVE/OBJECTIVE:  Hypertension      Patient is accompanied by her son.  Patient is doing fine.  No chest pain or palpitations or shortness of breath.  No GI/ symptoms.    Her back pain is under control with Percocet as needed.  Patient follows with pain management.    Her dementia with change in behavior is stable on current medications.  Patient follows with behavioral medicine.    Patient is suggested to get all the vaccines recommended for her age and condition.  Patient plans to get it from pharmacy.    Reviewed labs with the patient.  Labs from  has shown vitamin D50.9.  Cholesterol 210,

## 2024-06-02 ENCOUNTER — PATIENT MESSAGE (OUTPATIENT)
Facility: CLINIC | Age: 79
End: 2024-06-02

## 2024-06-12 PROBLEM — Z91.81 AT HIGH RISK FOR FALLS: Status: ACTIVE | Noted: 2024-06-12

## 2024-06-19 ENCOUNTER — TELEPHONE (OUTPATIENT)
Facility: CLINIC | Age: 79
End: 2024-06-19

## 2024-06-19 NOTE — TELEPHONE ENCOUNTER
Valerie from [facility] called stating office notes sent did not meet the criteria ,   Phone _ [phone]  Fax_ [phone]

## 2024-07-19 ENCOUNTER — HOSPITAL ENCOUNTER (OUTPATIENT)
Facility: HOSPITAL | Age: 79
Setting detail: INFUSION SERIES
End: 2024-07-19

## 2024-07-19 RX ORDER — LEVETIRACETAM 500 MG/1
TABLET ORAL
Qty: 180 TABLET | Refills: 0 | Status: SHIPPED | OUTPATIENT
Start: 2024-07-19

## 2024-07-29 ENCOUNTER — TELEPHONE (OUTPATIENT)
Facility: CLINIC | Age: 79
End: 2024-07-29

## 2024-07-29 NOTE — TELEPHONE ENCOUNTER
----- Message from Adriana Daugherty sent at 7/29/2024  1:42 PM EDT -----  Regarding: [facility] Message to Provider  E[facility] Message to Provider    Relationship to Patient: Covered Entity     Additional Information: They needed the appointment notes of the patient on July 19 for her wheelchair  --------------------------------------------------------------------------------------------------------------------------    Call Back Information: OK to leave message on voicemail  Preferred Call Back Number: Phone [phone]

## 2024-08-09 ENCOUNTER — HOSPITAL ENCOUNTER (OUTPATIENT)
Facility: HOSPITAL | Age: 79
Setting detail: SPECIMEN
End: 2024-08-09
Payer: MEDICARE

## 2024-08-09 DIAGNOSIS — E87.6 HYPOKALEMIA: ICD-10-CM

## 2024-08-09 DIAGNOSIS — D47.9 B-CELL LYMPHOPROLIFERATIVE DISORDER (HCC): ICD-10-CM

## 2024-08-09 LAB
ALBUMIN SERPL-MCNC: 3.9 G/DL (ref 3.4–5)
ALBUMIN/GLOB SERPL: 1.3 (ref 0.8–1.7)
ALP SERPL-CCNC: 63 U/L (ref 45–117)
ALT SERPL-CCNC: 28 U/L (ref 13–56)
ANION GAP SERPL CALC-SCNC: 7 MMOL/L (ref 3–18)
AST SERPL-CCNC: 26 U/L (ref 10–38)
BASOPHILS # BLD: 0 K/UL (ref 0–0.1)
BASOPHILS NFR BLD: 1 % (ref 0–2)
BILIRUB SERPL-MCNC: 0.3 MG/DL (ref 0.2–1)
BUN SERPL-MCNC: 30 MG/DL (ref 7–18)
BUN/CREAT SERPL: 27 (ref 12–20)
CALCIUM SERPL-MCNC: 9.1 MG/DL (ref 8.5–10.1)
CHLORIDE SERPL-SCNC: 100 MMOL/L (ref 100–111)
CO2 SERPL-SCNC: 29 MMOL/L (ref 21–32)
CREAT SERPL-MCNC: 1.11 MG/DL (ref 0.6–1.3)
DIFFERENTIAL METHOD BLD: ABNORMAL
EOSINOPHIL # BLD: 0 K/UL (ref 0–0.4)
EOSINOPHIL NFR BLD: 1 % (ref 0–5)
ERYTHROCYTE [DISTWIDTH] IN BLOOD BY AUTOMATED COUNT: 13 % (ref 11.6–14.5)
GLOBULIN SER CALC-MCNC: 3 G/DL (ref 2–4)
GLUCOSE SERPL-MCNC: 113 MG/DL (ref 74–99)
HCT VFR BLD AUTO: 41.8 % (ref 35–45)
HGB BLD-MCNC: 13.1 G/DL (ref 12–16)
IMM GRANULOCYTES # BLD AUTO: 0 K/UL (ref 0–0.04)
IMM GRANULOCYTES NFR BLD AUTO: 0 % (ref 0–0.5)
LYMPHOCYTES # BLD: 0.8 K/UL (ref 0.9–3.6)
LYMPHOCYTES NFR BLD: 15 % (ref 21–52)
MCH RBC QN AUTO: 29.4 PG (ref 24–34)
MCHC RBC AUTO-ENTMCNC: 31.3 G/DL (ref 31–37)
MCV RBC AUTO: 93.9 FL (ref 78–100)
MONOCYTES # BLD: 0.6 K/UL (ref 0.05–1.2)
MONOCYTES NFR BLD: 11 % (ref 3–10)
NEUTS SEG # BLD: 4.1 K/UL (ref 1.8–8)
NEUTS SEG NFR BLD: 73 % (ref 40–73)
NRBC # BLD: 0 K/UL (ref 0–0.01)
NRBC BLD-RTO: 0 PER 100 WBC
PLATELET # BLD AUTO: 279 K/UL (ref 135–420)
PMV BLD AUTO: 9.1 FL (ref 9.2–11.8)
POTASSIUM SERPL-SCNC: 4.2 MMOL/L (ref 3.5–5.5)
PROT SERPL-MCNC: 6.9 G/DL (ref 6.4–8.2)
RBC # BLD AUTO: 4.45 M/UL (ref 4.2–5.3)
SODIUM SERPL-SCNC: 136 MMOL/L (ref 136–145)
WBC # BLD AUTO: 5.7 K/UL (ref 4.6–13.2)

## 2024-08-09 PROCEDURE — 36415 COLL VENOUS BLD VENIPUNCTURE: CPT

## 2024-08-09 PROCEDURE — 85025 COMPLETE CBC W/AUTO DIFF WBC: CPT

## 2024-08-09 PROCEDURE — 80053 COMPREHEN METABOLIC PANEL: CPT

## 2024-08-17 ENCOUNTER — PATIENT MESSAGE (OUTPATIENT)
Facility: CLINIC | Age: 79
End: 2024-08-17

## 2024-08-19 RX ORDER — LOVASTATIN 40 MG/1
40 TABLET ORAL DAILY
Qty: 90 TABLET | Refills: 1 | Status: SHIPPED | OUTPATIENT
Start: 2024-08-19

## 2024-08-19 RX ORDER — OXYBUTYNIN CHLORIDE 10 MG/1
10 TABLET, EXTENDED RELEASE ORAL DAILY
Qty: 90 TABLET | Refills: 3 | Status: SHIPPED | OUTPATIENT
Start: 2024-08-19

## 2024-08-20 ENCOUNTER — HOSPITAL ENCOUNTER (OUTPATIENT)
Facility: HOSPITAL | Age: 79
Setting detail: INFUSION SERIES
Discharge: HOME | End: 2024-08-23
Payer: MEDICARE

## 2024-08-20 ENCOUNTER — OFFICE VISIT (OUTPATIENT)
Facility: CLINIC | Age: 79
End: 2024-08-20

## 2024-08-20 VITALS
RESPIRATION RATE: 16 BRPM | OXYGEN SATURATION: 95 % | DIASTOLIC BLOOD PRESSURE: 70 MMHG | TEMPERATURE: 98.2 F | SYSTOLIC BLOOD PRESSURE: 119 MMHG | HEART RATE: 95 BPM

## 2024-08-20 VITALS
HEIGHT: 65 IN | DIASTOLIC BLOOD PRESSURE: 57 MMHG | RESPIRATION RATE: 16 BRPM | SYSTOLIC BLOOD PRESSURE: 103 MMHG | OXYGEN SATURATION: 95 % | BODY MASS INDEX: 18.66 KG/M2 | WEIGHT: 112 LBS | HEART RATE: 90 BPM

## 2024-08-20 DIAGNOSIS — F41.9 ANXIETY DISORDER, UNSPECIFIED TYPE: ICD-10-CM

## 2024-08-20 DIAGNOSIS — Z78.9 ACTIVITY OF DAILY LIVING ALTERATION: ICD-10-CM

## 2024-08-20 DIAGNOSIS — Z13.29 SCREENING FOR ENDOCRINE, METABOLIC AND IMMUNITY DISORDER: ICD-10-CM

## 2024-08-20 DIAGNOSIS — R56.9 SEIZURE (HCC): ICD-10-CM

## 2024-08-20 DIAGNOSIS — M81.0 OSTEOPOROSIS, UNSPECIFIED OSTEOPOROSIS TYPE, UNSPECIFIED PATHOLOGICAL FRACTURE PRESENCE: ICD-10-CM

## 2024-08-20 DIAGNOSIS — E78.5 HYPERLIPIDEMIA, UNSPECIFIED HYPERLIPIDEMIA TYPE: ICD-10-CM

## 2024-08-20 DIAGNOSIS — Z00.00 MEDICARE ANNUAL WELLNESS VISIT, SUBSEQUENT: Primary | ICD-10-CM

## 2024-08-20 DIAGNOSIS — E55.9 VITAMIN D DEFICIENCY: ICD-10-CM

## 2024-08-20 DIAGNOSIS — Z13.228 SCREENING FOR ENDOCRINE, METABOLIC AND IMMUNITY DISORDER: ICD-10-CM

## 2024-08-20 DIAGNOSIS — Z71.89 ACP (ADVANCE CARE PLANNING): ICD-10-CM

## 2024-08-20 DIAGNOSIS — K59.00 CONSTIPATION, UNSPECIFIED CONSTIPATION TYPE: ICD-10-CM

## 2024-08-20 DIAGNOSIS — M81.0 OSTEOPOROSIS, UNSPECIFIED OSTEOPOROSIS TYPE, UNSPECIFIED PATHOLOGICAL FRACTURE PRESENCE: Primary | ICD-10-CM

## 2024-08-20 DIAGNOSIS — Z13.0 SCREENING FOR ENDOCRINE, METABOLIC AND IMMUNITY DISORDER: ICD-10-CM

## 2024-08-20 LAB
MAGNESIUM SERPL-MCNC: 2.1 MG/DL (ref 1.6–2.6)
PHOSPHATE SERPL-MCNC: 3.3 MG/DL (ref 2.5–4.9)

## 2024-08-20 PROCEDURE — 83735 ASSAY OF MAGNESIUM: CPT

## 2024-08-20 PROCEDURE — 84100 ASSAY OF PHOSPHORUS: CPT

## 2024-08-20 PROCEDURE — 96372 THER/PROPH/DIAG INJ SC/IM: CPT

## 2024-08-20 PROCEDURE — 36415 COLL VENOUS BLD VENIPUNCTURE: CPT

## 2024-08-20 PROCEDURE — 6360000002 HC RX W HCPCS: Performed by: INTERNAL MEDICINE

## 2024-08-20 RX ORDER — ONDANSETRON 2 MG/ML
8 INJECTION INTRAMUSCULAR; INTRAVENOUS
OUTPATIENT
Start: 2025-01-12

## 2024-08-20 RX ORDER — EPINEPHRINE 1 MG/ML
0.3 INJECTION, SOLUTION, CONCENTRATE INTRAVENOUS PRN
OUTPATIENT
Start: 2025-01-12

## 2024-08-20 RX ORDER — ACETAMINOPHEN 325 MG/1
650 TABLET ORAL
OUTPATIENT
Start: 2025-01-12

## 2024-08-20 RX ORDER — DIPHENHYDRAMINE HYDROCHLORIDE 50 MG/ML
50 INJECTION INTRAMUSCULAR; INTRAVENOUS
OUTPATIENT
Start: 2025-01-12

## 2024-08-20 RX ORDER — SODIUM CHLORIDE 9 MG/ML
INJECTION, SOLUTION INTRAVENOUS CONTINUOUS
OUTPATIENT
Start: 2025-01-12

## 2024-08-20 RX ORDER — ALBUTEROL SULFATE 90 UG/1
4 AEROSOL, METERED RESPIRATORY (INHALATION) PRN
OUTPATIENT
Start: 2025-01-12

## 2024-08-20 RX ADMIN — DENOSUMAB 60 MG: 60 INJECTION SUBCUTANEOUS at 15:22

## 2024-08-20 ASSESSMENT — LIFESTYLE VARIABLES
HOW MANY STANDARD DRINKS CONTAINING ALCOHOL DO YOU HAVE ON A TYPICAL DAY: PATIENT UNABLE TO ANSWER
HOW OFTEN DO YOU HAVE A DRINK CONTAINING ALCOHOL: PATIENT UNABLE TO ANSWER

## 2024-08-20 ASSESSMENT — PATIENT HEALTH QUESTIONNAIRE - PHQ9: DEPRESSION UNABLE TO ASSESS: FUNCTIONAL CAPACITY MOTIVATION LIMITS ACCURACY

## 2024-08-20 ASSESSMENT — PAIN - FUNCTIONAL ASSESSMENT: PAIN_FUNCTIONAL_ASSESSMENT: NONE - DENIES PAIN

## 2024-08-20 NOTE — ACP (ADVANCE CARE PLANNING)
Advance Care Planning   General Advance Care Planning (ACP) Conversation    Date of Conversation: 8/20/2024  Conducted with: Patient with Decision Making Capacity  Other persons present: None    Healthcare Decision Maker:    Primary Decision Maker: Manuel Hugo - [place]  [phone]      Content/Action Overview:  Has NO ACP documents-Information provided  Reviewed DNR/DNI and patient elects DNR order - completed portable DNR form & placed order        Length of Voluntary ACP Conversation in minutes:  16 minutes    Cullen Jimenez MD

## 2024-08-20 NOTE — PATIENT INSTRUCTIONS
your provider may have ordered immunizations, labs, imaging, and/or referrals for you.  A list of these orders (if applicable) as well as your Preventive Care list are included within your After Visit Summary for your review.    Other Preventive Recommendations:    A preventive eye exam performed by an eye specialist is recommended every 1-2 years to screen for glaucoma; cataracts, macular degeneration, and other eye disorders.  A preventive dental visit is recommended every 6 months.  Try to get at least 150 minutes of exercise per week or 10,000 steps per day on a pedometer .  Order or download the FREE \"Exercise & Physical Activity: Your Everyday Guide\" from [employer] [place] [employer]. Call 1-173.304.1820 or search [employer] [place] [employer] online.  You need 9576-9186 mg of calcium and 3348-6468 IU of vitamin D per day. It is possible to meet your calcium requirement with diet alone, but a vitamin D supplement is usually necessary to meet this goal.  When exposed to the sun, use a sunscreen that protects against both UVA and UVB radiation with an SPF of 30 or greater. Reapply every 2 to 3 hours or after sweating, drying off with a towel, or swimming.  Always wear a seat belt when traveling in a car. Always wear a helmet when riding a bicycle or motorcycle.

## 2024-08-20 NOTE — PROGRESS NOTES
Cely Hugo is a 79 y.o. female (: 1945) presenting to address:    Chief Complaint   Patient presents with    Medicare AWV       There were no vitals filed for this visit.    Coordination of Care Questionaire:   1. \"Have you been to the ER, urgent care clinic since your last visit?  Hospitalized since your last visit?\" No     2. \"Have you seen or consulted any other health care providers outside of the [facility] since your last visit?\" No      3. For patients aged 45-75: Has the patient had a colonoscopy / FIT/ Cologuard? N/a      If the patient is female:    4. For patients aged 40-74: Has the patient had a mammogram within the past 2 years? N/a      5. For patients aged 21-65: Has the patient had a pap smear? N/A    Advanced Directive:   1. Do you have an Advanced Directive? No     2. Would you like information on Advanced Directives? No   
pharmacy.    ROS as above    Vitals:    08/20/24 1541   BP: (!) 103/57   Site: Right Upper Arm   Position: Sitting   Cuff Size: Small Adult   Pulse: 90   Resp: 16   SpO2: 95%   Weight: 50.8 kg (112 lb)   Height: 1.651 m (5' 5\")     Physical Exam  Constitutional:       Appearance: Normal appearance.   HENT:      Head: Normocephalic and atraumatic.      Nose: Nose normal.      Mouth/Throat:      Mouth: Mucous membranes are moist.   Eyes:      Extraocular Movements: Extraocular movements intact.      Pupils: Pupils are equal, round, and reactive to light.   Cardiovascular:      Rate and Rhythm: Normal rate and regular rhythm.      Heart sounds: Normal heart sounds.   Pulmonary:      Breath sounds: Normal breath sounds.   Abdominal:      General: Abdomen is flat.      Palpations: Abdomen is soft.   Musculoskeletal:         General: Normal range of motion.      Cervical back: Normal range of motion and neck supple.   Skin:     General: Skin is warm.   Neurological:      General: No focal deficit present.      Mental Status: She is alert. Mental status is at baseline.         We discussed the diagnosis, risks and benefits of medications    Disclaimer:    The patient understands our medical plan.  Alternatives have been explained and offered.  The risks, benefits and significant side effects of all medications have been reviewed. Anticipated time course and progression of condition reviewed. All questions have been addressed.  She is encouraged to employ the information provided in the after visit summary, which was reviewed.      Where appropriate, she is instructed to call the clinic if she has not been notified either by phone or through [facility] with the results of her tests or with an appointment plan for any referrals within 1 week(s).  No news is not good news; it's no news. The patient  is to call if her condition worsens or fails to improve or if significant side effects are experienced.     Aspects of this note 
tablet Take 1 tablet by mouth daily Yes Cullen Jimenez MD   lovastatin (MEVACOR) 40 MG tablet Take 1 tablet by mouth daily Yes Cullen Jimenez MD   levETIRAcetam (KEPPRA) 500 MG tablet TAKE 1 TABLET TWICE A DAY Yes Cullen Jimenez MD   furosemide (LASIX) 40 MG tablet Take 0.5 tablets by mouth daily Yes Cullen Jimenez MD   potassium chloride 20 MEQ/15ML (10%) oral solution Take 15 ml every day. Yes Cullen Jimenez MD   linaCLOtide (LINZESS) 72 MCG CAPS capsule Take 1 capsule by mouth every morning (before breakfast) Yes Cullen Jimenez MD   cetirizine (ZYRTEC) 10 MG tablet Take 0.5 tablets by mouth daily Yes Cullen Jimenez MD   Multiple Vitamins-Minerals (THERAGRAN-M PO) Take 1 tablet by mouth daily Yes ProviderSkylar MD Gotu Kola, Centella asiatica, (GOTU MODESTA PO) Take by mouth Yes Skylar Murphy MD   PARoxetine (PAXIL) 20 MG tablet [The details of the medication are not available because there are pending changes by a home health clinician.] Yes Skylar Murphy MD   OLANZapine (ZYPREXA) 5 MG tablet take 1 tablet by mouth at bedtime Yes Skylar Murphy MD   oxyCODONE-acetaminophen (PERCOCET)  MG per tablet take 1 tablet by mouth four times a day for chronic pain Yes Skylar Murphy MD        Reviewed and updated this visit:  Tobacco  Allergies  Meds  Problems  Med Hx  Surg Hx  Soc Hx  Fam Hx

## 2024-08-20 NOTE — PROGRESS NOTES
[facility] Progress Note    Date: 2024    Name: Cely Hugo    MRN: [id number]         : 1945    Ms. Hugo arrived in the [facility] today at 1500, in stable condition and accompanied by her son, here for her PROLIA INJECTION & LABS (EVERY 6 MONTHS). She was assessed and education was provided.     Ms. Hugo's vitals were reviewed.  Vitals:    24 1500   BP: 119/70   Pulse: 95   Resp: 16   Temp: 98.2 °F (36.8 °C)   SpO2: 95%       Ms. Hugo presented to the infusion center today voicing no complaints. However, since she has some significant dementia, all assessment questions today were answered by her son.    And per her son, she had no significant issues or problems currently, and was doing well.     And, he stated that she has tolerated past PROLIA injections well, and without any issues or side effects.         Her PRE-PROLIA labs obtained on 24 were reviewed. The Calcium and Creatinine levels were noted to be completely within normal limits and satisfactory for treatment today, and were as follows. However, it was noted that the ordered Magnesium & Phosphorus levels were missing.      Latest Reference Range & Units 24 13:47   Sodium 136 - 145 mmol/L 136   Potassium 3.5 - 5.5 mmol/L 4.2   Chloride 100 - 111 mmol/L 100   CARBON DIOXIDE 21 - 32 mmol/L 29   BUN,BUNPL 7.0 - 18 MG/DL 30 (H)   Creatinine 0.6 - 1.3 MG/DL 1.11   Bun/Cre 12 - 20   27 (H)   Anion Gap 3.0 - 18 mmol/L 7   Est, Glom Filt Rate >60 ml/min/1.73m2 51 (L)   Glucose 74 - 99 mg/dL 113 (H)   Calcium 8.5 - 10.1 MG/DL 9.1   Albumin/Globulin Ratio 0.8 - 1.7   1.3   Total Protein 6.4 - 8.2 g/dL 6.9   (H): Data is abnormally high  (L): Data is abnormally low      Blood for the ordered labs (MAGNESIUM & PHOSPHORUS) was drawn from her LEFT AC at 1516, and was sent out by  to the [facility] hospital lab for processing.  (NO NEED TO WAIT FOR THESE RESULTS PRIOR TO TREATMENT TODAY.)        Denosumab (PROLIA) 60 mg, was administered  SQ, in the back of her RIGHT ARM at 1522, per order and without incident.           Ms. Hugo tolerated well and voiced no complaints.     [facility]. [facility] in [facility] condition at 1525.     She is to return in 6 months, on Tuesday, 2-18-25 at 1500, for her next appointment, to have her PRE-PROLIA LABS DRAWN.     And then, she is scheduled to return on Friday, 2-21-25 at 1500, for her next appointment, to receive her PROLIA injection.     Leonardo Winter, RN  August 20, 2024  3:13 PM

## 2024-09-11 ENCOUNTER — TELEPHONE (OUTPATIENT)
Facility: CLINIC | Age: 79
End: 2024-09-11

## 2024-09-11 DIAGNOSIS — E87.6 HYPOKALEMIA: ICD-10-CM

## 2024-09-11 RX ORDER — POTASSIUM CHLORIDE 20MEQ/15ML
LIQUID (ML) ORAL
Qty: 473 ML | Refills: 2 | Status: SHIPPED | OUTPATIENT
Start: 2024-09-11

## 2024-09-18 DIAGNOSIS — K59.00 CONSTIPATION, UNSPECIFIED CONSTIPATION TYPE: ICD-10-CM

## 2024-09-19 RX ORDER — LINACLOTIDE 72 UG/1
72 CAPSULE, GELATIN COATED ORAL
Qty: 90 CAPSULE | Refills: 2 | Status: SHIPPED | OUTPATIENT
Start: 2024-09-19

## 2024-09-30 RX ORDER — LEVETIRACETAM 500 MG/1
TABLET ORAL
Qty: 180 TABLET | Refills: 0 | Status: SHIPPED | OUTPATIENT
Start: 2024-09-30

## 2024-10-09 DIAGNOSIS — E87.6 HYPOKALEMIA: ICD-10-CM

## 2024-10-09 RX ORDER — POTASSIUM CHLORIDE 20 MEQ/15ML
SOLUTION ORAL
Qty: 473 ML | Refills: 2 | Status: SHIPPED | OUTPATIENT
Start: 2024-10-09

## 2024-11-20 ENCOUNTER — HOSPITAL ENCOUNTER (OUTPATIENT)
Facility: HOSPITAL | Age: 79
Setting detail: SPECIMEN
Discharge: HOME | End: 2024-11-23
Payer: MEDICARE

## 2024-11-20 DIAGNOSIS — Z13.0 SCREENING FOR ENDOCRINE, METABOLIC AND IMMUNITY DISORDER: ICD-10-CM

## 2024-11-20 DIAGNOSIS — E55.9 VITAMIN D DEFICIENCY: ICD-10-CM

## 2024-11-20 DIAGNOSIS — Z13.228 SCREENING FOR ENDOCRINE, METABOLIC AND IMMUNITY DISORDER: ICD-10-CM

## 2024-11-20 DIAGNOSIS — Z13.29 SCREENING FOR ENDOCRINE, METABOLIC AND IMMUNITY DISORDER: ICD-10-CM

## 2024-11-20 LAB
25(OH)D3 SERPL-MCNC: 43 NG/ML (ref 30–100)
ALBUMIN SERPL-MCNC: 3.9 G/DL (ref 3.4–5)
ALBUMIN/GLOB SERPL: 1.3 (ref 0.8–1.7)
ALP SERPL-CCNC: 61 U/L (ref 45–117)
ALT SERPL-CCNC: 31 U/L (ref 13–56)
ANION GAP SERPL CALC-SCNC: 7 MMOL/L (ref 3–18)
AST SERPL-CCNC: 27 U/L (ref 10–38)
BASOPHILS # BLD: 0 K/UL (ref 0–0.1)
BASOPHILS NFR BLD: 1 % (ref 0–2)
BILIRUB SERPL-MCNC: 0.4 MG/DL (ref 0.2–1)
BUN SERPL-MCNC: 31 MG/DL (ref 7–18)
BUN/CREAT SERPL: 30 (ref 12–20)
CALCIUM SERPL-MCNC: 9.9 MG/DL (ref 8.5–10.1)
CHLORIDE SERPL-SCNC: 99 MMOL/L (ref 100–111)
CHOLEST SERPL-MCNC: 212 MG/DL
CO2 SERPL-SCNC: 30 MMOL/L (ref 21–32)
CREAT SERPL-MCNC: 1.03 MG/DL (ref 0.6–1.3)
DIFFERENTIAL METHOD BLD: ABNORMAL
EOSINOPHIL # BLD: 0.1 K/UL (ref 0–0.4)
EOSINOPHIL NFR BLD: 2 % (ref 0–5)
ERYTHROCYTE [DISTWIDTH] IN BLOOD BY AUTOMATED COUNT: 13.1 % (ref 11.6–14.5)
EST. AVERAGE GLUCOSE BLD GHB EST-MCNC: 108 MG/DL
GLOBULIN SER CALC-MCNC: 3.1 G/DL (ref 2–4)
GLUCOSE SERPL-MCNC: 106 MG/DL (ref 74–99)
HBA1C MFR BLD: 5.4 % (ref 4.2–5.6)
HCT VFR BLD AUTO: 43 % (ref 35–45)
HDLC SERPL-MCNC: 93 MG/DL (ref 40–60)
HDLC SERPL: 2.3 (ref 0–5)
HGB BLD-MCNC: 13.6 G/DL (ref 12–16)
IMM GRANULOCYTES # BLD AUTO: 0 K/UL (ref 0–0.04)
IMM GRANULOCYTES NFR BLD AUTO: 0 % (ref 0–0.5)
LDLC SERPL CALC-MCNC: 104.8 MG/DL (ref 0–100)
LIPID PANEL: ABNORMAL
LYMPHOCYTES # BLD: 0.9 K/UL (ref 0.9–3.6)
LYMPHOCYTES NFR BLD: 16 % (ref 21–52)
MCH RBC QN AUTO: 29.6 PG (ref 24–34)
MCHC RBC AUTO-ENTMCNC: 31.6 G/DL (ref 31–37)
MCV RBC AUTO: 93.7 FL (ref 78–100)
MONOCYTES # BLD: 0.6 K/UL (ref 0.05–1.2)
MONOCYTES NFR BLD: 10 % (ref 3–10)
NEUTS SEG # BLD: 3.9 K/UL (ref 1.8–8)
NEUTS SEG NFR BLD: 71 % (ref 40–73)
NRBC # BLD: 0 K/UL (ref 0–0.01)
NRBC BLD-RTO: 0 PER 100 WBC
PLATELET # BLD AUTO: 273 K/UL (ref 135–420)
PMV BLD AUTO: 9.5 FL (ref 9.2–11.8)
POTASSIUM SERPL-SCNC: 4.2 MMOL/L (ref 3.5–5.5)
PROT SERPL-MCNC: 7 G/DL (ref 6.4–8.2)
RBC # BLD AUTO: 4.59 M/UL (ref 4.2–5.3)
SODIUM SERPL-SCNC: 136 MMOL/L (ref 136–145)
TRIGL SERPL-MCNC: 71 MG/DL
VLDLC SERPL CALC-MCNC: 14.2 MG/DL
WBC # BLD AUTO: 5.4 K/UL (ref 4.6–13.2)

## 2024-11-20 PROCEDURE — 80053 COMPREHEN METABOLIC PANEL: CPT

## 2024-11-20 PROCEDURE — 80061 LIPID PANEL: CPT

## 2024-11-20 PROCEDURE — 36415 COLL VENOUS BLD VENIPUNCTURE: CPT

## 2024-11-20 PROCEDURE — 82306 VITAMIN D 25 HYDROXY: CPT

## 2024-11-20 PROCEDURE — 83036 HEMOGLOBIN GLYCOSYLATED A1C: CPT

## 2024-11-20 PROCEDURE — 85025 COMPLETE CBC W/AUTO DIFF WBC: CPT

## 2024-11-25 RX ORDER — PAROXETINE 20 MG/1
20 TABLET, FILM COATED ORAL DAILY
Qty: 90 TABLET | Refills: 3 | Status: SHIPPED | OUTPATIENT
Start: 2024-11-25

## 2024-11-27 ENCOUNTER — OFFICE VISIT (OUTPATIENT)
Facility: CLINIC | Age: 79
End: 2024-11-27

## 2024-11-27 VITALS
TEMPERATURE: 97 F | OXYGEN SATURATION: 96 % | SYSTOLIC BLOOD PRESSURE: 104 MMHG | DIASTOLIC BLOOD PRESSURE: 65 MMHG | RESPIRATION RATE: 18 BRPM | HEART RATE: 80 BPM

## 2024-11-27 DIAGNOSIS — E78.5 HYPERLIPIDEMIA LDL GOAL <100: ICD-10-CM

## 2024-11-27 DIAGNOSIS — E87.6 HYPOKALEMIA: ICD-10-CM

## 2024-11-27 DIAGNOSIS — Z13.29 SCREENING FOR ENDOCRINE, METABOLIC AND IMMUNITY DISORDER: Primary | ICD-10-CM

## 2024-11-27 DIAGNOSIS — I50.30 DIASTOLIC HEART FAILURE, UNSPECIFIED HF CHRONICITY (HCC): ICD-10-CM

## 2024-11-27 DIAGNOSIS — R56.9 SEIZURE (HCC): ICD-10-CM

## 2024-11-27 DIAGNOSIS — E55.9 VITAMIN D DEFICIENCY: ICD-10-CM

## 2024-11-27 DIAGNOSIS — E53.8 B12 DEFICIENCY: ICD-10-CM

## 2024-11-27 DIAGNOSIS — Z13.0 SCREENING FOR ENDOCRINE, METABOLIC AND IMMUNITY DISORDER: Primary | ICD-10-CM

## 2024-11-27 DIAGNOSIS — Z13.228 SCREENING FOR ENDOCRINE, METABOLIC AND IMMUNITY DISORDER: Primary | ICD-10-CM

## 2024-11-27 DIAGNOSIS — F41.9 ANXIETY DISORDER, UNSPECIFIED TYPE: ICD-10-CM

## 2024-11-27 RX ORDER — POTASSIUM CHLORIDE 20 MEQ/15ML
SOLUTION ORAL
Qty: 473 ML | Refills: 2 | Status: SHIPPED | OUTPATIENT
Start: 2024-11-27

## 2024-11-27 SDOH — ECONOMIC STABILITY: FOOD INSECURITY: WITHIN THE PAST 12 MONTHS, THE FOOD YOU BOUGHT JUST DIDN'T LAST AND YOU DIDN'T HAVE MONEY TO GET MORE.: NEVER TRUE

## 2024-11-27 SDOH — ECONOMIC STABILITY: FOOD INSECURITY: WITHIN THE PAST 12 MONTHS, YOU WORRIED THAT YOUR FOOD WOULD RUN OUT BEFORE YOU GOT MONEY TO BUY MORE.: NEVER TRUE

## 2024-11-27 SDOH — ECONOMIC STABILITY: INCOME INSECURITY: HOW HARD IS IT FOR YOU TO PAY FOR THE VERY BASICS LIKE FOOD, HOUSING, MEDICAL CARE, AND HEATING?: NOT HARD AT ALL

## 2024-11-27 ASSESSMENT — PATIENT HEALTH QUESTIONNAIRE - PHQ9
SUM OF ALL RESPONSES TO PHQ QUESTIONS 1-9: 0
SUM OF ALL RESPONSES TO PHQ QUESTIONS 1-9: 0
1. LITTLE INTEREST OR PLEASURE IN DOING THINGS: NOT AT ALL
2. FEELING DOWN, DEPRESSED OR HOPELESS: NOT AT ALL
SUM OF ALL RESPONSES TO PHQ QUESTIONS 1-9: 0
SUM OF ALL RESPONSES TO PHQ9 QUESTIONS 1 & 2: 0
SUM OF ALL RESPONSES TO PHQ QUESTIONS 1-9: 0

## 2024-11-27 NOTE — PROGRESS NOTES
\"Have you been to the ER, urgent care clinic since your last visit?  Hospitalized since your last visit?\"    NO    “Have you seen or consulted any other health care providers outside of [facility] since your last visit?”    NO

## 2024-11-28 NOTE — PROGRESS NOTES
Cely Hugo (: 1945) is a 79 y.o. female, here for evaluation of the following chief complaint(s):  Follow-up and Hypertension       ASSESSMENT/PLAN:  Below is the assessment and plan developed based on review of pertinent history, physical exam, labs, studies, and medications.    1. Screening for endocrine, metabolic and immunity disorder  -     Lipid Panel; Future  -     Comprehensive Metabolic Panel; Future  -     CBC with Auto Differential; Future  -     Hemoglobin A1C; Future  2. Hypokalemia  Assessment & Plan:   Stable on potassium supplement  Orders:  -     Potassium Chloride (KAON 10 %) 10 % SOLN oral solution; take 15 MILLILITERS by mouth once daily, Disp-473 mL, R-2Normal  3. B12 deficiency  -     Vitamin B12; Future  4. Vitamin D deficiency  Assessment & Plan:   Stable on supplements  Orders:  -     Vitamin D 25 Hydroxy; Future  5. Anxiety disorder, unspecified type  Assessment & Plan:   Stable on current medications.  Patient follows with behavioral health.  6. Hyperlipidemia LDL goal <100  Assessment & Plan:   Stable on lovastatin  7. Seizure (HCC)  Assessment & Plan:   Stable on Keppra.  Follows with neurology  8. Diastolic heart failure, unspecified HF chronicity (HCC)  Assessment & Plan:   Stable on Lasix and potassium supplement.          Return in about 3 months (around 2025) for labs 1 week before next OV, follow up on chronic conditions.     SUBJECTIVE/OBJECTIVE:  Hypertension      Patient is accompanied by her son.    Patient is doing fine.  In good mood.  No chest pain or palpitations or shortness of breath.  No GI/ symptoms.  No neuro/psych symptoms.    Anxiety is under control on current medications.  Patient follows with behavioral health.    Seizures are under control on Keppra.  Patient follows with neurology.    No signs of congestive heart failure.  Patient continues on Lasix and potassium supplement.  Recommended patient to try to reduce Lasix and potassium supplement.

## 2024-12-11 RX ORDER — LEVETIRACETAM 500 MG/1
TABLET ORAL
Qty: 180 TABLET | Refills: 0 | Status: SHIPPED | OUTPATIENT
Start: 2024-12-11

## 2025-01-13 RX ORDER — LOVASTATIN 40 MG/1
40 TABLET ORAL DAILY
Qty: 90 TABLET | Refills: 1 | Status: SHIPPED | OUTPATIENT
Start: 2025-01-13

## 2025-01-13 NOTE — TELEPHONE ENCOUNTER
PCP: Cullen Jimenez MD    LAST OFFICE VISIT: 11/27/2024    LAST REFILL PER CHART:  Medication:lovastatin (MEVACOR) 40 MG tablet   Ordered On:08/19/2024  Instructions:Take 1 tablet by mouth daily   Dispense:90 tablets  Refills:1      Future Appointments   Date Time Provider Department Center   2/18/2025  3:00 PM [facility] PHLEBOTOMIST [facility]   2/20/2025  2:00 PM [facility] LAB VISIT [facility]   2/21/2025  3:00 PM [facility] INFUSION NURSE 5 [facility]   2/26/2025  2:40 PM Cullen Jimenez MD [facility]

## 2025-02-18 ENCOUNTER — HOSPITAL ENCOUNTER (OUTPATIENT)
Facility: HOSPITAL | Age: 80
Setting detail: INFUSION SERIES
Discharge: HOME | End: 2025-02-21
Payer: MEDICARE

## 2025-02-18 VITALS
TEMPERATURE: 98.6 F | DIASTOLIC BLOOD PRESSURE: 66 MMHG | OXYGEN SATURATION: 95 % | RESPIRATION RATE: 16 BRPM | SYSTOLIC BLOOD PRESSURE: 106 MMHG | HEART RATE: 86 BPM

## 2025-02-18 LAB
ALBUMIN SERPL-MCNC: 4.1 G/DL (ref 3.4–5)
ANION GAP SERPL CALC-SCNC: 7 MMOL/L (ref 3–18)
BUN SERPL-MCNC: 30 MG/DL (ref 7–18)
BUN/CREAT SERPL: 34 (ref 12–20)
CALCIUM SERPL-MCNC: 9.2 MG/DL (ref 8.5–10.1)
CHLORIDE SERPL-SCNC: 100 MMOL/L (ref 100–111)
CO2 SERPL-SCNC: 28 MMOL/L (ref 21–32)
CREAT SERPL-MCNC: 0.89 MG/DL (ref 0.6–1.3)
GLUCOSE SERPL-MCNC: 101 MG/DL (ref 74–99)
MAGNESIUM SERPL-MCNC: 2.3 MG/DL (ref 1.6–2.6)
PHOSPHATE SERPL-MCNC: 3.6 MG/DL (ref 2.5–4.9)
POTASSIUM SERPL-SCNC: 4.1 MMOL/L (ref 3.5–5.5)
SODIUM SERPL-SCNC: 135 MMOL/L (ref 136–145)

## 2025-02-18 PROCEDURE — 83735 ASSAY OF MAGNESIUM: CPT

## 2025-02-18 PROCEDURE — 36415 COLL VENOUS BLD VENIPUNCTURE: CPT

## 2025-02-18 PROCEDURE — 80069 RENAL FUNCTION PANEL: CPT

## 2025-02-18 ASSESSMENT — PAIN - FUNCTIONAL ASSESSMENT: PAIN_FUNCTIONAL_ASSESSMENT: NONE - DENIES PAIN

## 2025-02-18 NOTE — PROGRESS NOTES
[facility] Lab Visit:    Cely Hugo  1945  [id number]    1500: Pt arrived to [facility]. Renal panel, magnesium and phosphorus drawn per order via R forearm x1 attempt. Gauze and coban placed to venipuncture site. Patient departed [facility] in no distress at 1510. Pt is scheduled to return for her Prolia injection appointment on 2/28/25 at 1500.    Vitals:    02/18/25 1501   BP: 106/66   Pulse: 86   Resp: 16   Temp: 98.6 °F (37 °C)   SpO2: 95%

## 2025-02-21 ENCOUNTER — APPOINTMENT (OUTPATIENT)
Facility: HOSPITAL | Age: 80
End: 2025-02-21
Payer: MEDICARE

## 2025-02-22 RX ORDER — LEVETIRACETAM 500 MG/1
TABLET ORAL
Qty: 180 TABLET | Refills: 0 | Status: SHIPPED | OUTPATIENT
Start: 2025-02-22

## 2025-02-24 ENCOUNTER — HOSPITAL ENCOUNTER (OUTPATIENT)
Facility: HOSPITAL | Age: 80
Setting detail: SPECIMEN
Discharge: HOME | End: 2025-02-27
Payer: MEDICARE

## 2025-02-24 DIAGNOSIS — E55.9 VITAMIN D DEFICIENCY: ICD-10-CM

## 2025-02-24 DIAGNOSIS — Z13.0 SCREENING FOR ENDOCRINE, METABOLIC AND IMMUNITY DISORDER: ICD-10-CM

## 2025-02-24 DIAGNOSIS — E53.8 B12 DEFICIENCY: ICD-10-CM

## 2025-02-24 DIAGNOSIS — Z13.228 SCREENING FOR ENDOCRINE, METABOLIC AND IMMUNITY DISORDER: ICD-10-CM

## 2025-02-24 DIAGNOSIS — Z13.29 SCREENING FOR ENDOCRINE, METABOLIC AND IMMUNITY DISORDER: ICD-10-CM

## 2025-02-24 LAB
25(OH)D3 SERPL-MCNC: 47.9 NG/ML (ref 30–100)
ALBUMIN SERPL-MCNC: 3.9 G/DL (ref 3.4–5)
ALBUMIN/GLOB SERPL: 1.3 (ref 0.8–1.7)
ALP SERPL-CCNC: 60 U/L (ref 45–117)
ALT SERPL-CCNC: 26 U/L (ref 13–56)
ANION GAP SERPL CALC-SCNC: 6 MMOL/L (ref 3–18)
AST SERPL-CCNC: 25 U/L (ref 10–38)
BASOPHILS # BLD: 0.03 K/UL (ref 0–0.1)
BASOPHILS NFR BLD: 0.5 % (ref 0–2)
BILIRUB SERPL-MCNC: 0.4 MG/DL (ref 0.2–1)
BUN SERPL-MCNC: 33 MG/DL (ref 7–18)
BUN/CREAT SERPL: 35 (ref 12–20)
CALCIUM SERPL-MCNC: 9 MG/DL (ref 8.5–10.1)
CHLORIDE SERPL-SCNC: 100 MMOL/L (ref 100–111)
CHOLEST SERPL-MCNC: 213 MG/DL
CO2 SERPL-SCNC: 30 MMOL/L (ref 21–32)
CREAT SERPL-MCNC: 0.93 MG/DL (ref 0.6–1.3)
DIFFERENTIAL METHOD BLD: ABNORMAL
EOSINOPHIL # BLD: 0.06 K/UL (ref 0–0.4)
EOSINOPHIL NFR BLD: 1 % (ref 0–5)
ERYTHROCYTE [DISTWIDTH] IN BLOOD BY AUTOMATED COUNT: 12.9 % (ref 11.6–14.5)
EST. AVERAGE GLUCOSE BLD GHB EST-MCNC: 108 MG/DL
GLOBULIN SER CALC-MCNC: 3.1 G/DL (ref 2–4)
GLUCOSE SERPL-MCNC: 102 MG/DL (ref 74–99)
HBA1C MFR BLD: 5.4 % (ref 4.2–5.6)
HCT VFR BLD AUTO: 43.6 % (ref 35–45)
HDLC SERPL-MCNC: 89 MG/DL (ref 40–60)
HDLC SERPL: 2.4 (ref 0–5)
HGB BLD-MCNC: 13.6 G/DL (ref 12–16)
IMM GRANULOCYTES # BLD AUTO: 0.02 K/UL (ref 0–0.04)
IMM GRANULOCYTES NFR BLD AUTO: 0.3 % (ref 0–0.5)
LDLC SERPL CALC-MCNC: 112.4 MG/DL (ref 0–100)
LIPID PANEL: ABNORMAL
LYMPHOCYTES # BLD: 0.89 K/UL (ref 0.9–3.6)
LYMPHOCYTES NFR BLD: 15.6 % (ref 21–52)
MCH RBC QN AUTO: 29.6 PG (ref 24–34)
MCHC RBC AUTO-ENTMCNC: 31.2 G/DL (ref 31–37)
MCV RBC AUTO: 95 FL (ref 78–100)
MONOCYTES # BLD: 0.48 K/UL (ref 0.05–1.2)
MONOCYTES NFR BLD: 8.4 % (ref 3–10)
NEUTS SEG # BLD: 4.24 K/UL (ref 1.8–8)
NEUTS SEG NFR BLD: 74.2 % (ref 40–73)
NRBC # BLD: 0 K/UL (ref 0–0.01)
NRBC BLD-RTO: 0 PER 100 WBC
PLATELET # BLD AUTO: ABNORMAL K/UL (ref 135–420)
PMV BLD AUTO: 10.6 FL (ref 9.2–11.8)
POTASSIUM SERPL-SCNC: 4.4 MMOL/L (ref 3.5–5.5)
PROT SERPL-MCNC: 7 G/DL (ref 6.4–8.2)
RBC # BLD AUTO: 4.59 M/UL (ref 4.2–5.3)
SODIUM SERPL-SCNC: 136 MMOL/L (ref 136–145)
TRIGL SERPL-MCNC: 58 MG/DL
VIT B12 SERPL-MCNC: 942 PG/ML (ref 211–911)
VLDLC SERPL CALC-MCNC: 11.6 MG/DL
WBC # BLD AUTO: 5.7 K/UL (ref 4.6–13.2)

## 2025-02-24 PROCEDURE — 85025 COMPLETE CBC W/AUTO DIFF WBC: CPT

## 2025-02-24 PROCEDURE — 83036 HEMOGLOBIN GLYCOSYLATED A1C: CPT

## 2025-02-24 PROCEDURE — 36415 COLL VENOUS BLD VENIPUNCTURE: CPT

## 2025-02-24 PROCEDURE — 80061 LIPID PANEL: CPT

## 2025-02-24 PROCEDURE — 80053 COMPREHEN METABOLIC PANEL: CPT

## 2025-02-24 PROCEDURE — 82607 VITAMIN B-12: CPT

## 2025-02-24 PROCEDURE — 82306 VITAMIN D 25 HYDROXY: CPT

## 2025-02-24 RX ORDER — ACETAMINOPHEN 325 MG/1
650 TABLET ORAL
Status: CANCELLED | OUTPATIENT
Start: 2025-02-24

## 2025-02-24 RX ORDER — HYDROCORTISONE SODIUM SUCCINATE 100 MG/2ML
100 INJECTION INTRAMUSCULAR; INTRAVENOUS
Status: CANCELLED | OUTPATIENT
Start: 2025-02-24

## 2025-02-24 RX ORDER — DIPHENHYDRAMINE HYDROCHLORIDE 50 MG/ML
50 INJECTION INTRAMUSCULAR; INTRAVENOUS
Status: CANCELLED | OUTPATIENT
Start: 2025-02-24

## 2025-02-24 RX ORDER — SODIUM CHLORIDE 9 MG/ML
INJECTION, SOLUTION INTRAVENOUS CONTINUOUS
Status: CANCELLED | OUTPATIENT
Start: 2025-02-24

## 2025-02-24 RX ORDER — ALBUTEROL SULFATE 90 UG/1
4 INHALANT RESPIRATORY (INHALATION) PRN
Status: CANCELLED | OUTPATIENT
Start: 2025-02-24

## 2025-02-24 RX ORDER — ONDANSETRON 2 MG/ML
8 INJECTION INTRAMUSCULAR; INTRAVENOUS
Status: CANCELLED | OUTPATIENT
Start: 2025-02-24

## 2025-02-24 RX ORDER — EPINEPHRINE 1 MG/ML
0.3 INJECTION, SOLUTION, CONCENTRATE INTRAVENOUS PRN
Status: CANCELLED | OUTPATIENT
Start: 2025-02-24

## 2025-02-26 ENCOUNTER — OFFICE VISIT (OUTPATIENT)
Facility: CLINIC | Age: 80
End: 2025-02-26

## 2025-02-26 VITALS
OXYGEN SATURATION: 98 % | WEIGHT: 112 LBS | BODY MASS INDEX: 18.66 KG/M2 | DIASTOLIC BLOOD PRESSURE: 62 MMHG | TEMPERATURE: 99.5 F | RESPIRATION RATE: 16 BRPM | HEIGHT: 65 IN | HEART RATE: 97 BPM | SYSTOLIC BLOOD PRESSURE: 122 MMHG

## 2025-02-26 DIAGNOSIS — G89.4 CHRONIC PAIN SYNDROME: ICD-10-CM

## 2025-02-26 DIAGNOSIS — C82.14 FOLLICULAR LYMPHOMA GRADE II, LYMPH NODES OF AXILLA AND UPPER LIMB (HCC): ICD-10-CM

## 2025-02-26 DIAGNOSIS — K21.9 GASTROESOPHAGEAL REFLUX DISEASE, UNSPECIFIED WHETHER ESOPHAGITIS PRESENT: ICD-10-CM

## 2025-02-26 DIAGNOSIS — M06.9 RHEUMATOID ARTHRITIS INVOLVING MULTIPLE JOINTS (HCC): ICD-10-CM

## 2025-02-26 DIAGNOSIS — R56.9 SEIZURE (HCC): ICD-10-CM

## 2025-02-26 DIAGNOSIS — K59.00 CONSTIPATION, UNSPECIFIED CONSTIPATION TYPE: ICD-10-CM

## 2025-02-26 DIAGNOSIS — N18.32 CHRONIC KIDNEY DISEASE, STAGE 3B (HCC): ICD-10-CM

## 2025-02-26 DIAGNOSIS — Z13.0 SCREENING FOR ENDOCRINE, METABOLIC AND IMMUNITY DISORDER: Primary | ICD-10-CM

## 2025-02-26 DIAGNOSIS — Z13.29 SCREENING FOR ENDOCRINE, METABOLIC AND IMMUNITY DISORDER: Primary | ICD-10-CM

## 2025-02-26 DIAGNOSIS — I10 PRIMARY HYPERTENSION: ICD-10-CM

## 2025-02-26 DIAGNOSIS — I50.30 DIASTOLIC HEART FAILURE, UNSPECIFIED HF CHRONICITY (HCC): ICD-10-CM

## 2025-02-26 DIAGNOSIS — M81.0 OSTEOPOROSIS, UNSPECIFIED OSTEOPOROSIS TYPE, UNSPECIFIED PATHOLOGICAL FRACTURE PRESENCE: ICD-10-CM

## 2025-02-26 DIAGNOSIS — Z13.228 SCREENING FOR ENDOCRINE, METABOLIC AND IMMUNITY DISORDER: Primary | ICD-10-CM

## 2025-02-26 DIAGNOSIS — E55.9 VITAMIN D DEFICIENCY: ICD-10-CM

## 2025-02-26 DIAGNOSIS — F03.918 DEMENTIA WITH BEHAVIORAL DISTURBANCE (HCC): ICD-10-CM

## 2025-02-26 SDOH — ECONOMIC STABILITY: FOOD INSECURITY: WITHIN THE PAST 12 MONTHS, THE FOOD YOU BOUGHT JUST DIDN'T LAST AND YOU DIDN'T HAVE MONEY TO GET MORE.: NEVER TRUE

## 2025-02-26 SDOH — ECONOMIC STABILITY: FOOD INSECURITY: WITHIN THE PAST 12 MONTHS, YOU WORRIED THAT YOUR FOOD WOULD RUN OUT BEFORE YOU GOT MONEY TO BUY MORE.: NEVER TRUE

## 2025-02-26 ASSESSMENT — PATIENT HEALTH QUESTIONNAIRE - PHQ9
9. THOUGHTS THAT YOU WOULD BE BETTER OFF DEAD, OR OF HURTING YOURSELF: NOT AT ALL
10. IF YOU CHECKED OFF ANY PROBLEMS, HOW DIFFICULT HAVE THESE PROBLEMS MADE IT FOR YOU TO DO YOUR WORK, TAKE CARE OF THINGS AT HOME, OR GET ALONG WITH OTHER PEOPLE: NOT DIFFICULT AT ALL
6. FEELING BAD ABOUT YOURSELF - OR THAT YOU ARE A FAILURE OR HAVE LET YOURSELF OR YOUR FAMILY DOWN: NOT AT ALL
4. FEELING TIRED OR HAVING LITTLE ENERGY: NOT AT ALL
SUM OF ALL RESPONSES TO PHQ9 QUESTIONS 1 & 2: 0
7. TROUBLE CONCENTRATING ON THINGS, SUCH AS READING THE NEWSPAPER OR WATCHING TELEVISION: NOT AT ALL
5. POOR APPETITE OR OVEREATING: NOT AT ALL
SUM OF ALL RESPONSES TO PHQ QUESTIONS 1-9: 0
SUM OF ALL RESPONSES TO PHQ QUESTIONS 1-9: 0
8. MOVING OR SPEAKING SO SLOWLY THAT OTHER PEOPLE COULD HAVE NOTICED. OR THE OPPOSITE, BEING SO FIGETY OR RESTLESS THAT YOU HAVE BEEN MOVING AROUND A LOT MORE THAN USUAL: NOT AT ALL
1. LITTLE INTEREST OR PLEASURE IN DOING THINGS: NOT AT ALL
SUM OF ALL RESPONSES TO PHQ QUESTIONS 1-9: 0
SUM OF ALL RESPONSES TO PHQ QUESTIONS 1-9: 0
3. TROUBLE FALLING OR STAYING ASLEEP: NOT AT ALL
2. FEELING DOWN, DEPRESSED OR HOPELESS: NOT AT ALL

## 2025-02-26 NOTE — PROGRESS NOTES
Cely Hugo presents today for   Chief Complaint   Patient presents with    Follow-up           \"Have you been to the ER, urgent care clinic since your last visit?  Hospitalized since your last visit?\"    NO    “Have you seen or consulted any other health care providers outside of [facility] since your last visit?”    NO just pain management.

## 2025-02-27 NOTE — PROGRESS NOTES
Cely Hugo (: 1945) is a 79 y.o. female, here for evaluation of the following chief complaint(s):  Follow-up       ASSESSMENT/PLAN:  Below is the assessment and plan developed based on review of pertinent history, physical exam, labs, studies, and medications.    1. Screening for endocrine, metabolic and immunity disorder  -     Lipid Panel; Future  -     Hemoglobin A1C; Future  -     Comprehensive Metabolic Panel; Future  -     CBC with Auto Differential; Future  2. Follicular lymphoma grade ii, lymph nodes of axilla and upper limb (HCC)  Assessment & Plan:   Stable.  Follows with hematology.  3. Rheumatoid arthritis involving multiple joints (HCC)  Assessment & Plan:   Stable.  Follows with rheumatology.  4. Diastolic heart failure, unspecified HF chronicity (HCC)  Assessment & Plan:   Stable on Lasix and potassium supplement.  5. Dementia with behavioral disturbance (HCC)  Assessment & Plan:   Stable on current medications.  Follows with behavioral health.  6. Seizure (HCC)  Assessment & Plan:   Stable on Keppra.  Follows with neurology.  7. Chronic kidney disease, stage 3b (HCC)  Assessment & Plan:   Kidney function stable.  Follows with nephrology.  8. Vitamin D deficiency  Assessment & Plan:   Stable on supplements.  Orders:  -     Vitamin D 25 Hydroxy; Future  9. Primary hypertension  Assessment & Plan:   Patient is stable on current medications.  10. Gastroesophageal reflux disease, unspecified whether esophagitis present  Assessment & Plan:   Stable without medications.  11. Osteoporosis, unspecified osteoporosis type, unspecified pathological fracture presence  Assessment & Plan:   Patient follows with endocrine.  Stable on Prolia  12. Constipation, unspecified constipation type  Assessment & Plan:   Stable on Linzess  13. Chronic pain syndrome  Assessment & Plan:   Stable on current medications.  Patient follows with pain management.        Return in about 3 months (around 2025) for labs 1

## 2025-02-28 ENCOUNTER — HOSPITAL ENCOUNTER (OUTPATIENT)
Facility: HOSPITAL | Age: 80
Setting detail: INFUSION SERIES
End: 2025-02-28
Payer: MEDICARE

## 2025-02-28 VITALS
SYSTOLIC BLOOD PRESSURE: 106 MMHG | HEART RATE: 100 BPM | RESPIRATION RATE: 16 BRPM | TEMPERATURE: 98.3 F | OXYGEN SATURATION: 95 % | DIASTOLIC BLOOD PRESSURE: 64 MMHG

## 2025-02-28 DIAGNOSIS — M81.0 OSTEOPOROSIS, UNSPECIFIED OSTEOPOROSIS TYPE, UNSPECIFIED PATHOLOGICAL FRACTURE PRESENCE: Primary | ICD-10-CM

## 2025-02-28 PROCEDURE — 96372 THER/PROPH/DIAG INJ SC/IM: CPT

## 2025-02-28 PROCEDURE — 6360000002 HC RX W HCPCS: Performed by: INTERNAL MEDICINE

## 2025-02-28 RX ORDER — DIPHENHYDRAMINE HYDROCHLORIDE 50 MG/ML
50 INJECTION INTRAMUSCULAR; INTRAVENOUS
OUTPATIENT
Start: 2025-07-13

## 2025-02-28 RX ORDER — HYDROCORTISONE SODIUM SUCCINATE 100 MG/2ML
100 INJECTION INTRAMUSCULAR; INTRAVENOUS
OUTPATIENT
Start: 2025-07-13

## 2025-02-28 RX ORDER — SODIUM CHLORIDE 9 MG/ML
INJECTION, SOLUTION INTRAVENOUS CONTINUOUS
OUTPATIENT
Start: 2025-07-13

## 2025-02-28 RX ORDER — EPINEPHRINE 1 MG/ML
0.3 INJECTION, SOLUTION, CONCENTRATE INTRAVENOUS PRN
OUTPATIENT
Start: 2025-07-13

## 2025-02-28 RX ORDER — ONDANSETRON 2 MG/ML
8 INJECTION INTRAMUSCULAR; INTRAVENOUS
OUTPATIENT
Start: 2025-07-13

## 2025-02-28 RX ORDER — ALBUTEROL SULFATE 90 UG/1
4 INHALANT RESPIRATORY (INHALATION) PRN
OUTPATIENT
Start: 2025-07-13

## 2025-02-28 RX ORDER — ACETAMINOPHEN 325 MG/1
650 TABLET ORAL
OUTPATIENT
Start: 2025-07-13

## 2025-02-28 RX ADMIN — DENOSUMAB 60 MG: 60 INJECTION SUBCUTANEOUS at 15:22

## 2025-02-28 ASSESSMENT — PAIN - FUNCTIONAL ASSESSMENT: PAIN_FUNCTIONAL_ASSESSMENT: NONE - DENIES PAIN

## 2025-02-28 NOTE — PROGRESS NOTES
[facility] Progress Note    Date: 2025    Name: Cely Hugo    MRN: [id number]         : 1945    [facility], in stable condition and accompanied by her son, here for her PROLIA INJECTION (EVERY 6 MONTHS). She was assessed and education was provided.     Ms. Hugo's vitals were reviewed.  Vitals:    25 1505   BP: 106/64   Pulse: 100   Resp: 16   Temp: 98.3 °F (36.8 °C)   SpO2: 95%       Ms. Hugo presented to the infusion center today voicing no complaints. However, since she has some significant dementia, all assessment questions today were answered by her son.    And per her son, she had no significant issues or problems currently, and was doing well.     And, he stated that she has tolerated past PROLIA injections well, and without any issues or side effects.         Her PRE-PROLIA labs were reviewed and were all noted to be satisfactory for treatment today, and were as follows:  (Please note that her official PRE-PROLIA LABS were drawn here in our [facility] on 25. And then she had some additional labs drawn elsewhere on 25, which also contained a Calcium result.)         Latest Reference Range & Units 25 15:05 25 15:11   Calcium 8.5 - 10.1 MG/DL 9.2 9.0          Latest Reference Range & Units 25 15:05   Sodium 136 - 145 mmol/L 135 (L)   Potassium 3.5 - 5.5 mmol/L 4.1   Chloride 100 - 111 mmol/L 100   CARBON DIOXIDE 21 - 32 mmol/L 28   BUN,BUNPL 7.0 - 18 MG/DL 30 (H)   Creatinine 0.6 - 1.3 MG/DL 0.89   Bun/Cre 12 - 20   34 (H)   Anion Gap 3.0 - 18 mmol/L 7   Est, Glom Filt Rate >60 ml/min/1.73m2 67   Magnesium 1.6 - 2.6 mg/dL 2.3   Glucose 74 - 99 mg/dL 101 (H)   Calcium 8.5 - 10.1 MG/DL 9.2   Phosphorus 2.5 - 4.9 MG/DL 3.6   Albumin 3.4 - 5.0 g/dL 4.1   (L): Data is abnormally low  (H): Data is abnormally high          Denosumab (PROLIA) 60 mg, was administered SQ, in the back of her RIGHT ARM at 1522, per order and without incident.

## 2025-03-11 ENCOUNTER — TELEPHONE (OUTPATIENT)
Facility: CLINIC | Age: 80
End: 2025-03-11

## 2025-03-11 DIAGNOSIS — H91.93 HEARING IMPAIRED PERSON, BILATERAL: Primary | ICD-10-CM

## 2025-03-12 DIAGNOSIS — E87.6 HYPOKALEMIA: ICD-10-CM

## 2025-03-12 RX ORDER — POTASSIUM CHLORIDE 20 MEQ/15ML
SOLUTION ORAL
Qty: 473 ML | Refills: 2 | Status: SHIPPED | OUTPATIENT
Start: 2025-03-12

## 2025-04-23 DIAGNOSIS — K59.00 CONSTIPATION, UNSPECIFIED CONSTIPATION TYPE: ICD-10-CM

## 2025-04-23 RX ORDER — LINACLOTIDE 72 UG/1
72 CAPSULE, GELATIN COATED ORAL
Qty: 90 CAPSULE | Refills: 2 | Status: SHIPPED | OUTPATIENT
Start: 2025-04-23

## 2025-05-27 ENCOUNTER — PATIENT MESSAGE (OUTPATIENT)
Facility: CLINIC | Age: 80
End: 2025-05-27

## 2025-06-17 ENCOUNTER — TELEPHONE (OUTPATIENT)
Facility: CLINIC | Age: 80
End: 2025-06-17

## 2025-06-17 ENCOUNTER — CLINICAL DOCUMENTATION (OUTPATIENT)
Facility: CLINIC | Age: 80
End: 2025-06-17

## 2025-06-17 NOTE — TELEPHONE ENCOUNTER
Pt  has dementia and currently in a nursing facility , her son Manuel Hugo was the only one on her HIPAA . Manuel Hugo passed away   Leaving Jack Esparza the executer of his will   Jack Esparza was advised by his  (tiffanie Lacey) in order to get guardianship   For Cely Hugo he would need her Dr to sign off on documentation  he is asking how he can go about this please advise       Jack Esparza - [phone]

## 2025-06-18 NOTE — TELEPHONE ENCOUNTER
Mr Esparza will need to contact the courts to see what the process is in order to obtain Guardianship of Ms. Hugo. Mr Esparza aware